# Patient Record
Sex: MALE | Race: BLACK OR AFRICAN AMERICAN | NOT HISPANIC OR LATINO | Employment: OTHER | URBAN - METROPOLITAN AREA
[De-identification: names, ages, dates, MRNs, and addresses within clinical notes are randomized per-mention and may not be internally consistent; named-entity substitution may affect disease eponyms.]

---

## 2017-04-22 ENCOUNTER — HOSPITAL ENCOUNTER (INPATIENT)
Facility: HOSPITAL | Age: 42
LOS: 6 days | Discharge: HOME HEALTH | DRG: 579 | End: 2017-04-28
Attending: EMERGENCY MEDICINE | Admitting: INTERNAL MEDICINE
Payer: MEDICARE

## 2017-04-22 DIAGNOSIS — D69.59 THROMBOCYTOPENIA DUE TO DEFECTIVE PLATELET PRODUCTION: ICD-10-CM

## 2017-04-22 DIAGNOSIS — I12.9 HYPERTENSIVE RENAL DISEASE: ICD-10-CM

## 2017-04-22 DIAGNOSIS — J18.9 PNEUMONIA OF RIGHT UPPER LOBE DUE TO INFECTIOUS ORGANISM: ICD-10-CM

## 2017-04-22 DIAGNOSIS — I50.23 ACUTE ON CHRONIC SYSTOLIC HEART FAILURE: ICD-10-CM

## 2017-04-22 DIAGNOSIS — N18.6 ESRD (END STAGE RENAL DISEASE): ICD-10-CM

## 2017-04-22 DIAGNOSIS — D63.8 ANEMIA OF CHRONIC DISEASE: ICD-10-CM

## 2017-04-22 DIAGNOSIS — R05.9 COUGH: ICD-10-CM

## 2017-04-22 DIAGNOSIS — D72.9 NEUTROPHILIC LEUKOCYTOSIS: ICD-10-CM

## 2017-04-22 DIAGNOSIS — E80.6 HYPERBILIRUBINEMIA: ICD-10-CM

## 2017-04-22 DIAGNOSIS — R18.8 ASCITES: ICD-10-CM

## 2017-04-22 DIAGNOSIS — K75.89 CHOLESTATIC HEPATITIS: ICD-10-CM

## 2017-04-22 DIAGNOSIS — R00.0 TACHYCARDIA: ICD-10-CM

## 2017-04-22 DIAGNOSIS — M62.81 GENERALIZED MUSCLE WEAKNESS: ICD-10-CM

## 2017-04-22 DIAGNOSIS — R00.0 SINUS TACHYCARDIA BY ELECTROCARDIOGRAPHY: ICD-10-CM

## 2017-04-22 DIAGNOSIS — R22.2 MASS OF RIGHT CHEST WALL: Primary | ICD-10-CM

## 2017-04-22 DIAGNOSIS — I42.9 CARDIOMYOPATHY: ICD-10-CM

## 2017-04-22 DIAGNOSIS — I10 HYPERTENSION: ICD-10-CM

## 2017-04-22 PROBLEM — R59.0 LYMPHADENOPATHY, MEDIASTINAL: Status: ACTIVE | Noted: 2017-04-22

## 2017-04-22 LAB
ALBUMIN SERPL BCP-MCNC: 3.1 G/DL
ALP SERPL-CCNC: 274 U/L
ALT SERPL W/O P-5'-P-CCNC: 52 U/L
ANION GAP SERPL CALC-SCNC: 16 MMOL/L
ANISOCYTOSIS BLD QL SMEAR: SLIGHT
AST SERPL-CCNC: 63 U/L
BASOPHILS # BLD AUTO: 0.01 K/UL
BASOPHILS NFR BLD: 0.1 %
BILIRUB SERPL-MCNC: 4.1 MG/DL
BUN SERPL-MCNC: 43 MG/DL
CALCIUM SERPL-MCNC: 9.9 MG/DL
CHLORIDE SERPL-SCNC: 92 MMOL/L
CO2 SERPL-SCNC: 29 MMOL/L
CREAT SERPL-MCNC: 9.5 MG/DL
DIFFERENTIAL METHOD: ABNORMAL
EOSINOPHIL # BLD AUTO: 0 K/UL
EOSINOPHIL NFR BLD: 0.2 %
ERYTHROCYTE [DISTWIDTH] IN BLOOD BY AUTOMATED COUNT: 19.7 %
EST. GFR  (AFRICAN AMERICAN): 7 ML/MIN/1.73 M^2
EST. GFR  (NON AFRICAN AMERICAN): 6 ML/MIN/1.73 M^2
GLUCOSE SERPL-MCNC: 75 MG/DL
HCT VFR BLD AUTO: 34.9 %
HGB BLD-MCNC: 11.3 G/DL
HYPOCHROMIA BLD QL SMEAR: ABNORMAL
LACTATE SERPL-SCNC: 1.5 MMOL/L
LYMPHOCYTES # BLD AUTO: 1.1 K/UL
LYMPHOCYTES NFR BLD: 8.6 %
MAGNESIUM SERPL-MCNC: 2.1 MG/DL
MCH RBC QN AUTO: 28.8 PG
MCHC RBC AUTO-ENTMCNC: 32.4 %
MCV RBC AUTO: 89 FL
MONOCYTES # BLD AUTO: 1.1 K/UL
MONOCYTES NFR BLD: 8.2 %
NEUTROPHILS # BLD AUTO: 10.8 K/UL
NEUTROPHILS NFR BLD: 83.1 %
OVALOCYTES BLD QL SMEAR: ABNORMAL
PHOSPHATE SERPL-MCNC: 6.3 MG/DL
PLATELET # BLD AUTO: 142 K/UL
PMV BLD AUTO: ABNORMAL FL
POCT GLUCOSE: 75 MG/DL (ref 70–110)
POIKILOCYTOSIS BLD QL SMEAR: SLIGHT
POLYCHROMASIA BLD QL SMEAR: ABNORMAL
POTASSIUM SERPL-SCNC: 4.9 MMOL/L
PROT SERPL-MCNC: 9.2 G/DL
RBC # BLD AUTO: 3.93 M/UL
SODIUM SERPL-SCNC: 137 MMOL/L
TARGETS BLD QL SMEAR: ABNORMAL
WBC # BLD AUTO: 13.03 K/UL

## 2017-04-22 PROCEDURE — 11000001 HC ACUTE MED/SURG PRIVATE ROOM

## 2017-04-22 PROCEDURE — 25000003 PHARM REV CODE 250: Performed by: INTERNAL MEDICINE

## 2017-04-22 PROCEDURE — 84100 ASSAY OF PHOSPHORUS: CPT

## 2017-04-22 PROCEDURE — 80100016 HC MAINTENANCE HEMODIALYSIS

## 2017-04-22 PROCEDURE — 83735 ASSAY OF MAGNESIUM: CPT

## 2017-04-22 PROCEDURE — 85025 COMPLETE CBC W/AUTO DIFF WBC: CPT

## 2017-04-22 PROCEDURE — 99285 EMERGENCY DEPT VISIT HI MDM: CPT

## 2017-04-22 PROCEDURE — 93005 ELECTROCARDIOGRAM TRACING: CPT

## 2017-04-22 PROCEDURE — 82962 GLUCOSE BLOOD TEST: CPT

## 2017-04-22 PROCEDURE — 87040 BLOOD CULTURE FOR BACTERIA: CPT

## 2017-04-22 PROCEDURE — 80053 COMPREHEN METABOLIC PANEL: CPT

## 2017-04-22 PROCEDURE — 5A1D60Z PERFORMANCE OF URINARY FILTRATION, MULTIPLE: ICD-10-PCS | Performed by: INTERNAL MEDICINE

## 2017-04-22 PROCEDURE — 63600175 PHARM REV CODE 636 W HCPCS: Performed by: INTERNAL MEDICINE

## 2017-04-22 PROCEDURE — 83605 ASSAY OF LACTIC ACID: CPT

## 2017-04-22 RX ORDER — SODIUM CHLORIDE 9 MG/ML
INJECTION, SOLUTION INTRAVENOUS ONCE
Status: DISCONTINUED | OUTPATIENT
Start: 2017-04-22 | End: 2017-04-28 | Stop reason: HOSPADM

## 2017-04-22 RX ORDER — CARVEDILOL 6.25 MG/1
25 TABLET ORAL 2 TIMES DAILY WITH MEALS
Status: DISCONTINUED | OUTPATIENT
Start: 2017-04-22 | End: 2017-04-28 | Stop reason: HOSPADM

## 2017-04-22 RX ORDER — HEPARIN SODIUM 5000 [USP'U]/ML
5000 INJECTION, SOLUTION INTRAVENOUS; SUBCUTANEOUS EVERY 8 HOURS
Status: DISCONTINUED | OUTPATIENT
Start: 2017-04-22 | End: 2017-04-28 | Stop reason: HOSPADM

## 2017-04-22 RX ORDER — SODIUM CHLORIDE 9 MG/ML
INJECTION, SOLUTION INTRAVENOUS
Status: DISCONTINUED | OUTPATIENT
Start: 2017-04-22 | End: 2017-04-28 | Stop reason: HOSPADM

## 2017-04-22 RX ORDER — RAMELTEON 8 MG/1
8 TABLET ORAL NIGHTLY PRN
Status: DISCONTINUED | OUTPATIENT
Start: 2017-04-22 | End: 2017-04-28 | Stop reason: HOSPADM

## 2017-04-22 RX ADMIN — HEPARIN SODIUM 5000 UNITS: 5000 INJECTION, SOLUTION INTRAVENOUS; SUBCUTANEOUS at 09:04

## 2017-04-22 RX ADMIN — CARVEDILOL 25 MG: 6.25 TABLET, FILM COATED ORAL at 09:04

## 2017-04-22 RX ADMIN — VANCOMYCIN HYDROCHLORIDE 1250 MG: 1 INJECTION, POWDER, LYOPHILIZED, FOR SOLUTION INTRAVENOUS at 05:04

## 2017-04-22 NOTE — ASSESSMENT & PLAN NOTE
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 Consult to IR placed for biopsy. Onc consult placed for further recs.

## 2017-04-22 NOTE — SUBJECTIVE & OBJECTIVE
Past Medical History:   Diagnosis Date    ESRD (end stage renal disease)     Hypertension     Renal disorder        Past Surgical History:   Procedure Laterality Date    DIALYSIS FISTULA CREATION         Review of patient's allergies indicates:   Allergen Reactions    Ciprofloxacin Hives    Iodine and iodide containing products Hives and Itching       No current facility-administered medications on file prior to encounter.      Current Outpatient Prescriptions on File Prior to Encounter   Medication Sig    aspirin 325 MG tablet Take 1 tablet (325 mg total) by mouth once daily.    B COMPLEX WITH VITAMIN C (VITAMIN B COMPLEX WITH C ORAL) Take by mouth.    carvedilol (COREG) 6.25 MG tablet Take 25 mg by mouth 2 (two) times daily with meals.     HYDRALAZINE HCL (HYDRALAZINE ORAL) Take by mouth.    lisinopril (PRINIVIL,ZESTRIL) 40 MG tablet Take 1 tablet (40 mg total) by mouth once daily.    [DISCONTINUED] cinacalcet (SENSIPAR) 30 MG Tab Take 30 mg by mouth daily with breakfast.    [DISCONTINUED] sevelamer carbonate (RENVELA) 800 mg Tab Take 800 mg by mouth 3 (three) times daily with meals.     Family History     Problem Relation (Age of Onset)    Kidney disease Mother        Social History Main Topics    Smoking status: Never Smoker    Smokeless tobacco: Not on file    Alcohol use No    Drug use: No    Sexual activity: Not on file     Review of Systems   Constitutional: Positive for activity change and fatigue. Negative for unexpected weight change.   HENT: Negative.    Eyes: Negative.    Respiratory: Negative.    Cardiovascular: Negative.    Gastrointestinal: Negative.    Endocrine: Negative.    Genitourinary: Negative.    Musculoskeletal: Negative.    Skin: Negative.    Allergic/Immunologic: Positive for immunocompromised state.   Neurological: Positive for weakness.   Hematological: Negative.    Psychiatric/Behavioral: Negative.      Objective:     Vital Signs (Most Recent):  Temp: 97.6 °F (36.4 °C)  (04/22/17 0958)  Pulse: 108 (04/22/17 1118)  Resp: 18 (04/22/17 0958)  BP: 133/86 (04/22/17 1118)  SpO2: 95 % (04/22/17 1118) Vital Signs (24h Range):  Temp:  [97.6 °F (36.4 °C)] 97.6 °F (36.4 °C)  Pulse:  [105-108] 108  Resp:  [18] 18  SpO2:  [95 %-98 %] 95 %  BP: (133-152)/(81-86) 133/86     Weight: 110.7 kg (244 lb)  Body mass index is 41.88 kg/(m^2).    Physical Exam   Constitutional: He is oriented to person, place, and time. He appears well-developed. No distress.   Non toxic appearance   HENT:   Head: Normocephalic and atraumatic.   Mouth/Throat: Oropharynx is clear and moist.   Eyes: Conjunctivae and EOM are normal. Pupils are equal, round, and reactive to light.   Injected sclerae   Neck: Normal range of motion. Neck supple. No thyromegaly present.   No palpable lymphadenopathy   Cardiovascular:   Sinus tachycardia   Pulmonary/Chest: Effort normal and breath sounds normal. No respiratory distress. He has no wheezes. He has no rales. He exhibits no tenderness.   Abdominal: Soft. Bowel sounds are normal. He exhibits no distension. There is no tenderness.   Musculoskeletal: Normal range of motion. He exhibits no edema, tenderness or deformity.   Did not feel a mass on palpation of chest or back   Neurological: He is alert and oriented to person, place, and time. He displays normal reflexes. No cranial nerve deficit. He exhibits normal muscle tone. Coordination normal.   Skin: Skin is warm and dry. He is not diaphoretic. No pallor.   Psychiatric: He has a normal mood and affect. His behavior is normal. Judgment and thought content normal.   Nursing note and vitals reviewed.       Significant Labs: All pertinent labs within the past 24 hours have been reviewed.    Significant Imaging: I have reviewed all pertinent imaging results/findings within the past 24 hours.  I have reviewed and interpreted all pertinent imaging results/findings within the past 24 hours.

## 2017-04-22 NOTE — ED NOTES
Spoke to Dr. Dick and she stated to hold off on piperacillin-tazobactam and not give it and to cancel blood culture #2.

## 2017-04-22 NOTE — ED NOTES
Joseph, RN floor nurse was notified of piperacillin-tazobactam not being administered and being cancelled as well as blood culture #2 is also being cancelled.

## 2017-04-22 NOTE — ASSESSMENT & PLAN NOTE
Patient has no physical exams suggestive of cholecystitis or cholangitis. Is afebrile, BP stable and has non toxic appearance. He does have mild leukocytosis. Hold off on antibiotics for now. I will start with RUQ US with doppler. Metastatic disease?

## 2017-04-22 NOTE — NURSING
Patient being transported to HD room at this time. Lunch arrived to room. Patient insists he is allowed to eat since he has not eaten since yesterday and he becomes ill when he does not eat prior to HD. HD nurse advised that meal be sent with the patient to HD room.

## 2017-04-22 NOTE — PLAN OF CARE
Problem: Fall Risk (Adult)  Intervention: Monitor/Assist with Self Care    04/22/17 1527   Activity   Activity Assistance Provided independent   Daily Care Interventions   Self-Care Promotion independence encouraged   Functional Level Current   Ambulation 0 - independent   Transferring 0 - independent   Toileting 0 - independent   Bathing 0 - independent   Dressing 0 - independent   Eating 0 - independent   Communication 0 - understands/communicates without difficulty         Goal: Identify Related Risk Factors and Signs and Symptoms  Related risk factors and signs and symptoms are identified upon initiation of Human Response Clinical Practice Guideline (CPG)   Outcome: Ongoing (interventions implemented as appropriate)    04/22/17 1527   Fall Risk   Signs and Symptoms (Fall Risk) presence of risk factors       Goal: Absence of Falls  Patient will demonstrate the desired outcomes by discharge/transition of care.   Outcome: Ongoing (interventions implemented as appropriate)    04/22/17 1527   Fall Risk (Adult)   Absence of Falls making progress toward outcome         Problem: Patient Care Overview  Goal: Plan of Care Review  Outcome: Ongoing (interventions implemented as appropriate)    04/22/17 1527   Coping/Psychosocial   Plan Of Care Reviewed With patient       Goal: Individualization & Mutuality  Outcome: Ongoing (interventions implemented as appropriate)    04/22/17 1521   Mutuality/Individual Preferences   What Anxieties, Fears, Concerns, or Questions Do You Have About Your Care? none   What Information Would Help Us Give You More Personalized Care? none       Goal: Discharge Needs Assessment  Outcome: Ongoing (interventions implemented as appropriate)    04/22/17 1527   Living Environment   Transportation Available family or friend will provide

## 2017-04-22 NOTE — H&P
"[facility] Hospital Medicine  History & Physical    Patient Name: Livan Arevalo  MRN: [id number]  Admission Date: 4/22/2017  Attending Physician: Judson Franco MD   Primary Care Provider: Stephon Barrios Jr, MD         Patient information was obtained from patient and ER records.     Subjective:     Principal Problem:<principal problem not specified>    Chief Complaint:   Chief Complaint   Patient presents with    Fatigue     States he does dialysis and is weak         HPI: 42 year old male with ESRD (on HD every MWF), tachyarrhythmia and hypertension who presented with generalized weakness of one day, but then reported at least one week in evolution. Stated inability to get out of bed. Reports his legs give away and falls. Was unable to drive himself to dialysis and missed yesterday's session. Was able to go to Wednesday's session where only one hour was completed as he was "feeling bad". Reported in ED having nausea, vomiting and diarrhea, but did not report this to me. Labs in the ED showed mild leukocytosis with neutrophilia, mild anemia and some thrombocytopenia that is not severe. No bands. Chemistry did not show hyperkalemia or severe uremia, but did show hyperbilirubinemia with cholestatic pattern. Is afebrile and not hypotensive. Does not appear toxic. Breathing comfortably at room air. CXR with a right upper lobe "consolidation". A CT of chest without contrast showed a "Large densely calcified mass arising from the anterior margins of the right 1st rib near the costovertebral junction measuring up to 8.4 cm.  Findings are concerning for neoplasm such as chondrosarcoma. findings are new from prior CT dated 9/2012. Multiple enlarged bilateral axillary lymph nodes and borderline enlarged mediastinal AP window node.  Metastatic adenopathy not excluded."        Past Medical History:   Diagnosis Date    ESRD (end stage renal disease)     Hypertension     Renal disorder        Past " Surgical History:   Procedure Laterality Date    DIALYSIS FISTULA CREATION         Review of patient's allergies indicates:   Allergen Reactions    Ciprofloxacin Hives    Iodine and iodide containing products Hives and Itching       No current facility-administered medications on file prior to encounter.      Current Outpatient Prescriptions on File Prior to Encounter   Medication Sig    aspirin 325 MG tablet Take 1 tablet (325 mg total) by mouth once daily.    B COMPLEX WITH VITAMIN C (VITAMIN B COMPLEX WITH C ORAL) Take by mouth.    carvedilol (COREG) 6.25 MG tablet Take 25 mg by mouth 2 (two) times daily with meals.     HYDRALAZINE HCL (HYDRALAZINE ORAL) Take by mouth.    lisinopril (PRINIVIL,ZESTRIL) 40 MG tablet Take 1 tablet (40 mg total) by mouth once daily.    [DISCONTINUED] cinacalcet (SENSIPAR) 30 MG Tab Take 30 mg by mouth daily with breakfast.    [DISCONTINUED] sevelamer carbonate (RENVELA) 800 mg Tab Take 800 mg by mouth 3 (three) times daily with meals.     Family History     Problem Relation (Age of Onset)    Kidney disease Mother        Social History Main Topics    Smoking status: Never Smoker    Smokeless tobacco: Not on file    Alcohol use No    Drug use: No    Sexual activity: Not on file     Review of Systems   Constitutional: Positive for activity change and fatigue. Negative for unexpected weight change.   HENT: Negative.    Eyes: Negative.    Respiratory: Negative.    Cardiovascular: Negative.    Gastrointestinal: Negative.    Endocrine: Negative.    Genitourinary: Negative.    Musculoskeletal: Negative.    Skin: Negative.    Allergic/Immunologic: Positive for immunocompromised state.   Neurological: Positive for weakness.   Hematological: Negative.    Psychiatric/Behavioral: Negative.      Objective:     Vital Signs (Most Recent):  Temp: 97.6 °F (36.4 °C) (04/22/17 0958)  Pulse: 108 (04/22/17 1118)  Resp: 18 (04/22/17 0958)  BP: 133/86 (04/22/17 1118)  SpO2: 95 % (04/22/17  1118) Vital Signs (24h Range):  Temp:  [97.6 °F (36.4 °C)] 97.6 °F (36.4 °C)  Pulse:  [105-108] 108  Resp:  [18] 18  SpO2:  [95 %-98 %] 95 %  BP: (133-152)/(81-86) 133/86     Weight: 110.7 kg (244 lb)  Body mass index is 41.88 kg/(m^2).    Physical Exam   Constitutional: He is oriented to person, place, and time. He appears well-developed. No distress.   Non toxic appearance   HENT:   Head: Normocephalic and atraumatic.   Mouth/Throat: Oropharynx is clear and moist.   Eyes: Conjunctivae and EOM are normal. Pupils are equal, round, and reactive to light.   Injected sclerae   Neck: Normal range of motion. Neck supple. No thyromegaly present.   No palpable lymphadenopathy   Cardiovascular:   Sinus tachycardia   Pulmonary/Chest: Effort normal and breath sounds normal. No respiratory distress. He has no wheezes. He has no rales. He exhibits no tenderness.   Abdominal: Soft. Bowel sounds are normal. He exhibits no distension. There is no tenderness.   Musculoskeletal: Normal range of motion. He exhibits no edema, tenderness or deformity.   Did not feel a mass on palpation of chest or back   Neurological: He is alert and oriented to person, place, and time. He displays normal reflexes. No cranial nerve deficit. He exhibits normal muscle tone. Coordination normal.   Skin: Skin is warm and dry. He is not diaphoretic. No pallor.   Psychiatric: He has a normal mood and affect. His behavior is normal. Judgment and thought content normal.   Nursing note and vitals reviewed.       Significant Labs: All pertinent labs within the past 24 hours have been reviewed.    Significant Imaging: I have reviewed all pertinent imaging results/findings within the past 24 hours.  I have reviewed and interpreted all pertinent imaging results/findings within the past 24 hours.    Assessment/Plan:     Cholestatic hepatitis  Patient has no physical exams suggestive of cholecystitis or cholangitis. Is afebrile, BP stable and has non toxic  appearance. He does have mild leukocytosis. Hold off on antibiotics for now. I will start with RUQ US with doppler. Metastatic disease?       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 Consult to IR placed for biopsy. Onc consult placed for further recs.       Lymphadenopathy, mediastinal  Metastatic disease?      Generalized muscle weakness  Unknown cause. Workup etiology as mentioned above      Sinus tachycardia by electrocardiography  I will start BB now. Cardiac monitoring      Hypertension  At goal      ESRD (end stage renal disease)  Per nephrology. 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 I will place on chemical DVT prophylaxis as he is at high risk for this      Anemia of chronic disease  Stable and consistent with baseline levels      VTE Risk Mitigation     None        Pamela Dick MD  Department of Hospital Medicine   [facility]

## 2017-04-22 NOTE — IP AVS SNAPSHOT
[place] [address] [place] [address]  Phone: [phone]           Patient Discharge Instructions   Our goal is to set you up for success. This packet includes information on your condition, medications, and your home care.  It will help you care for yourself to prevent having to return to the hospital.     Please ask your nurse if you have any questions.      There are many details to remember when preparing to leave the hospital. Here is what you will need to do:    1. Take your medicine. If you are prescribed medications, review your Medication List on the following pages. You may have new medications to  at the pharmacy and others that you'll need to stop taking. Review the instructions for how and when to take your medications. Talk with your doctor or nurses if you are unsure of what to do.     2. Go to your follow-up appointments. Specific follow-up information is listed in the following pages. Your may be contacted by a nurse or clinical provider about future appointments. Be sure we have all of the phone numbers to reach you. Please contact your provider's office if you are unable to make an appointment.     3. Watch for warning signs. Your doctor or nurse will give you detailed warning signs to watch for and when to call for assistance. These instructions may also include educational information about your condition. If you experience any of warning signs to your health, call your doctor.               ** Verify the list of medication(s) below is accurate and up to date. Carry this with you in case of emergency. If your medications have changed, please notify your healthcare provider.             Medication List      START taking these medications        Additional Info                      atorvastatin 40 MG tablet   Commonly known as:  LIPITOR   Quantity:  30 tablet   Refills:  4   Dose:  40 mg    Last time this was given:  40 mg on 4/27/2017  8:28 AM    Instructions:  Take 1 tablet (40 mg total) by mouth once daily.     Begin Date    AM    Noon    PM    Bedtime       oxycodone-acetaminophen 5-325 mg per tablet   Commonly known as:  PERCOCET   Quantity:  30 tablet   Refills:  0   Dose:  1 tablet    Instructions:  Take 1 tablet by mouth every 4 (four) hours as needed for Pain.     Begin Date    AM    Noon    PM    Bedtime         CONTINUE taking these medications        Additional Info                      aspirin 325 MG tablet   Refills:  0   Dose:  325 mg    Instructions:  Take 1 tablet (325 mg total) by mouth once daily.     Begin Date    AM    Noon    PM    Bedtime       carvedilol 6.25 MG tablet   Commonly known as:  COREG   Refills:  0   Dose:  25 mg    Last time this was given:  25 mg on 4/28/2017  8:55 AM   Instructions:  Take 25 mg by mouth 2 (two) times daily with meals.     Begin Date    AM    Noon    PM    Bedtime       HYDRALAZINE ORAL   Refills:  0    Instructions:  Take by mouth.     Begin Date    AM    Noon    PM    Bedtime       lisinopril 40 MG tablet   Commonly known as:  PRINIVIL,ZESTRIL   Quantity:  90 tablet   Refills:  3   Dose:  40 mg    Instructions:  Take 1 tablet (40 mg total) by mouth once daily.     Begin Date    AM    Noon    PM    Bedtime       VITAMIN B COMPLEX WITH C ORAL   Refills:  0    Instructions:  Take by mouth.     Begin Date    AM    Noon    PM    Bedtime            Where to Get Your Medications      You can get these medications from any pharmacy     Bring a paper prescription for each of these medications     atorvastatin 40 MG tablet    oxycodone-acetaminophen 5-325 mg per tablet                  Please bring to all follow up appointments:    1. A copy of your discharge instructions.  2. All medicines you are currently taking in their original bottles.  3. Identification and insurance card.    Please arrive 15 minutes ahead of scheduled appointment time.    Please call 24 hours in advance if you must reschedule your  "appointment and/or time.        Your Scheduled Appointments     May 11, 2017 10:30 AM CDT   Consult with Sparkle Erickson MD   [facility] ([facility])    [address] [facility] [address] [place] [address] [phone]              Follow-up Information     Follow up with Sparkle Erickson MD On 5/11/2017.    Specialties:  Hematology and Oncology, Hematology    Why:  Appointment scheduled for Thursday May 11 at 10:30am    Contact information:    [address]  [address]  [place] [address]  [phone]          Follow up with [facility].    Specialty:  Home Health Services    Why:  Home Health- number to your home health provider to contact with in questions or concerns that may have for home health.  Nurse will be out to the home on Sunday to admit     Contact information:    [address]  [place] [address]  [phone]          Discharge Instructions     Future Orders    Activity as tolerated     BATH/SHOWER CHAIR FOR HOME USE     Questions:    Height:  5' 4" (1.626 m)    Weight:  108.4 kg (238 lb 15.7 oz)    Does patient have medical equipment at home?:  walker, rolling    shower chair    Length of need (1-99 months):  99    Type:  With back    Diet general     Questions:    Total calories:      Fat restriction, if any:      Protein restriction, if any:      Na restriction, if any:  2gNa    Fluid restriction:      Additional restrictions:      WALKER FOR HOME USE     Questions:    Type of Walker:  Adult (5'4"-6'6")    With wheels?:  Yes    Height:  5' 4" (1.626 m)    Weight:  108.4 kg (238 lb 15.7 oz)    Length of need (1-99 months):  99    Platform attachment:      Accessories/Other:      Assistance needed:      Does patient have medical equipment at home?:  walker, rolling    shower chair    Please check all that apply:  Patient's condition impairs ambulation.        Primary Diagnosis     Your primary diagnosis was:  Mass Of Right Chest Wall      Admission " "Information     Date & Time Provider Department CSN    4/22/2017 10:02 AM Tonny Storm MD [facility] [id number]      Care Providers     Provider Role Specialty Primary office phone    Tonny Storm MD Attending Provider Hospitalist [phone]    Bita Fournier MD Consulting Physician  Nephrology [phone]    Saurabh Soto MD Consulting Physician  Radiology [phone]    Sparkle Erickson MD Consulting Physician  Hematology and Oncology [phone]    Refugio Skelton MD Consulting Physician  Cardiology [phone]    Andrei Doan MD Consulting Physician  Neurology [phone]    Salas Lopez MD Consulting Physician  Internal Medicine  [phone]      Important Medicare Message          Most Recent Value    Important Message from Medicare Regarding Discharge Appeal Rights  Given to patient/caregiver, Explained to patient/caregiver, Signed/date by patient/caregiver yes 04/28/2017 1103      Your Vitals Were     BP Pulse Temp Resp Height Weight    98/60 (BP Location: Right arm, Patient Position: Lying, BP Method: Automatic) 60 97.6 °F (36.4 °C) (Oral) 18 5' 4" (1.626 m) 108.4 kg (238 lb 15.7 oz)    SpO2 BMI             99% 41.02 kg/m2         Recent Lab Values        9/6/2015                           4:55 AM           A1C 6.1                       Pending Labs     Order Current Status    Freeze and Hold,  Collected (04/24/17 1500)    Cytology Specimen-FNA-Radiology Assisted with Path Adequacy-Core BX In process    Fungus culture In process    AFB Culture & Smear Preliminary result      Allergies as of 4/28/2017        Reactions    Ciprofloxacin Hives    Iodine And Iodide Containing Products Hives, Itching      [facility]     [facility] Nurse Care Line - 24/7 Assistance  Unless otherwise directed by your provider, please contact [facility], our nurse care line that is available for 24/7 assistance.     Registered nurses in the [facility] " provide clinical advisement, health education, appointment booking, and other advisory services.  Call for this free service at [phone].        Advance Directives     An advance directive is a document which, in the event you are no longer able to make decisions for yourself, tells your healthcare team what kind of treatment you do or do not want to receive, or who you would like to make those decisions for you.  If you do not currently have an advance directive, [facility] encourages you to create one.  For more information call:  [phone]), [phone] ([phone]),  or log on to [email or web address].        Language Assistance Services     ATTENTION: Language assistance services are available, free of charge. Please call [phone].      ATENCIÓN: Si habla español, tiene a youssef disposición servicios gratuitos de asistencia lingüística. Llame al [phone].     [facility] Ý: N?u b?n nói Ti?ng Vi?t, có các d?ch v? h? tr? ngôn ng? mi?n phí dành cho b?n. G?i s? [phone].        Stroke Education              Pneumonmia Discharge Instructions                Chronic Kindey Disease Education             [facility] Sign-Up     Activating your [facility] account is as easy as 1-2-3!     1) Visit [email or web address], select Sign Up Now, enter this activation code and your date of birth, then select Next.  [email or web address]  Expires: 6/6/2017  1:08 PM      2) Create a username and password to use when you visit [facility] in the future and select a security question in case you lose your password and select Next.    3) Enter your e-mail address and click Sign Up!    Additional Information  If you have questions, please e-mail [email or web address] or call [phone] to talk to our [facility] staff. Remember, [facility] is NOT to be used for urgent needs. For medical emergencies, dial 911.          [facility] complies with applicable Federal civil rights laws and does not  discriminate on the basis of race, color, national origin, age, disability, or sex.

## 2017-04-22 NOTE — PLAN OF CARE
Problem: Hemodialysis (Adult)  Goal: Signs and Symptoms of Listed Potential Problems Will be Absent, Minimized or Managed (Hemodialysis)  Signs and symptoms of listed potential problems will be absent, minimized or managed by discharge/transition of care (reference Hemodialysis (Adult) CPG).  Outcome: Ongoing (interventions implemented as appropriate)    04/22/17 1835   Hemodialysis   Problems Assessed (Hemodialysis) all   Problems Present (Hemodialysis) electrolyte imbalance;fluid imbalance   Hemodialysis as ordered

## 2017-04-22 NOTE — ED TRIAGE NOTES
Pt states that he started feeling sick since yesterday- diarrhea, nausea and vomiting (has not thrown up today), decreased appetite, and weakness. Pt denies abdominal pain, chest pain, sore throat, fever.Pt states that he goes to dialysis typically on Mon, Wed, Fri but only went Monday and went Thursday since he could not make it in Wed. Pt states that he was told he is hypoglycemic and his sugar was 75 this morning. Pt is currently lying in bed, in no acute distress, aunt is at bedside. Bedside commode in room due to continuing episodes of diarrhea.

## 2017-04-22 NOTE — NURSING
Patient arrived from ED. Patient presented AAO x4, denies pain, SOB, but does appear weak. O2 on via NC. Patient had 1 watery BM upon arrival. HD dept contact as patient arrived to unit to advise of the patient having to report to HD dept. Patient made aware of POC. Patient verbalized understanding. Also. US dept called and advised of US order. Patient to have US following completion of HD.

## 2017-04-22 NOTE — PROGRESS NOTES
"Livan Arevalo is a 42 y.o. male patient.    Active Hospital Problems    Diagnosis  POA    Pneumonia of right upper lobe due to infectious organism [J18.1]  Yes      Resolved Hospital Problems    Diagnosis Date Resolved POA   No resolved problems to display.     Temp: 97.6 °F (36.4 °C) (04/22/17 0958)  Pulse: 108 (04/22/17 1118)  Resp: 18 (04/22/17 0958)  BP: 133/86 (04/22/17 1118)  SpO2: 95 % (04/22/17 1118)  Weight: 110.7 kg (244 lb) (04/22/17 0958)  Height: 5' 4" (162.6 cm) (04/22/17 0958)    Subjective:  Symptoms:  Stable.      Objective:  General Appearance:  Ill-appearing, in no acute distress and not in pain.    Vital signs: (most recent): Blood pressure 133/86, pulse 108, temperature 97.6 °F (36.4 °C), resp. rate 18, height 5' 4" (1.626 m), weight 110.7 kg (244 lb), SpO2 95 %.    HEENT: Normal HEENT exam.    Lungs:  Breath sounds clear to auscultation.  No rales.    Heart: S1 normal and S2 normal.    Extremities: There is dependent edema.  (Aneurysm of avf, high pitch)  Neurological: Patient is alert.    Skin:  Warm and dry.    Abdomen: Abdomen is soft.  Bowel sounds are normal.   There is no abdominal tenderness.       Intake/Output - Last 3 Shifts       04/20 0700 - 04/21 0659 04/21 0700 - 04/22 0659 04/22 0700 - 04/23 0659           Stool Occurrence   1 x        Lab Results   Component Value Date    WBC 13.03 (H) 04/22/2017    HGB 11.3 (L) 04/22/2017    HCT 34.9 (L) 04/22/2017    MCV 89 04/22/2017     (L) 04/22/2017     BMP  Lab Results   Component Value Date     04/22/2017    K 4.9 04/22/2017    CL 92 (L) 04/22/2017    CO2 29 04/22/2017    BUN 43 (H) 04/22/2017    CREATININE 9.5 (H) 04/22/2017    CALCIUM 9.9 04/22/2017    ANIONGAP 16 04/22/2017    ESTGFRAFRICA 7 (A) 04/22/2017    EGFRNONAA 6 (A) 04/22/2017     Current Facility-Administered Medications   Medication    0.9%  NaCl infusion    0.9%  NaCl infusion    piperacillin-tazobactam 4.5 g in sodium chloride 0.9% 100 mL IVPB (ready to " mix system)    vancomycin 1 g in dextrose 5 % 250 mL IVPB (ready to mix system)     Current Outpatient Prescriptions   Medication Sig    aspirin 325 MG tablet Take 1 tablet (325 mg total) by mouth once daily.    B COMPLEX WITH VITAMIN C (VITAMIN B COMPLEX WITH C ORAL) Take by mouth.    carvedilol (COREG) 6.25 MG tablet Take 25 mg by mouth 2 (two) times daily with meals.     HYDRALAZINE HCL (HYDRALAZINE ORAL) Take by mouth.    lisinopril (PRINIVIL,ZESTRIL) 40 MG tablet Take 1 tablet (40 mg total) by mouth once daily.       Assessment & Plan  1.ESRD. HD today. Resume MWF.  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 No epo with hd. Add back vitamin.  3.PNA. CAP. Vanc with hd.  4.HTN w/ckd. Uf with hd.   Bita Fournier MD  4/22/2017

## 2017-04-22 NOTE — PROGRESS NOTES
Dr. Dick contacted to notify that IR orders must be stat on weekends and requires physician to physician communication. However, Dr. Dick advises that IR consult is not stat and can wait until Monday, 04/24/17. Dr. Dick was made aware that patient is undergoing dialysis and would have US following completion of HD.

## 2017-04-22 NOTE — ED PROVIDER NOTES
"Encounter Date: 4/22/2017    SCRIBE #1 NOTE: I, Gina Buck , am scribing for, and in the presence of,  Judson Franco MD . I have scribed the following portions of the note - Other sections scribed: HPI and ROS .       History     Chief Complaint   Patient presents with    Fatigue     States he does dialysis and is weak      Review of patient's allergies indicates:   Allergen Reactions    Ciprofloxacin Hives    Iodine and iodide containing products Hives and Itching     HPI Comments: CC: Generalized weakness and Fatigue.  History obtained from patient and relative.   Pt arrived via personal transportation.     HPI: This 42 y.o. Male, who has ESRD (dialyzed M/W/F) and HTN, presents to the ED for evaluation of generalized weakness and fatigue since yesterday. The patient is dialyzed on Mondays, Wednesdays, and Fridays and reports that he received dialysis for 1 hour Wednesday, but was unable to complete usual 3 hour 45 minute session due to "feeling bad." Thus, the patient returned Thursday and received remaining 2 hours and 45 minutes of dialysis. He did not go yesterday because he was having nausea, vomiting, diarrhea, decreased appetite, generalized weakness, and fatigue. He states nausea, vomiting, and diarrhea have resolved today without intervention. He states that CBG was 75 this morning and therefore, he is concerned with hypoglycemia. He has attempted no treatment prior to arrival in the ED. No alleviating factors. He denies fever, cough, congestion, or sore throat. He reports no further symptoms.     The history is provided by the patient and a relative. No  was used.     Past Medical History:   Diagnosis Date    ESRD (end stage renal disease)     Hypertension     Renal disorder      Past Surgical History:   Procedure Laterality Date    DIALYSIS FISTULA CREATION       Family History   Problem Relation Age of Onset    Kidney disease Mother      Social History   Substance Use " Topics    Smoking status: Never Smoker    Smokeless tobacco: None    Alcohol use No     Review of Systems   Constitutional: Positive for appetite change and fatigue.   HENT: Negative for congestion, rhinorrhea and sore throat.    Eyes: Negative for pain and redness.   Respiratory: Negative for cough and shortness of breath.    Cardiovascular: Negative for chest pain and leg swelling.   Gastrointestinal: Positive for diarrhea, nausea and vomiting. Negative for abdominal pain.   Musculoskeletal: Negative for myalgias.   Skin: Negative for rash.   Neurological: Positive for weakness (generalized ).   Psychiatric/Behavioral: Negative for confusion.       Physical Exam   Initial Vitals   BP Pulse Resp Temp SpO2   04/22/17 0958 04/22/17 0958 04/22/17 0958 04/22/17 0958 04/22/17 0958   152/81 105 18 97.6 °F (36.4 °C) 95 %     Physical Exam    Vitals reviewed.  Constitutional: He appears well-developed and well-nourished.   HENT:   Head: Normocephalic and atraumatic.   Eyes: EOM are normal. Pupils are equal, round, and reactive to light.   Neck: Normal range of motion. Neck supple.   Cardiovascular: Regular rhythm, normal heart sounds and intact distal pulses. Tachycardia present.    Pulmonary/Chest: No respiratory distress. He has wheezes in the right upper field and the right middle field. He has rhonchi.   Abdominal: Soft. Bowel sounds are normal.   Musculoskeletal: Normal range of motion.   Neurological: He is alert and oriented to person, place, and time.   Skin: Skin is warm and dry.   Psychiatric: He has a normal mood and affect.         ED Course   Procedures  Labs Reviewed   CBC W/ AUTO DIFFERENTIAL - Abnormal; Notable for the following:        Result Value    WBC 13.03 (*)     RBC 3.93 (*)     Hemoglobin 11.3 (*)     Hematocrit 34.9 (*)     RDW 19.7 (*)     Platelets 142 (*)     Gran # 10.8 (*)     Mono # 1.1 (*)     Gran% 83.1 (*)     Lymph% 8.6 (*)     All other components within normal limits   COMPREHENSIVE  METABOLIC PANEL - Abnormal; Notable for the following:     Chloride 92 (*)     BUN, Bld 43 (*)     Creatinine 9.5 (*)     Total Protein 9.2 (*)     Albumin 3.1 (*)     Total Bilirubin 4.1 (*)     Alkaline Phosphatase 274 (*)     AST 63 (*)     ALT 52 (*)     eGFR if  7 (*)     eGFR if non  6 (*)     All other components within normal limits   PHOSPHORUS - Abnormal; Notable for the following:     Phosphorus 6.3 (*)     All other components within normal limits   CULTURE, BLOOD   CULTURE, BLOOD   MAGNESIUM   LACTIC ACID, PLASMA   POCT GLUCOSE     EKG Readings: (Independently Interpreted)   Initial Reading: No STEMI. Rhythm: Sinus Tachycardia. Ectopy: No Ectopy. Conduction: Normal. ST Segments: Normal ST Segments. T Waves: Normal. Clinical Impression: Normal Sinus Rhythm       X-Rays:   Independently Interpreted Readings:   Chest X-Ray: Normal heart size. There is an infiltrate in the RUL.       Medical decision-making:    The patient received a medical screening exam. If performed, the EKG was independently evaluated by me and is pending final cardiology evaluation.  If performed, all radiographic studies were independently evaluated by me and are pending final radiology evaluation. If labs were ordered, they were reviewed. Vital signs are independently assessed by me.  If performed, the pulse oximetry was independently evaluated by me.  I decided to obtain the patient's past medical record.  If available, I reviewed the patient's past medical record, including most recent labs and radiology reports.    Patient presents to the emergency department for evaluation of fatigue.  Patient had some cough and shortness of breath yesterday.  He is a dialysis patient.  A chest x-ray reveals a right upper lobe pneumonia.  Patient has a leukocytosis and is tachycardic.  Patient requires admission for IV antibiotics.  I've discussed the case with nephrology who will dialyze the patient and  administer vancomycin during dialysis.  The Zosyn will be renally dosed.  Patient will be placed on supplemental oxygen therapy.    I discussed the patient's presentation and workup with the hospitalist who agrees with placing the patient in the hospital.  I have placed orders for the hospitalist.     The results and physical exam findings were reviewed with the patient. Pt agrees with assessment, disposition and treatment plan and has no further questions or complaints at this time.    ANA MARIA Franco M.D. 12:00 PM 4/22/2017              Scribe Attestation:   Scribe #1: I performed the above scribed service and the documentation accurately describes the services I performed. I attest to the accuracy of the note.    Attending Attestation:           Physician Attestation for Scribe:  Physician Attestation Statement for Scribe #1: I, Judson Franco MD , reviewed documentation, as scribed by Gina Buck  in my presence, and it is both accurate and complete.                 ED Course     Clinical Impression:   The primary encounter diagnosis was Pneumonia of right upper lobe due to infectious organism. A diagnosis of Cough was also pertinent to this visit.          Judson Franco MD  04/22/17 1200

## 2017-04-23 PROBLEM — J18.9 PNEUMONIA OF RIGHT UPPER LOBE DUE TO INFECTIOUS ORGANISM: Status: ACTIVE | Noted: 2017-04-23

## 2017-04-23 PROBLEM — D72.9 NEUTROPHILIC LEUKOCYTOSIS: Status: RESOLVED | Noted: 2017-04-22 | Resolved: 2017-04-23

## 2017-04-23 LAB
ALBUMIN SERPL BCP-MCNC: 2.6 G/DL
ALP SERPL-CCNC: 226 U/L
ALT SERPL W/O P-5'-P-CCNC: 39 U/L
AMMONIA PLAS-SCNC: 49 UMOL/L
ANION GAP SERPL CALC-SCNC: 10 MMOL/L
AST SERPL-CCNC: 49 U/L
BASOPHILS # BLD AUTO: 0.01 K/UL
BASOPHILS NFR BLD: 0.1 %
BILIRUB SERPL-MCNC: 2.9 MG/DL
BUN SERPL-MCNC: 27 MG/DL
CALCIUM SERPL-MCNC: 9.6 MG/DL
CHLORIDE SERPL-SCNC: 98 MMOL/L
CO2 SERPL-SCNC: 28 MMOL/L
CREAT SERPL-MCNC: 6.4 MG/DL
DIFFERENTIAL METHOD: ABNORMAL
EOSINOPHIL # BLD AUTO: 0.1 K/UL
EOSINOPHIL NFR BLD: 1.7 %
ERYTHROCYTE [DISTWIDTH] IN BLOOD BY AUTOMATED COUNT: 18.9 %
EST. GFR  (AFRICAN AMERICAN): 11 ML/MIN/1.73 M^2
EST. GFR  (NON AFRICAN AMERICAN): 10 ML/MIN/1.73 M^2
GLUCOSE SERPL-MCNC: 101 MG/DL
HCT VFR BLD AUTO: 31.4 %
HGB BLD-MCNC: 10.3 G/DL
INR PPP: 1.4
LYMPHOCYTES # BLD AUTO: 0.9 K/UL
LYMPHOCYTES NFR BLD: 11.2 %
MCH RBC QN AUTO: 28.1 PG
MCHC RBC AUTO-ENTMCNC: 32.8 %
MCV RBC AUTO: 86 FL
MONOCYTES # BLD AUTO: 0.7 K/UL
MONOCYTES NFR BLD: 8.8 %
NEUTROPHILS # BLD AUTO: 6.1 K/UL
NEUTROPHILS NFR BLD: 78.1 %
PLATELET # BLD AUTO: 138 K/UL
PMV BLD AUTO: 10.7 FL
POTASSIUM SERPL-SCNC: 3.9 MMOL/L
PROT SERPL-MCNC: 8 G/DL
PROTHROMBIN TIME: 14.1 SEC
RBC # BLD AUTO: 3.66 M/UL
SODIUM SERPL-SCNC: 136 MMOL/L
TSH SERPL DL<=0.005 MIU/L-ACNC: 1.22 UIU/ML
WBC # BLD AUTO: 7.84 K/UL

## 2017-04-23 PROCEDURE — 11000001 HC ACUTE MED/SURG PRIVATE ROOM

## 2017-04-23 PROCEDURE — G8978 MOBILITY CURRENT STATUS: HCPCS | Mod: CJ | Performed by: PHYSICAL THERAPIST

## 2017-04-23 PROCEDURE — 85610 PROTHROMBIN TIME: CPT

## 2017-04-23 PROCEDURE — 25000003 PHARM REV CODE 250: Performed by: INTERNAL MEDICINE

## 2017-04-23 PROCEDURE — 82140 ASSAY OF AMMONIA: CPT

## 2017-04-23 PROCEDURE — 99223 1ST HOSP IP/OBS HIGH 75: CPT | Mod: ,,, | Performed by: INTERNAL MEDICINE

## 2017-04-23 PROCEDURE — 36415 COLL VENOUS BLD VENIPUNCTURE: CPT

## 2017-04-23 PROCEDURE — 85025 COMPLETE CBC W/AUTO DIFF WBC: CPT

## 2017-04-23 PROCEDURE — G8979 MOBILITY GOAL STATUS: HCPCS | Mod: CI | Performed by: PHYSICAL THERAPIST

## 2017-04-23 PROCEDURE — 80053 COMPREHEN METABOLIC PANEL: CPT

## 2017-04-23 PROCEDURE — 63600175 PHARM REV CODE 636 W HCPCS: Performed by: INTERNAL MEDICINE

## 2017-04-23 PROCEDURE — 84443 ASSAY THYROID STIM HORMONE: CPT

## 2017-04-23 PROCEDURE — 97162 PT EVAL MOD COMPLEX 30 MIN: CPT | Performed by: PHYSICAL THERAPIST

## 2017-04-23 PROCEDURE — 93306 TTE W/DOPPLER COMPLETE: CPT

## 2017-04-23 PROCEDURE — 93306 TTE W/DOPPLER COMPLETE: CPT | Mod: 26,,, | Performed by: INTERNAL MEDICINE

## 2017-04-23 RX ORDER — DIPHENHYDRAMINE HCL 50 MG
50 CAPSULE ORAL ONCE
Status: COMPLETED | OUTPATIENT
Start: 2017-04-24 | End: 2017-04-24

## 2017-04-23 RX ORDER — LOPERAMIDE HYDROCHLORIDE 2 MG/1
2 CAPSULE ORAL 4 TIMES DAILY PRN
Status: DISCONTINUED | OUTPATIENT
Start: 2017-04-23 | End: 2017-04-28 | Stop reason: HOSPADM

## 2017-04-23 RX ADMIN — CARVEDILOL 25 MG: 6.25 TABLET, FILM COATED ORAL at 08:04

## 2017-04-23 RX ADMIN — HEPARIN SODIUM 5000 UNITS: 5000 INJECTION, SOLUTION INTRAVENOUS; SUBCUTANEOUS at 06:04

## 2017-04-23 RX ADMIN — LOPERAMIDE HYDROCHLORIDE 2 MG: 2 CAPSULE ORAL at 04:04

## 2017-04-23 RX ADMIN — LOPERAMIDE HYDROCHLORIDE 2 MG: 2 CAPSULE ORAL at 10:04

## 2017-04-23 RX ADMIN — HEPARIN SODIUM 5000 UNITS: 5000 INJECTION, SOLUTION INTRAVENOUS; SUBCUTANEOUS at 04:04

## 2017-04-23 RX ADMIN — CARVEDILOL 25 MG: 6.25 TABLET, FILM COATED ORAL at 04:04

## 2017-04-23 RX ADMIN — HEPARIN SODIUM 5000 UNITS: 5000 INJECTION, SOLUTION INTRAVENOUS; SUBCUTANEOUS at 09:04

## 2017-04-23 RX ADMIN — PREDNISONE 50 MG: 5 TABLET ORAL at 10:04

## 2017-04-23 RX ADMIN — RAMELTEON 8 MG: 8 TABLET, FILM COATED ORAL at 09:04

## 2017-04-23 RX ADMIN — Medication 1 CAPSULE: at 08:04

## 2017-04-23 NOTE — PLAN OF CARE
Problem: Patient Care Overview  Goal: Plan of Care Review  Outcome: Ongoing (interventions implemented as appropriate)  Patient had dialysis today, 2200ml fluid removed. Patient has been weak today and was instructed to call for help to ambulate to bathroom. Several episodes diarrhea today. Patient has not voided. US of abdomen done today. No complaints of pain or discomfort. Slept through the night.

## 2017-04-23 NOTE — PROGRESS NOTES
Patient back from dialysis. US notified and patient informed not to eat until after US. V/S wnl, O2 sats 97% on room air. Patient states he feels weak and needs to go to US in bed. Awaiting transport.

## 2017-04-23 NOTE — ASSESSMENT & PLAN NOTE
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 This is a new finding. Consult to IR placed for biopsy. Onc consult placed for further recs. Abd/pelvic CT with contrast ordered but needs pre-treatment with prednisone and benadryl as he has history of hives and pruritus with contrast. Likely to be done tomorrow in AM.

## 2017-04-23 NOTE — PLAN OF CARE
04/23/17 1636   Discharge Assessment   Assessment Type Discharge Planning Assessment   Assessment information obtained from? Patient   Prior to hospitilization cognitive status: Alert/Oriented   Prior to hospitalization functional status: Independent   Current cognitive status: Alert/Oriented   Current Functional Status: Independent   Arrived From home or self-care   Able to Return to Prior Arrangements yes   Is patient able to care for self after discharge? Unable to determine at this time (comments)   How many people do you have in your home that can help with your care after discharge? 0   Who are your caregiver(s) and their phone number(s)? No one   Patient's perception of discharge disposition admitted as an inpatient;home or selfcare   Patient currently being followed by outpatient case management? No   Patient currently receives home health services? No   Does the patient currently use HME? No   Patient currently receives private duty nursing? No   Patient currently receives any other outside agency services? No   Equipment Currently Used at Home walker, rolling;shower chair   Do you have any problems affording any of your prescribed medications? No   Is the patient taking medications as prescribed? yes   Do you have any financial concerns preventing you from receiving the healthcare you need? No   Does the patient have transportation to healthcare appointments? Yes   Transportation Available car   On Dialysis? Yes   If yes, what is the name of the dialysis unit? [facility] MWF 12:00    Does the patient receive outpatient dialysis? Yes   Does the patient receive services at the [facility]? No   Are there any open cases? No   Discharge Plan A Home   Patient/Family In Agreement With Plan yes

## 2017-04-23 NOTE — PT/OT/SLP EVAL
"Physical Therapy  Evaluation    Livan Arevalo   MRN: [id number]   Admitting Diagnosis: <principal problem not specified>    PT Received On: 17  PT Start Time: 1130     PT Stop Time: 1200    PT Total Time (min): 30 min       Billable Minutes:  Evaluation 30 min     Diagnosis: <principal problem not specified>    Past Medical History:   Diagnosis Date    ESRD (end stage renal disease)     Hypertension     Renal disorder       Past Surgical History:   Procedure Laterality Date    DIALYSIS FISTULA CREATION         Referring physician: MD Devi  Date referred to PT: 2017    General Precautions: Standard, fall  Orthopedic Precautions: Full weight bearing   Braces: N/A            Patient History:  Lives With: alone  Living Arrangements: house  Home Accessibility: stairs to enter home, stairs within home  Home Layout: Bedroom on 2nd floor, Bathroom on 2nd floor  Number of Stairs to Enter Home: 8  Number of Stairs Within Home: 5  Stair Railings at Home: outside, present on left side, inside, present on right side  Transportation Available: family or friend will provide  Equipment Currently Used at Home: none  DME owned (not currently used): none    Previous Level of Function:  Ambulation Skills: independent  Transfer Skills: independent  ADL Skills: independent    Subjective:  Communicated with Nurse Jang prior to session.    Chief Complaint: none stated  Patient goals: to return to PLOF    Pain Ratin/10     Objective:   Patient found with: peripheral IV     Cognitive Exam:  Oriented to: Person, Place and Situation    Follows Commands/attention: Follows one-step commands  Communication: clear/fluent  Safety awareness/insight to disability: impaired; Leg "giving out' during ambulation, and pt doesn't want to use an AD.     Physical Exam:  Postural examination/scapula alignment: Rounded shoulder and Head forward    Skin integrity: Visible skin intact  Edema: None noted     Lower Extremity Range of " "Motion:  Right Lower Extremity: WFL  Left Lower Extremity: WFL    Lower Extremity Strength:  Right Lower Extremity: Deficits: Quad = 4-/5  Left Lower Extremity: Deficits: Quad = 4/5     Gross motor coordination: WFL    Functional Mobility:  Bed Mobility:  Rolling/Turning to Left: Stand by assistance, With side rail  Rolling/Turning Right: Stand by assistance, With side rail  Supine to Sit: Contact Guard Assistance, With side rail  Sit to Supine: Contact Guard Assistance, With siderail    Transfers:  Sit <> Stand Assistance: Contact Guard Assistance (- HHA.)  Sit <> Stand Assistive Device: No Assistive Device    Gait:   Gait Distance: 100' with a standing rest break > 1 min and 1 episode of LOB almost falling with R LE "giving out"  Assistance 1: Minimum assistance  Gait Assistive Device: Hand held assist  Gait Pattern: 2-point gait  Gait Deviation(s): decreased toe-to-floor clearance, decreased weight-shifting ability, decreased step length, decreased grace    Balance:   Static Sit: FAIR: Maintains without assist, but unable to take any challenges   Dynamic Sit: FAIR: Cannot move trunk without losing balance  Static Stand: FAIR: Maintains without assist but unable to take challenges  Dynamic stand: FAIR: Needs CONTACT GUARD during gait    AM-PAC 6 CLICK MOBILITY  How much help from another person does this patient currently need?   1 = Unable, Total/Dependent Assistance  2 = A lot, Maximum/Moderate Assistance  3 = A little, Minimum/Contact Guard/Supervision  4 = None, Modified Morton/Independent    Turning over in bed (including adjusting bedclothes, sheets and blankets)?: 4  Sitting down on and standing up from a chair with arms (e.g., wheelchair, bedside commode, etc.): 4  Moving from lying on back to sitting on the side of the bed?: 4  Moving to and from a bed to a chair (including a wheelchair)?: 3  Need to walk in hospital room?: 3  Climbing 3-5 steps with a railing?: 2  Total Score: 20     AM-PAC Raw " Score CMS G-Code Modifier Level of Impairment Assistance   6 % Total / Unable   7 - 9 CM 80 - 100% Maximal Assist   10 - 14 CL 60 - 80% Moderate Assist   15 - 19 CK 40 - 60% Moderate Assist   20 - 22 CJ 20 - 40% Minimal Assist   23 CI 1-20% SBA / CGA   24 CH 0% Independent/ Mod I     Patient left sitting on EOB  with all lines intact and call button in reach.    Assessment:   Livan Arevalo is a 42 y.o. male with a medical diagnosis of <principal problem not specified> and presents with decrease functional independence with ADLs and ambulation distances.  Pt would benefit from skilled PT services to address established deficits in POC to return to PLOF and QOL within previous living environment.    Rehab identified problem list/impairments: Rehab identified problem list/impairments: weakness, impaired endurance, impaired functional mobilty, gait instability, decreased lower extremity function, decreased safety awareness    Rehab potential is good.    Activity tolerance: Good    Discharge recommendations: Discharge Facility/Level Of Care Needs: outpatient PT     Barriers to discharge: Barriers to Discharge: Decreased caregiver support, Inaccessible home environment    Equipment recommendations: Equipment Needed After Discharge: walker, rolling, bath bench     GOALS:   Physical Therapy Goals        Problem: Physical Therapy Goal    Goal Priority Disciplines Outcome Goal Variances Interventions   Physical Therapy Goal     PT/OT, PT      Description:  Goals to be met by: 2017    Patient will increase functional independence with mobility by performin. Sit to stand transfer with Taylor  2. Gait  x 350 feet with Modified Taylor using Rolling Walker.    Recommend: Outpatient Physical Therapy, Rolling Walker, and Transfer Tub Bench at time of discharge.                  PLAN:    Patient to be seen 5 x/week to address the above listed problems via gait training, therapeutic activities,  therapeutic exercises  Plan of Care expires: 05/06/17  Plan of Care reviewed with: patient    Functional Assessment Tool Used: AM PAC  Score: 20  Functional Limitation: Mobility: Walking and moving around  Mobility: Walking and Moving Around Current Status (): CJ  Mobility: Walking and Moving Around Goal Status (): HARMEET Jones, PT  04/23/2017

## 2017-04-23 NOTE — SUBJECTIVE & OBJECTIVE
Oncology Treatment Plan:   [No treatment plan]    Medications:  Continuous Infusions:   Scheduled Meds:   sodium chloride 0.9%   Intravenous Once    carvedilol  25 mg Oral BID WM    [START ON 4/24/2017] diphenhydrAMINE  50 mg Oral Once    heparin (porcine)  5,000 Units Subcutaneous Q8H    predniSONE  50 mg Oral Once    [START ON 4/24/2017] predniSONE  50 mg Oral Once    vitamin renal formula (B-complex-vitamin c-folic acid)  1 capsule Oral Daily     PRN Meds:sodium chloride 0.9%, loperamide, ramelteon     Review of patient's allergies indicates:   Allergen Reactions    Ciprofloxacin Hives    Iodine and iodide containing products Hives and Itching        Past Medical History:   Diagnosis Date    ESRD (end stage renal disease)     Hypertension     Renal disorder      Past Surgical History:   Procedure Laterality Date    DIALYSIS FISTULA CREATION       Family History     Problem Relation (Age of Onset)    Kidney disease Mother        Social History Main Topics    Smoking status: Never Smoker    Smokeless tobacco: Not on file    Alcohol use No    Drug use: No    Sexual activity: Not on file       Review of Systems   Constitutional: Positive for fatigue. Negative for appetite change, fever and unexpected weight change.   HENT: Negative for mouth sores and nosebleeds.    Eyes: Negative for visual disturbance.   Respiratory: Negative for cough and shortness of breath.    Cardiovascular: Negative for chest pain and leg swelling.   Gastrointestinal: Positive for diarrhea. Negative for abdominal pain, blood in stool, constipation, nausea and vomiting.   Genitourinary: Negative for frequency and hematuria.   Musculoskeletal: Negative for arthralgias and back pain.   Skin: Negative for rash.   Neurological: Positive for weakness. Negative for dizziness, light-headedness, numbness and headaches.   Hematological: Negative for adenopathy.   Psychiatric/Behavioral: The patient is not nervous/anxious.       Objective:     Vital Signs (Most Recent):  Temp: 97.7 °F (36.5 °C) (04/23/17 1730)  Pulse: 106 (04/23/17 1730)  Resp: 18 (04/23/17 1730)  BP: 130/70 (04/23/17 1730)  SpO2: 98 % (04/23/17 1730) Vital Signs (24h Range):  Temp:  [97.7 °F (36.5 °C)-98.7 °F (37.1 °C)] 97.7 °F (36.5 °C)  Pulse:  [104-110] 106  Resp:  [17-20] 18  SpO2:  [93 %-98 %] 98 %  BP: (109-138)/(57-81) 130/70     Weight: 108.4 kg (238 lb 15.7 oz)  Body mass index is 41.02 kg/(m^2).  Body surface area is 2.21 meters squared.      Intake/Output Summary (Last 24 hours) at 04/23/17 1837  Last data filed at 04/23/17 1800   Gross per 24 hour   Intake             1250 ml   Output             2850 ml   Net            -1600 ml       Physical Exam  Physical Exam   Constitutional: He is oriented to person, place, and time. He appears well-developed and well-nourished, appears older than stated age in NAD  HENT:   Head: Normocephalic.   Mouth/Throat: Oropharynx is clear and moist. No oropharyngeal exudate.   Eyes: No scleral icterus.   Neck: Normal range of motion. Neck supple. No thyromegaly present.   Cardiovascular: Normal rate, regular rhythm and normal heart sounds.    No murmur heard.  Pulmonary/Chest: Effort normal . CTA B.  He has no wheezes. He has no rales.   Abdominal: Soft. Bowel sounds are normal. He exhibits no distension. There is no tenderness. There is no rebound and no guarding.   Musculoskeletal: Normal range of motion. He exhibits no edema.   Neurological: He is alert and oriented to person, place, and time. No cranial nerve deficit.   Skin: No rash noted. No erythema.   Psychiatric: He has a normal mood and affect.       Significant Labs:   All pertinent labs within the past 24 hours have been reviewed  Lab Results   Component Value Date    WBC 7.84 04/23/2017    HGB 10.3 (L) 04/23/2017    HCT 31.4 (L) 04/23/2017    MCV 86 04/23/2017     (L) 04/23/2017         Diagnostic Results:  I have reviewed and interpreted all pertinent  imaging results/findings within the past 24 hours    CT chest w/out contrast 4/22/2017  Large densely calcified mass arising from the anterior margins of the right 1st rib near the costovertebral junction measuring up to 8.4 cm.  Findings are concerning for neoplasm such as chondrosarcoma. findings are new from prior CT dated 9/2012.      Multiple enlarged bilateral axillary lymph nodes and borderline enlarged mediastinal AP window node.  Metastatic adenopathy not excluded.

## 2017-04-23 NOTE — PLAN OF CARE
Problem: Physical Therapy Goal  Goal: Physical Therapy Goal  Goals to be met by: 2017    Patient will increase functional independence with mobility by performin. Sit to stand transfer with Turner  2. Gait x 350 feet with Modified Turner using Rolling Walker.    Recommend: Outpatient Physical Therapy, Rolling Walker, and Transfer Tub Bench at time of discharge.   Rik Jones,PT  2017

## 2017-04-23 NOTE — ASSESSMENT & PLAN NOTE
"Patient has no physical exams suggestive of cholecystitis or cholangitis. Is afebrile and has non toxic appearance. He does have mild leukocytosis. Ultrasound showed  "Cholelithiasis and trace volume ascites.  Diffuse gallbladder wall thickening without associated hyperemia or biliary ductal dilatation, probably related to ascites/hepatic dysfunction..Hepatic venous increased pulsatility, commonly cardiac related. Otherwise, limited liver Doppler ultrasound within normal limits...Right hepatic 2.1 cm echogenic mass suggestive of a hemangioma, but remains incompletely characterized." Metastatic disease? Bilirubin, AP and liver enzymes all improved after fluid removal via HD. Will get 2D Echo. Hold off on antibiotics for now.    "

## 2017-04-23 NOTE — PROGRESS NOTES
"[facility]  Progress Note    Patient Name: Livan Arevalo  MRN: [id number]  Patient Class: IP- Inpatient   Admission Date: 4/22/2017  Length of Stay: 1 days  Attending Physician: Pamela Quinonez MD  Primary Care Provider: Stephon Barrios Jr, MD        Subjective:     Principal Problem:<principal problem not specified>    HPI:  42 year old male with ESRD (on HD every MWF), tachyarrhythmia and hypertension who presented with generalized weakness of one day, but then reported at least one week in evolution. Stated inability to get out of bed. Reports his legs give away and falls. Was unable to drive himself to dialysis and missed yesterday's session. Was able to go to Wednesday's session where only one hour was completed as he was "feeling bad". Reported in ED having nausea, vomiting and diarrhea, but did not report this to me. Labs in the ED showed mild leukocytosis with neutrophilia, mild anemia and some thrombocytopenia that is not severe. No bands. Chemistry did not show hyperkalemia or severe uremia, but did show hyperbilirubinemia with cholestatic pattern. Is afebrile and not hypotensive. Does not appear toxic. Breathing comfortably at room air. CXR with a right upper lobe "consolidation". A CT of chest without contrast showed a "Large densely calcified mass arising from the anterior margins of the right 1st rib near the costovertebral junction measuring up to 8.4 cm.  Findings are concerning for neoplasm such as chondrosarcoma. findings are new from prior CT dated 9/2012. Multiple enlarged bilateral axillary lymph nodes and borderline enlarged mediastinal AP window node.  Metastatic adenopathy not excluded."        Hospital Course:       Interval History: no events overnight. Patient reports loose stools this AM. Had none yesterday    Review of Systems   Constitutional: Positive for activity change and fatigue.   Respiratory: Negative.    Cardiovascular: Negative.  "   Gastrointestinal:        Loose stools   Endocrine: Negative.    Allergic/Immunologic: Positive for immunocompromised state.   Neurological: Positive for weakness.   Hematological: Negative.    Psychiatric/Behavioral: Negative.      Objective:     Vital Signs (Most Recent):  Temp: 98 °F (36.7 °C) (04/23/17 0830)  Pulse: 104 (04/23/17 0830)  Resp: 17 (04/23/17 0830)  BP: 138/76 (04/23/17 0830)  SpO2: 97 % (04/23/17 0830) Vital Signs (24h Range):  Temp:  [97.6 °F (36.4 °C)-98.7 °F (37.1 °C)] 98 °F (36.7 °C)  Pulse:  [103-111] 104  Resp:  [17-20] 17  SpO2:  [93 %-100 %] 97 %  BP: ()/() 138/76     Weight: 108.4 kg (238 lb 15.7 oz)  Body mass index is 41.02 kg/(m^2).    Intake/Output Summary (Last 24 hours) at 04/23/17 0955  Last data filed at 04/22/17 2200   Gross per 24 hour   Intake              890 ml   Output             2850 ml   Net            -1960 ml      Physical Exam   Constitutional: He is oriented to person, place, and time. He appears well-developed. No distress.   Non toxic appearance   HENT:   Head: Normocephalic and atraumatic.   Mouth/Throat: Oropharynx is clear and moist.   Eyes: Conjunctivae and EOM are normal. Pupils are equal, round, and reactive to light.   Injected sclerae   Neck: Normal range of motion. Neck supple. No thyromegaly present.   No palpable lymphadenopathy   Cardiovascular:   Sinus tachycardia   Pulmonary/Chest: Effort normal and breath sounds normal. No respiratory distress. He has no wheezes. He has no rales. He exhibits no tenderness.   Abdominal: Soft. Bowel sounds are normal. He exhibits no distension. There is no tenderness.   Musculoskeletal: Normal range of motion. He exhibits no edema, tenderness or deformity.   Did not feel a mass on palpation of chest or back   Neurological: He is alert and oriented to person, place, and time. He displays normal reflexes. No cranial nerve deficit. He exhibits normal muscle tone. Coordination normal.   Skin: Skin is warm and  "dry. He is not diaphoretic. No pallor.   Psychiatric: He has a normal mood and affect. His behavior is normal. Judgment and thought content normal.   Nursing note and vitals reviewed.      Significant Labs: All pertinent labs within the past 24 hours have been reviewed.    Significant Imaging: I have reviewed all pertinent imaging results/findings within the past 24 hours.  I have reviewed and interpreted all pertinent imaging results/findings within the past 24 hours.    Assessment/Plan:      Cholestatic hepatitis  Patient has no physical exams suggestive of cholecystitis or cholangitis. Is afebrile and has non toxic appearance. He does have mild leukocytosis. Ultrasound showed  "Cholelithiasis and trace volume ascites.  Diffuse gallbladder wall thickening without associated hyperemia or biliary ductal dilatation, probably related to ascites/hepatic dysfunction..Hepatic venous increased pulsatility, commonly cardiac related. Otherwise, limited liver Doppler ultrasound within normal limits...Right hepatic 2.1 cm echogenic mass suggestive of a hemangioma, but remains incompletely characterized." Metastatic disease? Bilirubin, AP and liver enzymes all improved after fluid removal via HD. Will get 2D Echo. Hold off on antibiotics for now.      Mass of right chest wall  Concern for chondrosarcoma vs other type of malignancy. This is a new finding. Consult to IR placed for biopsy. Onc consult placed for further recs. Abd/pelvic CT with contrast ordered but needs pre-treatment with prednisone and benadryl as he has history of hives and pruritus with contrast. Likely to be done tomorrow in AM.       Neutrophilic leukocytosis, resolved as of 4/23/2017  resolved      Lymphadenopathy, mediastinal  Metastatic disease?      Generalized muscle weakness  Unknown cause. Workup etiology as mentioned above. PT to evaluate today      Sinus tachycardia by electrocardiography  On BB. Cardiac monitoring      Hypertension  At " goal      ESRD (end stage renal disease)  Per nephrology. Further recs very much appreciated. 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 Continue on chemical DVT prophylaxis as he is at high risk for this      Anemia of chronic disease  Stable and consistent with baseline levels      VTE Risk Mitigation         Ordered     heparin (porcine) injection 5,000 Units  Every 8 hours     Route:  Subcutaneous        04/22/17 1324     Medium Risk of VTE  Once      04/22/17 1324     Place sequential compression device  Until discontinued      04/22/17 1324     Place BEN hose  Until discontinued      04/22/17 1324        Pending Onc eval. Biopsy and Abd/pelv CT tomorrow. Routine HD    Pamela Dick MD  Department of Hospital Medicine   [facility]

## 2017-04-23 NOTE — SUBJECTIVE & OBJECTIVE
Interval History: no events overnight. Patient reports loose stools this AM. Had none yesterday    Review of Systems   Constitutional: Positive for activity change and fatigue.   Respiratory: Negative.    Cardiovascular: Negative.    Gastrointestinal:        Loose stools   Endocrine: Negative.    Allergic/Immunologic: Positive for immunocompromised state.   Neurological: Positive for weakness.   Hematological: Negative.    Psychiatric/Behavioral: Negative.      Objective:     Vital Signs (Most Recent):  Temp: 98 °F (36.7 °C) (04/23/17 0830)  Pulse: 104 (04/23/17 0830)  Resp: 17 (04/23/17 0830)  BP: 138/76 (04/23/17 0830)  SpO2: 97 % (04/23/17 0830) Vital Signs (24h Range):  Temp:  [97.6 °F (36.4 °C)-98.7 °F (37.1 °C)] 98 °F (36.7 °C)  Pulse:  [103-111] 104  Resp:  [17-20] 17  SpO2:  [93 %-100 %] 97 %  BP: ()/() 138/76     Weight: 108.4 kg (238 lb 15.7 oz)  Body mass index is 41.02 kg/(m^2).    Intake/Output Summary (Last 24 hours) at 04/23/17 0955  Last data filed at 04/22/17 2200   Gross per 24 hour   Intake              890 ml   Output             2850 ml   Net            -1960 ml      Physical Exam   Constitutional: He is oriented to person, place, and time. He appears well-developed. No distress.   Non toxic appearance   HENT:   Head: Normocephalic and atraumatic.   Mouth/Throat: Oropharynx is clear and moist.   Eyes: Conjunctivae and EOM are normal. Pupils are equal, round, and reactive to light.   Injected sclerae   Neck: Normal range of motion. Neck supple. No thyromegaly present.   No palpable lymphadenopathy   Cardiovascular:   Sinus tachycardia   Pulmonary/Chest: Effort normal and breath sounds normal. No respiratory distress. He has no wheezes. He has no rales. He exhibits no tenderness.   Abdominal: Soft. Bowel sounds are normal. He exhibits no distension. There is no tenderness.   Musculoskeletal: Normal range of motion. He exhibits no edema, tenderness or deformity.   Did not feel a mass on  palpation of chest or back   Neurological: He is alert and oriented to person, place, and time. He displays normal reflexes. No cranial nerve deficit. He exhibits normal muscle tone. Coordination normal.   Skin: Skin is warm and dry. He is not diaphoretic. No pallor.   Psychiatric: He has a normal mood and affect. His behavior is normal. Judgment and thought content normal.   Nursing note and vitals reviewed.      Significant Labs: All pertinent labs within the past 24 hours have been reviewed.    Significant Imaging: I have reviewed all pertinent imaging results/findings within the past 24 hours.  I have reviewed and interpreted all pertinent imaging results/findings within the past 24 hours.

## 2017-04-24 PROBLEM — I07.1 MODERATE TRICUSPID REGURGITATION BY PRIOR ECHOCARDIOGRAM: Status: ACTIVE | Noted: 2017-04-24

## 2017-04-24 PROBLEM — I34.0 MODERATE MITRAL REGURGITATION BY PRIOR ECHOCARDIOGRAM: Status: ACTIVE | Noted: 2017-04-24

## 2017-04-24 PROBLEM — R00.0 SINUS TACHYCARDIA BY ELECTROCARDIOGRAPHY: Status: RESOLVED | Noted: 2017-04-22 | Resolved: 2017-04-24

## 2017-04-24 PROBLEM — I50.23 ACUTE ON CHRONIC SYSTOLIC HEART FAILURE: Status: ACTIVE | Noted: 2017-04-24

## 2017-04-24 PROBLEM — I27.20 PULMONARY HYPERTENSION: Status: ACTIVE | Noted: 2017-04-24

## 2017-04-24 LAB
ALBUMIN SERPL BCP-MCNC: 2.7 G/DL
ALP SERPL-CCNC: 278 U/L
ALT SERPL W/O P-5'-P-CCNC: 34 U/L
ANA SER QL IF: NORMAL
ANION GAP SERPL CALC-SCNC: 12 MMOL/L
APTT BLDCRRT: 32 SEC
AST SERPL-CCNC: 35 U/L
BILIRUB SERPL-MCNC: 1.8 MG/DL
BUN SERPL-MCNC: 42 MG/DL
CALCIUM SERPL-MCNC: 9.1 MG/DL
CHLORIDE SERPL-SCNC: 96 MMOL/L
CK SERPL-CCNC: 262 U/L
CO2 SERPL-SCNC: 25 MMOL/L
CREAT SERPL-MCNC: 8.1 MG/DL
EST. GFR  (AFRICAN AMERICAN): 9 ML/MIN/1.73 M^2
EST. GFR  (NON AFRICAN AMERICAN): 7 ML/MIN/1.73 M^2
ESTIMATED PA SYSTOLIC PRESSURE: 48.41
FERRITIN SERPL-MCNC: 1429 NG/ML
GLUCOSE SERPL-MCNC: 219 MG/DL
HIV 1+2 AB+HIV1 P24 AG SERPL QL IA: NEGATIVE
IRON SERPL-MCNC: 121 UG/DL
LDH SERPL L TO P-CCNC: 138 U/L
LDH SERPL L TO P-CCNC: 147 U/L
MITRAL VALVE REGURGITATION: ABNORMAL
PHOSPHATE SERPL-MCNC: 6 MG/DL
POTASSIUM SERPL-SCNC: 4.6 MMOL/L
PROT SERPL-MCNC: 8.3 G/DL
RETICS/RBC NFR AUTO: 1.6 %
RETIRED EF AND QEF - SEE NOTES: 30 (ref 55–65)
SATURATED IRON: 83 %
SODIUM SERPL-SCNC: 133 MMOL/L
TOTAL IRON BINDING CAPACITY: 145 UG/DL
TRANSFERRIN SERPL-MCNC: 98 MG/DL
TRICUSPID VALVE REGURGITATION: ABNORMAL

## 2017-04-24 PROCEDURE — 80053 COMPREHEN METABOLIC PANEL: CPT

## 2017-04-24 PROCEDURE — 83615 LACTATE (LD) (LDH) ENZYME: CPT | Mod: 91

## 2017-04-24 PROCEDURE — 87102 FUNGUS ISOLATION CULTURE: CPT

## 2017-04-24 PROCEDURE — 83540 ASSAY OF IRON: CPT

## 2017-04-24 PROCEDURE — 63600175 PHARM REV CODE 636 W HCPCS: Performed by: INTERNAL MEDICINE

## 2017-04-24 PROCEDURE — 85730 THROMBOPLASTIN TIME PARTIAL: CPT

## 2017-04-24 PROCEDURE — 63600175 PHARM REV CODE 636 W HCPCS: Performed by: RADIOLOGY

## 2017-04-24 PROCEDURE — 82728 ASSAY OF FERRITIN: CPT

## 2017-04-24 PROCEDURE — 88307 TISSUE EXAM BY PATHOLOGIST: CPT | Mod: 26,,, | Performed by: PATHOLOGY

## 2017-04-24 PROCEDURE — 11000001 HC ACUTE MED/SURG PRIVATE ROOM

## 2017-04-24 PROCEDURE — 86038 ANTINUCLEAR ANTIBODIES: CPT

## 2017-04-24 PROCEDURE — 25000003 PHARM REV CODE 250: Performed by: INTERNAL MEDICINE

## 2017-04-24 PROCEDURE — 99000 SPECIMEN HANDLING OFFICE-LAB: CPT

## 2017-04-24 PROCEDURE — 36415 COLL VENOUS BLD VENIPUNCTURE: CPT

## 2017-04-24 PROCEDURE — 83615 LACTATE (LD) (LDH) ENZYME: CPT

## 2017-04-24 PROCEDURE — 84100 ASSAY OF PHOSPHORUS: CPT

## 2017-04-24 PROCEDURE — 99233 SBSQ HOSP IP/OBS HIGH 50: CPT | Mod: ,,, | Performed by: INTERNAL MEDICINE

## 2017-04-24 PROCEDURE — 0PB13ZX EXCISION OF 1 TO 2 RIBS, PERCUTANEOUS APPROACH, DIAGNOSTIC: ICD-10-PCS | Performed by: RADIOLOGY

## 2017-04-24 PROCEDURE — 88307 TISSUE EXAM BY PATHOLOGIST: CPT | Performed by: PATHOLOGY

## 2017-04-24 PROCEDURE — 07973ZX DRAINAGE OF THORAX LYMPHATIC, PERCUTANEOUS APPROACH, DIAGNOSTIC: ICD-10-PCS | Performed by: RADIOLOGY

## 2017-04-24 PROCEDURE — 80074 ACUTE HEPATITIS PANEL: CPT

## 2017-04-24 PROCEDURE — 86703 HIV-1/HIV-2 1 RESULT ANTBDY: CPT

## 2017-04-24 PROCEDURE — 82550 ASSAY OF CK (CPK): CPT

## 2017-04-24 PROCEDURE — 87116 MYCOBACTERIA CULTURE: CPT

## 2017-04-24 PROCEDURE — 85045 AUTOMATED RETICULOCYTE COUNT: CPT

## 2017-04-24 PROCEDURE — 80100016 HC MAINTENANCE HEMODIALYSIS

## 2017-04-24 PROCEDURE — 25500020 PHARM REV CODE 255: Performed by: INTERNAL MEDICINE

## 2017-04-24 RX ORDER — ASPIRIN 81 MG/1
81 TABLET ORAL DAILY
Status: DISCONTINUED | OUTPATIENT
Start: 2017-04-24 | End: 2017-04-28 | Stop reason: HOSPADM

## 2017-04-24 RX ORDER — ATORVASTATIN CALCIUM 40 MG/1
40 TABLET, FILM COATED ORAL DAILY
Status: DISCONTINUED | OUTPATIENT
Start: 2017-04-24 | End: 2017-04-28 | Stop reason: HOSPADM

## 2017-04-24 RX ORDER — FENTANYL CITRATE 50 UG/ML
INJECTION, SOLUTION INTRAMUSCULAR; INTRAVENOUS CODE/TRAUMA/SEDATION MEDICATION
Status: COMPLETED | OUTPATIENT
Start: 2017-04-24 | End: 2017-04-24

## 2017-04-24 RX ORDER — LORAZEPAM 2 MG/ML
1 INJECTION INTRAMUSCULAR ONCE
Status: COMPLETED | OUTPATIENT
Start: 2017-04-24 | End: 2017-04-24

## 2017-04-24 RX ORDER — MIDAZOLAM HYDROCHLORIDE 1 MG/ML
INJECTION, SOLUTION INTRAMUSCULAR; INTRAVENOUS CODE/TRAUMA/SEDATION MEDICATION
Status: COMPLETED | OUTPATIENT
Start: 2017-04-24 | End: 2017-04-24

## 2017-04-24 RX ADMIN — FENTANYL CITRATE 50 MCG: 50 INJECTION, SOLUTION INTRAMUSCULAR; INTRAVENOUS at 02:04

## 2017-04-24 RX ADMIN — MIDAZOLAM HYDROCHLORIDE 1 MG: 1 INJECTION INTRAMUSCULAR; INTRAVENOUS at 02:04

## 2017-04-24 RX ADMIN — HEPARIN SODIUM 5000 UNITS: 5000 INJECTION, SOLUTION INTRAVENOUS; SUBCUTANEOUS at 05:04

## 2017-04-24 RX ADMIN — DIPHENHYDRAMINE HYDROCHLORIDE 50 MG: 50 CAPSULE ORAL at 07:04

## 2017-04-24 RX ADMIN — HEPARIN SODIUM 5000 UNITS: 5000 INJECTION, SOLUTION INTRAVENOUS; SUBCUTANEOUS at 04:04

## 2017-04-24 RX ADMIN — IOHEXOL 100 ML: 350 INJECTION, SOLUTION INTRAVENOUS at 08:04

## 2017-04-24 RX ADMIN — IOHEXOL 15 ML: 300 INJECTION, SOLUTION INTRAVENOUS at 08:04

## 2017-04-24 RX ADMIN — RAMELTEON 8 MG: 8 TABLET, FILM COATED ORAL at 08:04

## 2017-04-24 RX ADMIN — LORAZEPAM 1 MG: 2 INJECTION, SOLUTION INTRAMUSCULAR; INTRAVENOUS at 06:04

## 2017-04-24 RX ADMIN — ASPIRIN 81 MG: 81 TABLET, COATED ORAL at 08:04

## 2017-04-24 RX ADMIN — PREDNISONE 50 MG: 5 TABLET ORAL at 07:04

## 2017-04-24 RX ADMIN — Medication 1 CAPSULE: at 04:04

## 2017-04-24 NOTE — ASSESSMENT & PLAN NOTE
Imaging studies reviewed. IR-guided bx planned in am. Findings concerning for neoplasm. It is also recommended he undergo bx of axillary LN at same time if area amenable to bx to evaluate for involvement.   Await histologic confirmation CT a/p planned in am. Further imaging studies may be warranted based on imaging and pathology findings

## 2017-04-24 NOTE — ASSESSMENT & PLAN NOTE
Etiology unclear.Cannot r/o neoplasm related. Pt may need to undergo further evaluation with MRI spine. No bony TTP or back pain.  Await CT a/p findings

## 2017-04-24 NOTE — CONSULTS
"[facility]  Hematology/Oncology  Consult Note    Patient Name: Livan Arevalo  MRN: [id number]  Admission Date: 4/22/2017  Hospital Length of Stay: 1 days  Code Status: Full Code   Attending Provider: Pamela Quinonez MD  Consulting Provider: Sparkle Erickson MD  Primary Care Physician: Stephon Barrios Jr, MD  Principal Problem:<principal problem not specified>    Consults  Subjective:     HPI:  42 year old male with ESRD on HD and HTN  presented  generalized weakness x 1 day. Pt also reports he has suffered 2-3 falls past week. He reports intermittent nausea w/out vomiting which is not a change since undergoing dialysis. He also notes loose stools x 3days,non-bloody. No bowel or bladder incontinence. No fevers. No back pain. No numbness or tingling in LE. No SOB/cough. No HA/vision changes.  Abnml CXR revealing RUL opacity. CT of chest w/out contrast abnormal and  shows a "Large densely calcified mass arising from the anterior margins of the right 1st rib near the costovertebral junction measuring up to 8.4 cm concerning for neoplasm such as chondrosarcoma. In addition, multiple enlarged bilateral axillary lymph nodes and borderline enlarged mediastinal AP window node noted . IR-guided bx planned in am. .     Oncology Treatment Plan:   [No treatment plan]    Medications:  Continuous Infusions:   Scheduled Meds:   sodium chloride 0.9%   Intravenous Once    carvedilol  25 mg Oral BID WM    [START ON 4/24/2017] diphenhydrAMINE  50 mg Oral Once    heparin (porcine)  5,000 Units Subcutaneous Q8H    predniSONE  50 mg Oral Once    [START ON 4/24/2017] predniSONE  50 mg Oral Once    vitamin renal formula (B-complex-vitamin c-folic acid)  1 capsule Oral Daily     PRN Meds:sodium chloride 0.9%, loperamide, ramelteon     Review of patient's allergies indicates:   Allergen Reactions    Ciprofloxacin Hives    Iodine and iodide containing products Hives and Itching        Past Medical History: "   Diagnosis Date    ESRD (end stage renal disease)     Hypertension     Renal disorder      Past Surgical History:   Procedure Laterality Date    DIALYSIS FISTULA CREATION       Family History     Problem Relation (Age of Onset)    Kidney disease Mother        Social History Main Topics    Smoking status: Never Smoker    Smokeless tobacco: Not on file    Alcohol use No    Drug use: No    Sexual activity: Not on file       Review of Systems   Constitutional: Positive for fatigue. Negative for appetite change, fever and unexpected weight change.   HENT: Negative for mouth sores and nosebleeds.    Eyes: Negative for visual disturbance.   Respiratory: Negative for cough and shortness of breath.    Cardiovascular: Negative for chest pain and leg swelling.   Gastrointestinal: Positive for diarrhea. Negative for abdominal pain, blood in stool, constipation, nausea and vomiting.   Genitourinary: Negative for frequency and hematuria.   Musculoskeletal: Negative for arthralgias and back pain.   Skin: Negative for rash.   Neurological: Positive for weakness. Negative for dizziness, light-headedness, numbness and headaches.   Hematological: Negative for adenopathy.   Psychiatric/Behavioral: The patient is not nervous/anxious.      Objective:     Vital Signs (Most Recent):  Temp: 97.7 °F (36.5 °C) (04/23/17 1730)  Pulse: 106 (04/23/17 1730)  Resp: 18 (04/23/17 1730)  BP: 130/70 (04/23/17 1730)  SpO2: 98 % (04/23/17 1730) Vital Signs (24h Range):  Temp:  [97.7 °F (36.5 °C)-98.7 °F (37.1 °C)] 97.7 °F (36.5 °C)  Pulse:  [104-110] 106  Resp:  [17-20] 18  SpO2:  [93 %-98 %] 98 %  BP: (109-138)/(57-81) 130/70     Weight: 108.4 kg (238 lb 15.7 oz)  Body mass index is 41.02 kg/(m^2).  Body surface area is 2.21 meters squared.      Intake/Output Summary (Last 24 hours) at 04/23/17 1837  Last data filed at 04/23/17 1800   Gross per 24 hour   Intake             1250 ml   Output             2850 ml   Net            -1600 ml        Physical Exam  Physical Exam   Constitutional: He is oriented to person, place, and time. He appears well-developed and well-nourished, appears older than stated age in NAD  HENT:   Head: Normocephalic.   Mouth/Throat: Oropharynx is clear and moist. No oropharyngeal exudate.   Eyes: No scleral icterus.   Neck: Normal range of motion. Neck supple. No thyromegaly present.   Cardiovascular: Normal rate, regular rhythm and normal heart sounds.    No murmur heard.  Pulmonary/Chest: Effort normal . CTA B.  He has no wheezes. He has no rales.   Abdominal: Soft. Bowel sounds are normal. He exhibits no distension. There is no tenderness. There is no rebound and no guarding.   Musculoskeletal: Normal range of motion. He exhibits no edema.   Neurological: He is alert and oriented to person, place, and time. No cranial nerve deficit.   Skin: No rash noted. No erythema.   Psychiatric: He has a normal mood and affect.       Significant Labs:   All pertinent labs within the past 24 hours have been reviewed  Lab Results   Component Value Date    WBC 7.84 04/23/2017    HGB 10.3 (L) 04/23/2017    HCT 31.4 (L) 04/23/2017    MCV 86 04/23/2017     (L) 04/23/2017         Diagnostic Results:  I have reviewed and interpreted all pertinent imaging results/findings within the past 24 hours    CT chest w/out contrast 4/22/2017  Large densely calcified mass arising from the anterior margins of the right 1st rib near the costovertebral junction measuring up to 8.4 cm.  Findings are concerning for neoplasm such as chondrosarcoma. findings are new from prior CT dated 9/2012.      Multiple enlarged bilateral axillary lymph nodes and borderline enlarged mediastinal AP window node.  Metastatic adenopathy not excluded.      Assessment/Plan:         Mass of right chest wall  Imaging studies reviewed. IR-guided bx planned in am. Findings concerning for neoplasm. It is also recommended he undergo bx of axillary LN at same time if area  amenable to bx to evaluate for involvement. Await histologic confirmation CT a/p planned in am. Further imaging studies may be warranted based on imaging and pathology findings    Generalized muscle weakness  Etiology unclear.Cannot r/o neoplasm related. Workup in progress. Pt may need to undergo further evaluation with MRI spine. No bony TTP or back pain.  Await CT a/p findings    Will follow.       Sparkle Erickson MD

## 2017-04-24 NOTE — ASSESSMENT & PLAN NOTE
"Patient has no physical exams suggestive of cholecystitis or cholangitis. Is afebrile and has non toxic appearance. He does have mild leukocytosis. Ultrasound showed  "Cholelithiasis and trace volume ascites.  Diffuse gallbladder wall thickening without associated hyperemia or biliary ductal dilatation, probably related to ascites/hepatic dysfunction..Hepatic venous increased pulsatility, commonly cardiac related. Otherwise, limited liver Doppler ultrasound within normal limits...Right hepatic 2.1 cm echogenic mass suggestive of a hemangioma, but remains incompletely characterized." Metastatic disease? Bilirubin, AP and liver enzymes all improved after fluid removal via HD. 2D Echo with EF of 30%, which is depressed from 3 years ago. Will get cardiology involved. Hold off on antibiotics for now.    "

## 2017-04-24 NOTE — PLAN OF CARE
Problem: Patient Care Overview  Goal: Plan of Care Review  Outcome: Ongoing (interventions implemented as appropriate)    04/24/17 6811   Coping/Psychosocial   Plan Of Care Reviewed With patient         Pt remains free of falls this shift. Vitals stable. Prednisone given this shift in preparation of CT with contrast in AM, benadryl to be given in AM pre procedure. Pt had one episode of diarrhea this shift, pt c/o being raw from repeat diarrhea.  Prn sleep med given pt sleeping without any complaints. No complaints of pain. Will continue to monitor.

## 2017-04-24 NOTE — CONSULTS
Radiology Post-Procedure Note    Pre Op Diagnosis: right rib lesion    Post Op Diagnosis:  Same    Procedure:right rib aspiration and biopsy    Procedure performed by: Saurabh Soto MD    Written Informed Consent Obtained: Yes    Specimen Removed: YES 2 cc of purulent fluid    Estimated Blood Loss: Minimal    Findings: Under CT guidance, an 11 gauge needle was advanced into the lesion and there was return of frankly purulent fluid.  Specimen sent to pathology.   Additional specimen sent to lab: Yes  Patient tolerated procedure well.    Saurabh Soto MD  Department of Radiology  Pager: [phone]

## 2017-04-24 NOTE — PROGRESS NOTES
Livan Arevalo is a 42 y.o. male patient.    Follow for ESRD, dialysis    Patient seen while on dialysis  Reportedly feeling OK  No new c/o    Scheduled Meds:   sodium chloride 0.9%   Intravenous Once    carvedilol  25 mg Oral BID WM    heparin (porcine)  5,000 Units Subcutaneous Q8H    vitamin renal formula (B-complex-vitamin c-folic acid)  1 capsule Oral Daily       Review of patient's allergies indicates:   Allergen Reactions    Ciprofloxacin Hives    Iodine and iodide containing products Hives and Itching         Vital Signs Range (Last 24H):  Temp:  [96 °F (35.6 °C)-97.7 °F (36.5 °C)]   Pulse:  [100-106]   Resp:  [18-19]   BP: (130-133)/(60-81)   SpO2:  [96 %-98 %]     I & O (Last 24H):  Intake/Output Summary (Last 24 hours) at 04/24/17 0935  Last data filed at 04/24/17 0600   Gross per 24 hour   Intake              240 ml   Output                0 ml   Net              240 ml           Physical Exam:  General appearance: well developed, well nourished, no distress  Lungs:  clear to auscultation bilaterally and normal respiratory effort  Heart: regular rate and rhythm  Abdomen: soft, non-tender non-distented; bowel sounds normal; no masses,  no organomegaly  Extremities: edema trace    Laboratory:  CBC:   Recent Labs  Lab 04/23/17  0414   WBC 7.84   RBC 3.66*   HGB 10.3*   HCT 31.4*   *   MCV 86   MCH 28.1   MCHC 32.8     CMP:   Recent Labs  Lab 04/24/17  0413   *   CALCIUM 9.1   ALBUMIN 2.7*   PROT 8.3   *   K 4.6   CO2 25   CL 96   BUN 42*   CREATININE 8.1*   ALKPHOS 278*   ALT 34   AST 35   BILITOT 1.8*       Imp/Plan    ESRD - on dialysis, tolerated well, stable  HTN  Chest wall mass - w/u in progress  Anemia of CKD    Continue present Rx  HD qMWF  We'll follow for dialysis            Wesley Gray  4/24/2017

## 2017-04-24 NOTE — ASSESSMENT & PLAN NOTE
Unknown cause but mass that may be cancerous and a severely depressed EF as seen on Echo may be contributing. PT to evaluate today

## 2017-04-24 NOTE — PROGRESS NOTES
"[facility]  Hematology/Oncolgy  Progress Note    Patient Name: Livan Arevalo  MRN: [id number]  Patient Class: IP- Inpatient   Admission Date: 4/22/2017  Length of Stay: 2 days  Attending Physician: Pamela Quinonez MD  Primary Care Provider: Stephon Barrios Jr, MD        Subjective:     Principal Problem:Rib mass      HPI:42 year old male with ESRD on HD and HTN presented generalized weakness x 1 day. Pt also reports he has suffered 2-3 falls past week. He reports intermittent nausea w/out vomiting which is not a change since undergoing dialysis. He also notes loose stools x 3days,non-bloody. No bowel or bladder incontinence. No fevers. No back pain. No numbness or tingling in LE. No SOB/cough. No HA/vision changes. Abnml CXR revealing RUL opacity. CT of chest w/out contrast abnormal and shows a "Large densely calcified mass arising from the anterior margins of the right 1st rib near the costovertebral junction measuring up to 8.4 cm concerning for neoplasm such as chondrosarcoma. In addition, multiple enlarged bilateral axillary lymph nodes and borderline enlarged mediastinal AP window node noted       Interval History: Pt s/p CT guided bx of rib lesion. No complaints today. Pt also underwent attempt of axillary LN bx. D/w IR and pus drained from LN and sent for GS and cx.    Review of Systems   Constitutional: Negative for fatigue and fever.   Respiratory: Negative for cough.    Cardiovascular: Negative for leg swelling.   Gastrointestinal: Negative for nausea and vomiting.   Neurological: Positive for weakness.     Objective:     Vital Signs (Most Recent):  Temp: 98.5 °F (36.9 °C) (04/24/17 0917)  Pulse: 103 (04/24/17 1500)  Resp: 18 (04/24/17 1500)  BP: 133/76 (04/24/17 1500)  SpO2: 99 % (04/24/17 1500) Vital Signs (24h Range):  Temp:  [96 °F (35.6 °C)-98.5 °F (36.9 °C)] 98.5 °F (36.9 °C)  Pulse:  [] 103  Resp:  [16-19] 18  SpO2:  [96 %-99 %] 99 %  BP: (122-141)/(60-95) 133/76     Weight: " 108.4 kg (238 lb 15.7 oz)  Body mass index is 41.02 kg/(m^2).    Intake/Output Summary (Last 24 hours) at 04/24/17 1536  Last data filed at 04/24/17 0600   Gross per 24 hour   Intake              120 ml   Output                0 ml   Net              120 ml      Physical Exam  Constitutional: He is oriented to person, place, and time. He appears well-developed and well-nourished, appears older than stated age in NAD  HENT:   Head: Normocephalic.   Mouth/Throat: Oropharynx is clear and moist. No oropharyngeal exudate.   Eyes: No scleral icterus.   Neck: Normal range of motion. Neck supple. No thyromegaly present.   Cardiovascular: Normal rate, regular rhythm and normal heart sounds.   No murmur heard.  Pulmonary/Chest: Effort normal . CTA B. No wheezes  Abdominal: Soft. BS nl. NT, ND.  Musculoskeletal: Normal range of motion. He exhibits no edema.   Neurological: He is alert and oriented to person, place, and time. No cranial nerve deficit.   Skin: No rash noted. No erythema.   Psychiatric: He has a normal mood and affect  Significant Labs:   Lab Results   Component Value Date    WBC 7.84 04/23/2017    HGB 10.3 (L) 04/23/2017    HCT 31.4 (L) 04/23/2017    MCV 86 04/23/2017     (L) 04/23/2017         Significant Imaging: I have reviewed and interpreted all pertinent imaging results/findings within the past 24 hours    Assessment/Plan:      Mass of right chest wall  Imaging studies reviewed.   S/p CT guided bx  Path pending    LAD  Axillary LN bx attempt pos for pus drainage- etiology unclear - cx pending    Generalized muscle weakness , etiology unclear  CK mildly elevated  CT a/p neg for osseous mets  Plan MRI brain    ESRD - HD dependent    Anemia of CKD -stable     D/w Dr. Kapil Erickson MD

## 2017-04-24 NOTE — PT/OT/SLP PROGRESS
Physical Therapy      Livan Arevalo  MRN: [id number]    Patient not seen today secondary to Dialysis. Will follow-up as able today.    Rebecca De Leon, PTA

## 2017-04-24 NOTE — PROGRESS NOTES
"[facility] Medicine  Progress Note    Patient Name: Livan Arevalo  MRN: [id number]  Patient Class: IP- Inpatient   Admission Date: 4/22/2017  Length of Stay: 2 days  Attending Physician: Pamela Quinonez MD  Primary Care Provider: Stephon Barrios Jr, MD        Subjective:     Principal Problem:Cholestatic hepatitis    HPI:  42 year old male with ESRD (on HD every MWF), tachyarrhythmia and hypertension who presented with generalized weakness of one day, but then reported at least one week in evolution. Stated inability to get out of bed. Reports his legs give away and falls. Was unable to drive himself to dialysis and missed yesterday's session. Was able to go to Wednesday's session where only one hour was completed as he was "feeling bad". Reported in ED having nausea, vomiting and diarrhea, but did not report this to me. Labs in the ED showed mild leukocytosis with neutrophilia, mild anemia and some thrombocytopenia that is not severe. No bands. Chemistry did not show hyperkalemia or severe uremia, but did show hyperbilirubinemia with cholestatic pattern. Is afebrile and not hypotensive. Does not appear toxic. Breathing comfortably at room air. CXR with a right upper lobe "consolidation". A CT of chest without contrast showed a "Large densely calcified mass arising from the anterior margins of the right 1st rib near the costovertebral junction measuring up to 8.4 cm.  Findings are concerning for neoplasm such as chondrosarcoma. findings are new from prior CT dated 9/2012. Multiple enlarged bilateral axillary lymph nodes and borderline enlarged mediastinal AP window node.  Metastatic adenopathy not excluded."        Hospital Course:  Mr Arevalo presents with generalized weakness of unknown cause, but it appears cancer may be a possible etiology. A CXR on presentation showed a "consolidation" in the right upper lobe. Since this is an unusual place for a "consolidation" a chest CT without " "contrast was obtained. This showed "Large densely calcified mass arising from the anterior margins of the right 1st rib near the costovertebral junction measuring up to 8.4 cm.  Findings are concerning for neoplasm such as chondrosarcoma. findings are new from prior CT dated 9/2012. Multiple enlarged bilateral axillary lymph nodes and borderline enlarged mediastinal AP window node.  Metastatic adenopathy not excluded." Antibitoics held and admitted for formal evaluation of weakness and mass. Labs did show cholestatic pattern with hepatitis, therefore an US was obtained. This showed cholelithiasis but no signs of infection or obstruction, but rather changes consistent with visceral congestion. All numbers improved with UF via HD. Of note, he did miss his dialysis on day prior nor did he complete the prior one as he was too weak to do so. An abdominal CT with contrast (pre-treatment given for Hx of urticaria from iodine exposure) showed an hypoattenuating lesion of the liver that is likely benign but that has been present since 2012. Also a few upper normal size para aortic lymph nodes, but otherwise nothing to suggest metastatic disease. Oncology consulted. MRI of brain obtained to further evaluate weakness and leg "giving out" when walking. A biopsy of mass done on 4/24 via IR. Pathology pending.         Interval History: frequency of loose stools much better with imodium. Stated leg "gave out" during PT but was able to ambulate     Review of Systems   Constitutional: Positive for activity change and fatigue.   Respiratory: Negative.    Cardiovascular: Negative.    Gastrointestinal:        Loose stools   Endocrine: Negative.    Allergic/Immunologic: Positive for immunocompromised state.   Neurological: Positive for weakness.   Hematological: Negative.    Psychiatric/Behavioral: Negative.      Objective:     Vital Signs (Most Recent):  Temp: 98.5 °F (36.9 °C) (04/24/17 1543)  Pulse: 80 (04/24/17 1624)  Resp: 17 " (04/24/17 1543)  BP: 127/86 (04/24/17 1543)  SpO2: 98 % (04/24/17 1624) Vital Signs (24h Range):  Temp:  [96 °F (35.6 °C)-98.5 °F (36.9 °C)] 98.5 °F (36.9 °C)  Pulse:  [] 80  Resp:  [16-20] 17  SpO2:  [95 %-99 %] 98 %  BP: (122-141)/(60-95) 127/86     Weight: 108.4 kg (238 lb 15.7 oz)  Body mass index is 41.02 kg/(m^2).    Intake/Output Summary (Last 24 hours) at 04/24/17 1734  Last data filed at 04/24/17 0600   Gross per 24 hour   Intake              120 ml   Output                0 ml   Net              120 ml      Physical Exam   Constitutional: He is oriented to person, place, and time. He appears well-developed. No distress.   Non toxic appearance   HENT:   Head: Normocephalic and atraumatic.   Mouth/Throat: Oropharynx is clear and moist.   Eyes: Conjunctivae and EOM are normal. Pupils are equal, round, and reactive to light.   Injected sclerae   Neck: Normal range of motion. Neck supple. No thyromegaly present.   No palpable lymphadenopathy   Cardiovascular: Normal rate and regular rhythm.    Pulmonary/Chest: Effort normal and breath sounds normal. No respiratory distress. He has no wheezes. He has no rales. He exhibits no tenderness.   Abdominal: Soft. Bowel sounds are normal. He exhibits no distension. There is no tenderness.   Musculoskeletal: Normal range of motion. He exhibits no edema, tenderness or deformity.   Did not feel a mass on palpation of chest or back   Neurological: He is alert and oriented to person, place, and time. He displays normal reflexes. No cranial nerve deficit. He exhibits normal muscle tone. Coordination normal.   Skin: Skin is warm and dry. He is not diaphoretic. No pallor.   Psychiatric: He has a normal mood and affect. His behavior is normal. Judgment and thought content normal.   Nursing note and vitals reviewed.      Significant Labs: All pertinent labs within the past 24 hours have been reviewed.    Significant Imaging: I have reviewed all pertinent imaging  "results/findings within the past 24 hours.  I have reviewed and interpreted all pertinent imaging results/findings within the past 24 hours.    Assessment/Plan:      * Cholestatic hepatitis  Patient has no physical exams suggestive of cholecystitis or cholangitis. Is afebrile and has non toxic appearance. He does have mild leukocytosis. Ultrasound showed  "Cholelithiasis and trace volume ascites.  Diffuse gallbladder wall thickening without associated hyperemia or biliary ductal dilatation, probably related to ascites/hepatic dysfunction..Hepatic venous increased pulsatility, commonly cardiac related. Otherwise, limited liver Doppler ultrasound within normal limits...Right hepatic 2.1 cm echogenic mass suggestive of a hemangioma, but remains incompletely characterized." Metastatic disease? Bilirubin, AP and liver enzymes all improved after fluid removal via HD. 2D Echo with EF of 30%, which is depressed from 3 years ago. Will get cardiology involved. Hold off on antibiotics for now.      Mass of right chest wall  Concern for chondrosarcoma vs other type of malignancy. This is a new finding. Consult to IR placed for biopsy. Done today. Pending results. Onc consult placed for further recs. Abd/pelvic CT without evident metastatic disease. Pending brain MRI for generalized weakness in case there is metastatic disease there.       Acute on chronic systolic heart failure  Has cardiovascular disease. Noted on CT with contrast. EF of 30 % is a new finding. I will consult cardiology. Afterload reduction, UF via HD, ASA, statin and BB      Lymphadenopathy, mediastinal  Metastatic disease? Pus removed when attempting biopsy of axillary lymph node. Fluid sent to lab for culture. Hold off on abx for now.      Generalized muscle weakness  Unknown cause but mass that may be cancerous and a severely depressed EF as seen on Echo may be contributing. PT to evaluate today      Sinus tachycardia by electrocardiography, resolved as of " 4/24/2017  On BB. Cardiac monitoring. resolved      Hypertension  At goal      ESRD (end stage renal disease)  Per nephrology. Further recs very much appreciated. 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 Continue on chemical DVT prophylaxis as he is at high risk for this      Anemia of chronic disease  Stable and consistent with baseline levels      VTE Risk Mitigation         Ordered     heparin (porcine) injection 5,000 Units  Every 8 hours     Route:  Subcutaneous        04/22/17 1324     Medium Risk of VTE  Once      04/22/17 1324     Place sequential compression device  Until discontinued      04/22/17 1324     Place BEN hose  Until discontinued      04/22/17 1324          Pamela Dick MD  Department of Hospital Medicine   [facility]

## 2017-04-24 NOTE — CONSULTS
Inpatient Radiology Pre-procedure Note    History of Present Illness:  Livan Arevalo is a 42 y.o. male who presents for CT guided biopsy.  Admission H&P reviewed.  Past Medical History:   Diagnosis Date    ESRD (end stage renal disease)     Hypertension     Renal disorder      Past Surgical History:   Procedure Laterality Date    DIALYSIS FISTULA CREATION         Review of Systems:   As documented in primary team H&P    Home Meds:   Prior to Admission medications    Medication Sig Start Date End Date Taking? Authorizing Provider   aspirin 325 MG tablet Take 1 tablet (325 mg total) by mouth once daily. 9/7/15 9/6/16  Niurka Otero MD   B COMPLEX WITH VITAMIN C (VITAMIN B COMPLEX WITH C ORAL) Take by mouth.    Historical Provider, MD   carvedilol (COREG) 6.25 MG tablet Take 25 mg by mouth 2 (two) times daily with meals.     Historical Provider, MD   HYDRALAZINE HCL (HYDRALAZINE ORAL) Take by mouth.    Historical Provider, MD   lisinopril (PRINIVIL,ZESTRIL) 40 MG tablet Take 1 tablet (40 mg total) by mouth once daily. 9/7/15 9/6/16  Niurka Otero MD     Scheduled Meds:    sodium chloride 0.9%   Intravenous Once    carvedilol  25 mg Oral BID WM    heparin (porcine)  5,000 Units Subcutaneous Q8H    vitamin renal formula (B-complex-vitamin c-folic acid)  1 capsule Oral Daily     Continuous Infusions:    PRN Meds:sodium chloride 0.9%, loperamide, ramelteon  Anticoagulants/Antiplatelets: Heparin    Allergies:   Review of patient's allergies indicates:   Allergen Reactions    Ciprofloxacin Hives    Iodine and iodide containing products Hives and Itching     Sedation Hx: have not been any systemic reactions    Labs:    Recent Labs  Lab 04/23/17  0414   INR 1.4*       Recent Labs  Lab 04/23/17  0414   WBC 7.84   HGB 10.3*   HCT 31.4*   MCV 86   *      Recent Labs  Lab 04/22/17  1050  04/24/17  0413   GLU 75  < > 219*     < > 133*   K 4.9  < > 4.6   CL 92*  < > 96   CO2 29  < > 25   BUN 43*  < >  42*   CREATININE 9.5*  < > 8.1*   CALCIUM 9.9  < > 9.1   MG 2.1  --   --    ALT 52*  < > 34   AST 63*  < > 35   ALBUMIN 3.1*  < > 2.7*   BILITOT 4.1*  < > 1.8*   < > = values in this interval not displayed.      Vitals:  Temp: 98.5 °F (36.9 °C) (04/24/17 0917)  Pulse: 105 (04/24/17 0917)  Resp: 18 (04/24/17 0917)  BP: (!) 141/95 (04/24/17 0917)  SpO2: 97 % (04/24/17 0917)     Physical Exam:  ASA: 3  Mallampati: 2    General: no acute distress  Mental Status: alert and oriented to person, place and time  HEENT: normocephalic, atraumatic  Chest: unlabored breathing  Heart: regular heart rate  Abdomen: nondistended  Extremity: moves all extremities    Plan: CT guided biopsy of rib lesion. Will consider lymph node biopsy if it can be performed safely based on the CT. Informed consent obtained.  Sedation Plan: Moderate.    Saurabh Soto MD  Department of Radiology  Pager: [phone]

## 2017-04-24 NOTE — ASSESSMENT & PLAN NOTE
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 This is a new finding. Consult to IR placed for biopsy. Done today. Pending results. Onc consult placed for further recs. Abd/pelvic CT without evident metastatic disease. Pending brain MRI for generalized weakness in case there is metastatic disease there.

## 2017-04-24 NOTE — PROGRESS NOTES
Pt presented to the dialysis Acute room , pt assessed, 4 hour hd tx initiated, attempting 3L UF. Continue with plan

## 2017-04-24 NOTE — NURSING
Spoke with CT Donna, will have day time CT staff call back to med- surg floor to give an accurate time for pt scheduled CT therefore pt can receive PO steroids and PO benadryl within hour of procedure for anaphylactic prevention. Will communicate info with on coming Nurse.

## 2017-04-24 NOTE — PROGRESS NOTES
Pt sitting up on side of bed eating dinner, ganesh well. No distress noted. Will continue to monitor.

## 2017-04-24 NOTE — ASSESSMENT & PLAN NOTE
Metastatic disease? Pus removed when attempting biopsy of axillary lymph node. Fluid sent to lab for culture. Hold off on abx for now.

## 2017-04-24 NOTE — PROGRESS NOTES
Pt returned to room via bed from IR. No change in pt status. VSS. Pt request something to eat @ this time. No distress noted. Will continue to monitor.

## 2017-04-24 NOTE — ASSESSMENT & PLAN NOTE
Has cardiovascular disease. Noted on CT with contrast. EF of 30 % is a new finding. I will consult cardiology. Afterload reduction, UF via HD, ASA, statin and BB

## 2017-04-24 NOTE — SUBJECTIVE & OBJECTIVE
"Interval History: frequency of loose stools much better with imodium. Stated leg "gave out" during PT but was able to ambulate     Review of Systems   Constitutional: Positive for activity change and fatigue.   Respiratory: Negative.    Cardiovascular: Negative.    Gastrointestinal:        Loose stools   Endocrine: Negative.    Allergic/Immunologic: Positive for immunocompromised state.   Neurological: Positive for weakness.   Hematological: Negative.    Psychiatric/Behavioral: Negative.      Objective:     Vital Signs (Most Recent):  Temp: 98.5 °F (36.9 °C) (04/24/17 1543)  Pulse: 80 (04/24/17 1624)  Resp: 17 (04/24/17 1543)  BP: 127/86 (04/24/17 1543)  SpO2: 98 % (04/24/17 1624) Vital Signs (24h Range):  Temp:  [96 °F (35.6 °C)-98.5 °F (36.9 °C)] 98.5 °F (36.9 °C)  Pulse:  [] 80  Resp:  [16-20] 17  SpO2:  [95 %-99 %] 98 %  BP: (122-141)/(60-95) 127/86     Weight: 108.4 kg (238 lb 15.7 oz)  Body mass index is 41.02 kg/(m^2).    Intake/Output Summary (Last 24 hours) at 04/24/17 1734  Last data filed at 04/24/17 0600   Gross per 24 hour   Intake              120 ml   Output                0 ml   Net              120 ml      Physical Exam   Constitutional: He is oriented to person, place, and time. He appears well-developed. No distress.   Non toxic appearance   HENT:   Head: Normocephalic and atraumatic.   Mouth/Throat: Oropharynx is clear and moist.   Eyes: Conjunctivae and EOM are normal. Pupils are equal, round, and reactive to light.   Injected sclerae   Neck: Normal range of motion. Neck supple. No thyromegaly present.   No palpable lymphadenopathy   Cardiovascular: Normal rate and regular rhythm.    Pulmonary/Chest: Effort normal and breath sounds normal. No respiratory distress. He has no wheezes. He has no rales. He exhibits no tenderness.   Abdominal: Soft. Bowel sounds are normal. He exhibits no distension. There is no tenderness.   Musculoskeletal: Normal range of motion. He exhibits no edema, " tenderness or deformity.   Did not feel a mass on palpation of chest or back   Neurological: He is alert and oriented to person, place, and time. He displays normal reflexes. No cranial nerve deficit. He exhibits normal muscle tone. Coordination normal.   Skin: Skin is warm and dry. He is not diaphoretic. No pallor.   Psychiatric: He has a normal mood and affect. His behavior is normal. Judgment and thought content normal.   Nursing note and vitals reviewed.      Significant Labs: All pertinent labs within the past 24 hours have been reviewed.    Significant Imaging: I have reviewed all pertinent imaging results/findings within the past 24 hours.  I have reviewed and interpreted all pertinent imaging results/findings within the past 24 hours.

## 2017-04-25 PROBLEM — E66.9 OBESITY (BMI 30-39.9): Status: ACTIVE | Noted: 2017-04-25

## 2017-04-25 LAB
ANION GAP SERPL CALC-SCNC: 15 MMOL/L
BUN SERPL-MCNC: 47 MG/DL
CALCIUM SERPL-MCNC: 8.9 MG/DL
CHLORIDE SERPL-SCNC: 93 MMOL/L
CO2 SERPL-SCNC: 24 MMOL/L
CREAT SERPL-MCNC: 7.4 MG/DL
EST. GFR  (AFRICAN AMERICAN): 10 ML/MIN/1.73 M^2
EST. GFR  (NON AFRICAN AMERICAN): 8 ML/MIN/1.73 M^2
GLUCOSE SERPL-MCNC: 173 MG/DL
HAV IGM SERPL QL IA: NEGATIVE
HBV CORE IGM SERPL QL IA: NEGATIVE
HBV SURFACE AG SERPL QL IA: NEGATIVE
HCV AB SERPL QL IA: NEGATIVE
POTASSIUM SERPL-SCNC: 4.8 MMOL/L
SODIUM SERPL-SCNC: 132 MMOL/L

## 2017-04-25 PROCEDURE — G8980 MOBILITY D/C STATUS: HCPCS | Mod: CJ

## 2017-04-25 PROCEDURE — 25000003 PHARM REV CODE 250: Performed by: INTERNAL MEDICINE

## 2017-04-25 PROCEDURE — 36415 COLL VENOUS BLD VENIPUNCTURE: CPT

## 2017-04-25 PROCEDURE — 80048 BASIC METABOLIC PNL TOTAL CA: CPT

## 2017-04-25 PROCEDURE — 99222 1ST HOSP IP/OBS MODERATE 55: CPT | Mod: ,,, | Performed by: PSYCHIATRY & NEUROLOGY

## 2017-04-25 PROCEDURE — 97116 GAIT TRAINING THERAPY: CPT

## 2017-04-25 PROCEDURE — 99223 1ST HOSP IP/OBS HIGH 75: CPT | Mod: ,,, | Performed by: INTERNAL MEDICINE

## 2017-04-25 PROCEDURE — G8978 MOBILITY CURRENT STATUS: HCPCS | Mod: CJ

## 2017-04-25 PROCEDURE — 97110 THERAPEUTIC EXERCISES: CPT

## 2017-04-25 PROCEDURE — G8979 MOBILITY GOAL STATUS: HCPCS | Mod: CI

## 2017-04-25 PROCEDURE — 11000001 HC ACUTE MED/SURG PRIVATE ROOM

## 2017-04-25 RX ADMIN — RAMELTEON 8 MG: 8 TABLET, FILM COATED ORAL at 09:04

## 2017-04-25 RX ADMIN — CARVEDILOL 25 MG: 6.25 TABLET, FILM COATED ORAL at 06:04

## 2017-04-25 RX ADMIN — HEPARIN SODIUM 5000 UNITS: 5000 INJECTION, SOLUTION INTRAVENOUS; SUBCUTANEOUS at 02:04

## 2017-04-25 RX ADMIN — CARVEDILOL 25 MG: 6.25 TABLET, FILM COATED ORAL at 09:04

## 2017-04-25 RX ADMIN — Medication 1 CAPSULE: at 09:04

## 2017-04-25 RX ADMIN — ASPIRIN 81 MG: 81 TABLET, COATED ORAL at 09:04

## 2017-04-25 RX ADMIN — ATORVASTATIN CALCIUM 40 MG: 40 TABLET, FILM COATED ORAL at 09:04

## 2017-04-25 NOTE — SUBJECTIVE & OBJECTIVE
Interval History: No new issues.      Review of Systems   Constitutional: Negative for activity change, appetite change, chills, fatigue and fever.   HENT: Positive for congestion.    Respiratory: Negative for chest tightness and shortness of breath.    Cardiovascular: Negative for chest pain.   Gastrointestinal: Negative for abdominal pain.     Objective:     Vital Signs (Most Recent):  Temp: 97.6 °F (36.4 °C) (04/25/17 0800)  Pulse: 93 (04/25/17 0800)  Resp: 20 (04/25/17 0800)  BP: (!) 159/83 (04/25/17 0800)  SpO2: 98 % (04/25/17 0800) Vital Signs (24h Range):  Temp:  [97.1 °F (36.2 °C)-98.5 °F (36.9 °C)] 97.6 °F (36.4 °C)  Pulse:  [] 93  Resp:  [16-20] 20  SpO2:  [95 %-99 %] 98 %  BP: (110-160)/() 159/83     Weight: 108.4 kg (238 lb 15.7 oz)  Body mass index is 41.02 kg/(m^2).    Intake/Output Summary (Last 24 hours) at 04/25/17 1004  Last data filed at 04/25/17 0600   Gross per 24 hour   Intake              500 ml   Output             2404 ml   Net            -1904 ml      Physical Exam   Constitutional: He is oriented to person, place, and time. He appears well-developed and well-nourished.   Cardiovascular: Normal rate.    Pulmonary/Chest: Breath sounds normal. No respiratory distress. He has no wheezes.   Abdominal: Soft.   Neurological: He is alert and oriented to person, place, and time.   Vitals reviewed.      Significant Labs:   BMP:   Recent Labs  Lab 04/24/17  0413   *   *   K 4.6   CL 96   CO2 25   BUN 42*   CREATININE 8.1*   CALCIUM 9.1     CBC: No results for input(s): WBC, HGB, HCT, PLT in the last 48 hours.    Significant Imaging:

## 2017-04-25 NOTE — CONSULTS
"[facility]  Cardiology  Consult Note    Patient Name: Livan Arevalo  MRN: [id number]  Admission Date: 4/22/2017  Hospital Length of Stay: 3 days  Code Status: Full Code   Attending Provider: Tonny Storm MD   Consulting Provider: Von Alvarado MD  Primary Care Physician: Stephon Barrios Jr, MD  Principal Problem:Cholestatic hepatitis    Patient information was obtained from patient and ER records.     Consults  Subjective:     Chief Complaint:  Abnormal echo     HPI:     HPI: 42 year old male with ESRD (on HD every MWF), tachyarrhythmia and hypertension who presented with generalized weakness of one day, but then reported at least one week in evolution. Stated inability to get out of bed. Reports his legs give away and falls. Was unable to drive himself to dialysis and missed yesterday's session. Was able to go to Wednesday's session where only one hour was completed as he was "feeling bad". Reported in ED having nausea, vomiting and diarrhea, but did not report this to me. Labs in the ED showed mild leukocytosis with neutrophilia, mild anemia and some thrombocytopenia that is not severe. No bands. Chemistry did not show hyperkalemia or severe uremia, but did show hyperbilirubinemia with cholestatic pattern. Is afebrile and not hypotensive. Does not appear toxic. Breathing comfortably at room air. CXR with a right upper lobe "consolidation". A CT of chest without contrast showed a "Large densely calcified mass arising from the anterior margins of the right 1st rib near the costovertebral junction measuring up to 8.4 cm.  Findings are concerning for neoplasm such as chondrosarcoma. findings are new from prior CT dated 9/2012. Multiple enlarged bilateral axillary lymph nodes and borderline enlarged mediastinal AP window node.  Metastatic adenopathy not excluded."    Denies CP or significant SOB  Denies prior cardiac Hx  Hx PAD with LE intervention    Past Medical History:   Diagnosis Date    " ESRD (end stage renal disease)     Hypertension     Renal disorder        Past Surgical History:   Procedure Laterality Date    DIALYSIS FISTULA CREATION         Review of patient's allergies indicates:   Allergen Reactions    Ciprofloxacin Hives    Iodine and iodide containing products Hives and Itching       No current facility-administered medications on file prior to encounter.      Current Outpatient Prescriptions on File Prior to Encounter   Medication Sig    aspirin 325 MG tablet Take 1 tablet (325 mg total) by mouth once daily.    B COMPLEX WITH VITAMIN C (VITAMIN B COMPLEX WITH C ORAL) Take by mouth.    carvedilol (COREG) 6.25 MG tablet Take 25 mg by mouth 2 (two) times daily with meals.     HYDRALAZINE HCL (HYDRALAZINE ORAL) Take by mouth.    lisinopril (PRINIVIL,ZESTRIL) 40 MG tablet Take 1 tablet (40 mg total) by mouth once daily.     Family History     Problem Relation (Age of Onset)    Kidney disease Mother        Social History Main Topics    Smoking status: Never Smoker    Smokeless tobacco: Not on file    Alcohol use No    Drug use: No    Sexual activity: Not on file     Review of Systems   Constitution: Negative for decreased appetite.   HENT: Negative for ear discharge.    Eyes: Negative for blurred vision.   Endocrine: Negative for polyphagia.   Skin: Negative for nail changes.   Neurological: Negative for aphonia.   Psychiatric/Behavioral: Negative for hallucinations.     Objective:     Vital Signs (Most Recent):  Temp: 97.6 °F (36.4 °C) (04/25/17 0800)  Pulse: 93 (04/25/17 0800)  Resp: 20 (04/25/17 0800)  BP: (!) 159/83 (04/25/17 0800)  SpO2: 98 % (04/25/17 0800) Vital Signs (24h Range):  Temp:  [97.1 °F (36.2 °C)-98.5 °F (36.9 °C)] 97.6 °F (36.4 °C)  Pulse:  [] 93  Resp:  [16-20] 20  SpO2:  [95 %-99 %] 98 %  BP: (110-160)/() 159/83     Weight: 108.4 kg (238 lb 15.7 oz)  Body mass index is 41.02 kg/(m^2).    SpO2: 98 %  O2 Device (Oxygen Therapy): room  air      Intake/Output Summary (Last 24 hours) at 04/25/17 0856  Last data filed at 04/25/17 0600   Gross per 24 hour   Intake              500 ml   Output             2404 ml   Net            -1904 ml       Lines/Drains/Airways     Drain                 Hemodialysis AV Fistula Left upper arm -- days          Peripheral Intravenous Line                 Peripheral IV - Single Lumen 04/22/17 1050 Right Antecubital 2 days                Physical Exam   Constitutional: He is oriented to person, place, and time. He appears well-developed and well-nourished.   HENT:   Head: Normocephalic and atraumatic.   Eyes: Conjunctivae are normal. Pupils are equal, round, and reactive to light.   Neck: Normal range of motion. Neck supple.   Cardiovascular: Normal rate, normal heart sounds and intact distal pulses.    Pulmonary/Chest: Effort normal and breath sounds normal.   Abdominal: Soft. Bowel sounds are normal.   Musculoskeletal: Normal range of motion.   Neurological: He is alert and oriented to person, place, and time.   Skin: Skin is warm and dry.       Significant Labs: All pertinent lab results from the last 24 hours have been reviewed.    Significant Imaging: X-Ray: CXR: X-Ray Chest PA and Lateral (CXR): No results found for this visit on 04/22/17.  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] 04/22/2017 Yes    Anemia of chronic disease [D63.8] 04/22/2017 Yes     Lymphadenopathy, mediastinal [R59.0] 04/22/2017 Yes    Hypertensive renal disease [I12.9] 09/05/2015 Yes    ESRD (end stage renal disease) [N18.6] 03/18/2014 Yes    Hypertension [I10] 03/18/2014 Yes      Problems Resolved During this Admission:    Diagnosis Date Noted Date Resolved POA    Sinus tachycardia by electrocardiography [R00.0] 04/22/2017 04/24/2017 Yes    Neutrophilic leukocytosis [D72.9] 04/22/2017 04/23/2017 Yes       VTE Risk Mitigation         Ordered     heparin (porcine) injection 5,000 Units  Every 8 hours     Route:  Subcutaneous        04/22/17 1324     Medium Risk of VTE  Once      04/22/17 1324     Place sequential compression device  Until discontinued      04/22/17 1324     Place BEN hose  Until discontinued      04/22/17 1324        Echo 4/23/17    1 - Moderately depressed left ventricular systolic function (EF 30-35%).     2 - Concentric hypertrophy.     3 - Mild left ventricular enlargement.     4 - Biatrial enlargement.     5 - Right ventricular enlargement with mildly to moderately depressed systolic function.     6 - Pulmonary hypertension. The estimated PA systolic pressure is 48 mmHg.     7 - Moderate mitral regurgitation.     8 - Moderate tricuspid regurgitation.     9 - Trivial pulmonic regurgitation.     Cardiomyopathy - EF depressed from last echo 3/2014. Needs an ischemic evaluation when stable. Afterload reduction as tolerated, volume control with HD  Chest wall mass - w/u in progress  ESRD  PAD  Cholestatic hepatitis  HTN    Thank you for your consult. I will follow-up with patient. Please contact us if you have any additional questions.    Von Alvarado MD  Cardiology   [facility] [place]

## 2017-04-25 NOTE — PLAN OF CARE
Problem: Patient Care Overview  Goal: Plan of Care Review  Outcome: Ongoing (interventions implemented as appropriate)  Patient resting comfortably, AAOX3, Remains free of falls, respirations even and unlabored on RA. Denies complaints of pain, Tolerating po diet well. Ate 100% of breakfast and lunch. Plan of care reviewed to include renal diet, medications, and consults scheduled. Patient up with PT today, no distress noted. Will continue to monitor and follow plan of care    04/25/17 8288   Coping/Psychosocial   Plan Of Care Reviewed With patient

## 2017-04-25 NOTE — PLAN OF CARE
Problem: Physical Therapy Goal  Goal: Physical Therapy Goal  Goals to be met by: 2017    Patient will increase functional independence with mobility by performin. Sit to stand transfer with [place]  2. Gait x 350 feet with Modified [place] using Rolling Walker.    Recommend: Outpatient Physical Therapy, Rolling Walker, and Transfer Tub Bench at time of discharge.    Outcome: Ongoing (interventions implemented as appropriate)  Continue with POC.

## 2017-04-25 NOTE — ASSESSMENT & PLAN NOTE
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 This is a new finding. Consult to IR placed for biopsy. Done 4/24.. Pending results. Onc consult placed for further recs. Abd/pelvic CT without evident metastatic disease. brain MRI - possible acute infarcts.  Neuro consulted on 4/25.

## 2017-04-25 NOTE — PT/OT/SLP PROGRESS
"Physical Therapy  Treatment    Livan Arevalo   MRN: [id number]   Admitting Diagnosis: Mass of right chest wall    PT Received On: 17  PT Start Time: 1152     PT Stop Time: 1215    PT Total Time (min): 23 min       Billable Minutes:  Gait [id number] and Therapeutic Exercise 11    Treatment Type: Treatment  PT/PTA: PTA     PTA Visit Number: 1       General Precautions: Standard, fall  Orthopedic Precautions: N/A   Braces: N/A         Subjective:  Communicated with nurse [place] prior to session.  Pt agreeable to therapy. " my knees buckled sometimes "     Pain Ratin/10              Pain Rating Post-Intervention: 0/10    Objective:   Patient found with: telemetry    Functional Mobility:  Bed Mobility:    pt found seated EOB.     Transfers: x 2 trials from bed. VC's for safety technique and walker management.   Sit <> Stand Assistance: Contact Guard Assistance  Sit <> Stand Assistive Device: Rolling Walker    Gait:   Gait Distance: ~ 250 ft  . V/T cues for safety technique and wanlker management. Noted with L lateral lean during gait training, pt required min A for corner turning.   Assistance 1: Contact Guard Assistance, Minimum assistance  Gait Assistive Device: Rolling walker  Gait Pattern: reciprocal  Gait Deviation(s): decreased grace, decreased step length, decreased toe-to-floor clearance, decreased weight-shifting ability, decreased swing-to-stance ratio, lateral lean      Balance:   Static Sit: GOOD+: Takes MAXIMAL challenges from all directions.    Dynamic Sit: GOOD: Maintains balance through MODERATE excursions of active trunk movement  Static Stand: FAIR: Maintains without assist but unable to take challenges  Dynamic stand: FAIR: Needs CONTACT GUARD during gait     Therapeutic Activities and Exercises:  Pt performed seated BLE x 20 reps : LAQ, HS, hip flexion , pillow squeezes and heel/toes raises. VC's for proper and sequence. Pt tolerated well.  Educated pt on safety technique using RW. Pt " verbalize understand.     AM-PAC 6 CLICK MOBILITY  How much help from another person does this patient currently need?   1 = Unable, Total/Dependent Assistance  2 = A lot, Maximum/Moderate Assistance  3 = A little, Minimum/Contact Guard/Supervision  4 = None, Modified [place]/Independent    Turning over in bed (including adjusting bedclothes, sheets and blankets)?: 4  Sitting down on and standing up from a chair with arms (e.g., wheelchair, bedside commode, etc.): 3  Moving from lying on back to sitting on the side of the bed?: 4  Moving to and from a bed to a chair (including a wheelchair)?: 3  Need to walk in hospital room?: 3  Climbing 3-5 steps with a railing?: 3  Total Score: 20    AM-PAC Raw Score CMS G-Code Modifier Level of Impairment Assistance   6 % Total / Unable   7 - 9 CM 80 - 100% Maximal Assist   10 - 14 CL 60 - 80% Moderate Assist   15 - 19 CK 40 - 60% Moderate Assist   20 - 22 CJ 20 - 40% Minimal Assist   23 CI 1-20% SBA / CGA   24 CH 0% Independent/ Mod I     Patient left seated EOB ( per pt request) with all lines intact, call button in reach and nurse notified.    Assessment:  Livan Arevalo is a 42 y.o. male with a medical diagnosis of Mass of right chest wall and presents with weakness, decreased functional mobility. Pt increased distance with gait training today. Pt will benefit from further skilled therapy in order to get back to PLOF.    Rehab identified problem list/impairments: Rehab identified problem list/impairments: weakness, impaired endurance, gait instability, decreased lower extremity function, decreased coordination, decreased safety awareness    Rehab potential is good.    Activity tolerance: Good    Discharge recommendations: Discharge Facility/Level Of Care Needs: outpatient PT     Barriers to discharge:   Decreased caregiver support, Inaccessible home environment    Equipment recommendations:   walker, rolling, bath bench     GOALS:   Physical Therapy Goals         Problem: Physical Therapy Goal    Goal Priority Disciplines Outcome Goal Variances Interventions   Physical Therapy Goal     PT/OT, PT      Description:  Goals to be met by: 2017    Patient will increase functional independence with mobility by performin. Sit to stand transfer with [place]  2. Gait  x 350 feet with Modified [place] using Rolling Walker.    Recommend: Outpatient Physical Therapy, Rolling Walker, and Transfer Tub Bench at time of discharge.                  PLAN:    Patient to be seen 5 x/week  to address the above listed problems via gait training, therapeutic activities, therapeutic exercises  Plan of Care expires: 17  Plan of Care reviewed with: patient         Rebecca De Leon, PTA  2017

## 2017-04-25 NOTE — CONSULTS
"[facility]  Neurology  Consult Note    Patient Name: Livan Arevalo  MRN: [id number]  Admission Date: 4/22/2017  Hospital Length of Stay: 3 days  Code Status: Full Code   Attending Provider: Tonny Storm MD   Consulting Provider: Andrei Doan MD  Primary Care Physician: Stephon Barrios Jr, MD  Principal Problem:Mass of right chest wall    Consults  Subjective:     Chief Complaint/Reason for consult: Abnormal MRI    HPI: 41 y/o male with medical Hx as listed below who came to ED for generalized weakness. On chest imaging pt was found to have what appears to be an osseous mass in first rib on the left. Pt has been complaining of weakness of LE, "legs giving out" which has caused falls in the past. Pt had MRI of brain showing at least three areas of possible acute infarction. Mr. Arevalo denies visual or speech disturbances, vertigo, UE weakness or constant LE weakness. No aggravating or alleviating factors. Denies previous Hx of stroke. He does state that he was recently put on Coreg for irregular heart beat.    Past Medical History:   Diagnosis Date    ESRD (end stage renal disease)     Hypertension     Renal disorder        Past Surgical History:   Procedure Laterality Date    DIALYSIS FISTULA CREATION         Review of patient's allergies indicates:   Allergen Reactions    Ciprofloxacin Hives    Iodine and iodide containing products Hives and Itching       Current Neurological Medications:     No current facility-administered medications on file prior to encounter.      Current Outpatient Prescriptions on File Prior to Encounter   Medication Sig    aspirin 325 MG tablet Take 1 tablet (325 mg total) by mouth once daily.    B COMPLEX WITH VITAMIN C (VITAMIN B COMPLEX WITH C ORAL) Take by mouth.    carvedilol (COREG) 6.25 MG tablet Take 25 mg by mouth 2 (two) times daily with meals.     HYDRALAZINE HCL (HYDRALAZINE ORAL) Take by mouth.    lisinopril (PRINIVIL,ZESTRIL) 40 MG tablet " Take 1 tablet (40 mg total) by mouth once daily.      Family History     Problem Relation (Age of Onset)    Kidney disease Mother        Social History Main Topics    Smoking status: Never Smoker    Smokeless tobacco: Not on file    Alcohol use No    Drug use: No    Sexual activity: Not on file     Review of Systems   Constitutional: Negative for chills and fever.   HENT: Negative for trouble swallowing.   Eyes: Negative for photophobia.   Respiratory: Negative for shortness of breath.   Cardiovascular: Negative for chest pain and palpitations.   Gastrointestinal: Negative for abdominal pain.   Genitourinary: Negative for dysuria.   Musculoskeletal: Negative for arthralgias and back pain.   Neurological: Negative for dizziness, tremors, seizures, weakness, light-headedness, numbness and headaches.     Objective:     Vital Signs (Most Recent):  Temp: 96.4 °F (35.8 °C) (04/25/17 1600)  Pulse: 90 (04/25/17 1600)  Resp: 20 (04/25/17 1600)  BP: 132/82 (04/25/17 1600)  SpO2: (!) 92 % (04/25/17 1600) Vital Signs (24h Range):  Temp:  [96.4 °F (35.8 °C)-98 °F (36.7 °C)] 96.4 °F (35.8 °C)  Pulse:  [] 90  Resp:  [18-20] 20  SpO2:  [92 %-99 %] 92 %  BP: (118-160)/() 132/82     Weight: 108.4 kg (238 lb 15.7 oz)  Body mass index is 41.02 kg/(m^2).    Physical Exam  General: well developed, well nourished, appears stated age, no distress  Head: normocephalic  Eyes: conjunctivae/corneas clear.  Nose: no discharge, no epistaxis  Neck: no adenopathy and no carotid bruit  Heart: regular rate and rhythm.  Abdomen: non-distended and bowel sounds normal.  Extremities: no cyanosis or edema  Skin: no rash  Neurologic: Mental status: alert, oriented to person, place, time; follows commands with no difficulties  Cranial nerves: II: pupils equal, round, reactive to light and accommodation, III,IV,VI: extraocular muscles intact, no ptosis, V: facial light touch sensation normal bilaterally, VII: no facial weakness, IX: soft  palate elevation normal bilaterally, XI: trapezius strength normal bilaterally, XII: tongue protrudes midline   Strength: 5/5 on left; 5/5 on right  DTR's: 2+ in UE/LE's  No dysmetria       Significant Labs:   CBC: No results for input(s): WBC, HGB, HCT, PLT in the last 48 hours.  CMP:   Recent Labs  Lab 04/24/17  0413 04/25/17  1018   * 173*   * 132*   K 4.6 4.8   CL 96 93*   CO2 25 24   BUN 42* 47*   CREATININE 8.1* 7.4*   CALCIUM 9.1 8.9   PROT 8.3  --    ALBUMIN 2.7*  --    BILITOT 1.8*  --    ALKPHOS 278*  --    AST 35  --    ALT 34  --    ANIONGAP 12 15   EGFRNONAA 7* 8*       Significant Imaging:  MRI brain  Very limited due to motion artifact.  There is a sub-centimeters area of abnormal diffusion and ADC signal within the left cerebellar hemisphere concerning for an area of acute infarction.  There is a punctate area of signal abnormality within the posterior left frontal lobe also concerning for an area of acute infarction.  There is punctate area of signal abnormality noted on diffusion and ADC in the left frontal lobe more so anteriorly also concerning for an area of acute infarction.  There is a remote infarction in the right corona radiata with prior hemorrhagic transformation and hemosiderin deposition.  Tiny lacunar infarction within the left thalamus.  There are numerous punctate areas of abnormal T2 and flair signal seen within the periventricular white matter of the bilateral cerebral hemispheres most consistent with changes of small vessel chronic ischemic disease.  No definite mass is identified although note that IV contrast increase the sensitivity to detect metastases and could be done with better sedation.  There is no subdural fluid collection.  The sinuses, orbits, sella, parasellar region and craniocervical junction appear within normal limits.  Abnormal decreased T1 signal of the osseous structure of the upper cervical spine may indicate an underlying bone marrow replacement  process or changes of chronic anemia.   Impression       Severely limited study due to motion.  Repeat exam with better sedation could be done with the addition of IV contrast which increased sensitivity to detect metastases.  3 small punctate areas of abnormal diffusion signal concerning for small areas of acute infarction without definite hemorrhagic transformation.  Significant periventricular white matter disease, much greater than expected for this patient's age.  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        Electronically signed by: MELANIE MCGINNIS MD  Date: 04/25/17  Time: 08:17          Assessment and Plan:     43 y/o male consulted for abnormal brain MRI    1. Brain MRI abnormalities: there are several areas as mentioned above concerning for acute infarctions. Pattern could be seen in embolic phenomena, watershed strokes. Mass in chest wall of unknown type but malignancy could predispose pt to hypercoagulability as well.   -Will obtain carotid US.   -Continue antiplatelet and statin for now.      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] 04/22/2017 Yes    Anemia of chronic disease [D63.8] 04/22/2017 Yes    Cholestatic hepatitis [K75.89] 04/22/2017 Yes    Lymphadenopathy, mediastinal [R59.0] 04/22/2017 Yes    Hypertensive renal disease [I12.9] 09/05/2015 Yes    ESRD (end stage renal disease) [N18.6] 03/18/2014 Yes    Hypertension [I10]  03/18/2014 Yes      Problems Resolved During this Admission:    Diagnosis Date Noted Date Resolved POA    Sinus tachycardia by electrocardiography [R00.0] 04/22/2017 04/24/2017 Yes    Neutrophilic leukocytosis [D72.9] 04/22/2017 04/23/2017 Yes       VTE Risk Mitigation         Ordered     heparin (porcine) injection 5,000 Units  Every 8 hours     Route:  Subcutaneous        04/22/17 1324     Medium Risk of VTE  Once      04/22/17 1324     Place sequential compression device  Until discontinued      04/22/17 1324     Place BEN hose  Until discontinued      04/22/17 1324          Thank you for your consult. I will follow-up with patient. Please contact us if you have any additional questions.    Andrei Doan MD  Neurology  [facility]

## 2017-04-25 NOTE — ASSESSMENT & PLAN NOTE
Unknown cause but mass that may be cancerous and a severely depressed EF as seen on Echo may be contributing. PT recommends H/H with rolling walker and shower chair.

## 2017-04-25 NOTE — PROGRESS NOTES
"Progress Note  [facility] Medicine Service:   Admit Date: 4/22/2017  Encounter Date: 04/25/2017  ANGELIQUE:     Follow-up Visit For: Mass of right chest wall    HPI:  42 year old male with ESRD (on HD every MWF), tachyarrhythmia and hypertension who presented with generalized weakness of one day, but then reported at least one week in evolution. Stated inability to get out of bed. Reports his legs give away and falls. Was unable to drive himself to dialysis and missed yesterday's session. Was able to go to Wednesday's session where only one hour was completed as he was "feeling bad". Reported in ED having nausea, vomiting and diarrhea, but did not report this to me. Labs in the ED showed mild leukocytosis with neutrophilia, mild anemia and some thrombocytopenia that is not severe. No bands. Chemistry did not show hyperkalemia or severe uremia, but did show hyperbilirubinemia with cholestatic pattern. Is afebrile and not hypotensive. Does not appear toxic. Breathing comfortably at room air. CXR with a right upper lobe "consolidation". A CT of chest without contrast showed a "Large densely calcified mass arising from the anterior margins of the right 1st rib near the costovertebral junction measuring up to 8.4 cm.  Findings are concerning for neoplasm such as chondrosarcoma. findings are new from prior CT dated 9/2012. Multiple enlarged bilateral axillary lymph nodes and borderline enlarged mediastinal AP window node.  Metastatic adenopathy not excluded."          Interval History: No new issues.      Review of Systems   Constitutional: Negative for activity change, appetite change, chills, fatigue and fever.   HENT: Positive for congestion.    Respiratory: Negative for chest tightness and shortness of breath.    Cardiovascular: Negative for chest pain.   Gastrointestinal: Negative for abdominal pain.     Objective:     Vital Signs (Most Recent):  Temp: 97.6 °F (36.4 °C) (04/25/17 0800)  Pulse: 93 " (04/25/17 0800)  Resp: 20 (04/25/17 0800)  BP: (!) 159/83 (04/25/17 0800)  SpO2: 98 % (04/25/17 0800) Vital Signs (24h Range):  Temp:  [97.1 °F (36.2 °C)-98.5 °F (36.9 °C)] 97.6 °F (36.4 °C)  Pulse:  [] 93  Resp:  [16-20] 20  SpO2:  [95 %-99 %] 98 %  BP: (110-160)/() 159/83     Weight: 108.4 kg (238 lb 15.7 oz)  Body mass index is 41.02 kg/(m^2).    Intake/Output Summary (Last 24 hours) at 04/25/17 1004  Last data filed at 04/25/17 0600   Gross per 24 hour   Intake              500 ml   Output             2404 ml   Net            -1904 ml      Physical Exam   Constitutional: He is oriented to person, place, and time. He appears well-developed and well-nourished.   Cardiovascular: Normal rate.    Pulmonary/Chest: Breath sounds normal. No respiratory distress. He has no wheezes.   Abdominal: Soft.   Neurological: He is alert and oriented to person, place, and time.   Vitals reviewed.      Significant Labs:   BMP:   Recent Labs  Lab 04/24/17  0413   *   *   K 4.6   CL 96   CO2 25   BUN 42*   CREATININE 8.1*   CALCIUM 9.1     CBC: No results for input(s): WBC, HGB, HCT, PLT in the last 48 hours.    Significant Imaging:       ASSESSMENT/PLAN:     * Mass of right chest wall  Concern for chondrosarcoma vs other type of malignancy. This is a new finding. Consult to IR placed for biopsy. Done 4/24.. Pending results. Onc consult placed for further recs. Abd/pelvic CT without evident metastatic disease. brain MRI - possible acute infarcts.  Neuro consulted on 4/25.         Hypertension  At goal  Benign essential.     ESRD (end stage renal disease)  Per nephrology. Further recs very much appreciated. HD every MWF      Hypertensive renal disease        Generalized muscle weakness  Unknown cause but mass that may be cancerous and a severely depressed EF as seen on Echo may be contributing. PT recommends H/H with rolling walker and shower chair.         Anemia of chronic disease  Stable and consistent with  "baseline levels      Cholestatic hepatitis  Patient has no physical exams suggestive of cholecystitis or cholangitis. Is afebrile and has non toxic appearance. He does have mild leukocytosis. Ultrasound showed  "Cholelithiasis and trace volume ascites.  Diffuse gallbladder wall thickening without associated hyperemia or biliary ductal dilatation, probably related to ascites/hepatic dysfunction..Hepatic venous increased pulsatility, commonly cardiac related. Otherwise, limited liver Doppler ultrasound within normal limits...Right hepatic 2.1 cm echogenic mass suggestive of a hemangioma, but remains incompletely characterized." Metastatic disease? Bilirubin, AP and liver enzymes all improved after fluid removal via HD. 2D Echo with EF of 30%, which is depressed from 3 years ago. Will get cardiology involved. Hold off on antibiotics for now.      Acute on chronic systolic heart failure  Has cardiovascular disease. Noted on CT with contrast. EF of 30 % is a new finding. . Afterload reduction, UF via HD, ASA, statin and BB  Cards notes ischemic work up at some point.       Pulmonary hypertension  stable      Continue PT. Follow up on oncology recs. [facility] consulted for abnormal MRI of brain. Ischemic work up from cards once stable. Home soon. Dialysis tomorrow.   Rolling walker/shower chair on discharge       Tonny Mckoy MD  Department of Hospital Medicine    "

## 2017-04-25 NOTE — PLAN OF CARE
Problem: Patient Care Overview  Goal: Plan of Care Review  Outcome: Ongoing (interventions implemented as appropriate)    04/25/17 6654   Coping/Psychosocial   Plan Of Care Reviewed With patient         Pt remains free of falls and injury this shift. Vitals stable. Pt walked around unit twice.  No compliants of pain.IV c/d/i saline locked. No distress noted. Will continue to monitor.

## 2017-04-26 LAB
ANION GAP SERPL CALC-SCNC: 16 MMOL/L
BUN SERPL-MCNC: 67 MG/DL
CALCIUM SERPL-MCNC: 8.5 MG/DL
CHLORIDE SERPL-SCNC: 97 MMOL/L
CO2 SERPL-SCNC: 21 MMOL/L
CREAT SERPL-MCNC: 8.6 MG/DL
DIASTOLIC DYSFUNCTION: NO
EST. GFR  (AFRICAN AMERICAN): 8 ML/MIN/1.73 M^2
EST. GFR  (NON AFRICAN AMERICAN): 7 ML/MIN/1.73 M^2
GLUCOSE SERPL-MCNC: 162 MG/DL
POTASSIUM SERPL-SCNC: 3.9 MMOL/L
SODIUM SERPL-SCNC: 134 MMOL/L

## 2017-04-26 PROCEDURE — 11000001 HC ACUTE MED/SURG PRIVATE ROOM

## 2017-04-26 PROCEDURE — 36415 COLL VENOUS BLD VENIPUNCTURE: CPT

## 2017-04-26 PROCEDURE — 86706 HEP B SURFACE ANTIBODY: CPT

## 2017-04-26 PROCEDURE — 25000003 PHARM REV CODE 250: Performed by: INTERNAL MEDICINE

## 2017-04-26 PROCEDURE — 80048 BASIC METABOLIC PNL TOTAL CA: CPT

## 2017-04-26 PROCEDURE — 99232 SBSQ HOSP IP/OBS MODERATE 35: CPT | Mod: 25,,, | Performed by: INTERNAL MEDICINE

## 2017-04-26 PROCEDURE — 93017 CV STRESS TEST TRACING ONLY: CPT

## 2017-04-26 PROCEDURE — 93018 CV STRESS TEST I&R ONLY: CPT | Mod: ,,, | Performed by: INTERNAL MEDICINE

## 2017-04-26 PROCEDURE — 80100016 HC MAINTENANCE HEMODIALYSIS

## 2017-04-26 PROCEDURE — 63600175 PHARM REV CODE 636 W HCPCS: Performed by: INTERNAL MEDICINE

## 2017-04-26 PROCEDURE — 78452 HT MUSCLE IMAGE SPECT MULT: CPT | Mod: 26,,, | Performed by: INTERNAL MEDICINE

## 2017-04-26 PROCEDURE — 63600175 PHARM REV CODE 636 W HCPCS

## 2017-04-26 PROCEDURE — 93016 CV STRESS TEST SUPVJ ONLY: CPT | Mod: ,,, | Performed by: INTERNAL MEDICINE

## 2017-04-26 RX ORDER — REGADENOSON 0.08 MG/ML
INJECTION, SOLUTION INTRAVENOUS
Status: DISPENSED
Start: 2017-04-26 | End: 2017-04-27

## 2017-04-26 RX ORDER — ONDANSETRON 2 MG/ML
4 INJECTION INTRAMUSCULAR; INTRAVENOUS EVERY 6 HOURS PRN
Status: DISCONTINUED | OUTPATIENT
Start: 2017-04-26 | End: 2017-04-28 | Stop reason: HOSPADM

## 2017-04-26 RX ORDER — ONDANSETRON 2 MG/ML
INJECTION INTRAMUSCULAR; INTRAVENOUS
Status: DISPENSED
Start: 2017-04-26 | End: 2017-04-27

## 2017-04-26 RX ORDER — ONDANSETRON 2 MG/ML
INJECTION INTRAMUSCULAR; INTRAVENOUS
Status: COMPLETED
Start: 2017-04-26 | End: 2017-04-26

## 2017-04-26 RX ADMIN — ATORVASTATIN CALCIUM 40 MG: 40 TABLET, FILM COATED ORAL at 08:04

## 2017-04-26 RX ADMIN — ONDANSETRON 4 MG: 2 INJECTION INTRAMUSCULAR; INTRAVENOUS at 12:04

## 2017-04-26 RX ADMIN — ONDANSETRON 4 MG: 2 INJECTION, SOLUTION INTRAMUSCULAR; INTRAVENOUS at 12:04

## 2017-04-26 RX ADMIN — LOPERAMIDE HYDROCHLORIDE 2 MG: 2 CAPSULE ORAL at 09:04

## 2017-04-26 RX ADMIN — Medication 1 CAPSULE: at 08:04

## 2017-04-26 RX ADMIN — ONDANSETRON 4 MG: 2 INJECTION INTRAMUSCULAR; INTRAVENOUS at 08:04

## 2017-04-26 RX ADMIN — ASPIRIN 81 MG: 81 TABLET, COATED ORAL at 08:04

## 2017-04-26 RX ADMIN — HEPARIN SODIUM 5000 UNITS: 5000 INJECTION, SOLUTION INTRAVENOUS; SUBCUTANEOUS at 09:04

## 2017-04-26 NOTE — ASSESSMENT & PLAN NOTE
Has cardiovascular disease. Noted on CT with contrast. EF of 30 % is a new finding. . Afterload reduction, UF via HD, ASA, statin and BB  Cards notes ischemic work up at some point.

## 2017-04-26 NOTE — PROGRESS NOTES
"Livan Arevalo is a 42 y.o. male patient.    Active Hospital Problems    Diagnosis  POA    *Mass of right chest wall [R22.2]  Yes    Obesity (BMI 30-39.9) [E66.9]  Yes     Body mass index is 41.02 kg/(m^2).  Weight loss as out patient. Morbid.        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]  Yes    Anemia of chronic disease [D63.8]  Yes    Cholestatic hepatitis [K75.89]  Yes    Lymphadenopathy, mediastinal [R59.0]  Yes    Hypertensive renal disease [I12.9]  Yes    ESRD (end stage renal disease) [N18.6]  Yes    Hypertension [I10]  Yes      Resolved Hospital Problems    Diagnosis Date Resolved POA    Sinus tachycardia by electrocardiography [R00.0] 04/24/2017 Yes    Neutrophilic leukocytosis [D72.9] 04/23/2017 Yes     Temp: 97.4 °F (36.3 °C) (04/26/17 0400)  Pulse: 98 (04/26/17 0400)  Resp: 20 (04/26/17 0400)  BP: 120/89 (04/26/17 0400)  SpO2: 96 % (04/26/17 0400)  Weight: 108.4 kg (238 lb 15.7 oz) (04/22/17 1350)  Height: 5' 4" (162.6 cm) (04/22/17 1350)    Subjective:  Symptoms:  Stable.    Diet:  NPO.      Objective:  Vital signs: (most recent): Blood pressure 120/89, pulse 98, temperature 97.4 °F (36.3 °C), temperature source Oral, resp. rate 20, height 5' 4" (1.626 m), weight 108.4 kg (238 lb 15.7 oz), SpO2 96 %.    Lungs:  Normal respiratory rate and normal effort.    Heart: Normal rate.  Regular rhythm.      Assessment & Plan     Echo 4/23/17    1 - Moderately depressed left ventricular systolic function (EF 30-35%).     2 - Concentric hypertrophy.     3 - Mild left ventricular enlargement.     4 - Biatrial enlargement.     5 - Right ventricular enlargement with mildly to moderately depressed " systolic function.     6 - Pulmonary hypertension. The estimated PA systolic pressure is 48 mmHg.     7 - Moderate mitral regurgitation.     8 - Moderate tricuspid regurgitation.     9 - Trivial pulmonic regurgitation.      Cardiomyopathy - EF depressed from last echo 3/2014. Stress test today. Afterload reduction as tolerated, volume control with HD  Chest wall mass - w/u in progress  ESRD  PAD  Cholestatic hepatitis  HTN       Von Alvarado MD  4/26/2017

## 2017-04-26 NOTE — PROGRESS NOTES
Livan Arevalo is a 42 y.o. male patient.    Follow for ESRD, dialysis    No new c/o, comfortable    Scheduled Meds:   sodium chloride 0.9%   Intravenous Once    aspirin  81 mg Oral Daily    atorvastatin  40 mg Oral Daily    carvedilol  25 mg Oral BID WM    heparin (porcine)  5,000 Units Subcutaneous Q8H    regadenoson        vitamin renal formula (B-complex-vitamin c-folic acid)  1 capsule Oral Daily       Review of patient's allergies indicates:   Allergen Reactions    Ciprofloxacin Hives    Iodine and iodide containing products Hives and Itching         Vital Signs Range (Last 24H):  Temp:  [96.4 °F (35.8 °C)-98.5 °F (36.9 °C)]   Pulse:  []   Resp:  [17-20]   BP: (102-145)/(54-89)   SpO2:  [92 %-98 %]     I & O (Last 24H):  Intake/Output Summary (Last 24 hours) at 04/26/17 1324  Last data filed at 04/26/17 0600   Gross per 24 hour   Intake              240 ml   Output                0 ml   Net              240 ml           Physical Exam:  General appearance: well developed, well nourished, no distress  Lungs:  clear to auscultation bilaterally and normal respiratory effort  Heart: regular rate and rhythm  Abdomen: soft, non-tender non-distented; bowel sounds normal; no masses,  no organomegaly  Extremities: no cyanosis or edema, or clubbing    Laboratory:  CBC:   Recent Labs  Lab 04/23/17  0414   WBC 7.84   RBC 3.66*   HGB 10.3*   HCT 31.4*   *   MCV 86   MCH 28.1   MCHC 32.8     CMP:   Recent Labs  Lab 04/24/17  0413  04/26/17  0445   *  < > 162*   CALCIUM 9.1  < > 8.5*   ALBUMIN 2.7*  --   --    PROT 8.3  --   --    *  < > 134*   K 4.6  < > 3.9   CO2 25  < > 21*   CL 96  < > 97   BUN 42*  < > 67*   CREATININE 8.1*  < > 8.6*   ALKPHOS 278*  --   --    ALT 34  --   --    AST 35  --   --    BILITOT 1.8*  --   --    < > = values in this interval not displayed.    Imp/Plan    ESRD - HD today  Chest wall mass  HTN  Anemia of CKD    Continue present Rx  HD qMWF  Continue follow  for dialysis    Wesley Gray  4/26/2017

## 2017-04-26 NOTE — PT/OT/SLP PROGRESS
Physical Therapy      Livan Arevalo  MRN: [id number]    Patient not seen today secondary to Unavailable (Comment) x 2 attempts ( 10:40 AM and 14:50 PM)  Other (Comment) pt is off the unit . Will follow-up as able today .    Rebecca De Leon, PTA

## 2017-04-26 NOTE — PROGRESS NOTES
MRI findings reviewed. Neuro following. Pathology chest wall mass still pending. If findings c/w sarcoma, plan surgical evaluation.

## 2017-04-26 NOTE — PROGRESS NOTES
"[facility] Medicine  Progress Note    Patient Name: Livan Arevalo  MRN: [id number]  Patient Class: IP- Inpatient   Admission Date: 4/22/2017  Length of Stay: 4 days  Attending Physician: Tonny Storm MD  Primary Care Provider: Stephon Barrios Jr, MD        Subjective:     Principal Problem:Mass of right chest wall    HPI:  42 year old male with ESRD (on HD every MWF), tachyarrhythmia and hypertension who presented with generalized weakness of one day, but then reported at least one week in evolution. Stated inability to get out of bed. Reports his legs give away and falls. Was unable to drive himself to dialysis and missed yesterday's session. Was able to go to Wednesday's session where only one hour was completed as he was "feeling bad". Reported in ED having nausea, vomiting and diarrhea, but did not report this to me. Labs in the ED showed mild leukocytosis with neutrophilia, mild anemia and some thrombocytopenia that is not severe. No bands. Chemistry did not show hyperkalemia or severe uremia, but did show hyperbilirubinemia with cholestatic pattern. Is afebrile and not hypotensive. Does not appear toxic. Breathing comfortably at room air. CXR with a right upper lobe "consolidation". A CT of chest without contrast showed a "Large densely calcified mass arising from the anterior margins of the right 1st rib near the costovertebral junction measuring up to 8.4 cm.  Findings are concerning for neoplasm such as chondrosarcoma. findings are new from prior CT dated 9/2012. Multiple enlarged bilateral axillary lymph nodes and borderline enlarged mediastinal AP window node.  Metastatic adenopathy not excluded."        Hospital Course:  Mr Arevalo presents with generalized weakness of unknown cause, but it appears cancer may be a possible etiology. A CXR on presentation showed a "consolidation" in the right upper lobe. Since this is an unusual place for a "consolidation" a chest CT " "without contrast was obtained. This showed "Large densely calcified mass arising from the anterior margins of the right 1st rib near the costovertebral junction measuring up to 8.4 cm.  Findings are concerning for neoplasm such as chondrosarcoma. findings are new from prior CT dated 9/2012. Multiple enlarged bilateral axillary lymph nodes and borderline enlarged mediastinal AP window node.  Metastatic adenopathy not excluded." Antibitoics held and admitted for formal evaluation of weakness and mass. Labs did show cholestatic pattern with hepatitis, therefore an US was obtained. This showed cholelithiasis but no signs of infection or obstruction, but rather changes consistent with visceral congestion. All numbers improved with UF via HD. Of note, he did miss his dialysis on day prior nor did he complete the prior one as he was too weak to do so. Oncology consulted. MRI of brain obtained to further evaluate weakness and leg "giving out" when walking. A biopsy of mass done on 4/24 via IR. Pathology pending. MRI showed  acute infarcts and Neuro was consulted on 4/25 and began stroke work up. PT/OT evaluated the patient and recommended home with H/H as well as rolling walker and shower chair. Cards was consulted as well for decreased EF and plan was for ischemic work up once stable.         Interval History: No new issues. Resting comfortably    Review of Systems   Constitutional: Negative for activity change, appetite change, chills, fatigue and fever.   HENT: Positive for congestion.    Respiratory: Negative for chest tightness and shortness of breath.    Cardiovascular: Negative for chest pain.   Gastrointestinal: Negative for abdominal pain.     Objective:     Vital Signs (Most Recent):  Temp: 97.4 °F (36.3 °C) (04/26/17 0400)  Pulse: 98 (04/26/17 0400)  Resp: 20 (04/26/17 0400)  BP: 120/89 (04/26/17 0400)  SpO2: 96 % (04/26/17 0400) Vital Signs (24h Range):  Temp:  [96.4 °F (35.8 °C)-98.5 °F (36.9 °C)] 97.4 °F (36.3 " °C)  Pulse:  [] 98  Resp:  [20] 20  SpO2:  [92 %-99 %] 96 %  BP: (118-159)/(58-89) 120/89     Weight: 108.4 kg (238 lb 15.7 oz)  Body mass index is 41.02 kg/(m^2).    Intake/Output Summary (Last 24 hours) at 04/26/17 0647  Last data filed at 04/26/17 0600   Gross per 24 hour   Intake              240 ml   Output                0 ml   Net              240 ml      Physical Exam   Constitutional: He is oriented to person, place, and time. He appears well-developed and well-nourished.   Cardiovascular: Normal rate.    Pulmonary/Chest: Breath sounds normal. No respiratory distress. He has no wheezes.   Abdominal: Soft.   Neurological: He is alert and oriented to person, place, and time.   Vitals reviewed.      Significant Labs:   BMP:   Recent Labs  Lab 04/26/17  0445   *   *   K 3.9   CL 97   CO2 21*   BUN 67*   CREATININE 8.6*   CALCIUM 8.5*     CBC: No results for input(s): WBC, HGB, HCT, PLT in the last 48 hours.    Significant Imaging:    Assessment/Plan:      * Mass of right chest wall  Concern for chondrosarcoma vs other type of malignancy. This is a new finding. Consult to IR placed for biopsy. Done 4/24.. Pending results. Onc consult placed for further recs. Abd/pelvic CT without evident metastatic disease. brain MRI -acute infarcts.  Neuro consulted on 4/25. Pathology from Bx pending         Hypertension  At goal  Benign essential.     ESRD (end stage renal disease)  Per nephrology. Further recs very much appreciated. HD every MWF      Hypertensive renal disease        Generalized muscle weakness  Unknown cause but mass that may be cancerous and a severely depressed EF as seen on Echo may be contributing. PT recommends H/H with rolling walker and shower chair.         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 Continue on chemical DVT prophylaxis as he is at high risk for this      Anemia of chronic disease  Stable and consistent with baseline levels      Cholestatic  "hepatitis  Patient has no physical exams suggestive of cholecystitis or cholangitis. Is afebrile and has non toxic appearance. He does have mild leukocytosis. Ultrasound showed  "Cholelithiasis and trace volume ascites.  Diffuse gallbladder wall thickening without associated hyperemia or biliary ductal dilatation, probably related to ascites/hepatic dysfunction..Hepatic venous increased pulsatility, commonly cardiac related. Otherwise, limited liver Doppler ultrasound within normal limits...Right hepatic 2.1 cm echogenic mass suggestive of a hemangioma, but remains incompletely characterized." Metastatic disease? Bilirubin, AP and liver enzymes all improved after fluid removal via HD. 2D Echo with EF of 30%, which is depressed from 3 years ago. Will get cardiology involved. Hold off on antibiotics for now.      Lymphadenopathy, mediastinal  Metastatic disease? Pus removed when attempting biopsy of axillary lymph node. Fluid sent to lab for culture. Hold off on abx for now.      Acute on chronic systolic heart failure  Has cardiovascular disease. Noted on CT with contrast. EF of 30 % is a new finding. . Afterload reduction, UF via HD, ASA, statin and BB  Cards notes ischemic work up at some point.       Moderate mitral regurgitation by prior echocardiogram        Moderate tricuspid regurgitation by prior echocardiogram        Pulmonary hypertension  stable      Obesity (BMI 30-39.9)  Body mass index is 41.02 kg/(m^2).  Morbid.       VTE Risk Mitigation         Ordered     heparin (porcine) injection 5,000 Units  Every 8 hours     Route:  Subcutaneous        04/22/17 1324     Medium Risk of VTE  Once      04/22/17 1324     Place sequential compression device  Until discontinued      04/22/17 1324     Place BEN hose  Until discontinued      04/22/17 1324        Continue PT/OT. Neuro consulted and working up for stroke. Cards following. Ischemic work up at some point.  Oncology following. Path pending on bx.  Home in a " couple of days. Dialysis today.     Tonny Mckoy MD  Department of Hospital Medicine   [facility]

## 2017-04-26 NOTE — PLAN OF CARE
Problem: Patient Care Overview  Goal: Plan of Care Review  Outcome: Ongoing (interventions implemented as appropriate)    04/26/17 3684   Coping/Psychosocial   Plan Of Care Reviewed With patient      Pt remains free of falls and injury this shift. Vitals stable. No compliants of pain.IV c/d/i saline locked. No distress noted. Will continue to monitor.

## 2017-04-26 NOTE — PLAN OF CARE
Problem: Kidney Disease, Chronic/End Stage Renal Disease (Adult)  Goal: Signs and Symptoms of Listed Potential Problems Will be Absent, Minimized or Managed (Kidney Disease, Chronic/End Stage Renal Disease)  Signs and symptoms of listed potential problems will be absent, minimized or managed by discharge/transition of care (reference Kidney Disease, Chronic/End Stage Renal Disease (Adult) CPG).   Outcome: Ongoing (interventions implemented as appropriate)    04/26/17 8697   Kidney Disease, Chronic/End Stage Renal Disease   Problems Assessed (Chronic Kidney Disease/ESRD) all   Problems Present (Chronic Kidney Disease/ESRD) electrolyte imbalance;fluid imbalance   Hemodialysis as ordered

## 2017-04-26 NOTE — SUBJECTIVE & OBJECTIVE
Interval History: No new issues. Resting comfortably    Review of Systems   Constitutional: Negative for activity change, appetite change, chills, fatigue and fever.   HENT: Positive for congestion.    Respiratory: Negative for chest tightness and shortness of breath.    Cardiovascular: Negative for chest pain.   Gastrointestinal: Negative for abdominal pain.     Objective:     Vital Signs (Most Recent):  Temp: 97.4 °F (36.3 °C) (04/26/17 0400)  Pulse: 98 (04/26/17 0400)  Resp: 20 (04/26/17 0400)  BP: 120/89 (04/26/17 0400)  SpO2: 96 % (04/26/17 0400) Vital Signs (24h Range):  Temp:  [96.4 °F (35.8 °C)-98.5 °F (36.9 °C)] 97.4 °F (36.3 °C)  Pulse:  [] 98  Resp:  [20] 20  SpO2:  [92 %-99 %] 96 %  BP: (118-159)/(58-89) 120/89     Weight: 108.4 kg (238 lb 15.7 oz)  Body mass index is 41.02 kg/(m^2).    Intake/Output Summary (Last 24 hours) at 04/26/17 0647  Last data filed at 04/26/17 0600   Gross per 24 hour   Intake              240 ml   Output                0 ml   Net              240 ml      Physical Exam   Constitutional: He is oriented to person, place, and time. He appears well-developed and well-nourished.   Cardiovascular: Normal rate.    Pulmonary/Chest: Breath sounds normal. No respiratory distress. He has no wheezes.   Abdominal: Soft.   Neurological: He is alert and oriented to person, place, and time.   Vitals reviewed.      Significant Labs:   BMP:   Recent Labs  Lab 04/26/17  0445   *   *   K 3.9   CL 97   CO2 21*   BUN 67*   CREATININE 8.6*   CALCIUM 8.5*     CBC: No results for input(s): WBC, HGB, HCT, PLT in the last 48 hours.    Significant Imaging:

## 2017-04-26 NOTE — ASSESSMENT & PLAN NOTE
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 This is a new finding. Consult to IR placed for biopsy. Done 4/24.. Pending results. Onc consult placed for further recs. Abd/pelvic CT without evident metastatic disease. brain MRI -acute infarcts.  Neuro consulted on 4/25. Pathology from Bx pending

## 2017-04-26 NOTE — PLAN OF CARE
Problem: Patient Care Overview  Goal: Plan of Care Review  Outcome: Ongoing (interventions implemented as appropriate)  Patient with c/o nausea this a.m., new orders written per Dr. Mckoy for zofran iv q 6 hrs. Denies complaints of pain. Went today for carotid US. And stress test. Results pending. In dialysis, denies further nausea. Will continue to monitor    04/26/17 4105   Coping/Psychosocial   Plan Of Care Reviewed With patient

## 2017-04-27 LAB
ANION GAP SERPL CALC-SCNC: 13 MMOL/L
BACTERIA BLD CULT: NORMAL
BUN SERPL-MCNC: 43 MG/DL
CALCIUM SERPL-MCNC: 9.2 MG/DL
CHLORIDE SERPL-SCNC: 99 MMOL/L
CO2 SERPL-SCNC: 25 MMOL/L
CREAT SERPL-MCNC: 6.7 MG/DL
EST. GFR  (AFRICAN AMERICAN): 11 ML/MIN/1.73 M^2
EST. GFR  (NON AFRICAN AMERICAN): 9 ML/MIN/1.73 M^2
GLUCOSE SERPL-MCNC: 111 MG/DL
POTASSIUM SERPL-SCNC: 4.1 MMOL/L
SODIUM SERPL-SCNC: 137 MMOL/L

## 2017-04-27 PROCEDURE — 11000001 HC ACUTE MED/SURG PRIVATE ROOM

## 2017-04-27 PROCEDURE — 36415 COLL VENOUS BLD VENIPUNCTURE: CPT

## 2017-04-27 PROCEDURE — 99232 SBSQ HOSP IP/OBS MODERATE 35: CPT | Mod: ,,, | Performed by: INTERNAL MEDICINE

## 2017-04-27 PROCEDURE — 80048 BASIC METABOLIC PNL TOTAL CA: CPT

## 2017-04-27 PROCEDURE — 99232 SBSQ HOSP IP/OBS MODERATE 35: CPT | Mod: ,,, | Performed by: PSYCHIATRY & NEUROLOGY

## 2017-04-27 PROCEDURE — 25000003 PHARM REV CODE 250: Performed by: INTERNAL MEDICINE

## 2017-04-27 RX ADMIN — HEPARIN SODIUM 5000 UNITS: 5000 INJECTION, SOLUTION INTRAVENOUS; SUBCUTANEOUS at 05:04

## 2017-04-27 RX ADMIN — ASPIRIN 81 MG: 81 TABLET, COATED ORAL at 08:04

## 2017-04-27 RX ADMIN — CARVEDILOL 25 MG: 6.25 TABLET, FILM COATED ORAL at 08:04

## 2017-04-27 RX ADMIN — HEPARIN SODIUM 5000 UNITS: 5000 INJECTION, SOLUTION INTRAVENOUS; SUBCUTANEOUS at 09:04

## 2017-04-27 RX ADMIN — CARVEDILOL 25 MG: 6.25 TABLET, FILM COATED ORAL at 05:04

## 2017-04-27 RX ADMIN — ATORVASTATIN CALCIUM 40 MG: 40 TABLET, FILM COATED ORAL at 08:04

## 2017-04-27 RX ADMIN — Medication 1 CAPSULE: at 08:04

## 2017-04-27 NOTE — PT/OT/SLP PROGRESS
Physical Therapy      Livan Arevalo  MRN: [id number]    Patient not seen today secondary to Patient fatigue and nausea , nurse Genna notified Other (Comment) x 3 attempts ( 10:10 AM, 11: 40 AM , 14 : 20 PM) . Will follow-up tomorrow.    Rebecca De Leon, PTA

## 2017-04-27 NOTE — PROGRESS NOTES
"[facility]  Neurology  Progress Note    Patient Name: Livan Arevalo  MRN: [id number]  Admission Date: 4/22/2017  Hospital Length of Stay: 5 days  Code Status: Full Code   Attending Provider: Tonny Storm MD  Primary Care Physician: Stephon Barrios Jr, MD   Principal Problem:Mass of right chest wall    Subjective:     Interval History: 41 y/o male with medical Hx as listed below who came to ED for generalized weakness. On chest imaging pt was found to have what appears to be an osseous mass in first rib on the left. Pt has been complaining of weakness of LE, "legs giving out" which has caused falls in the past. Pt had MRI of brain showing at least three areas of possible acute infarction. Mr. Arevalo denies visual or speech disturbances, vertigo, UE weakness or constant LE weakness. No aggravating or alleviating factors. Denies previous Hx of stroke. He does state that he was recently put on Coreg for irregular heart beat.    -4/27/17: Mr. Arevalo with no new complaints today.     Current Neurological Medications:     Current Facility-Administered Medications   Medication Dose Route Frequency Provider Last Rate Last Dose    0.9%  NaCl infusion   Intravenous PRN Bita Fournier MD        0.9%  NaCl infusion   Intravenous Once Bita Fournier MD        aspirin EC tablet 81 mg  81 mg Oral Daily Pamela Quinonez MD   81 mg at 04/27/17 0829    atorvastatin tablet 40 mg  40 mg Oral Daily Pamela Quinonez MD   40 mg at 04/27/17 0828    carvedilol tablet 25 mg  25 mg Oral BID WM Pamela Quinonez MD   25 mg at 04/27/17 0828    heparin (porcine) injection 5,000 Units  5,000 Units Subcutaneous Q8H Pamela Quinonez MD   5,000 Units at 04/27/17 0546    loperamide capsule 2 mg  2 mg Oral QID PRN Pamela Quinonez MD   2 mg at 04/26/17 2156    ondansetron injection 4 mg  4 mg Intravenous Q6H PRN Tonny Storm MD   4 mg at 04/26/17 1205    ramelteon tablet 8 mg  8 mg Oral Nightly PRN Shae Blevins, " MD   8 mg at 04/25/17 1497    vitamin renal formula (B-complex-vitamin c-folic acid) 1 mg per capsule 1 capsule  1 capsule Oral Daily Bita Fournier MD   1 capsule at 04/27/17 0833       Review of Systems   Constitutional: Negative for chills and fever.   HENT: Negative for trouble swallowing.   Eyes: Negative for photophobia.   Respiratory: Negative for shortness of breath.   Cardiovascular: Negative for chest pain and palpitations.   Gastrointestinal: Negative for abdominal pain.   Genitourinary: Negative for dysuria.   Musculoskeletal: Negative for arthralgias and back pain.   Neurological: Negative for dizziness, tremors, seizures, weakness, light-headedness, numbness and headaches.     Objective:     Vital Signs (Most Recent):  Temp: 97.5 °F (36.4 °C) (04/27/17 1200)  Pulse: 101 (04/27/17 1200)  Resp: 19 (04/27/17 1200)  BP: 114/78 (04/27/17 1200)  SpO2: 97 % (04/26/17 0917) Vital Signs (24h Range):  Temp:  [97.5 °F (36.4 °C)-98 °F (36.7 °C)] 97.5 °F (36.4 °C)  Pulse:  [] 101  Resp:  [16-19] 19  BP: ()/(43-84) 114/78     Weight: 108.4 kg (238 lb 15.7 oz)  Body mass index is 41.02 kg/(m^2).    Physical Exam  General: well developed, well nourished, appears stated age, no distress  Head: normocephalic  Eyes: conjunctivae/corneas clear.  Nose: no discharge, no epistaxis  Neck: no adenopathy and no carotid bruit  Heart: regular rate and rhythm.  Abdomen: non-distended and bowel sounds normal.  Extremities: no cyanosis or edema  Skin: no rash  Neurologic: Mental status: alert, oriented to person, place, time; follows commands with no difficulties  Cranial nerves: II: pupils equal, round, reactive to light and accommodation, III,IV,VI: extraocular muscles intact, no ptosis, V: facial light touch sensation normal bilaterally, VII: no facial weakness, IX: soft palate elevation normal bilaterally, XI: trapezius strength normal bilaterally, XII: tongue protrudes midline   Strength: 5/5 on left; 5/5 on  right  DTR's: 2+ in UE/LE's  No dysmetria       Significant Labs:   CBC: No results for input(s): WBC, HGB, HCT, PLT in the last 48 hours.  CMP:   Recent Labs  Lab 04/26/17  0445 04/27/17  0526   * 111*   * 137   K 3.9 4.1   CL 97 99   CO2 21* 25   BUN 67* 43*   CREATININE 8.6* 6.7*   CALCIUM 8.5* 9.2   ANIONGAP 16 13   EGFRNONAA 7* 9*         Assessment and Plan:     43 y/o male consulted for abnormal brain MRI     1. Brain MRI abnormalities: there are several areas as mentioned above concerning for acute infarctions. Pattern could be seen in embolic phenomena, watershed strokes. Mass in chest wall of unknown type but malignancy could predispose pt to hypercoagulability as well. No arrhythmias on EKG. No focal findings on exam.  -Carotid US. No significant stenosis.  -Continue antiplatelet and statin for now.       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] 04/22/2017 Yes    Anemia of chronic disease [D63.8] 04/22/2017 Yes    Cholestatic hepatitis [K75.89] 04/22/2017 Yes    Lymphadenopathy, mediastinal [R59.0] 04/22/2017 Yes    Hypertensive renal disease [I12.9] 09/05/2015 Yes    ESRD (end stage renal disease) [N18.6] 03/18/2014 Yes    Hypertension [I10] 03/18/2014 Yes      Problems Resolved During this Admission:    Diagnosis Date Noted Date Resolved POA    Sinus tachycardia by electrocardiography [R00.0] 04/22/2017 04/24/2017 Yes    Neutrophilic leukocytosis [D72.9]  04/22/2017 04/23/2017 Yes       VTE Risk Mitigation         Ordered     heparin (porcine) injection 5,000 Units  Every 8 hours     Route:  Subcutaneous        04/22/17 1324     Medium Risk of VTE  Once      04/22/17 1324     Place sequential compression device  Until discontinued      04/22/17 1324     Place BEN hose  Until discontinued      04/22/17 1324          Andrei Doan MD  Neurology  [facility]

## 2017-04-27 NOTE — PLAN OF CARE
Problem: Patient Care Overview  Goal: Plan of Care Review  Outcome: Ongoing (interventions implemented as appropriate)  Pt remains free of falls and injuries. Pt received dialysis today (4/26), tolerated well but with low BP, could not get much off. Pt denies any pain. Still waiting for pathology result of chest wall. C/o diarrhea, imodium given. No other complaints, will cont to monitor.

## 2017-04-27 NOTE — ASSESSMENT & PLAN NOTE
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 This is a new finding. Consult to IR placed for biopsy. Done 4/24.. Pending results. Onc consult placed for further recs. Abd/pelvic CT without evident metastatic disease. brain MRI -acute infarcts.  Neuro consulted on 4/25. Pathology from Bx pending. Oncology following.

## 2017-04-27 NOTE — PLAN OF CARE
Problem: Fall Risk (Adult)  Goal: Absence of Falls  Patient will demonstrate the desired outcomes by discharge/transition of care.   Outcome: Ongoing (interventions implemented as appropriate)  Patient remains free of falls, instructed to use call light for ambulation. Refused PT on today. Will continue to monitor    04/27/17 1516   Fall Risk (Adult)   Absence of Falls making progress toward outcome

## 2017-04-27 NOTE — PROGRESS NOTES
"[facility] Medicine  Progress Note    Patient Name: Livan Arevalo  MRN: [id number]  Patient Class: IP- Inpatient   Admission Date: 4/22/2017  Length of Stay: 5 days  Attending Physician: Tonny Storm MD  Primary Care Provider: Stephon Barrios Jr, MD        Subjective:     Principal Problem:Mass of right chest wall    HPI:  42 year old male with ESRD (on HD every MWF), tachyarrhythmia and hypertension who presented with generalized weakness of one day, but then reported at least one week in evolution. Stated inability to get out of bed. Reports his legs give away and falls. Was unable to drive himself to dialysis and missed yesterday's session. Was able to go to Wednesday's session where only one hour was completed as he was "feeling bad". Reported in ED having nausea, vomiting and diarrhea, but did not report this to me. Labs in the ED showed mild leukocytosis with neutrophilia, mild anemia and some thrombocytopenia that is not severe. No bands. Chemistry did not show hyperkalemia or severe uremia, but did show hyperbilirubinemia with cholestatic pattern. Is afebrile and not hypotensive. Does not appear toxic. Breathing comfortably at room air. CXR with a right upper lobe "consolidation". A CT of chest without contrast showed a "Large densely calcified mass arising from the anterior margins of the right 1st rib near the costovertebral junction measuring up to 8.4 cm.  Findings are concerning for neoplasm such as chondrosarcoma. findings are new from prior CT dated 9/2012. Multiple enlarged bilateral axillary lymph nodes and borderline enlarged mediastinal AP window node.  Metastatic adenopathy not excluded."        Hospital Course:  Mr Arevalo presents with generalized weakness of unknown cause, but it appears cancer may be a possible etiology. A CXR on presentation showed a "consolidation" in the right upper lobe. Since this is an unusual place for a "consolidation" a chest CT " "without contrast was obtained. This showed "Large densely calcified mass arising from the anterior margins of the right 1st rib near the costovertebral junction measuring up to 8.4 cm.  Findings are concerning for neoplasm such as chondrosarcoma. findings are new from prior CT dated 9/2012. Multiple enlarged bilateral axillary lymph nodes and borderline enlarged mediastinal AP window node.  Metastatic adenopathy not excluded." Antibitoics held and admitted for formal evaluation of weakness and mass. Labs did show cholestatic pattern with hepatitis, therefore an US was obtained. This showed cholelithiasis but no signs of infection or obstruction, but rather changes consistent with visceral congestion. All numbers improved with UF via HD. Of note, he did miss his dialysis on day prior nor did he complete the prior one as he was too weak to do so. Oncology consulted. MRI of brain obtained to further evaluate weakness and leg "giving out" when walking. A biopsy of mass done on 4/24 via IR. Pathology pending. MRI showed  acute infarcts and Neuro was consulted on 4/25 and began stroke work up. PT/OT evaluated the patient and recommended home with H/H as well as rolling walker and shower chair. Cards was consulted as well for decreased EF and the patient went for a stress test on 4/26 that showed a fixed deficit without any areas of ischemia.  Patient had carotids of his neck that were normal.          Interval History: No new issues. Resting comfortably     Review of Systems   Constitutional: Negative for activity change, appetite change, chills, fatigue and fever.   HENT: Positive for congestion.    Respiratory: Negative for chest tightness and shortness of breath.    Cardiovascular: Negative for chest pain.   Gastrointestinal: Negative for abdominal pain.     Objective:     Vital Signs (Most Recent):  Temp: 97.9 °F (36.6 °C) (04/27/17 0800)  Pulse: 104 (04/27/17 0800)  Resp: 18 (04/27/17 0800)  BP: 117/73 (04/27/17 " 0800)  SpO2: 97 % (04/26/17 0917) Vital Signs (24h Range):  Temp:  [97.6 °F (36.4 °C)-98 °F (36.7 °C)] 97.9 °F (36.6 °C)  Pulse:  [] 104  Resp:  [16-18] 18  BP: ()/(43-84) 117/73     Weight: 108.4 kg (238 lb 15.7 oz)  Body mass index is 41.02 kg/(m^2).    Intake/Output Summary (Last 24 hours) at 04/27/17 1004  Last data filed at 04/26/17 1920   Gross per 24 hour   Intake              740 ml   Output             3000 ml   Net            -2260 ml      Physical Exam   Constitutional: He is oriented to person, place, and time. He appears well-developed and well-nourished.   Cardiovascular: Normal rate.    Pulmonary/Chest: Breath sounds normal. No respiratory distress. He has no wheezes.   Abdominal: Soft.   Neurological: He is alert and oriented to person, place, and time.   Vitals reviewed.      Significant Labs:   BMP:   Recent Labs  Lab 04/27/17  0526   *      K 4.1   CL 99   CO2 25   BUN 43*   CREATININE 6.7*   CALCIUM 9.2     CBC: No results for input(s): WBC, HGB, HCT, PLT in the last 48 hours.    Significant Imaging:     Assessment/Plan:      * Mass of right chest wall  Concern for chondrosarcoma vs other type of malignancy. This is a new finding. Consult to IR placed for biopsy. Done 4/24.. Pending results. Onc consult placed for further recs. Abd/pelvic CT without evident metastatic disease. brain MRI -acute infarcts.  Neuro consulted on 4/25. Pathology from Bx pending. Oncology following.         Hypertension  At goal  Benign essential.     ESRD (end stage renal disease)  Per nephrology. Further recs very much appreciated. HD every MWF      Hypertensive renal disease        Generalized muscle weakness  Unknown cause but mass that may be cancerous and a severely depressed EF as seen on Echo may be contributing. PT recommends H/H with rolling walker and shower chair.         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 Continue on chemical DVT prophylaxis as he is  "at high risk for this      Anemia of chronic disease  Stable and consistent with baseline levels      Cholestatic hepatitis  Patient has no physical exams suggestive of cholecystitis or cholangitis. Is afebrile and has non toxic appearance. He does have mild leukocytosis. Ultrasound showed  "Cholelithiasis and trace volume ascites.  Diffuse gallbladder wall thickening without associated hyperemia or biliary ductal dilatation, probably related to ascites/hepatic dysfunction..Hepatic venous increased pulsatility, commonly cardiac related. Otherwise, limited liver Doppler ultrasound within normal limits...Right hepatic 2.1 cm echogenic mass suggestive of a hemangioma, but remains incompletely characterized." Metastatic disease? Bilirubin, AP and liver enzymes all improved after fluid removal via HD. 2D Echo with EF of 30%, which is depressed from 3 years ago. Will get cardiology involved. Hold off on antibiotics for now.      Lymphadenopathy, mediastinal  Metastatic disease? Pus removed when attempting biopsy of axillary lymph node. Fluid sent to lab for culture. Hold off on abx for now.      Acute on chronic systolic heart failure  Has cardiovascular disease. Noted on CT with contrast. EF of 30 % is a new finding. . Afterload reduction, UF via HD, ASA, statin and BB  S/p stress test on 4/26 with fixed deficit and no ischemia       Moderate mitral regurgitation by prior echocardiogram        Moderate tricuspid regurgitation by prior echocardiogram        Pulmonary hypertension  stable      Obesity (BMI 30-39.9)  Body mass index is 41.02 kg/(m^2).  Morbid.       VTE Risk Mitigation         Ordered     heparin (porcine) injection 5,000 Units  Every 8 hours     Route:  Subcutaneous        04/22/17 1324     Medium Risk of VTE  Once      04/22/17 1324     Place sequential compression device  Until discontinued      04/22/17 1324     Place BEN hose  Until discontinued      04/22/17 1324        No new issues.  Path pending.  " If not back by tomorrow- may d/c to home after dialysis and follow up as out patient.     Tonny Mckoy MD  Department of Hospital Medicine   [facility]

## 2017-04-27 NOTE — PROGRESS NOTES
"[facility]  Hematology/Oncolgy  Progress Note    Patient Name: Livan Arevalo  MRN: [id number]  Patient Class: IP- Inpatient   Admission Date: 4/22/2017  Length of Stay: 5 days  Attending Physician: Tonny Storm MD  Primary Care Provider: Stephon Barrios Jr, MD        Subjective:     Principal Problem:Rib mass      HPI:42 year old male with ESRD on HD and HTN presented generalized weakness x 1 day. Pt also reports he has suffered 2-3 falls past week. He reports intermittent nausea w/out vomiting which is not a change since undergoing dialysis. He also notes loose stools x 3days,non-bloody. No bowel or bladder incontinence. No fevers. No back pain. No numbness or tingling in LE. No SOB/cough. No HA/vision changes. Abnml CXR revealing RUL opacity. CT of chest w/out contrast abnormal and shows a "Large densely calcified mass arising from the anterior margins of the right 1st rib near the costovertebral junction measuring up to 8.4 cm concerning for neoplasm such as chondrosarcoma. In addition, multiple enlarged bilateral axillary lymph nodes and borderline enlarged mediastinal AP window node noted       Interval History: Pt s/p CT guided bx of rib lesion.  Pt also underwent attempt of axillary LN bx. Purulent fluid drained from LN. Pathology of rib lesion still pending.   No new complaints today.     Review of Systems   Constitutional: Negative for fatigue and fever.   Respiratory: Negative for cough.    Cardiovascular: Negative for leg swelling.   Gastrointestinal: Negative for nausea and vomiting.   Neurological: Positive for weakness.     Objective:     Vital Signs (Most Recent):  Temp: 97.5 °F (36.4 °C) (04/27/17 1200)  Pulse: 101 (04/27/17 1200)  Resp: 19 (04/27/17 1200)  BP: 114/78 (04/27/17 1200)  SpO2: 98 % (04/27/17 0800) Vital Signs (24h Range):  Temp:  [97.5 °F (36.4 °C)-98 °F (36.7 °C)] 97.5 °F (36.4 °C)  Pulse:  [] 101  Resp:  [17-19] 19  SpO2:  [98 %] 98 %  BP: ()/(50-84) " 114/78     Weight: 108.4 kg (238 lb 15.7 oz)  Body mass index is 41.02 kg/(m^2).    Intake/Output Summary (Last 24 hours) at 04/27/17 1653  Last data filed at 04/27/17 1500   Gross per 24 hour   Intake              980 ml   Output             3000 ml   Net            -2020 ml      Physical Exam  Constitutional: He is oriented to person, place, and time. He appears well-developed and well-nourished, appears older than stated age in NAD  HENT:   Head: Normocephalic.   Mouth/Throat: Oropharynx is clear and moist. No oropharyngeal exudate.   Eyes: No scleral icterus.   Neck: Normal range of motion. Neck supple. No thyromegaly present.   Cardiovascular: Normal rate, regular rhythm and normal heart sounds. No murmur heard.  Pulmonary/Chest: Effort normal . CTA B. No wheezes  Abdominal: Soft. BS nl. NT, ND.  Musculoskeletal: Normal range of motion. He exhibits no edema.   Neurological: He is alert and oriented to person, place, and time.  Skin: No rash noted. No erythema.   Psychiatric: He has a normal mood and affect  Significant Labs:   Lab Results   Component Value Date    WBC 7.84 04/23/2017    HGB 10.3 (L) 04/23/2017    HCT 31.4 (L) 04/23/2017    MCV 86 04/23/2017     (L) 04/23/2017         Significant Imaging: I have reviewed and interpreted all pertinent imaging results/findings within the past 24 hours    Assessment/Plan:      Mass of right chest wall  S/p CT guided bx  Concern for malignancy, particularly chondrosacroma  If bx proven sarcoma, pt will need to undergo surgical evaluation.     Generalized muscle weakness , etiology unclear  CK mildly elevated  CT a/p neg for osseous mets    Abnormal Brain MRI  Followed by Neuro    ESRD - HD dependent    Anemia of CKD -stable       Dr. Lopez will f/u in am    Sparkle Erickson MD

## 2017-04-27 NOTE — ASSESSMENT & PLAN NOTE
Has cardiovascular disease. Noted on CT with contrast. EF of 30 % is a new finding. . Afterload reduction, UF via HD, ASA, statin and BB  S/p stress test on 4/26 with fixed deficit and no ischemia

## 2017-04-27 NOTE — SUBJECTIVE & OBJECTIVE
Interval History: No new issues. Resting comfortably     Review of Systems   Constitutional: Negative for activity change, appetite change, chills, fatigue and fever.   HENT: Positive for congestion.    Respiratory: Negative for chest tightness and shortness of breath.    Cardiovascular: Negative for chest pain.   Gastrointestinal: Negative for abdominal pain.     Objective:     Vital Signs (Most Recent):  Temp: 97.9 °F (36.6 °C) (04/27/17 0800)  Pulse: 104 (04/27/17 0800)  Resp: 18 (04/27/17 0800)  BP: 117/73 (04/27/17 0800)  SpO2: 97 % (04/26/17 0917) Vital Signs (24h Range):  Temp:  [97.6 °F (36.4 °C)-98 °F (36.7 °C)] 97.9 °F (36.6 °C)  Pulse:  [] 104  Resp:  [16-18] 18  BP: ()/(43-84) 117/73     Weight: 108.4 kg (238 lb 15.7 oz)  Body mass index is 41.02 kg/(m^2).    Intake/Output Summary (Last 24 hours) at 04/27/17 1004  Last data filed at 04/26/17 1920   Gross per 24 hour   Intake              740 ml   Output             3000 ml   Net            -2260 ml      Physical Exam   Constitutional: He is oriented to person, place, and time. He appears well-developed and well-nourished.   Cardiovascular: Normal rate.    Pulmonary/Chest: Breath sounds normal. No respiratory distress. He has no wheezes.   Abdominal: Soft.   Neurological: He is alert and oriented to person, place, and time.   Vitals reviewed.      Significant Labs:   BMP:   Recent Labs  Lab 04/27/17  0526   *      K 4.1   CL 99   CO2 25   BUN 43*   CREATININE 6.7*   CALCIUM 9.2     CBC: No results for input(s): WBC, HGB, HCT, PLT in the last 48 hours.    Significant Imaging:

## 2017-04-27 NOTE — PROGRESS NOTES
"Livan Arevalo is a 42 y.o. male patient.    Active Hospital Problems    Diagnosis  POA    *Mass of right chest wall [R22.2]  Yes    Obesity (BMI 30-39.9) [E66.9]  Yes     Body mass index is 41.02 kg/(m^2).  Weight loss as out patient. Morbid.        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]  Yes    Anemia of chronic disease [D63.8]  Yes    Cholestatic hepatitis [K75.89]  Yes    Lymphadenopathy, mediastinal [R59.0]  Yes    Hypertensive renal disease [I12.9]  Yes    ESRD (end stage renal disease) [N18.6]  Yes    Hypertension [I10]  Yes      Resolved Hospital Problems    Diagnosis Date Resolved POA    Sinus tachycardia by electrocardiography [R00.0] 04/24/2017 Yes    Neutrophilic leukocytosis [D72.9] 04/23/2017 Yes     Temp: 97.9 °F (36.6 °C) (04/27/17 0800)  Pulse: 104 (04/27/17 0800)  Resp: 18 (04/27/17 0800)  BP: 117/73 (04/27/17 0800)  SpO2: 97 % (04/26/17 0917)  Weight: 108.4 kg (238 lb 15.7 oz) (04/22/17 1350)  Height: 5' 4" (162.6 cm) (04/22/17 1350)    Subjective:  Symptoms:  Stable.    Diet:  NPO.      Objective:  Vital signs: (most recent): Blood pressure 117/73, pulse 104, temperature 97.9 °F (36.6 °C), temperature source Oral, resp. rate 18, height 5' 4" (1.626 m), weight 108.4 kg (238 lb 15.7 oz), SpO2 97 %.    Lungs:  Normal respiratory rate and normal effort.    Heart: Normal rate.  Regular rhythm.      Assessment & Plan     Echo 4/23/17    1 - Moderately depressed left ventricular systolic function (EF 30-35%).     2 - Concentric hypertrophy.     3 - Mild left ventricular enlargement.     4 - Biatrial enlargement.     5 - Right ventricular enlargement with mildly to moderately depressed " systolic function.     6 - Pulmonary hypertension. The estimated PA systolic pressure is 48 mmHg.     7 - Moderate mitral regurgitation.     8 - Moderate tricuspid regurgitation.     9 - Trivial pulmonic regurgitation.     Stress test 4/26/17  Impression: ABNORMAL MYOCARDIAL PERFUSION  1. The perfusion scan is free of evidence for myocardial ischemia.   2. There is mild intensity fixed defect in the apical wall of the left ventricle, consistent with myocardial injury.   3. There is a mild intensity fixed defect in the inferior wall of the left ventricle, secondary to diaphragm attenuation.   4. Resting wall motion is physiologic.   5. There is resting LV dysfunction with a reduced ejection fraction of 37 %.      Cardiomyopathy - EF depressed from last echo 3/2014. Stress test with fixed defects and no ischemia - continue with medical Rx. Afterload reduction as tolerated, volume control with HD  Chest wall mass - w/u in progress  ESRD  PAD  Cholestatic hepatitis  HTN       Von Alvarado MD  4/27/2017

## 2017-04-28 VITALS
TEMPERATURE: 98 F | SYSTOLIC BLOOD PRESSURE: 98 MMHG | OXYGEN SATURATION: 99 % | HEIGHT: 64 IN | BODY MASS INDEX: 40.8 KG/M2 | RESPIRATION RATE: 18 BRPM | WEIGHT: 239 LBS | DIASTOLIC BLOOD PRESSURE: 60 MMHG | HEART RATE: 60 BPM

## 2017-04-28 PROBLEM — I63.9 ACUTE ISCHEMIC STROKE: Status: ACTIVE | Noted: 2017-04-28

## 2017-04-28 PROBLEM — I49.9 IRREGULAR HEART RATE: Status: ACTIVE | Noted: 2017-04-28

## 2017-04-28 PROBLEM — I50.23 ACUTE ON CHRONIC SYSTOLIC HEART FAILURE: Status: RESOLVED | Noted: 2017-04-24 | Resolved: 2017-04-28

## 2017-04-28 PROBLEM — K75.89 CHOLESTATIC HEPATITIS: Status: RESOLVED | Noted: 2017-04-22 | Resolved: 2017-04-28

## 2017-04-28 PROBLEM — I49.9 IRREGULAR HEART RATE: Status: RESOLVED | Noted: 2017-04-28 | Resolved: 2017-04-28

## 2017-04-28 LAB
ANION GAP SERPL CALC-SCNC: 14 MMOL/L
BUN SERPL-MCNC: 56 MG/DL
CALCIUM SERPL-MCNC: 9 MG/DL
CHLORIDE SERPL-SCNC: 96 MMOL/L
CO2 SERPL-SCNC: 24 MMOL/L
CREAT SERPL-MCNC: 8.2 MG/DL
EST. GFR  (AFRICAN AMERICAN): 9 ML/MIN/1.73 M^2
EST. GFR  (NON AFRICAN AMERICAN): 7 ML/MIN/1.73 M^2
GLUCOSE SERPL-MCNC: 120 MG/DL
HEP. B SURF AB, QUAL: POSITIVE
HEP. B SURF AB, QUANT.: 34 MIU/ML
POTASSIUM SERPL-SCNC: 4.3 MMOL/L
SODIUM SERPL-SCNC: 134 MMOL/L

## 2017-04-28 PROCEDURE — 63600175 PHARM REV CODE 636 W HCPCS: Performed by: INTERNAL MEDICINE

## 2017-04-28 PROCEDURE — 93005 ELECTROCARDIOGRAM TRACING: CPT

## 2017-04-28 PROCEDURE — 36415 COLL VENOUS BLD VENIPUNCTURE: CPT

## 2017-04-28 PROCEDURE — 25000003 PHARM REV CODE 250: Performed by: INTERNAL MEDICINE

## 2017-04-28 PROCEDURE — 99233 SBSQ HOSP IP/OBS HIGH 50: CPT | Mod: ,,, | Performed by: INTERNAL MEDICINE

## 2017-04-28 PROCEDURE — 80100016 HC MAINTENANCE HEMODIALYSIS

## 2017-04-28 PROCEDURE — 80048 BASIC METABOLIC PNL TOTAL CA: CPT

## 2017-04-28 RX ORDER — OXYCODONE AND ACETAMINOPHEN 5; 325 MG/1; MG/1
1 TABLET ORAL EVERY 4 HOURS PRN
Qty: 30 TABLET | Refills: 0 | Status: SHIPPED | OUTPATIENT
Start: 2017-04-28 | End: 2018-01-11 | Stop reason: ALTCHOICE

## 2017-04-28 RX ORDER — ATORVASTATIN CALCIUM 40 MG/1
40 TABLET, FILM COATED ORAL DAILY
Qty: 30 TABLET | Refills: 4 | Status: SHIPPED | OUTPATIENT
Start: 2017-04-28 | End: 2017-05-11

## 2017-04-28 RX ORDER — PROMETHAZINE HYDROCHLORIDE 25 MG/1
25 TABLET ORAL EVERY 4 HOURS PRN
Qty: 30 TABLET | Refills: 3 | Status: SHIPPED | OUTPATIENT
Start: 2017-04-28 | End: 2018-01-11 | Stop reason: ALTCHOICE

## 2017-04-28 RX ADMIN — CARVEDILOL 25 MG: 6.25 TABLET, FILM COATED ORAL at 08:04

## 2017-04-28 RX ADMIN — HEPARIN SODIUM 5000 UNITS: 5000 INJECTION, SOLUTION INTRAVENOUS; SUBCUTANEOUS at 05:04

## 2017-04-28 RX ADMIN — ASPIRIN 81 MG: 81 TABLET, COATED ORAL at 08:04

## 2017-04-28 RX ADMIN — ONDANSETRON 4 MG: 2 INJECTION INTRAMUSCULAR; INTRAVENOUS at 04:04

## 2017-04-28 NOTE — PT/OT/SLP PROGRESS
Physical Therapy      Livan Arevalo  MRN: [id number]    Patient not seen today secondary to Unavailable (Comment), Dialysis. Will follow-up as able today .    Rebecca De Leon, PTA

## 2017-04-28 NOTE — PT/OT/SLP DISCHARGE
Physical Therapy Discharge Summary    Livan Arevalo  MRN: [id number]   Mass of right chest wall   Patient Discharged from acute Physical Therapy on 17.  Please refer to prior PT noted date on 17 for functional status.     Assessment:   Goals partially met. Patient appropriate for care in another setting.  GOALS:   Physical Therapy Goals     Not on file      Multidisciplinary Problems (Resolved)        Problem: Physical Therapy Goal    Goal Priority Disciplines Outcome Goal Variances Interventions   Physical Therapy Goal   (Resolved)     PT/OT, PT Outcome(s) achieved     Description:  Goals to be met by: 2017    Patient will increase functional independence with mobility by performin. Sit to stand transfer with Spencer  2. Gait  x 350 feet with Modified Spencer using Rolling Walker.    Recommend: Outpatient Physical Therapy, Rolling Walker, and Transfer Tub Bench at time of discharge.                Reasons for Discontinuation of Therapy Services  Transfer to alternate level of care.      Plan:  Patient Discharged to: Outpatient Therapy Services.

## 2017-04-28 NOTE — PLAN OF CARE
04/28/17 1103   Medicare Message   Important Message from Medicare regarding Discharge Appeal Rights Given to patient/caregiver;Explained to patient/caregiver;Signed/date by patient/caregiver   Date IMM was signed 04/28/17   Time IMM was signed ([id number])

## 2017-04-28 NOTE — PROGRESS NOTES
"[facility]  Hematology/Oncolgy  Progress Note    Patient Name: Livan Arevalo  MRN: [id number]  Patient Class: IP- Inpatient   Admission Date: 4/22/2017  Length of Stay: 6 days  Attending Physician: Tonny Storm MD  Primary Care Provider: Stephon Barrios Jr, MD        Subjective:     Principal Problem:Rib mass      HPI:42 year old male with ESRD on HD and HTN presented generalized weakness x 1 day. Pt also reports he has suffered 2-3 falls past week. He reports intermittent nausea w/out vomiting which is not a change since undergoing dialysis. He also notes loose stools x 3days,non-bloody. No bowel or bladder incontinence. No fevers. No back pain. No numbness or tingling in LE. No SOB/cough. No HA/vision changes. Abnml CXR revealing RUL opacity. CT of chest w/out contrast abnormal and shows a "Large densely calcified mass arising from the anterior margins of the right 1st rib near the costovertebral junction measuring up to 8.4 cm concerning for neoplasm such as chondrosarcoma. In addition, multiple enlarged bilateral axillary lymph nodes and borderline enlarged mediastinal AP window node noted       Interval History:     04/28/2017: ready to go home. Pt s/p CT guided bx of rib lesion.  Pt also underwent attempt of axillary LN bx. Purulent fluid drained from LN. Pathology of rib lesion still pending.     Review of Systems   Constitutional: Negative for fatigue and fever.   Respiratory: Negative for cough.    Cardiovascular: Negative for leg swelling.   Gastrointestinal: Negative for nausea and vomiting.   Neurological: Positive for weakness.     Objective:     Vital Signs (Most Recent):  Temp: 97.5 °F (36.4 °C) (04/28/17 0434)  Pulse: 102 (04/28/17 0434)  Resp: 18 (04/28/17 0434)  BP: (!) 105/55 (04/28/17 0434)  SpO2: 95 % (04/28/17 0434) Vital Signs (24h Range):  Temp:  [97.5 °F (36.4 °C)-98.3 °F (36.8 °C)] 97.5 °F (36.4 °C)  Pulse:  [] 102  Resp:  [18-19] 18  SpO2:  [95 %-98 %] 95 %  BP: " (105-117)/(55-79) 105/55     Weight: 108.4 kg (238 lb 15.7 oz)  Body mass index is 41.02 kg/(m^2).    Intake/Output Summary (Last 24 hours) at 04/28/17 0724  Last data filed at 04/27/17 1800   Gross per 24 hour   Intake              360 ml   Output                0 ml   Net              360 ml      Physical Exam     Constitutional: NAD  Head: Normocephalic.   Mouth/Throat: Oropharynx is clear and moist. No oropharyngeal exudate.   Eyes: No scleral icterus.   Neck: Normal range of motion. Neck supple. No thyromegaly present.   Cardiovascular: Normal rate, regular rhythm and normal heart sounds. No murmur heard.  Pulmonary/Chest: right chest wall tenderness   Abdominal: obese, + BS x 4 quadrants   Musculoskeletal: Normal range of motion. He exhibits no edema.   Neurological: A&O x 3.  Skin: skin rashes to lower extremities. Extensive tattoos    Psychiatric: He has a normal mood and affect      Significant Labs:   Lab Results   Component Value Date    WBC 7.84 04/23/2017    HGB 10.3 (L) 04/23/2017    HCT 31.4 (L) 04/23/2017    MCV 86 04/23/2017     (L) 04/23/2017         Significant Imaging: I have reviewed and interpreted all pertinent imaging results/findings within the past 24 hours    Assessment/Plan:      Mass of right chest wall  S/p CT guided bx  Concern for malignancy, particularly chondrosacroma  If bx proven sarcoma, pt will need to undergo surgical evaluation.   - path pending  - f/u with Dr. Erickson outpatient     Generalized muscle weakness: improving     Abnormal Brain MRI  Followed by Neuro    ESRD - HD dependent    Anemia of CKD -stable     Covering for Dr. Georgina Lopez M.D  Internal Medicine & Geriatric Medicine  Hematology & Oncology  Palliative Medicine    [address], [address]  [place] [address]  [phone] (Office)  [phone] (Fax)

## 2017-04-28 NOTE — ASSESSMENT & PLAN NOTE
Workup ongoing.  Path pending.  Pt is at acceptable cardiac risk for surgery/anesthesia given stress test results.

## 2017-04-28 NOTE — PROGRESS NOTES
Order received for DME. Facesheet, Orders and H&P sent to [facility] @ [phone].  Awaiting fax confirmation and return call to confirm equipment will be provided.    11:32a  Called [facility]. Did not receive the fax. Re-fax referral.    12:56pm  Spoke to pt while in Dialysis. Stated that he has never had wallker or bath/shower chair.  Call to Patricia with [facility] for receipt of fax and status of DMEs. Did not receive fax but will send referral information to [facility] to be processed. Pt has Medicare and Medicaid.    1:34pm  Walker delivered by [employer]. Shower chair not covered by insurance and pt does not want to make cash pay. Informed patient that chair can be purchased at [employer].

## 2017-04-28 NOTE — DISCHARGE SUMMARY
"[facility] Hospital Medicine  Discharge Summary      Patient Name: Livan Arevalo  MRN: [id number]  Admission Date: 4/22/2017  Hospital Length of Stay: 6 days  Discharge Date and Time:  04/28/2017 10:45 AM  Attending Physician: Tonny Storm MD   Discharging Provider: Tonny Storm MD  Primary Care Provider: Stephon Barrios Jr, MD      HPI:   42 year old male with ESRD (on HD every MWF), tachyarrhythmia and hypertension who presented with generalized weakness of one day, but then reported at least one week in evolution. Stated inability to get out of bed. Reports his legs give away and falls. Was unable to drive himself to dialysis and missed yesterday's session. Was able to go to Wednesday's session where only one hour was completed as he was "feeling bad". Reported in ED having nausea, vomiting and diarrhea, but did not report this to me. Labs in the ED showed mild leukocytosis with neutrophilia, mild anemia and some thrombocytopenia that is not severe. No bands. Chemistry did not show hyperkalemia or severe uremia, but did show hyperbilirubinemia with cholestatic pattern. Is afebrile and not hypotensive. Does not appear toxic. Breathing comfortably at room air. CXR with a right upper lobe "consolidation". A CT of chest without contrast showed a "Large densely calcified mass arising from the anterior margins of the right 1st rib near the costovertebral junction measuring up to 8.4 cm.  Findings are concerning for neoplasm such as chondrosarcoma. findings are new from prior CT dated 9/2012. Multiple enlarged bilateral axillary lymph nodes and borderline enlarged mediastinal AP window node.  Metastatic adenopathy not excluded."        * No surgery found *      Indwelling Lines/Drains at time of discharge:   Lines/Drains/Airways     Drain                 Hemodialysis AV Fistula Left upper arm -- days              Hospital Course:   Mr Arevalo presents with generalized weakness of unknown " "cause, but it appears cancer may be a possible etiology. A CXR on presentation showed a "consolidation" in the right upper lobe. Since this is an unusual place for a "consolidation" a chest CT without contrast was obtained. This showed "Large densely calcified mass arising from the anterior margins of the right 1st rib near the costovertebral junction measuring up to 8.4 cm.  Findings are concerning for neoplasm such as chondrosarcoma. findings are new from prior CT dated 9/2012. Multiple enlarged bilateral axillary lymph nodes and borderline enlarged mediastinal AP window node.  Metastatic adenopathy not excluded." Antibitoics held and admitted for formal evaluation of weakness and mass. Labs did show cholestatic pattern with hepatitis, therefore an US was obtained. This showed cholelithiasis but no signs of infection or obstruction, but rather changes consistent with visceral congestion. All numbers improved with UF via HD. Of note, he did miss his dialysis on day prior nor did he complete the prior one as he was too weak to do so. Oncology consulted. MRI of brain obtained to further evaluate weakness and leg "giving out" when walking. A biopsy of mass done on 4/24 via IR. Pathology pending. MRI showed  acute infarcts and Neuro was consulted on 4/25 and began stroke work up. PT/OT evaluated the patient and recommended home with H/H as well as rolling walker and shower chair. Cards was consulted as well for decreased EF and the patient went for a stress test on 4/26 that showed a fixed deficit without any areas of ischemia.  Patient had carotids of his neck that were normal.  Neuro just recommended continuation of anti-platelet therapy.  His path was not back on 4/28 and the patient was requesting discharge- reasonable. The rest of the hospital course was unremarkable. The patient can follow up with oncology in one week for path results.  Activity as tolerated. Diet- low NA diet.  Will be discharged on statin and " ASA. Already on B-blocker and Ace for CHF with EF of 30%.  He will follow up with Dr. Marks, Dr. Ellsworth, Dr. Lerner and PCP in one week.   H/H will be arranged. Equipment will be arranged.            Consults:   Consults         Status Ordering Provider     Inpatient consult to Cardiology  Once     Provider:  Refugio Skelton MD    Acknowledged QUYEN DURANT     Inpatient consult to Hematology/Oncology - [facility]  Once     Provider:  Sparkle Erickson MD    Acknowledged QUYEN DURANT     Inpatient consult to Interventional Radiology  Once     Provider:  Saurabh Soto MD    Completed QUYEN DURANT     Inpatient consult to Interventional Radiology  Once     Provider:  Saurabh Soto MD    Completed SPARKLE ERICKSON     Inpatient consult to Nephrology  Once     Provider:  Bita Fournier MD    Acknowledged VIKASH ZAMUDIO     Inpatient consult to Neurology  Once     Provider:  Andrei Doan MD    Completed PEDRITO LITTLEJOHN          Significant Diagnostic Studies: Labs:    Pending Diagnostic Studies:     Procedure Component Value Units Date/Time    Cytology Specimen-FNA-Radiology Assisted with Path Adequacy-Core BX [[id number]] Collected:  04/24/17 1500    Order Status:  Sent Lab Status:  In process Updated:  04/24/17 1514    Specimen:  Bone (radiology assisted with on site path adequacy)     Freeze and Hold,  [[id number]] Collected:  04/24/17 1500    Order Status:  Sent Lab Status:  No result     Specimen:  Body Fluid from Drainage     IR Biopsy Bone Superficial [[id number]] Resulted:  04/24/17 1503    Order Status:  Sent Lab Status:  In process Updated:  04/24/17 1509    NM Myocardial Perfusion Spect Multi Pharmacologic [[id number]] Resulted:  04/26/17 1137    Order Status:  Sent Lab Status:  In process Updated:  04/26/17 1520        Final Active Diagnoses:    Diagnosis Date Noted POA    PRINCIPAL PROBLEM:  Mass of right chest wall [R22.2] 04/22/2017 Yes    Acute ischemic stroke [I63.9]  04/28/2017 No    Obesity (BMI 30-39.9) [E66.9] 04/25/2017 Yes    Moderate mitral regurgitation by prior echocardiogram [I34.0] 04/24/2017 Yes    Moderate tricuspid regurgitation by prior echocardiogram [I07.1] 04/24/2017 Yes    Pulmonary hypertension [I27.2] 04/24/2017 Yes    Generalized muscle weakness [M62.81] 04/22/2017 Yes    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] 04/22/2017 Yes    Anemia of chronic disease [D63.8] 04/22/2017 Yes    Lymphadenopathy, mediastinal [R59.0] 04/22/2017 Yes    Hypertensive renal disease [I12.9] 09/05/2015 Yes    ESRD (end stage renal disease) [N18.6] 03/18/2014 Yes    Hypertension [I10] 03/18/2014 Yes      Problems Resolved During this Admission:    Diagnosis Date Noted Date Resolved POA    Irregular heart rate [I49.9] 04/28/2017 04/28/2017 Yes    Pneumonia of right upper lobe due to infectious organism [J18.1]  04/28/2017 Unknown    Acute on chronic systolic heart failure [I50.23] 04/24/2017 04/28/2017 Yes    Sinus tachycardia by electrocardiography [R00.0] 04/22/2017 04/24/2017 Yes    Neutrophilic leukocytosis [D72.9] 04/22/2017 04/23/2017 Yes    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  Service: Hospital Medicine      Discharged Condition: good    [place] [facility] [place] Up:  Follow-up Information     Follow up with Sparkle Erickson MD In 1 week.    Specialties:  Hematology and Oncology, Hematology    Contact information:    [address]  [address]  [place] [id number] [phone]          Follow up with Oscar Ellsworth MD In 1 week.    Specialties:  Cardiology, INTERVENTIONAL CARDIOLOGY    Contact information:    [address]  [address]  [place] [id number] [phone]          Follow up with Stephon Barrios Jr, MD In 1 week.    Specialty:  Family Medicine    Contact information:     "[address]  [facility] [address]  [phone]          Follow up with Guicho Lerner MD.    Specialty:  Neurology    Contact information:    [address]   [address] [place] [address] [phone]          Patient Instructions:     WALKER FOR HOME USE   Order Specific Question Answer Comments   Type of Walker: Adult (5'4"-6'6")    With wheels? Yes    Height: 5' 4" (1.626 m)    Weight: 108.4 kg (238 lb 15.7 oz)    Length of need (1-99 months): 99    Does patient have medical equipment at home? walker, rolling    Does patient have medical equipment at home? shower chair    Please check all that apply: Patient's condition impairs ambulation.      BATH/SHOWER CHAIR FOR HOME USE   Order Specific Question Answer Comments   Height: 5' 4" (1.626 m)    Weight: 108.4 kg (238 lb 15.7 oz)    Does patient have medical equipment at home? walker, rolling    Does patient have medical equipment at home? shower chair    Length of need (1-99 months): 99    Type: With back      Diet general   Order Specific Question Answer Comments   Na restriction, if any: 2gNa      Activity as tolerated       Medications:  Reconciled Home Medications:   Current Discharge Medication List      START taking these medications    Details   atorvastatin (LIPITOR) 40 MG tablet Take 1 tablet (40 mg total) by mouth once daily.  Qty: 30 tablet, Refills: 4      oxycodone-acetaminophen (PERCOCET) 5-325 mg per tablet Take 1 tablet by mouth every 4 (four) hours as needed for Pain.  Qty: 30 tablet, Refills: 0         CONTINUE these medications which have NOT CHANGED    Details   aspirin 325 MG tablet Take 1 tablet (325 mg total) by mouth once daily.  Refills: 0      B COMPLEX WITH VITAMIN C (VITAMIN B COMPLEX WITH C ORAL) Take by mouth.      carvedilol (COREG) 6.25 MG tablet Take 25 mg by mouth 2 (two) times daily with meals.       HYDRALAZINE HCL (HYDRALAZINE ORAL) Take by mouth.      lisinopril (PRINIVIL,ZESTRIL) 40 MG tablet Take 1 tablet (40 mg " total) by mouth once daily.  Qty: 90 tablet, Refills: 3           Time spent on the discharge of patient: > 30 minutes    Tonny Mckoy MD  Department of Hospital Medicine  [facility]

## 2017-04-28 NOTE — PROGRESS NOTES
"[facility]  Progress Note    Patient Name: Livan Arevalo  MRN: [id number]  Patient Class: IP- Inpatient   Admission Date: 4/22/2017  Length of Stay: 6 days  Attending Physician: Tonny Storm MD  Primary Care Provider: Stephon Barrios Jr, MD        Subjective:     Principal Problem:Mass of right chest wall    HPI:  42 year old male with ESRD (on HD every MWF), tachyarrhythmia and hypertension who presented with generalized weakness of one day, but then reported at least one week in evolution. Stated inability to get out of bed. Reports his legs give away and falls. Was unable to drive himself to dialysis and missed yesterday's session. Was able to go to Wednesday's session where only one hour was completed as he was "feeling bad". Reported in ED having nausea, vomiting and diarrhea, but did not report this to me. Labs in the ED showed mild leukocytosis with neutrophilia, mild anemia and some thrombocytopenia that is not severe. No bands. Chemistry did not show hyperkalemia or severe uremia, but did show hyperbilirubinemia with cholestatic pattern. Is afebrile and not hypotensive. Does not appear toxic. Breathing comfortably at room air. CXR with a right upper lobe "consolidation". A CT of chest without contrast showed a "Large densely calcified mass arising from the anterior margins of the right 1st rib near the costovertebral junction measuring up to 8.4 cm.  Findings are concerning for neoplasm such as chondrosarcoma. findings are new from prior CT dated 9/2012. Multiple enlarged bilateral axillary lymph nodes and borderline enlarged mediastinal AP window node.  Metastatic adenopathy not excluded."        Hospital Course:  Mr Arevalo presents with generalized weakness of unknown cause, but it appears cancer may be a possible etiology. A CXR on presentation showed a "consolidation" in the right upper lobe. Since this is an unusual place for a "consolidation" a chest CT " "without contrast was obtained. This showed "Large densely calcified mass arising from the anterior margins of the right 1st rib near the costovertebral junction measuring up to 8.4 cm.  Findings are concerning for neoplasm such as chondrosarcoma. findings are new from prior CT dated 9/2012. Multiple enlarged bilateral axillary lymph nodes and borderline enlarged mediastinal AP window node.  Metastatic adenopathy not excluded." Antibitoics held and admitted for formal evaluation of weakness and mass. Labs did show cholestatic pattern with hepatitis, therefore an US was obtained. This showed cholelithiasis but no signs of infection or obstruction, but rather changes consistent with visceral congestion. All numbers improved with UF via HD. Of note, he did miss his dialysis on day prior nor did he complete the prior one as he was too weak to do so. Oncology consulted. MRI of brain obtained to further evaluate weakness and leg "giving out" when walking. A biopsy of mass done on 4/24 via IR. Pathology pending. MRI showed  acute infarcts and Neuro was consulted on 4/25 and began stroke work up. PT/OT evaluated the patient and recommended home with H/H as well as rolling walker and shower chair. Cards was consulted as well for decreased EF and the patient went for a stress test on 4/26 that showed a fixed deficit without any areas of ischemia.  Patient had carotids of his neck that were normal.  Neuro just recommended continuation of anti-platelet therapy.  His path was not back on 4/28 and the patient was requesting discharge- reasonable. The rest of the hospital course was unremarkable. The patient can follow up with oncology in one week for path results.          Interval History: No new issues. Would like to go home     Review of Systems   Constitutional: Negative for activity change, appetite change, chills, fatigue and fever.   HENT: Positive for congestion.    Respiratory: Negative for chest tightness and shortness " of breath.    Cardiovascular: Negative for chest pain.   Gastrointestinal: Negative for abdominal pain.     Objective:     Vital Signs (Most Recent):  Temp: 97.7 °F (36.5 °C) (04/28/17 0800)  Pulse: 101 (04/28/17 0800)  Resp: 18 (04/28/17 0800)  BP: 112/75 (04/28/17 0800)  SpO2: 99 % (04/28/17 0800) Vital Signs (24h Range):  Temp:  [97.5 °F (36.4 °C)-98.3 °F (36.8 °C)] 97.7 °F (36.5 °C)  Pulse:  [] 101  Resp:  [18-19] 18  SpO2:  [95 %-99 %] 99 %  BP: (105-117)/(55-79) 112/75     Weight: 108.4 kg (238 lb 15.7 oz)  Body mass index is 41.02 kg/(m^2).    Intake/Output Summary (Last 24 hours) at 04/28/17 1035  Last data filed at 04/27/17 1800   Gross per 24 hour   Intake              360 ml   Output                0 ml   Net              360 ml      Physical Exam   Constitutional: He is oriented to person, place, and time. He appears well-developed and well-nourished.   Cardiovascular: Normal rate.    Pulmonary/Chest: Breath sounds normal. No respiratory distress. He has no wheezes.   Abdominal: Soft.   Neurological: He is alert and oriented to person, place, and time.   Vitals reviewed.      Significant Labs:   CBC: No results for input(s): WBC, HGB, HCT, PLT in the last 48 hours.  CMP:   Recent Labs  Lab 04/27/17  0526 04/28/17  0330    134*   K 4.1 4.3   CL 99 96   CO2 25 24   * 120*   BUN 43* 56*   CREATININE 6.7* 8.2*   CALCIUM 9.2 9.0   ANIONGAP 13 14   EGFRNONAA 9* 7*       Significant Imaging:     Assessment/Plan:      * Mass of right chest wall  Concern for chondrosarcoma vs other type of malignancy. This is a new finding. Consult to IR placed for biopsy. Done 4/24.. Pending results. Onc consult placed for further recs. Abd/pelvic CT without evident metastatic disease. brain MRI -acute infarcts.  Neuro consulted on 4/25. Pathology from Bx pending. Oncology following. Can follow up as out patient.         Hypertension  At goal  Benign essential.     ESRD (end stage renal disease)  Per  "nephrology. Further recs very much appreciated. HD every MWF      Hypertensive renal disease        Generalized muscle weakness  Unknown cause but mass that may be cancerous and a severely depressed EF as seen on Echo may be contributing. PT recommends H/H with rolling walker and shower chair.         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 Continue on chemical DVT prophylaxis as he is at high risk for this      Anemia of chronic disease  Stable and consistent with baseline levels      Cholestatic hepatitis  Patient has no physical exams suggestive of cholecystitis or cholangitis. Is afebrile and has non toxic appearance. He does have mild leukocytosis. Ultrasound showed  "Cholelithiasis and trace volume ascites.  Diffuse gallbladder wall thickening without associated hyperemia or biliary ductal dilatation, probably related to ascites/hepatic dysfunction..Hepatic venous increased pulsatility, commonly cardiac related. Otherwise, limited liver Doppler ultrasound within normal limits...Right hepatic 2.1 cm echogenic mass suggestive of a hemangioma, but remains incompletely characterized." Metastatic disease? Bilirubin, AP and liver enzymes all improved after fluid removal via HD. 2D Echo with EF of 30%, which is depressed from 3 years ago. Will get cardiology involved. Hold off on antibiotics for now.      Lymphadenopathy, mediastinal  Metastatic disease? Pus removed when attempting biopsy of axillary lymph node. Fluid sent to lab for culture. Hold off on abx for now.      Acute on chronic systolic heart failure  Has cardiovascular disease. Noted on CT with contrast. EF of 30 % is a new finding. . Afterload reduction, UF via HD, ASA, statin and BB  S/p stress test on 4/26 with fixed deficit and no ischemia       Moderate mitral regurgitation by prior echocardiogram        Moderate tricuspid regurgitation by prior echocardiogram        Pulmonary hypertension  stable      Obesity (BMI " 30-39.9)  Body mass index is 41.02 kg/(m^2).  Morbid.       Irregular heart rate        Acute ischemic stroke  May be from hypercoagulable state from chest mass. Neuro has evaluated. Carotids negative. Continue anti-platelet therapy. Follow up out patient       Pneumonia of right upper lobe due to infectious organism, resolved as of 4/28/2017        VTE Risk Mitigation         Ordered     heparin (porcine) injection 5,000 Units  Every 8 hours     Route:  Subcutaneous        04/22/17 1324     Medium Risk of VTE  Once      04/22/17 1324     Place sequential compression device  Until discontinued      04/22/17 1324     Place BEN hose  Until discontinued      04/22/17 1324      will d/c to home       Tonny Mckoy MD  Department of Hospital Medicine   [facility]

## 2017-04-28 NOTE — PLAN OF CARE
Problem: Patient Care Overview  Goal: Plan of Care Review  Outcome: Ongoing (interventions implemented as appropriate)  Pt remains free of falls and injuries. Pt more fatigued tonight, sleeping throughout shift with no complaints, denies any pain. Still waiting for pathology result of chest wall mass. No other complaints, will cont to monitor.

## 2017-04-28 NOTE — ASSESSMENT & PLAN NOTE
May be from hypercoagulable state from chest mass. Neuro has evaluated. Carotids negative. Continue anti-platelet therapy. Follow up out patient

## 2017-04-28 NOTE — NURSING
Cardiology tech reports EKG shows acute MI. Dr. Ellsworth notified via phone and given EKG report in person, states it is NO-STEMI. VS stable /80, , O2 sats 98%. Patient remains on cardiac monitoring. EKG report in patient's chart. Will continue to monitor patient.

## 2017-04-28 NOTE — PROGRESS NOTES
"[facility]  Cardiology  Progress Note    Patient Name: Livan Arevalo  MRN: [id number]  Admission Date: 4/22/2017  Hospital Length of Stay: 6 days  Code Status: Full Code   Attending Physician: Tonny Storm MD   Primary Care Physician: Stephon Barrios Jr, MD  Expected Discharge Date:   Principal Problem:Mass of right chest wall    Subjective:     Hospital Course:   No CP/SOB.  Pt states that he was diagnosed with an "irregular heartbeat" and prescribed coreg by his nephrologist.  He is unaware of any prior AF or hx of CVA.  Carotid US noted "irregular HR".  EKG 4/22 with ?EAT.  Case d/w RN.        ROS  Objective:     Vital Signs (Most Recent):  Temp: 97.5 °F (36.4 °C) (04/28/17 0434)  Pulse: 102 (04/28/17 0434)  Resp: 18 (04/28/17 0434)  BP: (!) 105/55 (04/28/17 0434)  SpO2: 95 % (04/28/17 0434) Vital Signs (24h Range):  Temp:  [97.5 °F (36.4 °C)-98.3 °F (36.8 °C)] 97.5 °F (36.4 °C)  Pulse:  [] 102  Resp:  [18-19] 18  SpO2:  [95 %-98 %] 95 %  BP: (105-117)/(55-79) 105/55     Weight: 108.4 kg (238 lb 15.7 oz)  Body mass index is 41.02 kg/(m^2).     SpO2: 95 %  O2 Device (Oxygen Therapy): room air      Intake/Output Summary (Last 24 hours) at 04/28/17 0705  Last data filed at 04/27/17 1800   Gross per 24 hour   Intake              360 ml   Output                0 ml   Net              360 ml       Lines/Drains/Airways     Drain                 Hemodialysis AV Fistula Left upper arm -- days          Peripheral Intravenous Line                 Peripheral IV - Single Lumen 04/22/17 1050 Right Antecubital 5 days                Physical Exam    Current Medications:   sodium chloride 0.9%   Intravenous Once    aspirin  81 mg Oral Daily    atorvastatin  40 mg Oral Daily    carvedilol  25 mg Oral BID WM    heparin (porcine)  5,000 Units Subcutaneous Q8H    vitamin renal formula (B-complex-vitamin c-folic acid)  1 capsule Oral Daily        sodium chloride 0.9%, loperamide, ondansetron, " ramelteon    Laboratory:  CBC:    Recent Labs  Lab 09/06/15  0455 04/22/17  1050 04/23/17  0414   WHITE BLOOD CELL COUNT 7.61 13.03 H 7.84   HEMOGLOBIN 10.6 L 11.3 L 10.3 L   HEMATOCRIT 33.4 L 34.9 L 31.4 L   PLATELETS 221 142 L 138 L       CHEMISTRIES:    Recent Labs  Lab 08/15/15  1955  04/22/17  1050  04/26/17  0445 04/27/17  0526 04/28/17  0330   GLUCOSE 149 H  < > 75  < > 162 H 111 H 120 H   SODIUM 137  < > 137  < > 134 L 137 134 L   POTASSIUM 4.9  < > 4.9  < > 3.9 4.1 4.3   BUN BLD 37 H  < > 43 H  < > 67 H 43 H 56 H   CREATININE 9.5 H  < > 9.5 H  < > 8.6 H 6.7 H 8.2 H   EGFR IF  7 A  < > 7 A  < > 8 A 11 A 9 A   EGFR IF NON- 6 A  < > 6 A  < > 7 A 9 A 7 A   CALCIUM 10.0  < > 9.9  < > 8.5 L 9.2 9.0   MAGNESIUM 2.1  --  2.1  --   --   --   --    < > = values in this interval not displayed.    CARDIAC BIOMARKERS:    Recent Labs  Lab 04/24/17  0413    H       COAGS:    Recent Labs  Lab 09/05/15  1640 04/23/17  0414   INR 1.2 1.4 H       LIPIDS/LFTS:    Recent Labs  Lab 09/07/15  0446 04/22/17  1050 04/23/17  0414 04/24/17  0413   CHOLESTEROL 108 L  --   --   --    TRIGLYCERIDES 60  --   --   --    HDL 27 L  --   --   --    LDL CHOLESTEROL 69.0  --   --   --    NON-HDL CHOLESTEROL 81  --   --   --    AST  --  63 H 49 H 35   ALT  --  52 H 39 34     MRI Brain 4/24/17  Severely limited study due to motion.  Repeat exam with better sedation could be done with the addition of IV contrast which increased sensitivity to detect metastases.  3 small punctate areas of abnormal diffusion signal concerning for small areas of acute infarction without definite hemorrhagic transformation.  Significant periventricular white matter disease, much greater than expected for this patient's age.  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      Diagnostic Results:  EKG (tracings pers rev)  4/22/17 1026 ?SR vs ectopic atrial rhythm, PRWP    Carotid US 4/26/17  No  "significant proximal ICA stenosis (<50%).  Irregular heart rate noted.    Echo: 4/23/17    1 - Moderately depressed left ventricular systolic function (EF 30-35%).     2 - Concentric hypertrophy.     3 - Mild left ventricular enlargement.     4 - Biatrial enlargement.     5 - Right ventricular enlargement with mildly to moderately depressed systolic function.     6 - Pulmonary hypertension. The estimated PA systolic pressure is 48 mmHg.     7 - Moderate mitral regurgitation.     8 - Moderate tricuspid regurgitation.     9 - Trivial pulmonic regurgitation.    Stress Test: L MPI 4/26/17 (Images pers rev)  Nuclear Quantitative Functional Analysis:   LVEF: 37 %  Impression: ABNORMAL MYOCARDIAL PERFUSION  1. The perfusion scan is free of evidence for myocardial ischemia.   2. There is mild intensity fixed defect in the apical wall of the left ventricle, consistent with myocardial injury.   3. There is a mild intensity fixed defect in the inferior wall of the left ventricle, secondary to diaphragm attenuation.   4. Resting wall motion is physiologic.   5. There is resting LV dysfunction with a reduced ejection fraction of 37 %.   6. The ventricular volumes are normal at rest and stress.   7. The extracardiac distribution of radioactivity is normal.       Assessment and Plan:     * Mass of right chest wall  Workup ongoing.  Path pending.  Pt is at acceptable cardiac risk for surgery/anesthesia given stress test results.    Irregular heart rate  Pt reports hx of "Irreg HR"  No prior hx of AF  Abnl EKG (?EAT) noted on EKG  MRI suggestive of mult small CVA  Carotid US neg  Check EKG and put pt on tele to monitor for atrial arrhythmia.  If AF/AFL, pt will need OAC    Acute on chronic systolic heart failure  Pt appears euvolemic.  MPI without ischemia.  Cont med rx: ASA/statin/BBl  Consider addition of ACEi if BP will tolerate  Cardiac "clearance" as above    Hypertension  Controlled    ESRD (end stage renal disease)  Per " renal, on HD      VTE Risk Mitigation         Ordered     heparin (porcine) injection 5,000 Units  Every 8 hours     Route:  Subcutaneous        04/22/17 1324     Medium Risk of VTE  Once      04/22/17 1324     Place sequential compression device  Until discontinued      04/22/17 1324     Place BEN hose  Until discontinued      04/22/17 1324          Oscar Ellsworth MD  Cardiology  [facility]

## 2017-04-28 NOTE — PROGRESS NOTES
Home health referral sent to [facility] via [facility].  [place] to follow for response in [facility]. Jonathan informed of home health needed      1158- Jonathan notified [place] that the pt has been accepted by [facility] and that the pt is on the board to be seen on Sunday. [place] accepted [facility] as home health provider in [facility]

## 2017-04-28 NOTE — SUBJECTIVE & OBJECTIVE
ROS  Objective:     Vital Signs (Most Recent):  Temp: 97.5 °F (36.4 °C) (04/28/17 0434)  Pulse: 102 (04/28/17 0434)  Resp: 18 (04/28/17 0434)  BP: (!) 105/55 (04/28/17 0434)  SpO2: 95 % (04/28/17 0434) Vital Signs (24h Range):  Temp:  [97.5 °F (36.4 °C)-98.3 °F (36.8 °C)] 97.5 °F (36.4 °C)  Pulse:  [] 102  Resp:  [18-19] 18  SpO2:  [95 %-98 %] 95 %  BP: (105-117)/(55-79) 105/55     Weight: 108.4 kg (238 lb 15.7 oz)  Body mass index is 41.02 kg/(m^2).     SpO2: 95 %  O2 Device (Oxygen Therapy): room air      Intake/Output Summary (Last 24 hours) at 04/28/17 0705  Last data filed at 04/27/17 1800   Gross per 24 hour   Intake              360 ml   Output                0 ml   Net              360 ml       Lines/Drains/Airways     Drain                 Hemodialysis AV Fistula Left upper arm -- days          Peripheral Intravenous Line                 Peripheral IV - Single Lumen 04/22/17 1050 Right Antecubital 5 days                Physical Exam    Current Medications:   sodium chloride 0.9%   Intravenous Once    aspirin  81 mg Oral Daily    atorvastatin  40 mg Oral Daily    carvedilol  25 mg Oral BID WM    heparin (porcine)  5,000 Units Subcutaneous Q8H    vitamin renal formula (B-complex-vitamin c-folic acid)  1 capsule Oral Daily        sodium chloride 0.9%, loperamide, ondansetron, ramelteon    Laboratory:  CBC:    Recent Labs  Lab 09/06/15  0455 04/22/17  1050 04/23/17  0414   WHITE BLOOD CELL COUNT 7.61 13.03 H 7.84   HEMOGLOBIN 10.6 L 11.3 L 10.3 L   HEMATOCRIT 33.4 L 34.9 L 31.4 L   PLATELETS 221 142 L 138 L       CHEMISTRIES:    Recent Labs  Lab 08/15/15  1955  04/22/17  1050  04/26/17  0445 04/27/17  0526 04/28/17  0330   GLUCOSE 149 H  < > 75  < > 162 H 111 H 120 H   SODIUM 137  < > 137  < > 134 L 137 134 L   POTASSIUM 4.9  < > 4.9  < > 3.9 4.1 4.3   BUN BLD 37 H  < > 43 H  < > 67 H 43 H 56 H   CREATININE 9.5 H  < > 9.5 H  < > 8.6 H 6.7 H 8.2 H   EGFR IF  7 A  < > 7 A  < > 8 A 11  A 9 A   EGFR IF NON- 6 A  < > 6 A  < > 7 A 9 A 7 A   CALCIUM 10.0  < > 9.9  < > 8.5 L 9.2 9.0   MAGNESIUM 2.1  --  2.1  --   --   --   --    < > = values in this interval not displayed.    CARDIAC BIOMARKERS:    Recent Labs  Lab 04/24/17  0413    H       COAGS:    Recent Labs  Lab 09/05/15  1640 04/23/17  0414   INR 1.2 1.4 H       LIPIDS/LFTS:    Recent Labs  Lab 09/07/15  0446 04/22/17  1050 04/23/17  0414 04/24/17  0413   CHOLESTEROL 108 L  --   --   --    TRIGLYCERIDES 60  --   --   --    HDL 27 L  --   --   --    LDL CHOLESTEROL 69.0  --   --   --    NON-HDL CHOLESTEROL 81  --   --   --    AST  --  63 H 49 H 35   ALT  --  52 H 39 34     MRI Brain 4/24/17  Severely limited study due to motion.  Repeat exam with better sedation could be done with the addition of IV contrast which increased sensitivity to detect metastases.  3 small punctate areas of abnormal diffusion signal concerning for small areas of acute infarction without definite hemorrhagic transformation.  Significant periventricular white matter disease, much greater than expected for this patient's age.  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      Diagnostic Results:  EKG (tracings pers rev)  4/22/17 1026 ?SR vs ectopic atrial rhythm, PRWP    Carotid US 4/26/17  No significant proximal ICA stenosis (<50%).  Irregular heart rate noted.    Echo: 4/23/17    1 - Moderately depressed left ventricular systolic function (EF 30-35%).     2 - Concentric hypertrophy.     3 - Mild left ventricular enlargement.     4 - Biatrial enlargement.     5 - Right ventricular enlargement with mildly to moderately depressed systolic function.     6 - Pulmonary hypertension. The estimated PA systolic pressure is 48 mmHg.     7 - Moderate mitral regurgitation.     8 - Moderate tricuspid regurgitation.     9 - Trivial pulmonic regurgitation.    Stress Test: L MPI 4/26/17 (Images pers rev)  Nuclear Quantitative Functional  Analysis:   LVEF: 37 %  Impression: ABNORMAL MYOCARDIAL PERFUSION  1. The perfusion scan is free of evidence for myocardial ischemia.   2. There is mild intensity fixed defect in the apical wall of the left ventricle, consistent with myocardial injury.   3. There is a mild intensity fixed defect in the inferior wall of the left ventricle, secondary to diaphragm attenuation.   4. Resting wall motion is physiologic.   5. There is resting LV dysfunction with a reduced ejection fraction of 37 %.   6. The ventricular volumes are normal at rest and stress.   7. The extracardiac distribution of radioactivity is normal.

## 2017-04-28 NOTE — PLAN OF CARE
[facility]    HOME HEALTH ORDERS  FACE TO FACE ENCOUNTER    Patient Name: Livan Arevalo  YOB: 1975    PCP: Stephon Barrios Jr, MD   PCP Address: [address] [place] [address]  PCP Phone Number: [phone]  PCP Fax: [phone]    Encounter Date: 04/28/2017    Admit to Home Health    Diagnoses:  Active Hospital Problems    Diagnosis  POA    *Mass of right chest wall [R22.2]  Yes     Priority: 1 - High    Acute ischemic stroke [I63.9]  No    Obesity (BMI 30-39.9) [E66.9]  Yes     Body mass index is 41.02 kg/(m^2).  Weight loss as out patient. Morbid.        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]  Yes    Anemia of chronic disease [D63.8]  Yes    Lymphadenopathy, mediastinal [R59.0]  Yes    Hypertensive renal disease [I12.9]  Yes    ESRD (end stage renal disease) [N18.6]  Yes    Hypertension [I10]  Yes      Resolved Hospital Problems    Diagnosis Date Resolved POA    Irregular heart rate [I49.9] 04/28/2017 Yes    Pneumonia of right upper lobe due to infectious organism [J18.1] 04/28/2017 Unknown    Acute on chronic systolic heart failure [I50.23] 04/28/2017 Yes    Sinus tachycardia by electrocardiography [R00.0] 04/24/2017 Yes    Neutrophilic leukocytosis [D72.9] 04/23/2017 Yes    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  Follow-up Information     Follow up with Sparkle Erickson MD In 1 week.    Specialties:  Hematology and Oncology, Hematology    Contact information:    [address] [place] [address] [phone]          Follow up with Oscar Ellsworth MD In 1 week.    Specialties:  Cardiology, INTERVENTIONAL CARDIOLOGY    Contact information:     [address]  [facility] [address]  [phone]          Follow up with Stephon Barrios Jr, MD In 1 week.    Specialty:  Family Medicine    Contact information:    [address]  [facility] [id number] [phone]          Follow up with Guicho Lerner MD.    Specialty:  Neurology    Contact information:    [address]  [facility] [id number] [phone]              I have seen and examined this patient face to face today. My clinical findings that support the need for the home health skilled services and home bound status are the following:  Weakness/numbness causing balance and gait disturbance due to Weakness/Debility making it taxing to leave home.    Allergies:  Review of patient's allergies indicates:   Allergen Reactions    Ciprofloxacin Hives    Iodine and iodide containing products Hives and Itching       Diet: 2 gram sodium diet    Activities: activity as tolerated    Nursing:   SN to complete comprehensive assessment including routine vital signs. Instruct on disease process and s/s of complications to report to MD. Review/verify medication list sent home with the patient at time of discharge  and instruct patient/caregiver as needed. Frequency may be adjusted depending on start of care date.    Notify MD if SBP > 160 or < 90; DBP > 90 or < 50; HR > 120 or < 50; Temp > 101; Other:       CONSULTS:    Physical Therapy to evaluate and treat. Evaluate for home safety and equipment needs; Establish/upgrade home exercise program. Perform / instruct on therapeutic exercises, gait training, transfer training, and Range of Motion.  Occupational Therapy to evaluate and treat. Evaluate home environment for safety and equipment needs. Perform/Instruct on transfers, ADL training, ROM, and therapeutic exercises.  Aide to provide assistance with personal care, ADLs, and vital signs.      Medications: Review discharge medications with patient and family and provide education.       Current Discharge Medication List      START taking these medications    Details   atorvastatin (LIPITOR) 40 MG tablet Take 1 tablet (40 mg total) by mouth once daily.  Qty: 30 tablet, Refills: 4      oxycodone-acetaminophen (PERCOCET) 5-325 mg per tablet Take 1 tablet by mouth every 4 (four) hours as needed for Pain.  Qty: 30 tablet, Refills: 0         CONTINUE these medications which have NOT CHANGED    Details   aspirin 325 MG tablet Take 1 tablet (325 mg total) by mouth once daily.  Refills: 0      B COMPLEX WITH VITAMIN C (VITAMIN B COMPLEX WITH C ORAL) Take by mouth.      carvedilol (COREG) 6.25 MG tablet Take 25 mg by mouth 2 (two) times daily with meals.       HYDRALAZINE HCL (HYDRALAZINE ORAL) Take by mouth.      lisinopril (PRINIVIL,ZESTRIL) 40 MG tablet Take 1 tablet (40 mg total) by mouth once daily.  Qty: 90 tablet, Refills: 3             I certify that this patient is confined to his home and needs intermittent skilled nursing care, physical therapy and occupational therapy.

## 2017-04-28 NOTE — ASSESSMENT & PLAN NOTE
"Pt reports hx of "Irreg HR"  No prior hx of AF  Abnl EKG (?EAT) noted on EKG  MRI suggestive of mult small CVA  Carotid US neg  Check EKG and put pt on tele to monitor for atrial arrhythmia.  If AF/AFL, pt will need OAC  "

## 2017-04-28 NOTE — ASSESSMENT & PLAN NOTE
"Pt appears euvolemic.  MPI without ischemia.  Cont med rx: ASA/statin/BBl  Consider addition of ACEi if BP will tolerate  Cardiac "clearance" as above  "

## 2017-04-28 NOTE — PROGRESS NOTES
WRITTEN DISCHARGE INSTRUCTIONS    Follow-up Information     Follow up with Sparkle Erickson MD On 5/11/2017.    Specialties:  Hematology and Oncology, Hematology    Why:  Appointment scheduled for Thursday May 11 at 10:30am    Contact information:    [address]  [address]  [place] [address]  [phone]          Follow up with [facility].    Specialty:  Home Health Services    Why:  Home Health- number to your home health provider to contact with in questions or concerns that may have for home health.  Nurse will be out to the home on Sunday to admit     Contact information:    [address]  [place] [address]  [phone]          Follow up with [facility].    Specialty:  DME Provider    Why:  SHAVONNE - Gayle Levy    Contact information:    [address] [facility] [address] [phone]            Thank you for choosing [facility] for your care.  Within 48-72 hours after leaving the hospital you will receive a call from [facility] Nurses following up to see how you are doing.  The team will ask you a few questions and the call will last approximately 20 minutes.    Please answer any calls you may receive from [facility].  We want to continue to support you as you manage your healthcare needs.  [facility] is happy to have the opportunity to serve you.    Sincerely,  [facility],  DULCE Ruvalcaba, [facility]  [phone]

## 2017-04-28 NOTE — ASSESSMENT & PLAN NOTE
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 This is a new finding. Consult to IR placed for biopsy. Done 4/24.. Pending results. Onc consult placed for further recs. Abd/pelvic CT without evident metastatic disease. brain MRI -acute infarcts.  Neuro consulted on 4/25. Pathology from Bx pending. Oncology following. Can follow up as out patient.

## 2017-04-28 NOTE — SUBJECTIVE & OBJECTIVE
Interval History: No new issues. Would like to go home     Review of Systems   Constitutional: Negative for activity change, appetite change, chills, fatigue and fever.   HENT: Positive for congestion.    Respiratory: Negative for chest tightness and shortness of breath.    Cardiovascular: Negative for chest pain.   Gastrointestinal: Negative for abdominal pain.     Objective:     Vital Signs (Most Recent):  Temp: 97.7 °F (36.5 °C) (04/28/17 0800)  Pulse: 101 (04/28/17 0800)  Resp: 18 (04/28/17 0800)  BP: 112/75 (04/28/17 0800)  SpO2: 99 % (04/28/17 0800) Vital Signs (24h Range):  Temp:  [97.5 °F (36.4 °C)-98.3 °F (36.8 °C)] 97.7 °F (36.5 °C)  Pulse:  [] 101  Resp:  [18-19] 18  SpO2:  [95 %-99 %] 99 %  BP: (105-117)/(55-79) 112/75     Weight: 108.4 kg (238 lb 15.7 oz)  Body mass index is 41.02 kg/(m^2).    Intake/Output Summary (Last 24 hours) at 04/28/17 1035  Last data filed at 04/27/17 1800   Gross per 24 hour   Intake              360 ml   Output                0 ml   Net              360 ml      Physical Exam   Constitutional: He is oriented to person, place, and time. He appears well-developed and well-nourished.   Cardiovascular: Normal rate.    Pulmonary/Chest: Breath sounds normal. No respiratory distress. He has no wheezes.   Abdominal: Soft.   Neurological: He is alert and oriented to person, place, and time.   Vitals reviewed.      Significant Labs:   CBC: No results for input(s): WBC, HGB, HCT, PLT in the last 48 hours.  CMP:   Recent Labs  Lab 04/27/17  0526 04/28/17  0330    134*   K 4.1 4.3   CL 99 96   CO2 25 24   * 120*   BUN 43* 56*   CREATININE 6.7* 8.2*   CALCIUM 9.2 9.0   ANIONGAP 13 14   EGFRNONAA 9* 7*       Significant Imaging:

## 2017-04-28 NOTE — PLAN OF CARE
04/28/17 1458   Final Note   Assessment Type Final Discharge Note   Discharge Disposition Home   Discharge planning education complete? Yes   Hospital Follow Up  Appt(s) scheduled? Yes   Discharge plans and expectations educations in teach back method with documentation complete? Yes   [facility]fered [facility]? n/a   Discharge/Hospital Encounter Summary to (non-[facility]) PCP n/a   Referral to Outpatient Case Management complete? n/a   Referral to / orders for Home Health Complete? n/a   30 day supply of medicines given at discharge, if documented non-compliance / non-adherence? n/a   Any social issues identified prior to discharge? No   Did you assess the readiness or willingness of the family or caregiver to support self management of care? Yes   Right Care Referral Info   Post Acute Recommendation Home-care

## 2017-04-28 NOTE — NURSING
Discharge instructions, follow up appointments, and prescriptions given and explained to patient. Verbalizes understanding of all. IV removed patient tolerated well. Patient escorted to car via wheel chair, family by side. No acute distress noted.

## 2017-05-02 ENCOUNTER — PATIENT OUTREACH (OUTPATIENT)
Dept: ADMINISTRATIVE | Facility: CLINIC | Age: 42
End: 2017-05-02
Payer: MEDICARE

## 2017-05-02 NOTE — PROGRESS NOTES
C3 nurse contacted Dr Tran's office after several attempts and  stating patient can come in today as a walk-in appt.    C3 nurse called patient back to advise of above; Pt verbalized understanding.

## 2017-05-02 NOTE — PATIENT INSTRUCTIONS
Weakness [Uncertain Cause]  Based on your exam today, the exact cause of your weakness is not certain. However, your weakness does not seem to be a sign of a serious illness at this time. Sometimes the signs of a serious illness take more time to appear. Therefore, please watch for the warning signs listed below.  Home Care:  1) Rest at home today. Do not over-exert yourself.  2) Take your medicine as prescribed.  3) For the next few days, drink extra fluids (unless your doctor wants you to restrict fluids for other reasons). Do not skip meals.  Follow Up  with your doctor or as advised if you are not starting to feel better within TWO days.  Get Prompt Medical Attention  if any of the following occur:  Worsening of your symptoms  Chest, arm, neck, jaw or upper back pain  Dizziness or fainting  Trouble breathing  Unable to eat or drink normal amounts  Nausea, frequent vomiting, frequent diarrhea  Abdominal pain  Numbness or weakness of the face, one arm or one leg  Slurred speech, confusion, trouble speaking, walking or seeing  Blood in vomit or stool (black or red color)  Fever of 100.4º F (38º C) or higher, or as directed by your healthcare provider  © 5776-6893 [facility], [address] [place] [address], [place] [address]. All rights reserved. This information is not intended as a substitute for professional medical care. Always follow your healthcare professional's instructions.

## 2017-05-11 ENCOUNTER — INITIAL CONSULT (OUTPATIENT)
Dept: HEMATOLOGY/ONCOLOGY | Facility: CLINIC | Age: 42
DRG: 871 | End: 2017-05-11
Payer: MEDICARE

## 2017-05-11 VITALS
TEMPERATURE: 99 F | SYSTOLIC BLOOD PRESSURE: 128 MMHG | OXYGEN SATURATION: 99 % | HEIGHT: 64 IN | DIASTOLIC BLOOD PRESSURE: 68 MMHG | HEART RATE: 110 BPM

## 2017-05-11 DIAGNOSIS — R22.2 CHEST SWELLING: ICD-10-CM

## 2017-05-11 DIAGNOSIS — R22.2 MASS OF RIGHT CHEST WALL: Primary | ICD-10-CM

## 2017-05-11 DIAGNOSIS — R93.89 ABNORMAL CT SCAN, CHEST: ICD-10-CM

## 2017-05-11 PROCEDURE — 99999 PR PBB SHADOW E&M-EST. PATIENT-LVL III: CPT | Mod: PBBFAC,,, | Performed by: INTERNAL MEDICINE

## 2017-05-11 PROCEDURE — 99215 OFFICE O/P EST HI 40 MIN: CPT | Mod: S$PBB,,, | Performed by: INTERNAL MEDICINE

## 2017-05-11 RX ORDER — CARVEDILOL 3.12 MG/1
TABLET ORAL
Refills: 3 | COMMUNITY
Start: 2017-03-31 | End: 2018-01-11

## 2017-05-11 NOTE — LETTER
May 14, 2017      Tonny Storm MD  [address] [place] [address]           [facility]  [address] [facility] [address] [place] [address]  Phone: [phone]          Patient: Livan Arevalo   MR Number: [id number]   YOB: 1975   Date of Visit: 5/11/2017       Dear Dr. Tonny Storm:    Thank you for referring Livan Arevalo to me for evaluation. Attached you will find relevant portions of my assessment and plan of care.    If you have questions, please do not hesitate to call me. I look forward to following Livan Arevalo along with you.    Sincerely,        Enclosure  CC:  No Recipients    If you would like to receive this communication electronically, please contact [email or web address] or [phone] to request more information on [facility] access.    For providers and/or their staff who would like to refer a patient to [facility], please contact us through our one-stop-shop provider referral line, Kathryn Marks, at [phone].    If you feel you have received this communication in error or would no longer like to receive these types of communications, please e-mail [email or web address]

## 2017-05-11 NOTE — PROGRESS NOTES
"Subjective:       Patient ID: Livan Arevalo is a 42 y.o. male.    Chief Complaint: Consult (hospital f/u)    HPI   42 year old male with ESRD on HD and HTN seen today for hospital f/u for rt chest wall lesion. He presented with generalized weakness x 1 day and  intermittent nausea w/out vomiting which is not a change since undergoing dialysis. He also notes loose stools x 3days,non-bloody. No bowel or bladder incontinence. No fevers. No back pain. No numbness or tingling in LE. No SOB/cough. No HA/vision changes. Abnml CXR revealing RUL opacity. CT of chest w/out contrast abnormal and shows a "Large densely calcified mass arising from the anterior margins of the right 1st rib near the costovertebral junction measuring up to 8.4 cm concerning for neoplasm such as chondrosarcoma. In addition, multiple enlarged bilateral axillary lymph nodes and borderline enlarged mediastinal AP window node noted MRI of brain obtained to further evaluate weakness and leg "giving out" when walking. A biopsy of mass done on 4/24 via IR. Pathology pending at time of hospital d/c. MRI showed acute infarcts and Neuro followed pt during hospitalization. Cards was consulted as well for decreased EF and the patient went for a stress test on 4/26 that showed a fixed deficit without any areas of ischemia.         Today, pt doing well.  Pt here for f/u for results of rib bx.  Pt s/p CT guided bx of rib lesion. Pt also underwent attempt of axillary LN bx. Purulent fluid drained from LN.   He reports rt chest wall swelling  No fevers or pain .    RIGHT RIB LESION (BIOPSY):  -No neoplasm identified  -Extensive dystrophic calcifications and degenerative material        Review of Systems   Constitutional: Negative for appetite change, fatigue, fever and unexpected weight change.   HENT: Negative for mouth sores and nosebleeds.    Eyes: Negative for visual disturbance.   Respiratory: Negative for cough and shortness of breath.    Cardiovascular: " "Negative for chest pain and leg swelling.   Gastrointestinal: Negative for abdominal pain, blood in stool, constipation, diarrhea, nausea and vomiting.   Genitourinary: Negative for frequency and hematuria.   Musculoskeletal: Negative for arthralgias and back pain.   Skin: Negative for rash.   Neurological: Negative for dizziness, seizures, weakness, light-headedness, numbness and headaches.   Hematological: Negative for adenopathy.   Psychiatric/Behavioral: The patient is not nervous/anxious.        Objective:        Vitals:    05/11/17 1106 05/11/17 1115   BP: 128/68    BP Location: Right arm    Patient Position: Sitting    BP Method: Manual    Pulse: (!) 111 110   Temp: 98.6 °F (37 °C)    TempSrc: Oral    SpO2: 99% 99%   Height: 5' 4" (1.626 m)        Physical Exam   Constitutional: He is oriented to person, place, and time. He appears well-developed and well-nourished.   HENT:   Head: Normocephalic.   Mouth/Throat: Oropharynx is clear and moist. No oropharyngeal exudate.   Eyes: Conjunctivae are normal. No scleral icterus.   Neck: Normal range of motion. Neck supple. No thyromegaly present.   Cardiovascular: Normal rate, regular rhythm and normal heart sounds.    No murmur heard.  Pulmonary/Chest: Effort normal and breath sounds normal. He has no wheezes. He has no rales.   Abdominal: Soft. Bowel sounds are normal. He exhibits no distension and no mass. There is no hepatosplenomegaly. There is no tenderness. There is no rebound and no guarding.   Musculoskeletal: Normal range of motion. He exhibits no edema.   Lymphadenopathy:     He has no cervical adenopathy.     He has axillary adenopathy.        Right: No supraclavicular adenopathy present.        Left: No supraclavicular adenopathy present.   Neurological: He is alert and oriented to person, place, and time. No cranial nerve deficit.   Skin: No rash noted. No erythema.   Psychiatric: He has a normal mood and affect.         Lab Results   Component Value " Date    WBC 11.68 05/14/2017    HGB 10.7 (L) 05/14/2017    HCT 32.3 (L) 05/14/2017    MCV 86 05/14/2017     (L) 05/14/2017       CT a/p w/contrast 4/24/2017  Cardiomegaly.  Coronary artery calcification.  Mosaic attenuation of the lung bases suggest underlying inflammatory small airway disease.  Small amount of ascites.  Significant atherosclerotic plaque of the aorta and its branches.  Hypoattenuating lesion of the liver is likely benign, it is perceptible on the prior study of 1/24/12 but more conspicuous on today's contrast study    CT chest w/out contrast 4/22/2017  Large densely calcified mass arising from the anterior margins of the right 1st rib near the costovertebral junction measuring up to 8.4 cm.  Findings are concerning for neoplasm such as chondrosarcoma. findings are new from prior CT dated 9/2012.      Multiple enlarged bilateral axillary lymph nodes and borderline enlarged mediastinal AP window node.  Metastatic adenopathy not excluded.    RIGHT RIB LESION (BIOPSY):  -No neoplasm identified  -Extensive dystrophic calcifications and degenerative material  Assessment:       1. Mass of right chest wall    2. Abnormal CT scan, chest    3. Chest swelling        Plan:   Detailed d/w patient reviewing radiographic and pathology findings.Pathology findings reveals no neoplasm however; concern for neoplasm remains high. Plan to discuss an consider Amb referral to CT surgery for evaluation. If histologic confirmation is confirmed , pt likely not candidate for resection due to sig comorbidities. Plan to defer to CT surgery.   Also, plan US chest for further eval chest wall swelling.    Addendum: Discussed with Dr. Guerra. It is determined that pt should be considered for repeat bx. Plan Amb referral to IR for evaluation.     Greater than 45 minutes spent during this visit of which greater than 50% devoted to counseling and coordination of care regarding diagnosis and management plan.

## 2017-05-14 ENCOUNTER — HOSPITAL ENCOUNTER (INPATIENT)
Facility: HOSPITAL | Age: 42
LOS: 3 days | Discharge: HOME | DRG: 871 | End: 2017-05-17
Attending: EMERGENCY MEDICINE | Admitting: HOSPITALIST
Payer: MEDICARE

## 2017-05-14 DIAGNOSIS — A41.89 SEPSIS DUE TO OTHER ETIOLOGY: Primary | ICD-10-CM

## 2017-05-14 DIAGNOSIS — A41.9 SEPSIS: ICD-10-CM

## 2017-05-14 DIAGNOSIS — N18.6 ESRD (END STAGE RENAL DISEASE): ICD-10-CM

## 2017-05-14 DIAGNOSIS — E66.9 OBESITY (BMI 30-39.9): ICD-10-CM

## 2017-05-14 LAB
ALBUMIN SERPL BCP-MCNC: 2.7 G/DL
ALLENS TEST: ABNORMAL
ALP SERPL-CCNC: 343 U/L
ALT SERPL W/O P-5'-P-CCNC: 38 U/L
ANION GAP SERPL CALC-SCNC: 17 MMOL/L
ANISOCYTOSIS BLD QL SMEAR: SLIGHT
APTT BLDCRRT: 29.4 SEC
AST SERPL-CCNC: 61 U/L
BASOPHILS # BLD AUTO: 0.02 K/UL
BASOPHILS NFR BLD: 0.2 %
BILIRUB SERPL-MCNC: 5.3 MG/DL
BUN SERPL-MCNC: 38 MG/DL
CALCIUM SERPL-MCNC: 9.1 MG/DL
CHLORIDE SERPL-SCNC: 93 MMOL/L
CO2 SERPL-SCNC: 26 MMOL/L
CREAT SERPL-MCNC: 8.8 MG/DL
DELSYS: ABNORMAL
DIFFERENTIAL METHOD: ABNORMAL
EOSINOPHIL # BLD AUTO: 0 K/UL
EOSINOPHIL NFR BLD: 0.1 %
ERYTHROCYTE [DISTWIDTH] IN BLOOD BY AUTOMATED COUNT: 19 %
EST. GFR  (AFRICAN AMERICAN): 7.8 ML/MIN/1.73 M^2
EST. GFR  (NON AFRICAN AMERICAN): 6.8 ML/MIN/1.73 M^2
FLOW: 0
FLUAV AG SPEC QL IA: NEGATIVE
FLUBV AG SPEC QL IA: NEGATIVE
GLUCOSE SERPL-MCNC: 83 MG/DL
HCO3 UR-SCNC: 30.5 MMOL/L (ref 24–28)
HCT VFR BLD AUTO: 32.3 %
HGB BLD-MCNC: 10.7 G/DL
HYPOCHROMIA BLD QL SMEAR: ABNORMAL
INR PPP: 1.5
LACTATE SERPL-SCNC: 0.5 MMOL/L
LACTATE SERPL-SCNC: 2.3 MMOL/L
LYMPHOCYTES # BLD AUTO: 1.4 K/UL
LYMPHOCYTES NFR BLD: 11.6 %
MAGNESIUM SERPL-MCNC: 1.8 MG/DL
MCH RBC QN AUTO: 28.4 PG
MCHC RBC AUTO-ENTMCNC: 33.1 %
MCV RBC AUTO: 86 FL
MODE: ABNORMAL
MONOCYTES # BLD AUTO: 0.8 K/UL
MONOCYTES NFR BLD: 7.1 %
NEUTROPHILS # BLD AUTO: 9.4 K/UL
NEUTROPHILS NFR BLD: 81 %
OVALOCYTES BLD QL SMEAR: ABNORMAL
PCO2 BLDA: 38.1 MMHG (ref 35–45)
PH SMN: 7.51 [PH] (ref 7.35–7.45)
PLATELET # BLD AUTO: 101 K/UL
PLATELET BLD QL SMEAR: ABNORMAL
PMV BLD AUTO: ABNORMAL FL
PO2 BLDA: 64 MMHG (ref 80–100)
POC BE: 8 MMOL/L
POC SATURATED O2: 94 % (ref 95–100)
POC TCO2: 32 MMOL/L (ref 23–27)
POIKILOCYTOSIS BLD QL SMEAR: SLIGHT
POLYCHROMASIA BLD QL SMEAR: ABNORMAL
POTASSIUM SERPL-SCNC: 4.9 MMOL/L
PROCALCITONIN SERPL IA-MCNC: 109.22 NG/ML
PROT SERPL-MCNC: 8.1 G/DL
PROTHROMBIN TIME: 15.2 SEC
RBC # BLD AUTO: 3.77 M/UL
SAMPLE: ABNORMAL
SITE: ABNORMAL
SODIUM SERPL-SCNC: 136 MMOL/L
SPECIMEN SOURCE: NORMAL
TARGETS BLD QL SMEAR: ABNORMAL
WBC # BLD AUTO: 11.68 K/UL

## 2017-05-14 PROCEDURE — 87400 INFLUENZA A/B EACH AG IA: CPT

## 2017-05-14 PROCEDURE — 85025 COMPLETE CBC W/AUTO DIFF WBC: CPT

## 2017-05-14 PROCEDURE — 87449 NOS EACH ORGANISM AG IA: CPT

## 2017-05-14 PROCEDURE — 96367 TX/PROPH/DG ADDL SEQ IV INF: CPT

## 2017-05-14 PROCEDURE — 80053 COMPREHEN METABOLIC PANEL: CPT

## 2017-05-14 PROCEDURE — 85610 PROTHROMBIN TIME: CPT

## 2017-05-14 PROCEDURE — 96366 THER/PROPH/DIAG IV INF ADDON: CPT

## 2017-05-14 PROCEDURE — 96361 HYDRATE IV INFUSION ADD-ON: CPT

## 2017-05-14 PROCEDURE — 93010 ELECTROCARDIOGRAM REPORT: CPT | Mod: ,,, | Performed by: INTERNAL MEDICINE

## 2017-05-14 PROCEDURE — 99285 EMERGENCY DEPT VISIT HI MDM: CPT | Mod: 25

## 2017-05-14 PROCEDURE — 36600 WITHDRAWAL OF ARTERIAL BLOOD: CPT

## 2017-05-14 PROCEDURE — 99285 EMERGENCY DEPT VISIT HI MDM: CPT | Mod: ,,, | Performed by: EMERGENCY MEDICINE

## 2017-05-14 PROCEDURE — 85730 THROMBOPLASTIN TIME PARTIAL: CPT

## 2017-05-14 PROCEDURE — 96365 THER/PROPH/DIAG IV INF INIT: CPT

## 2017-05-14 PROCEDURE — 63600175 PHARM REV CODE 636 W HCPCS: Performed by: INTERNAL MEDICINE

## 2017-05-14 PROCEDURE — 87040 BLOOD CULTURE FOR BACTERIA: CPT

## 2017-05-14 PROCEDURE — 20600001 HC STEP DOWN PRIVATE ROOM

## 2017-05-14 PROCEDURE — 84145 PROCALCITONIN (PCT): CPT

## 2017-05-14 PROCEDURE — 83735 ASSAY OF MAGNESIUM: CPT

## 2017-05-14 PROCEDURE — 82803 BLOOD GASES ANY COMBINATION: CPT

## 2017-05-14 PROCEDURE — 83605 ASSAY OF LACTIC ACID: CPT

## 2017-05-14 PROCEDURE — 25000003 PHARM REV CODE 250: Performed by: INTERNAL MEDICINE

## 2017-05-14 PROCEDURE — 93005 ELECTROCARDIOGRAM TRACING: CPT

## 2017-05-14 RX ORDER — CEFEPIME HYDROCHLORIDE 2 G/50ML
2 INJECTION, SOLUTION INTRAVENOUS
Status: COMPLETED | OUTPATIENT
Start: 2017-05-14 | End: 2017-05-14

## 2017-05-14 RX ORDER — METRONIDAZOLE 500 MG/100ML
500 INJECTION, SOLUTION INTRAVENOUS
Status: DISCONTINUED | OUTPATIENT
Start: 2017-05-14 | End: 2017-05-14

## 2017-05-14 RX ORDER — ONDANSETRON 4 MG/1
4 TABLET, ORALLY DISINTEGRATING ORAL
Status: COMPLETED | OUTPATIENT
Start: 2017-05-14 | End: 2017-05-14

## 2017-05-14 RX ADMIN — CEFEPIME HYDROCHLORIDE 2 G: 2 INJECTION, SOLUTION INTRAVENOUS at 04:05

## 2017-05-14 RX ADMIN — SODIUM CHLORIDE 3267 ML: 0.9 INJECTION, SOLUTION INTRAVENOUS at 03:05

## 2017-05-14 RX ADMIN — ONDANSETRON 4 MG: 4 TABLET, ORALLY DISINTEGRATING ORAL at 03:05

## 2017-05-14 RX ADMIN — VANCOMYCIN HYDROCHLORIDE 2000 MG: 100 INJECTION, POWDER, LYOPHILIZED, FOR SOLUTION INTRAVENOUS at 05:05

## 2017-05-14 NOTE — CONSULTS
[facility]  Critical Care Medicine  Consult Note    Patient Name: Livan Arevalo  MRN: [id number]  Admission Date: 5/14/2017  Hospital Length of Stay: 0 days  Code Status: Prior  Attending Physician: Timur Arechiga MD   Primary Care Provider: Stephon Barrios Jr, MD   Principal Problem: <principal problem not specified>    Consults  Subjective:     HPI:  Mr. Arevalo is a 43 yo AAM w/ h/o ESRD (MWF) 2/2 to hypertensive nephropathy who [place] [facility] [place] a 2 day history of weakness, chills and vomiting.   Patient has his HD on Friday, where he states they pulled too much off.  He remembers finishing dialysis and going to his car and turning the heat up because he was having significant chills.   That evening had malaise and exhaustion.  Patient went to bed that night, slept the entire night, but when he woke up his malaise was worse and he began having intermittent rigors.   Yesterday afternoon he reports nausea and had one episode of vomiting this morning 2/2 to po intolerance. He states that his abdomen burns diffusely when after PO intake.    He denies CP, SOB and dyspnea, productive cough,  Over the past day he has also felt light headed w/o any vertigo, palpitations.      Hospital/ICU Course:       Past Medical History:   Diagnosis Date    ESRD (end stage renal disease)     Hypertension     Renal disorder        Past Surgical History:   Procedure Laterality Date    DIALYSIS FISTULA CREATION         Review of patient's allergies indicates:   Allergen Reactions    Ciprofloxacin Hives    Iodine and iodide containing products Hives and Itching       Family History     Problem Relation (Age of Onset)    Kidney disease Mother        Social History Main Topics    Smoking status: Never Smoker    Smokeless tobacco: Not on file    Alcohol use No    Drug use: No    Sexual activity: Not on file      Review of Systems   Constitutional: Positive for chills and fatigue. Negative for diaphoresis and  fever.   HENT: Negative for ear pain, hearing loss, mouth sores and sore throat.   Eyes: Negative for photophobia, pain and visual disturbance.   Respiratory: Negative for chest tightness and shortness of breath.   Cardiovascular: Negative for chest pain and palpitations.   Gastrointestinal: Negative for abdominal distention, abdominal pain, anal bleeding, blood in stool, constipation, diarrhea, nausea, rectal pain and vomiting.   Genitourinary:   Patient is anuric    Musculoskeletal: Negative for arthralgias and myalgias.   Skin: Negative for rash and wound.   Neurological: Positive for dizziness, tremors and light-headedness. Negative for syncope  Objective:     Vital Signs (Most Recent):  Temp: 98.7 °F (37.1 °C) (05/14/17 1301)  Pulse: 108 (05/14/17 1611)  Resp: (!) 24 (05/14/17 1611)  BP: (S) (!) 99/58 (05/14/17 1428)  SpO2: 95 % (05/14/17 1301) Vital Signs (24h Range):  Temp:  [98.7 °F (37.1 °C)] 98.7 °F (37.1 °C)  Pulse:  [108-111] 108  Resp:  [16-24] 24  SpO2:  [95 %] 95 %  BP: ()/(54-63) 99/58   Weight: 108.9 kg (240 lb)  Body mass index is 41.2 kg/(m^2).    No intake or output data in the 24 hours ending 05/14/17 1653    Physical Exam  Constitutional: He appears well-nourished. He appears lethargic. He is not diaphoretic. He is Obese . He appears ill. No distress.   HENT:   Head: Normocephalic and atraumatic.   Eyes: EOM are normal. Pupils are equal, round, and reactive to light.   Neck: Normal range of motion. Neck supple.   Cardiovascular: Regular rhythm and intact distal pulses. Tachycardia present. Exam reveals no gallop and no friction rub.   No murmur heard.  Pulmonary/Chest: Breath sounds normal. No respiratory distress. He has no wheezes. He has no rhonchi. He has no rales.   Abdominal: Soft. Bowel sounds are normal. He exhibits no distension. There is no tenderness.   Musculoskeletal: He exhibits no edema or tenderness.   Neurological: He is oriented to person, place, and time. He appears  lethargic. No cranial nerve deficit.   Skin: Skin is warm and dry.   Vents:     Lines/Drains/Airways     Drain                 Hemodialysis AV Fistula Left upper arm -- days          Peripheral Intravenous Line                 Peripheral IV - Single Lumen 05/14/17 1431 Right Hand less than 1 day              Significant Labs:    CBC/Anemia Profile:    Recent Labs  Lab 05/14/17  1423   WBC 11.68   HGB 10.7*   HCT 32.3*   *   MCV 86   RDW 19.0*        Chemistries:    Recent Labs  Lab 05/14/17  1423      K 4.9   CL 93*   CO2 26   BUN 38*   CREATININE 8.8*   CALCIUM 9.1   ALBUMIN 2.7*   PROT 8.1   BILITOT 5.3*   ALKPHOS 343*   ALT 38   AST 61*   MG 1.8       Lactic Acid:   Recent Labs  Lab 05/14/17  1542   LACTATE 2.3*       Significant Imaging: I have reviewed all pertinent imaging results/findings within the past 24 hours.     No acute abnormality.    Unchanged cardiomegaly and right apical calcified mass.    Assessment/Plan:     ID  Sepsis  -WBC wnl, afebrile with a BP of 99/58 while in the room  -Lactate 2.3 and tachycardic  -patient appears to be fluid down and requires fluid resuscitation (pt also confirmed  That dialysis pulled too much fluid off him on Friday's session)  -has gotten Vanc and Cefepime in the ED for empirical coverage  -CXR -show no acute changes, or infective process  -BCx are pending  -recommend fluid resuscitation, follow up on Blood cultures, admit to hospital medicine for further  evaluation (currently stable for the floor)  -Please notify ICU with any status changes         Critical Care Medicine Daily Checklist:    A: Awake: RASS Goal/Actual Goal:    Actual:     B: Spontaneous Breathing Trial Performed?     C: SAT & SBT Coordinated?  NA                      D: Delirium: CAM-ICU     E: Early Mobility Performed? Yes   F: Feeding Goal:    Status:     Current Diet Order   Procedures    Diet NPO     Except for Medication      AS: Analgesia/Sedation None   T: Thromboembolic  Prophylaxis Defer to primary team   H: HOB > 300 Yes   U: Stress Ulcer Prophylaxis (if needed) NA   G: Glucose Control Na   B: Bowel Function     I: Indwelling Catheter (Lines & Loera) Necessity none   D: De-escalation of Antimicrobials/Pharmacotherapies Currently vanc and cefepime    Plan for the day/ETD Admit to hospital medicine    Code Status:  Family/Goals of Care: Prior       Critical Care Time: 60 minutes     Critical care was time spent personally by me on the following activities: development of treatment plan with patient or surrogate and bedside caregivers, discussions with consultants, evaluation of patient's response to treatment, examination of patient, ordering and performing treatments and interventions, ordering and review of laboratory studies, ordering and review of radiographic studies, pulse oximetry, re-evaluation of patient's condition. This critical care time did not overlap with that of any other provider or involve time for any procedures.    Thank you for your consult. I will sign off. Please contact us if you have any additional questions.     Case discussed with Dr. Machuca, Critical Care Fellow     Hallie Matta MD  Critical Care Medicine  [facility]

## 2017-05-14 NOTE — SUBJECTIVE & OBJECTIVE
Past Medical History:   Diagnosis Date    ESRD (end stage renal disease)     Hypertension     Renal disorder        Past Surgical History:   Procedure Laterality Date    DIALYSIS FISTULA CREATION         Review of patient's allergies indicates:   Allergen Reactions    Ciprofloxacin Hives    Iodine and iodide containing products Hives and Itching       Family History     Problem Relation (Age of Onset)    Kidney disease Mother        Social History Main Topics    Smoking status: Never Smoker    Smokeless tobacco: Not on file    Alcohol use No    Drug use: No    Sexual activity: Not on file      Review of Systems   Constitutional: Positive for chills and fatigue. Negative for diaphoresis and fever.   HENT: Negative for ear pain, hearing loss, mouth sores and sore throat.   Eyes: Negative for photophobia, pain and visual disturbance.   Respiratory: Negative for chest tightness and shortness of breath.   Cardiovascular: Negative for chest pain and palpitations.   Gastrointestinal: Negative for abdominal distention, abdominal pain, anal bleeding, blood in stool, constipation, diarrhea, nausea, rectal pain and vomiting.   Genitourinary:   Patient is anuric    Musculoskeletal: Negative for arthralgias and myalgias.   Skin: Negative for rash and wound.   Neurological: Positive for dizziness, tremors and light-headedness. Negative for syncope  Objective:     Vital Signs (Most Recent):  Temp: 98.7 °F (37.1 °C) (05/14/17 1301)  Pulse: 108 (05/14/17 1611)  Resp: (!) 24 (05/14/17 1611)  BP: (S) (!) 99/58 (05/14/17 1428)  SpO2: 95 % (05/14/17 1301) Vital Signs (24h Range):  Temp:  [98.7 °F (37.1 °C)] 98.7 °F (37.1 °C)  Pulse:  [108-111] 108  Resp:  [16-24] 24  SpO2:  [95 %] 95 %  BP: ()/(54-63) 99/58   Weight: 108.9 kg (240 lb)  Body mass index is 41.2 kg/(m^2).    No intake or output data in the 24 hours ending 05/14/17 1653    Physical Exam  Constitutional: He appears well-nourished. He appears lethargic. He is  not diaphoretic. He is Obese . He appears ill. No distress.   HENT:   Head: Normocephalic and atraumatic.   Eyes: EOM are normal. Pupils are equal, round, and reactive to light.   Neck: Normal range of motion. Neck supple.   Cardiovascular: Regular rhythm and intact distal pulses. Tachycardia present. Exam reveals no gallop and no friction rub.   No murmur heard.  Pulmonary/Chest: Breath sounds normal. No respiratory distress. He has no wheezes. He has no rhonchi. He has no rales.   Abdominal: Soft. Bowel sounds are normal. He exhibits no distension. There is no tenderness.   Musculoskeletal: He exhibits no edema or tenderness.   Neurological: He is oriented to person, place, and time. He appears lethargic. No cranial nerve deficit.   Skin: Skin is warm and dry.   Vents:     Lines/Drains/Airways     Drain                 Hemodialysis AV Fistula Left upper arm -- days          Peripheral Intravenous Line                 Peripheral IV - Single Lumen 05/14/17 1431 Right Hand less than 1 day              Significant Labs:    CBC/Anemia Profile:    Recent Labs  Lab 05/14/17  1423   WBC 11.68   HGB 10.7*   HCT 32.3*   *   MCV 86   RDW 19.0*        Chemistries:    Recent Labs  Lab 05/14/17  1423      K 4.9   CL 93*   CO2 26   BUN 38*   CREATININE 8.8*   CALCIUM 9.1   ALBUMIN 2.7*   PROT 8.1   BILITOT 5.3*   ALKPHOS 343*   ALT 38   AST 61*   MG 1.8       Lactic Acid:   Recent Labs  Lab 05/14/17  1542   LACTATE 2.3*       Significant Imaging: I have reviewed all pertinent imaging results/findings within the past 24 hours.     No acute abnormality.    Unchanged cardiomegaly and right apical calcified mass.

## 2017-05-14 NOTE — IP AVS SNAPSHOT
[facility]  [address]  [facility] [address]  Phone: [phone]           Patient Discharge Instructions   Our goal is to set you up for success. This packet includes information on your condition, medications, and your home care.  It will help you care for yourself to prevent having to return to the hospital.     Please ask your nurse if you have any questions.      There are many details to remember when preparing to leave the hospital. Here is what you will need to do:    1. Take your medicine. If you are prescribed medications, review your Medication List on the following pages. You may have new medications to  at the pharmacy and others that you'll need to stop taking. Review the instructions for how and when to take your medications. Talk with your doctor or nurses if you are unsure of what to do.     2. Go to your follow-up appointments. Specific follow-up information is listed in the following pages. Your may be contacted by a nurse or clinical provider about future appointments. Be sure we have all of the phone numbers to reach you. Please contact your provider's office if you are unable to make an appointment.     3. Watch for warning signs. Your doctor or nurse will give you detailed warning signs to watch for and when to call for assistance. These instructions may also include educational information about your condition. If you experience any of warning signs to your health, call your doctor.           [facility]  Unless otherwise directed by your provider, please   contact [facility], our nurse care line   that is available for 24/7 assistance.     [phone] (toll-free)     Registered nurses in the [facility]   provide: appointment scheduling, clinical advisement, health education, and other advisory services.                  ** Verify the list of medication(s) below is accurate and up to date. Carry this with you in case of  emergency. If your medications have changed, please notify your healthcare provider.             Medication List      START taking these medications        Additional Info                      calcium carbonate 200 mg calcium (500 mg) chewable tablet   Commonly known as:  TUMS   Refills:  0   Dose:  500 mg    Last time this was given:  500 mg on 5/17/2017  9:24 AM   Instructions:  Take 1 tablet (500 mg total) by mouth 3 (three) times daily with meals.     Begin Date    AM    Noon    PM    Bedtime         CONTINUE taking these medications        Additional Info                      carvedilol 3.125 MG tablet   Commonly known as:  COREG   Refills:  3    Last time this was given:  3.125 mg on 5/17/2017 12:01 PM   Instructions:  TK 1 T PO BID     Begin Date    AM    Noon    PM    Bedtime       oxycodone-acetaminophen 5-325 mg per tablet   Commonly known as:  PERCOCET   Quantity:  30 tablet   Refills:  0   Dose:  1 tablet    Instructions:  Take 1 tablet by mouth every 4 (four) hours as needed for Pain.     Begin Date    AM    Noon    PM    Bedtime       promethazine 25 MG tablet   Commonly known as:  PHENERGAN   Quantity:  30 tablet   Refills:  3   Dose:  25 mg    Instructions:  Take 1 tablet (25 mg total) by mouth every 4 (four) hours as needed for Nausea.     Begin Date    AM    Noon    PM    Bedtime            Where to Get Your Medications      You can get these medications from any pharmacy     You don't need a prescription for these medications     calcium carbonate 200 mg calcium (500 mg) chewable tablet                  Please bring to all follow up appointments:    1. A copy of your discharge instructions.  2. All medicines you are currently taking in their original bottles.  3. Identification and insurance card.    Please arrive 15 minutes ahead of scheduled appointment time.    Please call 24 hours in advance if you must reschedule your appointment and/or time.        Your Scheduled Appointments     May 25,  "2017  1:15 PM CDT   Physical with Tl Sandoval MD   [facility] - [facility] ([facility])    [address]  [facility] [address] [phone]              Follow-up Information     Follow up with Jenn Sandoval MD In 1 week.    Specialty:  Internal Medicine    Contact information:    [address] [facility] [id number] [phone]          Discharge Instructions     Future Orders    Call MD for:  difficulty breathing or increased cough     Call MD for:  increased confusion or weakness     Call MD for:  persistent dizziness, light-headedness, or visual disturbances     Call MD for:  persistent nausea and vomiting or diarrhea     Call MD for:  redness, tenderness, or signs of infection (pain, swelling, redness, odor or green/yellow discharge around incision site)     Call MD for:  severe persistent headache     Call MD for:  severe uncontrolled pain     Call MD for:  temperature >100.4         Primary Diagnosis     Your primary diagnosis was:  Infection In Bloodstream      Admission Information     Date & Time Provider Department CSN    5/14/2017  1:42 PM Victorina Belle MD [facility] [id number]      Care Providers     Provider Role Specialty Primary office phone    Victorina Belle MD Attending Provider Hospitalist [phone]    Victorina Belle MD Team Attending  Hospitalist [phone]    Shaheen Castano MD Consulting Physician  Nephrology [phone]      Your Vitals Were     BP Pulse Temp Resp Height Weight    142/86 (BP Location: Right arm, Patient Position: Standing, BP Method: Automatic) 106 97.4 °F (36.3 °C) (Oral) 18 5' 4" (1.626 m) 108.9 kg (240 lb)    SpO2 BMI             100% 41.2 kg/m2         Recent Lab Values        9/6/2015                           4:55 AM           A1C 6.1                       Pending Labs     Order Current Status    Blood culture x two cultures. Draw prior to antibiotics. Preliminary result    " Blood culture x two cultures. Draw prior to antibiotics. Preliminary result      Allergies as of 5/17/2017        Reactions    Ciprofloxacin Hives    Iodine And Iodide Containing Products Hives, Itching      Advance Directives     An advance directive is a document which, in the event you are no longer able to make decisions for yourself, tells your healthcare team what kind of treatment you do or do not want to receive, or who you would like to make those decisions for you.  If you do not currently have an advance directive, [facility] encourages you to create one.  For more information call:  [phone]), [phone] ([phone]),  or log on to [email or web address].        Language Assistance Services     ATTENTION: Language assistance services are available, free of charge. Please call [phone].      ATENCIÓN: Si habla español, tiene a youssef disposición servicios gratuitos de asistencia lingüística. Llame al [phone].     CHÚ Ý: N?u b?n nói Ti?ng Vi?t, có các d?ch v? h? tr? ngôn ng? mi?n phí dành cho b?n. G?i s? [phone].        Heart Failure Education       Heart Failure: Being Active  You have a condition called heart failure. Being active doesnt mean that you have to wear yourself out. Even a little movement each day helps to strengthen your heart. If you cant get out to exercise, you can do simple stretching and strengthening exercises at home. These are good ways to keep you well-conditioned and prevent you and your heart from becoming excessively weak.    Ideas to get you started  · Add a little movement to things you do now. Walk to mail letters. Park your car at the far end of the parking lot and walk to the store. Walk up a flight of stairs instead of taking the elevator.  · Choose activities you enjoy. You might walk, swim, or ride an exercise bike. Things like gardening and washing the car count, too. Other possibilities include: washing dishes, walking the dog,  walking around the mall, and doing aerobic activities with friends.  · Join a group exercise program at a [facility], a senior center, or a community center. Or look into a hospital cardiac rehabilitation program. Ask your doctor if you qualify.  Tips to keep you going  · Get up and get dressed each day. Go to a coffee shop and read a newspaper or go somewhere that you'll be in the presence of other active people. Youll feel more like being active.  · Make a plan. Choose one or more activities that you enjoy and that you can easily do. Then plan to do at least one each day. You might write your plan on a calendar.  · Go with a friend or a group if you like company. This can help you feel supported and stay motivated, too.  · Plan social events that you enjoy. This will keep you mentally engaged as well as physically motivated to do things you find pleasure in.  For your safety  · Talk with your healthcare provider before starting an exercise program.  · Exercise indoors when its too hot or too cold outside, or when the air quality is poor. Try walking at a shopping mall.  · Wear socks and sturdy shoes to maintain your balance and prevent falls.  · Start slowly. Do a few minutes several times a day at first. Increase your time and speed little by little.  · Stop and rest whenever you feel tired or get short of breath.  · Dont push yourself on days when you dont feel well.  Date Last Reviewed: 3/20/2016  © 4092-6800 [employer]. [address] [place] [address], [place] [address]. All rights reserved. This information is not intended as a substitute for professional medical care. Always follow your healthcare professional's instructions.              Heart Failure: Evaluating Your Heart  You have a condition called heart failure. To evaluate your condition, your doctor will examine you, ask questions, and do some tests. Along with looking for signs of heart failure, the doctor looks for any other health  problems that may have led to heart failure. The results of your evaluation will help your doctor form a treatment plan.  Health history and physical exam  Your visit will start with a health history. Tell the doctor about any symptoms youve noticed and about all medicines you take. Then youll have a physical exam. This includes listening to your heartbeat and breathing. Youll also be checked for swelling (edema) in your legs and neck. When you have fluid buildup or fluid in the lungs, it may be called congestive heart failure.  Diagnosing heart failure     During an echocardiogram, sound waves bounce off the heart. These are converted into a picture on the screen.   The following may be done to help your doctor form a diagnosis:  · X-rays show the size and shape of your heart. These pictures can also show fluid in your lungs.  · An electrocardiogram (ECG or EKG) shows the pattern of your heartbeat. Small pads (electrodes) are placed on your chest, arms, and legs. Wires connect the pads to the ECG machine, which records your hearts electrical signals. This can give the doctor information about heart function.  · An echocardiogram uses ultrasound waves to show the structure and movement of your heart muscle. This shows how well the heart pumps. It also shows the thickness of the heart walls, and if the heart is enlarged. It is one of the most useful, non-invasive tests as it provides information about the heart's general function. This helps your doctor make treatment decisions.  · Lab tests evaluate small amounts of blood or urine for signs of problems. A BNP lab test can help diagnose and evaluate heart failure. BNP stands for B-type natriuretic peptide. The ventricles secrete more BNP when heart failure worsens. Lab tests can also provide information about metabolic dysfunction or heart dysfunction.  Your treatment plan  Based on the results of your evaluation and tests, your doctor will develop a treatment  plan. This plan is designed to relieve some of your heart failure symptoms and help make you more comfortable. Your treatment plan may include:  · Medicine to help your heart work better and improve your quality of life  · Changes in what you eat and drink to help prevent fluid from backing up in your body  · Daily monitoring of your weight and heart failure symptoms to see how well your treatment plan is working  · Exercise to help you stay healthy  · Help with quitting smoking  · Emotional and psychological support to help adjust to the changes  · Referrals to other specialists to make sure you are being treated comprehensively  Date Last Reviewed: 3/21/2016  © 1175-9314 [employer]. [address] [place] [address], [place] [address]. All rights reserved. This information is not intended as a substitute for professional medical care. Always follow your healthcare professional's instructions.              Heart Failure: Making Changes to Your Diet  You have a condition called heart failure. When you have heart failure, excess fluid is more likely to build up in your body because your heart isn't working well. This makes the heart work harder to pump blood. Fluid buildup causes symptoms such as shortness of breath and swelling (edema). This is often referred to as congestive heart failure or CHF. Controlling the amount of salt (sodium) you eat may help stop fluid from building up. Your doctor may also tell you to reduce the amount of fluid you drink.  Reading food labels    Your healthcare provider will tell you how much sodium you can eat each day. Read food labels to keep track. Keep in mind that certain foods are high in salt. These include canned, frozen, and processed foods. Check the amount of sodium in each serving. Watch out for high-sodium ingredients. These include MSG (monosodium glutamate), baking soda, and sodium phosphate.   Eating less salt  Give yourself time to get used to eating less salt. It  may take a little while. Here are some tips to help:  · Take the saltshaker off the table. Replace it with salt-free herb mixes and spices.  · Eat fresh or plain frozen vegetables. These have much less salt than canned vegetables.  · Choose low-sodium snacks like sodium-free pretzels, crackers, or air-popped popcorn.  · Dont add salt to your food when youre cooking. Instead, season your foods with pepper, lemon, garlic, or onion.  · When you eat out, ask that your food be cooked without added salt.  · Avoid eating fried foods as these often have a great deal of salt.  If youre told to limit fluids  You may need to limit how much fluid you have to help prevent swelling. This includes anything that is liquid at room temperature, such as ice cream and soup. If your doctor tells you to limit fluid, try these tips:  · Measure drinks in a measuring cup before you drink them. This will help you meet daily goals.  · Chill drinks to make them more refreshing.  · Suck on frozen lemon wedges to quench thirst.  · Only drink when youre thirsty.  · Chew sugarless gum or suck on hard candy to keep your mouth moist.  · Weigh yourself daily to know if your body's fluid content is rising.  My sodium goal  Your healthcare provider may give you a sodium goal to meet each day. This includes sodium found in food as well as salt that you add. My goal is to eat no more than ___________ mg of sodium per day.     When to call your doctor  Call your doctor right away if you have any symptoms of worsening heart failure. These can include:  · Sudden weight gain  · Increased swelling of your legs or ankles  · Trouble breathing when youre resting or at night  · Increase in the number of pillows you have to sleep on  · Chest pain, pressure, discomfort, or pain in the jaw, neck, or back   Date Last Reviewed: 3/21/2016  © 0459-7777 [employer]. [address] [place] [address], [place] [address]. All rights reserved. This information is not  intended as a substitute for professional medical care. Always follow your healthcare professional's instructions.              Heart Failure: Medicines to Help Your Heart    You have a condition called heart failure (also known as congestive heart failure, or CHF). Your doctor will likely prescribe medicines for heart failure and any underlying health problems you have. Most heart failure patients take one or more types of medicinen. Your healthcare provider will work to find the combination of medicines that works best for you.  Heart failure medicines  Here are the most common heart failure medicines:  · ACE inhibitors lower blood pressure and decrease strain on the heart. This makes it easier for the heart to pump. Angiotensin receptor blockers have similar effects. These are prescribed for some patients instead of ACE inhibitors.  · Beta-blockers relieve stress on the heart. They also improve symptoms. They may also improve the heart's pumping action over time.  · Diuretics (also called water pills) help rid your body of excess water. This can help rid your body of swelling (edema). Having less fluid to pump means your heart doesnt have to work as hard. Some diuretics make your body lose a mineral called potassium. Your doctor will tell you if you need to take supplements or eat more foods high in potassium.  · Digoxin helps your heart pump with more strength. This helps your heart pump more blood with each beat. So, more oxygen-rich blood travels to the rest of the body.  · Aldosterone antagonists help alter hormones and decrease strain on the heart.  · Hydralazine and nitrates are two separate medicines used together to treat heart failure. They may come in one combination pill. They lower blood pressure and decrease how hard the heart has to pump.  Medicines for related conditions  Controlling other heart problems helps keep heart failure under control, too. Depending on other heart problems you have,  medicines may be prescribed to:  · Lower blood pressure (antihypertensives).  · Lower cholesterol levels (statins).  · Prevent blood clots (anticoagulants or aspirin).  · Keep the heartbeat steady (antiarrhythmics).  Date Last Reviewed: 3/5/2016  © 8612-7363 [employer]. [address] [place] [address]. All rights reserved. This information is not intended as a substitute for professional medical care. Always follow your healthcare professional's instructions.              Heart Failure: Procedures That May Help    The heart is a muscle that pumps oxygen-rich blood to all parts of the body. When you have heart failure, the heart is not able to pump as well as it should. Blood and fluid may back up into the lungs (congestive heart failure), and some parts of the body dont get enough oxygen-rich blood to work normally. These problems lead to the symptoms of heart failure.     Certain procedures may help the heart pump better in some cases of heart failure. Some procedures are done to treat health problems that may have caused the heart failure such as coronary artery disease or heart rhythm problems. For more serious heart failure, other options are available.  Treating artery and valve problems  If you have coronary artery disease or valve disease, procedures may be done to improve blood flow. This helps the heart pump better, which can improve heart failure symptoms. First, your doctor may do a cardiac catheterization to help detect clogged blood vessels or valve damage. During this procedure, a  thin tube (catheter) in inserted into a blood vessel and guided to the heart. There a dye is injected and a special type of X-ray (angiogram) is taken of the blood vessels. Procedures to open a blocked artery or fix damaged valves can also be done using catheterization.  · Angioplasty uses a balloon-tipped instrument at the end of the catheter. The balloon is inflated to widen the narrowed artery.  In many cases, a stent is expanded to further support the narrowed artery. A stent is a metal mesh tube.  · Valve surgery repairs or replacement of faulty valves can also be done during catheterization so blood can flow properly through the chambers of the heart.  Bypass surgery is another option to help treat blocked arteries. It uses a healthy blood vessel from elsewhere in the body. The healthy blood vessel is attached above and below the blocked area so that blood can flow around the blocked artery.  Treating heart rhythm problems  A device may be placed in the chest to help a weak heart maintain a healthy, heartbeat so the heart can pump more effectively:  · Pacemaker. A pacemaker is an implanted device that regulates your heartbeat electronically. It monitors your heart's rhythm and generates a painless electric impulse that helps the heart beat in a regular rhythm. A pacemaker is programmed to meet your specific heart rhythm needs.  · Biventricular pacing/cardiac resynchronization therapy. A type of pacemaker that paces both pumping chambers of the heart at the same time to coordinate contractions and to improve the heart's function. Some people with heart failure are candidates for this therapy.  · Implantable cardioverter defibrillator. A device similar to a pacemaker that senses when the heart is beating too fast and delivers an electrical shock to convert the fast rhythm to a normal rhythm. This can be a life saving device.  In severe cases  In more serious cases of heart failure when other treatments no longer work, other options may include:  · Ventricular assist devices (VADs). These are mechanical devices used to take over the pumping function for one or both of the heart's ventricles, or pumping chambers. A VAD may be necessary when heart failure progresses to the point that medicines and other treatments no longer help. In some cases, a VAD may be used as a bridge to transplant.  · Heart transplant.  This is replacing the diseased heart with a healthy one from a donor. This is an option for a few people who are very sick. A heart transplant is very serious and not an option for all patients. Your doctor can tell you more.  Date Last Reviewed: 3/20/2016  © 1744-0578 [employer]. [address] [place] [address], [place] [address]. All rights reserved. This information is not intended as a substitute for professional medical care. Always follow your healthcare professional's instructions.              Heart Failure: Tracking Your Weight  You have a condition called heart failure. When you have heart failure, a sudden weight gain or a steady rise in weight is a warning sign that your body is retaining too much water and salt. This could mean your heart failure is getting worse. If left untreated, it can cause problems for your lungs and result in shortness of breath. Weighing yourself each day is the best way to know if youre retaining water. If your weight goes up quickly, call your doctor. You will be given instructions on how to get rid of the excess water. You will likely need medicines and to avoid salt. This will help your heart work better.  Call your doctor if you gain more than 2 pounds in 1 day, more than 5 pounds in 1 week, or whatever weight gain you were told to report by your doctor. This is often a sign of worsening heart failure and needs to be evaluated and treated. Your doctor will tell you what to do next.   Tips for weighing yourself    · Weigh yourself at the same time each morning, wearing the same clothes. Weigh yourself after urinating and before eating.  · Use the same scale each day. Make sure the numbers are easy to read. Put the scale on a flat, hard surface -- not on a rug or carpet.  · Do not stop weighing yourself. If you forget one day, weigh again the next morning.  How to use your weight chart  · Keep your weight chart near the scale. Write your weight on the chart as soon as  you get off the scale.  · Fill in the month and the start date on the chart. Then write down your weight each day. Your chart will look like this:    · If you miss a day, leave the space blank. Weigh yourself the next day and write your weight in the next space.  · Take your weight chart with you when you go to see your doctor.  Date Last Reviewed: 3/20/2016  © 3309-7169 [employer]. [address] [place] [address], [place] [address]. All rights reserved. This information is not intended as a substitute for professional medical care. Always follow your healthcare professional's instructions.              Heart Failure: Warning Signs of a Flare-Up  You have a condition called heart failure. Once you have heart failure, flare-ups can happen. Below are signs that can mean your heart failure is getting worse. If you notice any of these warning signs, call your healthcare provider.  Swelling    · Your feet, ankles, or lower legs get puffier.  · You notice skin changes on your lower legs.  · Your shoes feel too tight.  · Your clothes are tighter in the waist.  · You have trouble getting rings on or off your fingers.  Shortness of breath  · You have to breathe harder even when youre doing your normal activities or when youre resting.  · You are short of breath walking up stairs or even short distances.  · You wake up at night short of breath or coughing.  · You need to use more pillows or sit up to sleep.  · You wake up tired or restless.  Other warning signs  · You feel weaker, dizzy, or more tired.  · You have chest pain or changes in your heartbeat.  · You have a cough that wont go away.  · You cant remember things or dont feel like eating.  Tracking your weight  Gaining weight is often the first warning sign that heart failure is getting worse. Gaining even a few pounds can be a sign that your body is retaining excess water and salt. Weighing yourself each day in the morning after you urinate and before you  eat, is the best way to know if you're retaining water. Get a scale that is easy to read and make sure you wear the same clothes and use the same scale every time you weigh. Your healthcare provider will show you how to track your weight. Call your doctor if you gain more than 2 pounds in 1 day, 5 pounds in 1 week, or whatever weight gain you were told to report by your doctor. This is often a sign of worsening heart failure and needs to be evaluated and treated before it compromises your breathing. Your doctor will tell you what to do next.    Date Last Reviewed: 3/15/2016  © 8030-8498 [employer]. [address] [place] [address], [place] [address]. All rights reserved. This information is not intended as a substitute for professional medical care. Always follow your healthcare professional's instructions.              Pneumonmia Discharge Instructions                Chronic Kindey Disease Education             [facility] Sign-Up     Activating your [facility] account is as easy as 1-2-3!     1) Visit [email or web address], select Sign Up Now, enter this activation code and your date of birth, then select Next.  [email or web address]  Expires: 6/6/2017  1:08 PM      2) Create a username and password to use when you visit [facility] in the future and select a security question in case you lose your password and select Next.    3) Enter your e-mail address and click Sign Up!    Additional Information  If you have questions, please e-mail [email or web address] or call [phone] to talk to our [facility] staff. Remember, [facility] is NOT to be used for urgent needs. For medical emergencies, dial 911.          [facility] complies with applicable Federal civil rights laws and does not discriminate on the basis of race, color, national origin, age, disability, or sex.

## 2017-05-14 NOTE — ED TRIAGE NOTES
"Patient reports he was hospitalized last week - says he wasn't given enough abx.     Patient reports yesterday he was at [facility] in the heat and felt freezing so her got in his car and turned on the heat. Did not feel feverish last night but states his L arm started feeling numb. Last normal yesterday per patient.  Pt is hemodialysis patient MWF, last dialysis Friday. Is afebrile at this time and took no antipyretics      + Generalized weakness and fatigue   + "numbness" to L arm (said while he rubs his fingers together), no weakness in this extremity, sharp and dull sensation intact and equal bilaterally, denies confusion, slurred words, or unilateral weakness  +Rhinorhea  + Cough with productive sputum  +N/V/D since yesterday  "

## 2017-05-14 NOTE — ASSESSMENT & PLAN NOTE
-WBC wnl, afebrile with a BP of 99/58 while in the room  -Lactate 2.3 and tachycardic  -patient appears to be fluid down and requires fluid resuscitation (pt also confirmed  That dialysis pulled too much fluid off him on Friday's session)  -has gotten Vanc and Cefepime in the ED for empirical coverage  -CXR -show no abdnormality  -BCx are pending  -recommend fluid resuscitation, follow up on Blood cultures, admit to hospital medicine for further  evaluation (currently stable for the floor)  -Please notify ICU with any status changes

## 2017-05-14 NOTE — ED PROVIDER NOTES
Encounter Date: 5/14/2017    SCRIBE #1 NOTE: I, Marce Abdi, am scribing for, and in the presence of,  Dr. Arechiga. I have scribed the following portions of the note - the Resident attestation and the EKG reading. Other sections scribed: imaging.       History     Chief Complaint   Patient presents with    Fatigue     Patient reports chills yesterday, without known fever.  States he is a dialysis patient with dialysis last on Friday.  Aching all over.     Review of patient's allergies indicates:   Allergen Reactions    Ciprofloxacin Hives    Iodine and iodide containing products Hives and Itching     HPI Comments: Mr. Arevalo is a 41 yo AAM w/ h/o ESRD (MWF) 2/2 to hypertensive nephropathy p/w 2d h/o weakness.  Patient reports being in his usual state of health until HD this past Friday.  While getting HD he began having chills w/ associated tremor that spontaneously resolved.  He went back home after HD and that evening had malaise and exhaustion.  Patient went to bed that night, slept the entire night, but when he woke up his malaise was worse and he began having intermittent rigors.  He denies and dyspnea, productive cough, or fevers.  Yesterday afternoon he reports nausea and had one episode of vomiting this morning 2/2 to po intolerance.  Over the past day he has also felt light headed w/o any vertigo, palpitations.    Past Medical History:   Diagnosis Date    ESRD (end stage renal disease)     Hypertension     Renal disorder      Past Surgical History:   Procedure Laterality Date    DIALYSIS FISTULA CREATION       Family History   Problem Relation Age of Onset    Kidney disease Mother      Social History   Substance Use Topics    Smoking status: Never Smoker    Smokeless tobacco: None    Alcohol use No     Review of Systems   Constitutional: Positive for chills and fatigue. Negative for diaphoresis and fever.   HENT: Negative for ear pain, hearing loss, mouth sores and sore throat.    Eyes: Negative  for photophobia, pain and visual disturbance.   Respiratory: Negative for chest tightness and shortness of breath.    Cardiovascular: Negative for chest pain and palpitations.   Gastrointestinal: Negative for abdominal distention, abdominal pain, anal bleeding, blood in stool, constipation, diarrhea, nausea, rectal pain and vomiting.   Genitourinary:        Patient is anuric    Musculoskeletal: Negative for arthralgias and myalgias.   Skin: Negative for rash and wound.   Neurological: Positive for dizziness, tremors and light-headedness. Negative for syncope.       Physical Exam   Initial Vitals   BP Pulse Resp Temp SpO2   05/14/17 1301 05/14/17 1301 05/14/17 1301 05/14/17 1301 05/14/17 1301   90/54 111 16 98.7 °F (37.1 °C) 95 %       Physical Exam    Constitutional: He appears well-nourished. He appears lethargic. He is not diaphoretic. He is Obese . He appears ill. No distress.   HENT:   Head: Normocephalic and atraumatic.   Eyes: EOM are normal. Pupils are equal, round, and reactive to light.   Neck: Normal range of motion. Neck supple.   Cardiovascular: Regular rhythm and intact distal pulses. Tachycardia present.  Exam reveals no gallop and no friction rub.    No murmur heard.  Pulmonary/Chest: Breath sounds normal. No respiratory distress. He has no wheezes. He has no rhonchi. He has no rales.   Abdominal: Soft. Bowel sounds are normal. He exhibits no distension. There is no tenderness.   Musculoskeletal: He exhibits no edema or tenderness.   Neurological: He is oriented to person, place, and time. He appears lethargic. No cranial nerve deficit.   Skin: Skin is warm and dry.         ED Course   Procedures  Labs Reviewed   CBC W/ AUTO DIFFERENTIAL - Abnormal; Notable for the following:        Result Value    RBC 3.77 (*)     Hemoglobin 10.7 (*)     Hematocrit 32.3 (*)     RDW 19.0 (*)     Platelets 101 (*)     Gran # 9.4 (*)     Gran% 81.0 (*)     Lymph% 11.6 (*)     Platelet Estimate Decreased (*)     All  other components within normal limits   COMPREHENSIVE METABOLIC PANEL - Abnormal; Notable for the following:     Chloride 93 (*)     BUN, Bld 38 (*)     Creatinine 8.8 (*)     Albumin 2.7 (*)     Total Bilirubin 5.3 (*)     Alkaline Phosphatase 343 (*)     AST 61 (*)     Anion Gap 17 (*)     eGFR if  7.8 (*)     eGFR if non  6.8 (*)     All other components within normal limits   LACTIC ACID, PLASMA - Abnormal; Notable for the following:     Lactate (Lactic Acid) 2.3 (*)     All other components within normal limits   PROTIME-INR - Abnormal; Notable for the following:     Prothrombin Time 15.2 (*)     INR 1.5 (*)     All other components within normal limits   PROCALCITONIN - Abnormal; Notable for the following:     Procalcitonin 109.22 (*)     All other components within normal limits   ISTAT PROCEDURE - Abnormal; Notable for the following:     POC PH 7.512 (*)     POC PO2 64 (*)     POC HCO3 30.5 (*)     POC SATURATED O2 94 (*)     POC TCO2 32 (*)     All other components within normal limits   MAGNESIUM   INFLUENZA A AND B ANTIGEN   APTT   LACTIC ACID, PLASMA     EKG Readings: (Independently Interpreted)   Tachycardia, right axis deviation, poor R wave progression, QT prolongation, incomplete right  bundle branch block, inferior T wave inversion.        X-Rays:   Independently Interpreted Readings:   Chest X-Ray: Mild cardiomegaly, right apical mass noted, no effusions, no infiltrates. Unchanged from prior.      Medical Decision Making:   History:   Old Medical Records: I decided to obtain old medical records.  Old Records Summarized: records from previous admission(s).  Initial Assessment:   43 yo AAM w/ h/o ESRD p/w malaise  Differential Diagnosis:   DDx includes metabolic encephalopathy 2/2 to electrolyte disturbance vs sepsis vs Hypokalemia vs hypercalcemia  Independently Interpreted Test(s):   I have ordered and independently interpreted X-rays - see prior notes.  I have  ordered and independently interpreted EKG Reading(s) - see prior notes  Clinical Tests:   Lab Tests: Ordered and Reviewed  Radiological Study: Ordered and Reviewed  Medical Tests: Ordered and Reviewed  ED Management:  2:27 PM  Labs & CXR pending    3:13 PM  500 cc NS bolus & lactic acid ordered     4:00 PM  CBC shows leukocytosis; ABG ordered    4:32 PM  Lactic acid = 2.3 & SOFA score = 8, ICU consulted for further assistance    4:52 PM  Per ICU, complete fluid resuscitation; per their request, consult order DC'd            Scribe Attestation:   Scribe #1: I performed the above scribed service and the documentation accurately describes the services I performed. I attest to the accuracy of the note.    Attending Attestation:   Physician Attestation Statement for Resident:  As the supervising MD   Physician Attestation Statement: I have personally seen and examined this patient.   I agree with the above history. -: Emergent evaluation of a 42 y.o. Male. My initial differential diagnoses include, but are not limited to: sepsis, bacteremia, pneumonia, dehydration. Because teddy the patient's comorbidities, tachycardia, and mild hypotension, I will provide broad spectrum antibiotics and fluid resuscitation.    As the supervising MD I agree with the above PE.    As the supervising MD I agree with the above treatment, course, plan, and disposition.          Physician Attestation for Scribe:  Physician Attestation Statement for Scribe #1: I, Dr. Arechiga, reviewed documentation, as scribed by Marce Abdi in my presence, and it is both accurate and complete.                 ED Course     Clinical Impression:   The primary encounter diagnosis was Sepsis due to other etiology. Diagnoses of ESRD (end stage renal disease), Obesity (BMI 30-39.9), and Sepsis were also pertinent to this visit.          Timur Arechiga MD  05/18/17 [id number]

## 2017-05-15 PROBLEM — I50.22 CHRONIC SYSTOLIC HEART FAILURE: Status: ACTIVE | Noted: 2017-04-24

## 2017-05-15 PROBLEM — D63.8 ANEMIA OF CHRONIC DISEASE: Chronic | Status: ACTIVE | Noted: 2017-04-22

## 2017-05-15 PROBLEM — N18.9 CHRONIC KIDNEY DISEASE-MINERAL AND BONE DISORDER: Status: ACTIVE | Noted: 2017-05-15

## 2017-05-15 PROBLEM — E66.9 OBESITY (BMI 30-39.9): Chronic | Status: ACTIVE | Noted: 2017-04-25

## 2017-05-15 PROBLEM — E83.9 CHRONIC KIDNEY DISEASE-MINERAL AND BONE DISORDER: Status: ACTIVE | Noted: 2017-05-15

## 2017-05-15 PROBLEM — Z86.73 HISTORY OF STROKE: Chronic | Status: ACTIVE | Noted: 2017-04-28

## 2017-05-15 PROBLEM — N18.9 ANEMIA OF CHRONIC RENAL FAILURE: Chronic | Status: ACTIVE | Noted: 2017-04-22

## 2017-05-15 PROBLEM — R22.2 MASS OF RIGHT CHEST WALL: Chronic | Status: ACTIVE | Noted: 2017-04-22

## 2017-05-15 PROBLEM — M62.81 GENERALIZED MUSCLE WEAKNESS: Status: RESOLVED | Noted: 2017-04-22 | Resolved: 2017-05-15

## 2017-05-15 PROBLEM — I50.22 CHRONIC SYSTOLIC HEART FAILURE: Chronic | Status: ACTIVE | Noted: 2017-04-24

## 2017-05-15 PROBLEM — D63.1 ANEMIA OF CHRONIC RENAL FAILURE: Chronic | Status: ACTIVE | Noted: 2017-04-22

## 2017-05-15 PROBLEM — M89.9 CHRONIC KIDNEY DISEASE-MINERAL AND BONE DISORDER: Status: ACTIVE | Noted: 2017-05-15

## 2017-05-15 LAB
ALBUMIN SERPL BCP-MCNC: 2.7 G/DL
ANION GAP SERPL CALC-SCNC: 14 MMOL/L
ANISOCYTOSIS BLD QL SMEAR: SLIGHT
BASOPHILS # BLD AUTO: 0.01 K/UL
BASOPHILS NFR BLD: 0.1 %
BUN SERPL-MCNC: 47 MG/DL
BURR CELLS BLD QL SMEAR: ABNORMAL
C DIFF GDH STL QL: NEGATIVE
C DIFF TOX A+B STL QL IA: NEGATIVE
CALCIUM SERPL-MCNC: 9.3 MG/DL
CHLORIDE SERPL-SCNC: 93 MMOL/L
CO2 SERPL-SCNC: 28 MMOL/L
CREAT SERPL-MCNC: 9.4 MG/DL
DIFFERENTIAL METHOD: ABNORMAL
EOSINOPHIL # BLD AUTO: 0.1 K/UL
EOSINOPHIL NFR BLD: 0.9 %
ERYTHROCYTE [DISTWIDTH] IN BLOOD BY AUTOMATED COUNT: 18.9 %
EST. GFR  (AFRICAN AMERICAN): 7.2 ML/MIN/1.73 M^2
EST. GFR  (NON AFRICAN AMERICAN): 6.2 ML/MIN/1.73 M^2
GLUCOSE SERPL-MCNC: 77 MG/DL
HCT VFR BLD AUTO: 32.5 %
HGB BLD-MCNC: 10.5 G/DL
HYPOCHROMIA BLD QL SMEAR: ABNORMAL
LYMPHOCYTES # BLD AUTO: 1.1 K/UL
LYMPHOCYTES NFR BLD: 12.5 %
MAGNESIUM SERPL-MCNC: 2 MG/DL
MCH RBC QN AUTO: 28.1 PG
MCHC RBC AUTO-ENTMCNC: 32.3 %
MCV RBC AUTO: 87 FL
MONOCYTES # BLD AUTO: 0.7 K/UL
MONOCYTES NFR BLD: 7.8 %
NEUTROPHILS # BLD AUTO: 6.8 K/UL
NEUTROPHILS NFR BLD: 78.7 %
PHOSPHATE SERPL-MCNC: 6.4 MG/DL
PLATELET # BLD AUTO: 99 K/UL
PLATELET BLD QL SMEAR: ABNORMAL
PMV BLD AUTO: ABNORMAL FL
POIKILOCYTOSIS BLD QL SMEAR: SLIGHT
POLYCHROMASIA BLD QL SMEAR: ABNORMAL
POTASSIUM SERPL-SCNC: 4.5 MMOL/L
PROCALCITONIN SERPL IA-MCNC: 109.08 NG/ML
RBC # BLD AUTO: 3.74 M/UL
SODIUM SERPL-SCNC: 135 MMOL/L
VANCOMYCIN SERPL-MCNC: 20.7 UG/ML
WBC # BLD AUTO: 8.61 K/UL

## 2017-05-15 PROCEDURE — 25000003 PHARM REV CODE 250: Performed by: STUDENT IN AN ORGANIZED HEALTH CARE EDUCATION/TRAINING PROGRAM

## 2017-05-15 PROCEDURE — 25000003 PHARM REV CODE 250: Performed by: INTERNAL MEDICINE

## 2017-05-15 PROCEDURE — 80202 ASSAY OF VANCOMYCIN: CPT

## 2017-05-15 PROCEDURE — 96372 THER/PROPH/DIAG INJ SC/IM: CPT

## 2017-05-15 PROCEDURE — 84145 PROCALCITONIN (PCT): CPT

## 2017-05-15 PROCEDURE — 36415 COLL VENOUS BLD VENIPUNCTURE: CPT

## 2017-05-15 PROCEDURE — 80069 RENAL FUNCTION PANEL: CPT

## 2017-05-15 PROCEDURE — 80100016 HC MAINTENANCE HEMODIALYSIS

## 2017-05-15 PROCEDURE — 63600175 PHARM REV CODE 636 W HCPCS: Performed by: INTERNAL MEDICINE

## 2017-05-15 PROCEDURE — 27000207 HC ISOLATION

## 2017-05-15 PROCEDURE — 99223 1ST HOSP IP/OBS HIGH 75: CPT | Mod: ,,, | Performed by: INTERNAL MEDICINE

## 2017-05-15 PROCEDURE — 99223 1ST HOSP IP/OBS HIGH 75: CPT | Mod: ,,, | Performed by: HOSPITALIST

## 2017-05-15 PROCEDURE — 20600001 HC STEP DOWN PRIVATE ROOM

## 2017-05-15 PROCEDURE — 63600175 PHARM REV CODE 636 W HCPCS: Performed by: STUDENT IN AN ORGANIZED HEALTH CARE EDUCATION/TRAINING PROGRAM

## 2017-05-15 PROCEDURE — 83735 ASSAY OF MAGNESIUM: CPT

## 2017-05-15 PROCEDURE — 85025 COMPLETE CBC W/AUTO DIFF WBC: CPT

## 2017-05-15 RX ORDER — HEPARIN SODIUM 5000 [USP'U]/ML
5000 INJECTION, SOLUTION INTRAVENOUS; SUBCUTANEOUS EVERY 8 HOURS
Status: DISCONTINUED | OUTPATIENT
Start: 2017-05-15 | End: 2017-05-17 | Stop reason: HOSPADM

## 2017-05-15 RX ORDER — SODIUM CHLORIDE 9 MG/ML
INJECTION, SOLUTION INTRAVENOUS ONCE
Status: COMPLETED | OUTPATIENT
Start: 2017-05-15 | End: 2017-05-15

## 2017-05-15 RX ORDER — SEVELAMER CARBONATE 800 MG/1
800 TABLET, FILM COATED ORAL
Status: DISCONTINUED | OUTPATIENT
Start: 2017-05-15 | End: 2017-05-16

## 2017-05-15 RX ORDER — BENZONATATE 100 MG/1
100 CAPSULE ORAL 3 TIMES DAILY PRN
Status: DISCONTINUED | OUTPATIENT
Start: 2017-05-15 | End: 2017-05-17 | Stop reason: HOSPADM

## 2017-05-15 RX ORDER — ONDANSETRON 2 MG/ML
4 INJECTION INTRAMUSCULAR; INTRAVENOUS EVERY 8 HOURS PRN
Status: DISCONTINUED | OUTPATIENT
Start: 2017-05-15 | End: 2017-05-17 | Stop reason: HOSPADM

## 2017-05-15 RX ORDER — ACETAMINOPHEN 325 MG/1
650 TABLET ORAL EVERY 6 HOURS PRN
Status: DISCONTINUED | OUTPATIENT
Start: 2017-05-15 | End: 2017-05-17 | Stop reason: HOSPADM

## 2017-05-15 RX ORDER — SODIUM CHLORIDE 9 MG/ML
INJECTION, SOLUTION INTRAVENOUS
Status: DISCONTINUED | OUTPATIENT
Start: 2017-05-15 | End: 2017-05-17

## 2017-05-15 RX ORDER — ONDANSETRON 4 MG/1
4 TABLET, ORALLY DISINTEGRATING ORAL EVERY 8 HOURS PRN
Status: DISCONTINUED | OUTPATIENT
Start: 2017-05-15 | End: 2017-05-17 | Stop reason: HOSPADM

## 2017-05-15 RX ORDER — LOPERAMIDE HYDROCHLORIDE 2 MG/1
2 CAPSULE ORAL 4 TIMES DAILY PRN
Status: DISCONTINUED | OUTPATIENT
Start: 2017-05-15 | End: 2017-05-17 | Stop reason: HOSPADM

## 2017-05-15 RX ADMIN — CEFTRIAXONE 1 G: 1 INJECTION, SOLUTION INTRAVENOUS at 01:05

## 2017-05-15 RX ADMIN — VANCOMYCIN HYDROCHLORIDE 1500 MG: 1 INJECTION, POWDER, LYOPHILIZED, FOR SOLUTION INTRAVENOUS at 04:05

## 2017-05-15 RX ADMIN — HEPARIN SODIUM 5000 UNITS: 5000 INJECTION, SOLUTION INTRAVENOUS; SUBCUTANEOUS at 01:05

## 2017-05-15 RX ADMIN — LOPERAMIDE HYDROCHLORIDE 2 MG: 2 CAPSULE ORAL at 08:05

## 2017-05-15 RX ADMIN — SODIUM CHLORIDE 350 ML: 0.9 INJECTION, SOLUTION INTRAVENOUS at 01:05

## 2017-05-15 RX ADMIN — HEPARIN SODIUM 5000 UNITS: 5000 INJECTION, SOLUTION INTRAVENOUS; SUBCUTANEOUS at 06:05

## 2017-05-15 RX ADMIN — HEPARIN SODIUM 5000 UNITS: 5000 INJECTION, SOLUTION INTRAVENOUS; SUBCUTANEOUS at 10:05

## 2017-05-15 RX ADMIN — ERYTHROPOIETIN 5000 UNITS: 10000 INJECTION, SOLUTION INTRAVENOUS; SUBCUTANEOUS at 01:05

## 2017-05-15 NOTE — H&P
[facility] Hospital Medicine  History & Physical    Patient Name: Livan Arevalo  MRN: [id number]  Admission Date: 5/14/2017  Attending Physician: Victorina Belle MD   Primary Care Provider: Stephon Barrios Jr, MD    [facility] Medicine Team: [facility] 2 D Claude Jones MD     Patient information was obtained from patient, past medical records and ER records.     Subjective:     Principal Problem:Sepsis    Chief Complaint:   Chief Complaint   Patient presents with    Fatigue     Patient reports chills yesterday, without known fever.  States he is a dialysis patient with dialysis last on Friday.  Aching all over.        HPI: Mr. Arevalo is a 42/M with PMH HTN, ESRD on HD (Mon/Wed/Fri), HFrEF (2D Echo 04/23/17 EF 30%, mod MR, mod TR) who presented with two days of weakness, chills with rigors, and cough. He states symptoms began on 05/12 as he underwent dialysis, beginning with chills with rigors. He had significant malaise that evening. He states that symptoms worsened on 05/13, with severe chills and rigors while at a [place] outing in the park. He reports some nonproductive coughing with post-tussive emesis. He reports having several episodes of loose stool as well beginning on 05/13. Given persistence of symptoms, he presented to the ED 05/14 for further evaluation.    He attests to lightheadedness, chills, rigors, fatigue, cough and post-tussive emesis, as well as diarrhea. Denies history of C difficile, CP, palpitations, SOB; chart review reveals serratia on blood culture from 2012, with subsequent blood cultures negative.      Past Medical History:   Diagnosis Date    ESRD (end stage renal disease)     Hypertension     Renal disorder        Past Surgical History:   Procedure Laterality Date    DIALYSIS FISTULA CREATION         Review of patient's allergies indicates:   Allergen Reactions    Ciprofloxacin Hives    Iodine and iodide containing products Hives and Itching       No current  facility-administered medications on file prior to encounter.      Current Outpatient Prescriptions on File Prior to Encounter   Medication Sig    aspirin 325 MG tablet Take 1 tablet (325 mg total) by mouth once daily.    carvedilol (COREG) 3.125 MG tablet TK 1 T PO BID    lisinopril (PRINIVIL,ZESTRIL) 40 MG tablet Take 1 tablet (40 mg total) by mouth once daily.    oxycodone-acetaminophen (PERCOCET) 5-325 mg per tablet Take 1 tablet by mouth every 4 (four) hours as needed for Pain.    promethazine (PHENERGAN) 25 MG tablet Take 1 tablet (25 mg total) by mouth every 4 (four) hours as needed for Nausea.     Family History     Problem Relation (Age of Onset)    Kidney disease Mother        Social History Main Topics    Smoking status: Never Smoker    Smokeless tobacco: Not on file    Alcohol use No    Drug use: No    Sexual activity: Not on file     Review of Systems   Constitutional: Positive for chills and fever.   HENT: Negative for sore throat and trouble swallowing.    Eyes: Negative for pain and visual disturbance.   Respiratory: Positive for cough. Negative for shortness of breath.    Cardiovascular: Negative for chest pain and palpitations.   Gastrointestinal: Positive for diarrhea and vomiting (post-tussive). Negative for abdominal pain and nausea.     Objective:     Vital Signs (Most Recent):  Temp: 97.6 °F (36.4 °C) (05/14/17 2318)  Pulse: 107 (05/14/17 2318)  Resp: 20 (05/14/17 2318)  BP: 110/63 (05/14/17 2318)  SpO2: 100 % (05/14/17 2318) Vital Signs (24h Range):  Temp:  [97.6 °F (36.4 °C)-98.7 °F (37.1 °C)] 97.6 °F (36.4 °C)  Pulse:  [105-111] 107  Resp:  [16-24] 20  SpO2:  [91 %-100 %] 100 %  BP: ()/(50-76) 110/63     Weight: 108.9 kg (240 lb)  Body mass index is 41.2 kg/(m^2).    Physical Exam   Constitutional: He is oriented to person, place, and time. He appears well-developed and well-nourished. No distress.   HENT:   Head: Normocephalic and atraumatic.   Nose: Nose normal.    Mouth/Throat: Oropharynx is clear and moist.   Eyes: Conjunctivae are normal. Pupils are equal, round, and reactive to light.   Neck: Normal range of motion. Neck supple.   Cardiovascular: Normal rate, regular rhythm, normal heart sounds and intact distal pulses.    Pulmonary/Chest: Effort normal and breath sounds normal.   Abdominal: Soft. Bowel sounds are normal. He exhibits no distension. There is no tenderness.   Musculoskeletal: Normal range of motion. He exhibits edema (trace BLE).   Palpable thrill LUE fistula; no erythema, induration or tenderness   Neurological: He is alert and oriented to person, place, and time.   Skin: Skin is warm and dry. No rash noted.   Vitals reviewed.    Significant Labs:   Recent Results (from the past 24 hour(s))   CBC auto differential    Collection Time: 05/14/17  2:23 PM   Result Value Ref Range    WBC 11.68 3.90 - 12.70 K/uL    RBC 3.77 (L) 4.60 - 6.20 M/uL    Hemoglobin 10.7 (L) 14.0 - 18.0 g/dL    Hematocrit 32.3 (L) 40.0 - 54.0 %    MCV 86 82 - 98 fL    MCH 28.4 27.0 - 31.0 pg    MCHC 33.1 32.0 - 36.0 %    RDW 19.0 (H) 11.5 - 14.5 %    Platelets 101 (L) 150 - 350 K/uL    MPV SEE COMMENT 9.2 - 12.9 fL    Gran # 9.4 (H) 1.8 - 7.7 K/uL    Lymph # 1.4 1.0 - 4.8 K/uL    Mono # 0.8 0.3 - 1.0 K/uL    Eos # 0.0 0.0 - 0.5 K/uL    Baso # 0.02 0.00 - 0.20 K/uL    Gran% 81.0 (H) 38.0 - 73.0 %    Lymph% 11.6 (L) 18.0 - 48.0 %    Mono% 7.1 4.0 - 15.0 %    Eosinophil% 0.1 0.0 - 8.0 %    Basophil% 0.2 0.0 - 1.9 %    Platelet Estimate Decreased (A)     Aniso Slight     Poik Slight     Poly Occasional     Hypo Occasional     Ovalocytes Occasional     Target Cells Occasional     Differential Method Automated    Comprehensive metabolic panel    Collection Time: 05/14/17  2:23 PM   Result Value Ref Range    Sodium 136 136 - 145 mmol/L    Potassium 4.9 3.5 - 5.1 mmol/L    Chloride 93 (L) 95 - 110 mmol/L    CO2 26 23 - 29 mmol/L    Glucose 83 70 - 110 mg/dL    BUN, Bld 38 (H) 6 - 20 mg/dL     Creatinine 8.8 (H) 0.5 - 1.4 mg/dL    Calcium 9.1 8.7 - 10.5 mg/dL    Total Protein 8.1 6.0 - 8.4 g/dL    Albumin 2.7 (L) 3.5 - 5.2 g/dL    Total Bilirubin 5.3 (H) 0.1 - 1.0 mg/dL    Alkaline Phosphatase 343 (H) 55 - 135 U/L    AST 61 (H) 10 - 40 U/L    ALT 38 10 - 44 U/L    Anion Gap 17 (H) 8 - 16 mmol/L    eGFR if African American 7.8 (A) >60 mL/min/1.73 m^2    eGFR if non  6.8 (A) >60 mL/min/1.73 m^2   Magnesium    Collection Time: 05/14/17  2:23 PM   Result Value Ref Range    Magnesium 1.8 1.6 - 2.6 mg/dL   Lactic acid, plasma    Collection Time: 05/14/17  3:42 PM   Result Value Ref Range    Lactate (Lactic Acid) 2.3 (H) 0.5 - 2.2 mmol/L   APTT    Collection Time: 05/14/17  3:42 PM   Result Value Ref Range    aPTT 29.4 21.0 - 32.0 sec   Protime-INR    Collection Time: 05/14/17  3:42 PM   Result Value Ref Range    Prothrombin Time 15.2 (H) 9.0 - 12.5 sec    INR 1.5 (H) 0.8 - 1.2   Influenza antigen Nasal Swab    Collection Time: 05/14/17  3:43 PM   Result Value Ref Range    Influenza A Ag, EIA Negative Negative    Influenza B Ag, EIA Negative Negative    Flu A & B Source Nasal Swab    Procalcitonin    Collection Time: 05/14/17  3:43 PM   Result Value Ref Range    Procalcitonin 109.22 (H) <0.25 ng/mL   ISTAT PROCEDURE    Collection Time: 05/14/17  4:11 PM   Result Value Ref Range    POC PH 7.512 (H) 7.35 - 7.45    POC PCO2 38.1 35 - 45 mmHg    POC PO2 64 (L) 80 - 100 mmHg    POC HCO3 30.5 (H) 24 - 28 mmol/L    POC BE 8 -2 to 2 mmol/L    POC SATURATED O2 94 (L) 95 - 100 %    POC TCO2 32 (H) 23 - 27 mmol/L    Sample ARTERIAL     Site RR     Allens Test Pass     DelSys Room Air     Mode SPONT     Flow 0    Lactic acid, plasma #2    Collection Time: 05/14/17  6:33 PM   Result Value Ref Range    Lactate (Lactic Acid) 0.5 0.5 - 2.2 mmol/L     Significant Imaging:   CXR 05/14/17:  No acute abnormality. Unchanged cardiomegaly and right apical calcified mass.    Assessment/Plan:     * Sepsis  - Sepsis with  concern for blood source given frequent access for HD; lower suspicion for GI, respiratory infections.  - SIRS 2/4 in ED (tachycardia, tachypnea) with SOFA 8 prior to fluid resuscitation. LA 2.3, improved to 0.5 after fluids.  - Received vancomycin, cefepime in ED; will continue empiric coverage with ceftriaxone 1g IV q24hr, intermittently dosed vancomycin given ESRD on HD. Vancomycin level in AM to further assess.  - Blood cultures pending. CXR overall unremarkable for infectious process.    Essential hypertension  - Holding antihypertensives in the setting of sepsis. Will resume as appropriate.    ESRD (end stage renal disease)  - ESRD on HD Mon/Wed/Fri; will consult nephrology for routine HD.    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    Anemia of chronic renal failure  - Anemia, thrombocytopenia associated with chronic disease (ESRD).  - Will continue to monitor.    Chronic systolic heart failure  - Recent 2D Echo with EF 30%; pharmacologic stress test with no induced ischemia.  - Given received fluid resuscitation in ED will monitor for signs of volume overload and likely undergo HD tomorrow.  - Will continue to monitor.    VTE Risk Mitigation         Ordered     heparin (porcine) injection 5,000 Units  Every 8 hours     Route:  Subcutaneous        05/15/17 0232     Place BEN hose  Until discontinued      05/14/17 2144     Place sequential compression device  Until discontinued      05/14/17 2144     Medium Risk of VTE  Once      05/14/17 2144        Staff attestation to follow.    MARBIN Flood MD   PGY-2   [phone]

## 2017-05-15 NOTE — ASSESSMENT & PLAN NOTE
- Already on renal diet   - Corrected calcium 10.3   - Start Sevelamer 800 mg PO with meals   - Will order PTH and vitamin D levels

## 2017-05-15 NOTE — ASSESSMENT & PLAN NOTE
- Target Hgb 10- 11.5   - Currently at goal (10.5)    - Will start epoetin 5,000 units on HD days

## 2017-05-15 NOTE — CONSULTS
[facility]  Nephrology  Consult Note    Patient Name: Livan Arevalo  MRN: [id number]  Admission Date: 5/14/2017  Hospital Length of Stay: 1 days  Attending Provider: Victorina Belle MD   Primary Care Physician: Stephon Barrios Jr, MD  Principal Problem:Sepsis    Inpatient consult to Nephrology  Consult performed by: RADHA HORNE  Consult ordered by: MARBIN ESCOBAR  Assessment/Recommendations: I have reviewed and concur with the fellow's history, physical, assessment, and plan. I have personally interviewed and examined the patient at bedside.        Subjective:     HPI: A 42/M with PMH HTN, ESRD on HD (Mon/Wed/Fri), HFrEF (2D Echo 04/23/17 EF 30%, mod MR, mod TR) who presented with two days of weakness, chills with rigors, and cough. He states symptoms began on 05/12 as he underwent dialysis, beginning with chills with rigors. Patient was started on IV fluids and antibiotics on admission with slight improvement of symptoms. We are consulted for backup HD. Patient goes to [facility] on MWF under the care of Dr. Hernandez. His dry weight is 107 kg. Treatment duration 3 hours 45 minutes.     Past Medical History:   Diagnosis Date    ESRD (end stage renal disease)     Hypertension     Renal disorder        Past Surgical History:   Procedure Laterality Date    DIALYSIS FISTULA CREATION         Review of patient's allergies indicates:   Allergen Reactions    Ciprofloxacin Hives    Iodine and iodide containing products Hives and Itching     Current Facility-Administered Medications   Medication Frequency    0.9%  NaCl infusion PRN    0.9%  NaCl infusion Once    acetaminophen tablet 650 mg Q6H PRN    benzonatate capsule 100 mg TID PRN    cefTRIAXone (ROCEPHIN) 1 g in dextrose 5 % 50 mL IVPB Q24H    heparin (porcine) injection 5,000 Units Q8H    ondansetron disintegrating tablet 4 mg Q8H PRN    ondansetron injection 4 mg Q8H PRN     Family History     Problem Relation (Age of  Onset)    Kidney disease Mother        Social History Main Topics    Smoking status: Never Smoker    Smokeless tobacco: Not on file    Alcohol use No    Drug use: No    Sexual activity: Not on file     Review of Systems   Constitutional: Positive for chills and fever.   HENT: Negative for sore throat and trouble swallowing.    Eyes: Negative for pain and visual disturbance.   Respiratory: Positive for cough. Negative for shortness of breath.    Cardiovascular: Positive for leg swelling. Negative for chest pain and palpitations.   Gastrointestinal: Positive for diarrhea and vomiting. Negative for abdominal pain and nausea.   Genitourinary:        Patient anuric    Musculoskeletal: Positive for arthralgias. Negative for joint swelling and neck stiffness.   Skin: Negative for color change and rash.   Neurological: Positive for weakness and headaches. Negative for dizziness and seizures.     Objective:     Vital Signs (Most Recent):  Temp: 98.1 °F (36.7 °C) (05/15/17 0801)  Pulse: 108 (05/15/17 0801)  Resp: 18 (05/15/17 0801)  BP: 116/69 (05/15/17 0801)  SpO2: 95 % (05/15/17 0801)  O2 Device (Oxygen Therapy): room air (05/15/17 0801) Vital Signs (24h Range):  Temp:  [97.6 °F (36.4 °C)-98.7 °F (37.1 °C)] 98.1 °F (36.7 °C)  Pulse:  [105-111] 108  Resp:  [16-24] 18  SpO2:  [91 %-100 %] 95 %  BP: ()/(50-76) 116/69     Weight: 108.9 kg (240 lb) (05/14/17 2318)  Body mass index is 41.2 kg/(m^2).  Body surface area is 2.22 meters squared.    I/O last 3 completed shifts:  In: 240 [P.O.:240]  Out: -     Physical Exam   Constitutional: He is oriented to person, place, and time. He appears well-developed and well-nourished. No distress.   HENT:   Head: Normocephalic and atraumatic.   Nose: Nose normal.   Mouth/Throat: Oropharynx is clear and moist.   Eyes: Conjunctivae are normal. Pupils are equal, round, and reactive to light.   Neck: Normal range of motion. Neck supple.   Cardiovascular: Normal rate, regular rhythm,  normal heart sounds and intact distal pulses.    Pulmonary/Chest: Effort normal and breath sounds normal.   Abdominal: Soft. Bowel sounds are normal. He exhibits no distension. There is no tenderness.   Musculoskeletal: Normal range of motion. He exhibits edema (trace BLE).   Palpable thrill LUE fistula; no erythema, induration or tenderness   Neurological: He is alert and oriented to person, place, and time.   Skin: Skin is warm and dry. No rash noted.   Vitals reviewed.         Significant Labs:  CBC:   Recent Labs  Lab 05/15/17  0602   WBC 8.61   RBC 3.74*   HGB 10.5*   HCT 32.5*   PLT 99*   MCV 87   MCH 28.1   MCHC 32.3     CMP:   Recent Labs  Lab 05/14/17  1423 05/15/17  0602   GLU 83 77   CALCIUM 9.1 9.3   ALBUMIN 2.7* 2.7*   PROT 8.1  --     135*   K 4.9 4.5   CO2 26 28   CL 93* 93*   BUN 38* 47*   CREATININE 8.8* 9.4*   ALKPHOS 343*  --    ALT 38  --    AST 61*  --    BILITOT 5.3*  --        Significant Imaging:  Labs: Reviewed  X-Ray: Reviewed    Assessment/Plan:     ESRD (end stage renal disease)  - Will provide dialysis for metabolic clearance and volume   - Seen and examined today during hemodialysis; tolerating treatment well without issues. Denied headaches, chest pain, abdominal pain, or muscle cramps   - Target ultrafiltration up to 1L as tolerated by patient   - Dialysate adjusted to current labs                 Anemia of chronic renal failure  - Target Hgb 10- 11.5   - Currently at goal (10.5)    - Will start epoetin 5,000 units on HD days         Chronic kidney disease-mineral and bone disorder  - Already on renal diet   - Corrected calcium 10.3   - Start Sevelamer 800 mg PO with meals   - Will order PTH and vitamin D levels          Fredo Osman MD  Nephrology  [facility]

## 2017-05-15 NOTE — ASSESSMENT & PLAN NOTE
- Sepsis with concern for blood source given frequent access for HD; lower suspicion for GI, respiratory infections.  - SIRS 2/4 in ED (tachycardia, tachypnea) with SOFA 8 prior to fluid resuscitation. LA 2.3, improved to 0.5 after fluids.  - Received vancomycin, cefepime in ED; will continue empiric coverage with ceftriaxone 1g IV q24hr, intermittently dosed vancomycin given ESRD on HD. Vancomycin level in AM to further assess.  - Blood cultures pending. CXR overall unremarkable for infectious process.

## 2017-05-15 NOTE — PLAN OF CARE
Problem: Patient Care Overview  Goal: Plan of Care Review  Outcome: Ongoing (interventions implemented as appropriate)  3.45 hr hd completed, net fluid [id number], target goal achieved, pt tolerated well. Report given to Manoj ACHARYA.

## 2017-05-15 NOTE — SUBJECTIVE & OBJECTIVE
Past Medical History:   Diagnosis Date    ESRD (end stage renal disease)     Hypertension     Renal disorder        Past Surgical History:   Procedure Laterality Date    DIALYSIS FISTULA CREATION         Review of patient's allergies indicates:   Allergen Reactions    Ciprofloxacin Hives    Iodine and iodide containing products Hives and Itching       No current facility-administered medications on file prior to encounter.      Current Outpatient Prescriptions on File Prior to Encounter   Medication Sig    aspirin 325 MG tablet Take 1 tablet (325 mg total) by mouth once daily.    carvedilol (COREG) 3.125 MG tablet TK 1 T PO BID    lisinopril (PRINIVIL,ZESTRIL) 40 MG tablet Take 1 tablet (40 mg total) by mouth once daily.    oxycodone-acetaminophen (PERCOCET) 5-325 mg per tablet Take 1 tablet by mouth every 4 (four) hours as needed for Pain.    promethazine (PHENERGAN) 25 MG tablet Take 1 tablet (25 mg total) by mouth every 4 (four) hours as needed for Nausea.     Family History     Problem Relation (Age of Onset)    Kidney disease Mother        Social History Main Topics    Smoking status: Never Smoker    Smokeless tobacco: Not on file    Alcohol use No    Drug use: No    Sexual activity: Not on file     Review of Systems   Constitutional: Positive for chills and fever.   HENT: Negative for sore throat and trouble swallowing.    Eyes: Negative for pain and visual disturbance.   Respiratory: Positive for cough. Negative for shortness of breath.    Cardiovascular: Negative for chest pain and palpitations.   Gastrointestinal: Positive for diarrhea and vomiting (post-tussive). Negative for abdominal pain and nausea.     Objective:     Vital Signs (Most Recent):  Temp: 97.6 °F (36.4 °C) (05/14/17 2318)  Pulse: 107 (05/14/17 2318)  Resp: 20 (05/14/17 2318)  BP: 110/63 (05/14/17 2318)  SpO2: 100 % (05/14/17 2318) Vital Signs (24h Range):  Temp:  [97.6 °F (36.4 °C)-98.7 °F (37.1 °C)] 97.6 °F (36.4  °C)  Pulse:  [105-111] 107  Resp:  [16-24] 20  SpO2:  [91 %-100 %] 100 %  BP: ()/(50-76) 110/63     Weight: 108.9 kg (240 lb)  Body mass index is 41.2 kg/(m^2).    Physical Exam   Constitutional: He is oriented to person, place, and time. He appears well-developed and well-nourished. No distress.   HENT:   Head: Normocephalic and atraumatic.   Nose: Nose normal.   Mouth/Throat: Oropharynx is clear and moist.   Eyes: Conjunctivae are normal. Pupils are equal, round, and reactive to light.   Neck: Normal range of motion. Neck supple.   Cardiovascular: Normal rate, regular rhythm, normal heart sounds and intact distal pulses.    Pulmonary/Chest: Effort normal and breath sounds normal.   Abdominal: Soft. Bowel sounds are normal. He exhibits no distension. There is no tenderness.   Musculoskeletal: Normal range of motion. He exhibits edema (trace BLE).   Palpable thrill LUE fistula; no erythema, induration or tenderness   Neurological: He is alert and oriented to person, place, and time.   Skin: Skin is warm and dry. No rash noted.   Vitals reviewed.    Significant Labs:   Recent Results (from the past 24 hour(s))   CBC auto differential    Collection Time: 05/14/17  2:23 PM   Result Value Ref Range    WBC 11.68 3.90 - 12.70 K/uL    RBC 3.77 (L) 4.60 - 6.20 M/uL    Hemoglobin 10.7 (L) 14.0 - 18.0 g/dL    Hematocrit 32.3 (L) 40.0 - 54.0 %    MCV 86 82 - 98 fL    MCH 28.4 27.0 - 31.0 pg    MCHC 33.1 32.0 - 36.0 %    RDW 19.0 (H) 11.5 - 14.5 %    Platelets 101 (L) 150 - 350 K/uL    MPV SEE COMMENT 9.2 - 12.9 fL    Gran # 9.4 (H) 1.8 - 7.7 K/uL    Lymph # 1.4 1.0 - 4.8 K/uL    Mono # 0.8 0.3 - 1.0 K/uL    Eos # 0.0 0.0 - 0.5 K/uL    Baso # 0.02 0.00 - 0.20 K/uL    Gran% 81.0 (H) 38.0 - 73.0 %    Lymph% 11.6 (L) 18.0 - 48.0 %    Mono% 7.1 4.0 - 15.0 %    Eosinophil% 0.1 0.0 - 8.0 %    Basophil% 0.2 0.0 - 1.9 %    Platelet Estimate Decreased (A)     Aniso Slight     Poik Slight     Poly Occasional     Hypo Occasional      Ovalocytes Occasional     Target Cells Occasional     Differential Method Automated    Comprehensive metabolic panel    Collection Time: 05/14/17  2:23 PM   Result Value Ref Range    Sodium 136 136 - 145 mmol/L    Potassium 4.9 3.5 - 5.1 mmol/L    Chloride 93 (L) 95 - 110 mmol/L    CO2 26 23 - 29 mmol/L    Glucose 83 70 - 110 mg/dL    BUN, Bld 38 (H) 6 - 20 mg/dL    Creatinine 8.8 (H) 0.5 - 1.4 mg/dL    Calcium 9.1 8.7 - 10.5 mg/dL    Total Protein 8.1 6.0 - 8.4 g/dL    Albumin 2.7 (L) 3.5 - 5.2 g/dL    Total Bilirubin 5.3 (H) 0.1 - 1.0 mg/dL    Alkaline Phosphatase 343 (H) 55 - 135 U/L    AST 61 (H) 10 - 40 U/L    ALT 38 10 - 44 U/L    Anion Gap 17 (H) 8 - 16 mmol/L    eGFR if African American 7.8 (A) >60 mL/min/1.73 m^2    eGFR if non  6.8 (A) >60 mL/min/1.73 m^2   Magnesium    Collection Time: 05/14/17  2:23 PM   Result Value Ref Range    Magnesium 1.8 1.6 - 2.6 mg/dL   Lactic acid, plasma    Collection Time: 05/14/17  3:42 PM   Result Value Ref Range    Lactate (Lactic Acid) 2.3 (H) 0.5 - 2.2 mmol/L   APTT    Collection Time: 05/14/17  3:42 PM   Result Value Ref Range    aPTT 29.4 21.0 - 32.0 sec   Protime-INR    Collection Time: 05/14/17  3:42 PM   Result Value Ref Range    Prothrombin Time 15.2 (H) 9.0 - 12.5 sec    INR 1.5 (H) 0.8 - 1.2   Influenza antigen Nasal Swab    Collection Time: 05/14/17  3:43 PM   Result Value Ref Range    Influenza A Ag, EIA Negative Negative    Influenza B Ag, EIA Negative Negative    Flu A & B Source Nasal Swab    Procalcitonin    Collection Time: 05/14/17  3:43 PM   Result Value Ref Range    Procalcitonin 109.22 (H) <0.25 ng/mL   ISTAT PROCEDURE    Collection Time: 05/14/17  4:11 PM   Result Value Ref Range    POC PH 7.512 (H) 7.35 - 7.45    POC PCO2 38.1 35 - 45 mmHg    POC PO2 64 (L) 80 - 100 mmHg    POC HCO3 30.5 (H) 24 - 28 mmol/L    POC BE 8 -2 to 2 mmol/L    POC SATURATED O2 94 (L) 95 - 100 %    POC TCO2 32 (H) 23 - 27 mmol/L    Sample ARTERIAL     Site RR      Allens Test Pass     DelSys Room Air     Mode SPONT     Flow 0    Lactic acid, plasma #2    Collection Time: 05/14/17  6:33 PM   Result Value Ref Range    Lactate (Lactic Acid) 0.5 0.5 - 2.2 mmol/L     Significant Imaging:   CXR 05/14/17:  No acute abnormality. Unchanged cardiomegaly and right apical calcified mass.

## 2017-05-15 NOTE — PLAN OF CARE
Problem: Patient Care Overview  Goal: Plan of Care Review  Outcome: Ongoing (interventions implemented as appropriate)  Side rails up x2; call bell in place; bed in lowest, locked position; skid proof socks on; no evidence of skin breakdown; care plan explained to patient; pt remains free of injury. Pt tolerated po, stated he has had several semi formed BM and is requesting Imodium MD aware. VSS and afebrile, dialysis completed without incident. U/S of abdomen pending. Pt completed rocephin and vancomycin infusion.

## 2017-05-15 NOTE — ASSESSMENT & PLAN NOTE
- Will provide dialysis for metabolic clearance and volume   - Seen and examined today during hemodialysis; tolerating treatment well without issues. Denied headaches, chest pain, abdominal pain, or muscle cramps   - Target ultrafiltration up to 1L as tolerated by patient   - Dialysate adjusted to current labs

## 2017-05-15 NOTE — PROGRESS NOTES
3 HR maintenance HD treatment initiated via Lt Upper Arm AVF, poor venous flow and some clotting at the tip of needle noted, another site selected, good flows obtained.

## 2017-05-15 NOTE — ASSESSMENT & PLAN NOTE
- Recent 2D Echo with EF 30%; pharmacologic stress test with no induced ischemia.  - Given received fluid resuscitation in ED will monitor for signs of volume overload and likely undergo HD tomorrow.  - Will continue to monitor.

## 2017-05-15 NOTE — PLAN OF CARE
"Jenn Sandoval MD   [address] [facility] [address]       [id number] - [place], [place] - [address] AT [facility]  [address]  [facility] [address]  Phone: [phone] Fax: [phone]    [facility] [address] [facility] [address] [facility]  [address] [facility] [address]  Phone: [phone] Fax: [phone]      Payor: MEDICARE / Plan: MEDICARE PART A & B / Product Type: Government /      The patient / family member was given the Patient Information and Education packet, the "My Health Packet".         05/15/17 1450   Discharge Assessment   Assessment Type Discharge Planning Assessment   Confirmed/corrected address and phone number on facesheet? Yes   Assessment information obtained from? Patient   Expected Length of Stay (days) 3   Communicated expected length of stay with patient/caregiver yes   Prior to hospitilization cognitive status: Alert/Oriented   Prior to hospitalization functional status: Assistive Equipment   Current cognitive status: Alert/Oriented   Current Functional Status: Assistive Equipment   Arrived From home or self-care   Lives With alone   Able to Return to Prior Arrangements yes   Is patient able to care for self after discharge? Yes   Who are your caregiver(s) and their phone number(s)? Claude Blanchard (S.O.) [phone]   Patient's perception of discharge disposition home or selfcare   Readmission Within The Last 30 Days current reason for admission unrelated to previous admission   Patient currently being followed by outpatient case management? No   Patient currently receives home health services? No   Does the patient currently use HME? Yes   Patient currently receives private duty nursing? No   Patient currently receives any other outside agency services? No   Equipment Currently Used at Home walker, rolling;shower chair   Do you have any problems affording any of your " prescribed medications? No   Is the patient taking medications as prescribed? yes   Do you have any financial concerns preventing you from receiving the healthcare you need? No   Does the patient have transportation to healthcare appointments? Yes   Transportation Available car   On Dialysis? Yes   If yes, what is the name of the dialysis unit? [facility]   Does the patient receive outpatient dialysis? Yes   Does the patient receive services at the Coumadin Clinic? No   Discharge Plan A Home   Discharge Plan B Home Health   Patient/Family In Agreement With Plan yes

## 2017-05-15 NOTE — PLAN OF CARE
05/15/17 1453   Readmission Questionnaire   At the time of your discharge, did someone talk to you about what your health problems were? Yes   At the time of discharge, did someone talk to you about what to watch out for regarding worsening of your health problem? Yes   At the time of discharge, did someone talk to you about what to do if you experienced worsening of your health problem? Yes   At the time of discharge, did someone talk to you about which medication to take when you left the hospital and which ones to stop taking? Yes   At the time of discharge, did someone talk to you about when and where to follow up with a doctor after you left the hospital? Yes   What do you believe caused you to be sick enough to be re-admitted? Fever   Do you have problems taking your medications as prescribed? No   Do you have any problems affording any of  your prescribed medications? No   Do you have problems obtaining/receiving your medications? No   Does the patient have transportation to healthcare appointments? Yes   Lives With alone   Living Arrangements house   Does the patient have family/friends to help with healtcare needs after discharge? yes

## 2017-05-15 NOTE — PLAN OF CARE
Problem: Patient Care Overview  Goal: Plan of Care Review  Outcome: Ongoing (interventions implemented as appropriate)  Afebrile. Free from falls or injury. No complaints of pain. C-diff sample taken.  Bed locked in lowest position, non skid socks on, call light within reach. Pt instructed to call if any assistance is needed. Vitals stable. Will cont to angle pt.

## 2017-05-15 NOTE — PROGRESS NOTES
Pt arrived with transport and was placed in room 806 without any complications. Pt is AAOx 4. Oriented to room, nurse and call light. Assessment performed. No skin breakdown noted. Vitals stable. IV of the right hand removed because pt stated it was hurting.  All questions answered at this time. Will cont to angle pt.

## 2017-05-15 NOTE — SUBJECTIVE & OBJECTIVE
Past Medical History:   Diagnosis Date    ESRD (end stage renal disease)     Hypertension     Renal disorder        Past Surgical History:   Procedure Laterality Date    DIALYSIS FISTULA CREATION         Review of patient's allergies indicates:   Allergen Reactions    Ciprofloxacin Hives    Iodine and iodide containing products Hives and Itching     Current Facility-Administered Medications   Medication Frequency    0.9%  NaCl infusion PRN    0.9%  NaCl infusion Once    acetaminophen tablet 650 mg Q6H PRN    benzonatate capsule 100 mg TID PRN    cefTRIAXone (ROCEPHIN) 1 g in dextrose 5 % 50 mL IVPB Q24H    heparin (porcine) injection 5,000 Units Q8H    ondansetron disintegrating tablet 4 mg Q8H PRN    ondansetron injection 4 mg Q8H PRN     Family History     Problem Relation (Age of Onset)    Kidney disease Mother        Social History Main Topics    Smoking status: Never Smoker    Smokeless tobacco: Not on file    Alcohol use No    Drug use: No    Sexual activity: Not on file     Review of Systems   Constitutional: Positive for chills and fever.   HENT: Negative for sore throat and trouble swallowing.    Eyes: Negative for pain and visual disturbance.   Respiratory: Positive for cough. Negative for shortness of breath.    Cardiovascular: Positive for leg swelling. Negative for chest pain and palpitations.   Gastrointestinal: Positive for diarrhea and vomiting. Negative for abdominal pain and nausea.   Genitourinary:        Patient anuric    Musculoskeletal: Positive for arthralgias. Negative for joint swelling and neck stiffness.   Skin: Negative for color change and rash.   Neurological: Positive for weakness and headaches. Negative for dizziness and seizures.     Objective:     Vital Signs (Most Recent):  Temp: 98.1 °F (36.7 °C) (05/15/17 0801)  Pulse: 108 (05/15/17 0801)  Resp: 18 (05/15/17 0801)  BP: 116/69 (05/15/17 0801)  SpO2: 95 % (05/15/17 0801)  O2 Device (Oxygen Therapy): room air  (05/15/17 0801) Vital Signs (24h Range):  Temp:  [97.6 °F (36.4 °C)-98.7 °F (37.1 °C)] 98.1 °F (36.7 °C)  Pulse:  [105-111] 108  Resp:  [16-24] 18  SpO2:  [91 %-100 %] 95 %  BP: ()/(50-76) 116/69     Weight: 108.9 kg (240 lb) (05/14/17 2318)  Body mass index is 41.2 kg/(m^2).  Body surface area is 2.22 meters squared.    I/O last 3 completed shifts:  In: 240 [P.O.:240]  Out: -     Physical Exam   Constitutional: He is oriented to person, place, and time. He appears well-developed and well-nourished. No distress.   HENT:   Head: Normocephalic and atraumatic.   Nose: Nose normal.   Mouth/Throat: Oropharynx is clear and moist.   Eyes: Conjunctivae are normal. Pupils are equal, round, and reactive to light.   Neck: Normal range of motion. Neck supple.   Cardiovascular: Normal rate, regular rhythm, normal heart sounds and intact distal pulses.    Pulmonary/Chest: Effort normal and breath sounds normal.   Abdominal: Soft. Bowel sounds are normal. He exhibits no distension. There is no tenderness.   Musculoskeletal: Normal range of motion. He exhibits edema (trace BLE).   Palpable thrill LUE fistula; no erythema, induration or tenderness   Neurological: He is alert and oriented to person, place, and time.   Skin: Skin is warm and dry. No rash noted.   Vitals reviewed.         Significant Labs:  CBC:   Recent Labs  Lab 05/15/17  0602   WBC 8.61   RBC 3.74*   HGB 10.5*   HCT 32.5*   PLT 99*   MCV 87   MCH 28.1   MCHC 32.3     CMP:   Recent Labs  Lab 05/14/17  1423 05/15/17  0602   GLU 83 77   CALCIUM 9.1 9.3   ALBUMIN 2.7* 2.7*   PROT 8.1  --     135*   K 4.9 4.5   CO2 26 28   CL 93* 93*   BUN 38* 47*   CREATININE 8.8* 9.4*   ALKPHOS 343*  --    ALT 38  --    AST 61*  --    BILITOT 5.3*  --        Significant Imaging:  Labs: Reviewed  X-Ray: Reviewed

## 2017-05-16 LAB
ALBUMIN SERPL BCP-MCNC: 2.7 G/DL
ALP SERPL-CCNC: 293 U/L
ALT SERPL W/O P-5'-P-CCNC: 24 U/L
ANION GAP SERPL CALC-SCNC: 10 MMOL/L
AST SERPL-CCNC: 28 U/L
BASOPHILS # BLD AUTO: 0.01 K/UL
BASOPHILS NFR BLD: 0.2 %
BILIRUB SERPL-MCNC: 2.5 MG/DL
BUN SERPL-MCNC: 28 MG/DL
CALCIUM SERPL-MCNC: 9.3 MG/DL
CHLORIDE SERPL-SCNC: 100 MMOL/L
CO2 SERPL-SCNC: 23 MMOL/L
CREAT SERPL-MCNC: 7.2 MG/DL
DIFFERENTIAL METHOD: ABNORMAL
EOSINOPHIL # BLD AUTO: 0.2 K/UL
EOSINOPHIL NFR BLD: 2.8 %
ERYTHROCYTE [DISTWIDTH] IN BLOOD BY AUTOMATED COUNT: 18.9 %
EST. GFR  (AFRICAN AMERICAN): 10 ML/MIN/1.73 M^2
EST. GFR  (NON AFRICAN AMERICAN): 8.6 ML/MIN/1.73 M^2
GLUCOSE SERPL-MCNC: 113 MG/DL
HCT VFR BLD AUTO: 30.5 %
HGB BLD-MCNC: 9.6 G/DL
LYMPHOCYTES # BLD AUTO: 1.3 K/UL
LYMPHOCYTES NFR BLD: 20.5 %
MAGNESIUM SERPL-MCNC: 2.2 MG/DL
MCH RBC QN AUTO: 27.7 PG
MCHC RBC AUTO-ENTMCNC: 31.5 %
MCV RBC AUTO: 88 FL
MONOCYTES # BLD AUTO: 0.6 K/UL
MONOCYTES NFR BLD: 10 %
NEUTROPHILS # BLD AUTO: 4.2 K/UL
NEUTROPHILS NFR BLD: 66.5 %
PLATELET # BLD AUTO: 128 K/UL
PMV BLD AUTO: ABNORMAL FL
POTASSIUM SERPL-SCNC: 4 MMOL/L
PROT SERPL-MCNC: 8 G/DL
RBC # BLD AUTO: 3.47 M/UL
SODIUM SERPL-SCNC: 133 MMOL/L
VANCOMYCIN SERPL-MCNC: 33.9 UG/ML
WBC # BLD AUTO: 6.38 K/UL

## 2017-05-16 PROCEDURE — 25000003 PHARM REV CODE 250: Performed by: STUDENT IN AN ORGANIZED HEALTH CARE EDUCATION/TRAINING PROGRAM

## 2017-05-16 PROCEDURE — 25000003 PHARM REV CODE 250: Performed by: INTERNAL MEDICINE

## 2017-05-16 PROCEDURE — 36415 COLL VENOUS BLD VENIPUNCTURE: CPT

## 2017-05-16 PROCEDURE — 99232 SBSQ HOSP IP/OBS MODERATE 35: CPT | Mod: ,,, | Performed by: HOSPITALIST

## 2017-05-16 PROCEDURE — 83735 ASSAY OF MAGNESIUM: CPT

## 2017-05-16 PROCEDURE — 63600175 PHARM REV CODE 636 W HCPCS: Performed by: INTERNAL MEDICINE

## 2017-05-16 PROCEDURE — 80202 ASSAY OF VANCOMYCIN: CPT

## 2017-05-16 PROCEDURE — 80053 COMPREHEN METABOLIC PANEL: CPT

## 2017-05-16 PROCEDURE — 20600001 HC STEP DOWN PRIVATE ROOM

## 2017-05-16 PROCEDURE — 85025 COMPLETE CBC W/AUTO DIFF WBC: CPT

## 2017-05-16 RX ORDER — SODIUM CHLORIDE 9 MG/ML
INJECTION, SOLUTION INTRAVENOUS
Status: DISCONTINUED | OUTPATIENT
Start: 2017-05-17 | End: 2017-05-17 | Stop reason: HOSPADM

## 2017-05-16 RX ORDER — SODIUM CHLORIDE 9 MG/ML
INJECTION, SOLUTION INTRAVENOUS ONCE
Status: COMPLETED | OUTPATIENT
Start: 2017-05-17 | End: 2017-05-17

## 2017-05-16 RX ORDER — CALCIUM CARBONATE 200(500)MG
500 TABLET,CHEWABLE ORAL
Status: DISCONTINUED | OUTPATIENT
Start: 2017-05-16 | End: 2017-05-17 | Stop reason: HOSPADM

## 2017-05-16 RX ADMIN — LOPERAMIDE HYDROCHLORIDE 2 MG: 2 CAPSULE ORAL at 04:05

## 2017-05-16 RX ADMIN — HEPARIN SODIUM 5000 UNITS: 5000 INJECTION, SOLUTION INTRAVENOUS; SUBCUTANEOUS at 05:05

## 2017-05-16 RX ADMIN — CEFTRIAXONE 1 G: 1 INJECTION, SOLUTION INTRAVENOUS at 09:05

## 2017-05-16 RX ADMIN — CALCIUM CARBONATE (ANTACID) CHEW TAB 500 MG 500 MG: 500 CHEW TAB at 01:05

## 2017-05-16 RX ADMIN — HEPARIN SODIUM 5000 UNITS: 5000 INJECTION, SOLUTION INTRAVENOUS; SUBCUTANEOUS at 02:05

## 2017-05-16 RX ADMIN — CALCIUM CARBONATE (ANTACID) CHEW TAB 500 MG 500 MG: 500 CHEW TAB at 05:05

## 2017-05-16 RX ADMIN — LOPERAMIDE HYDROCHLORIDE 2 MG: 2 CAPSULE ORAL at 05:05

## 2017-05-16 NOTE — ASSESSMENT & PLAN NOTE
Biopsy 04/24/17 demonstrated no identified neoplasm, extensive dystrophic calcifications and degenerative material; planning for repeat outpatient IR biopsy given high suspicion of neoplastic process

## 2017-05-16 NOTE — ASSESSMENT & PLAN NOTE
- Recent 2D Echo with EF 30%; pharmacologic stress test with no induced ischemia.  - Given received fluid resuscitation in ED will monitor for signs of volume overload. S/p HD yesterday.  - Will continue to monitor.

## 2017-05-16 NOTE — PROGRESS NOTES
[facility] Hospital Medicine  Progress Note    Patient Name: Livan Arevalo  MRN: [id number]  Patient Class: IP- Inpatient   Admission Date: 5/14/2017  Length of Stay: 2 days  Attending Physician: Victroina Belle MD  Primary Care Provider: Jenn Sandoval MD    [facility] Medicine Team: [facility] 2 Becca Rogers MD    Subjective:     Principal Problem:Sepsis    HPI:  Mr. Arevalo is a 42/M with PMH HTN, ESRD on HD (Mon/Wed/Fri), HFrEF (2D Echo 04/23/17 EF 30%, mod MR, mod TR) who presented with two days of weakness, chills with rigors, and cough. He states symptoms began on 05/12 as he underwent dialysis, beginning with chills with rigors. He had significant malaise that evening. He states that symptoms worsened on 05/13, with severe chills and rigors while at a [place] outing in the park. He reports some nonproductive coughing with post-tussive emesis. He reports having several episodes of loose stool as well beginning on 05/13. Given persistence of symptoms, he presented to the ED 05/14 for further evaluation.    He attests to lightheadedness, chills, rigors, fatigue, cough and post-tussive emesis, as well as diarrhea. Denies history of C difficile, CP, palpitations, SOB; chart review reveals serratia on blood culture from 2012, with subsequent blood cultures negative.      Hospital Course:  5/15 Patient with episodes of loose stool. 5/14 cdiff negative. Immodium provided.  5/16 Diarrhea resolved. Patient remains afebrile. Antibiotics discontinued. Abd US negative for cholecystitis although cholelithiasis noted. Bcxs remain negative.      Interval History: Patient states he feels much better this AM and denies fever, chills, nausea, vomiting, abdominal pain. He says he has not had diarrhea since being given the Immodium. He asks that the Renvela be switched to Tums as patient is used to taking Tums TIDWM due to his renal disease.    Review of Systems   Constitutional: Negative for chills and  fever.   HENT: Negative for sore throat and trouble swallowing.    Eyes: Negative for pain and visual disturbance.   Respiratory: Negative for cough and shortness of breath.    Cardiovascular: Negative for chest pain and palpitations.   Gastrointestinal: Negative for abdominal distention, abdominal pain, diarrhea, nausea and vomiting.     Objective:     Vital Signs (Most Recent):  Temp: 98.2 °F (36.8 °C) (05/16/17 0750)  Pulse: 108 (05/16/17 0750)  Resp: 18 (05/16/17 0750)  BP: (!) 147/95 (05/16/17 0750)  SpO2: 96 % (05/16/17 0750) Vital Signs (24h Range):  Temp:  [97.4 °F (36.3 °C)-98.6 °F (37 °C)] 98.2 °F (36.8 °C)  Pulse:  [102-108] 108  Resp:  [17-18] 18  SpO2:  [95 %-99 %] 96 %  BP: (114-147)/(73-95) 147/95     Weight: 108.9 kg (240 lb)  Body mass index is 41.2 kg/(m^2).    Intake/Output Summary (Last 24 hours) at 05/16/17 1147  Last data filed at 05/15/17 1658   Gross per 24 hour   Intake             1080 ml   Output             1600 ml   Net             -520 ml      Physical Exam   Constitutional: He is oriented to person, place, and time. He appears well-developed and well-nourished. No distress.   HENT:   Head: Normocephalic and atraumatic.   Nose: Nose normal.   Mouth/Throat: Oropharynx is clear and moist.   Eyes: Conjunctivae are normal. Pupils are equal, round, and reactive to light.   Neck: Normal range of motion. Neck supple.   Cardiovascular: Regular rhythm, normal heart sounds and intact distal pulses.    tachycardic   Pulmonary/Chest: Effort normal and breath sounds normal. He has no rales.   Abdominal: Soft. Bowel sounds are normal. He exhibits no distension. There is no tenderness.   Musculoskeletal: Normal range of motion. He exhibits edema (trace BLE).   Palpable thrill LUE fistula; no erythema, induration or tenderness   Neurological: He is alert and oriented to person, place, and time.   Skin: Skin is warm and dry. No rash noted.   Vitals reviewed.       Significant Labs:   CBC:   Recent  Labs  Lab 05/14/17  1423 05/15/17  0602 05/16/17  0625   WBC 11.68 8.61 6.38   HGB 10.7* 10.5* 9.6*   HCT 32.3* 32.5* 30.5*   * 99* 128*     CMP:   Recent Labs  Lab 05/14/17  1423 05/15/17  0602 05/16/17  0625    135* 133*   K 4.9 4.5 4.0   CL 93* 93* 100   CO2 26 28 23   GLU 83 77 113*   BUN 38* 47* 28*   CREATININE 8.8* 9.4* 7.2*   CALCIUM 9.1 9.3 9.3   PROT 8.1  --  8.0   ALBUMIN 2.7* 2.7* 2.7*   BILITOT 5.3*  --  2.5*   ALKPHOS 343*  --  293*   AST 61*  --  28   ALT 38  --  24   ANIONGAP 17* 14 10   EGFRNONAA 6.8* 6.2* 8.6*     Random vanc 33.9    Significant Imaging: U/S: I have reviewed all pertinent results/findings within the past 24 hours and my personal findings are:  negative for cholecystitis, although cholelithiasis and wall thickening noted    Assessment/Plan:      * Sepsis  - Sepsis with concern for blood source given frequent access for HD; lower suspicion for GI, respiratory infections. bcxs remain no growth to date.  - CXR overall unremarkable for infectious process.  - SIRS 2/4 in ED (tachycardia, tachypnea) with SOFA 8 prior to fluid resuscitation. LA 2.3, improved to 0.5 after fluids. Remains tachycardic although tachypnea improved.  - Received vancomycin, cefepime in ED; switched to ceftriaxone 1g IV q24hr, intermittently dosed vancomycin given ESRD on HD. Vancomycin level elevated. Given no overt signs of infection, no fever, no leukocytosis, or complaints, will discontinue antibiotics and monitor closely. Symptoms likely viral.    Essential hypertension  - Holding antihypertensives in the setting of sepsis. Blood pressure appears to improve. Will consider restarting tomorrow.     ESRD (end stage renal disease)  - ESRD on HD Mon/Wed/Fri; nephrology following. Appreciate assistance.   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    Anemia of chronic renal failure  - Anemia, thrombocytopenia associated with chronic disease (ESRD).  - Will continue to monitor.    Chronic systolic heart failure  - Recent 2D Echo with EF 30%; pharmacologic stress test with no induced ischemia.  - Given received fluid resuscitation in ED will monitor for signs of volume overload. S/p HD yesterday.  - Will continue to monitor.    Obesity (BMI 30-39.9)        VTE Risk Mitigation         Ordered     heparin (porcine) injection 5,000 Units  Every 8 hours     Route:  Subcutaneous        05/15/17 0232     Place BEN hose  Until discontinued      05/14/17 2144     Place sequential compression device  Until discontinued      05/14/17 2144     Medium Risk of VTE  Once      05/14/17 2144          Becca Rogers MD  Department of Hospital Medicine   [facility]

## 2017-05-16 NOTE — ASSESSMENT & PLAN NOTE
- Sepsis with concern for blood source given frequent access for HD; lower suspicion for GI, respiratory infections. bcxs remain no growth to date.  - CXR overall unremarkable for infectious process.  - SIRS 2/4 in ED (tachycardia, tachypnea) with SOFA 8 prior to fluid resuscitation. LA 2.3, improved to 0.5 after fluids. Remains tachycardic although tachypnea improved.  - Received vancomycin, cefepime in ED; switched to ceftriaxone 1g IV q24hr, intermittently dosed vancomycin given ESRD on HD. Vancomycin level elevated. Given no overt signs of infection, no fever, no leukocytosis, or complaints, will discontinue antibiotics and monitor closely. Symptoms likely viral.

## 2017-05-16 NOTE — PLAN OF CARE
Problem: Patient Care Overview  Goal: Plan of Care Review  Outcome: Ongoing (interventions implemented as appropriate)  Side rails up x2; call bell in place; bed in lowest, locked position; skid proof socks on; no evidence of skin breakdown; care plan explained to patient; pt remains free of injury. Pt tolerated po, stated he has had several loose BM, Imodium given. VSS and afebrile. Possible d/c hme tomorrow. Pt rocephin discontinued.

## 2017-05-16 NOTE — ASSESSMENT & PLAN NOTE
- Holding antihypertensives in the setting of sepsis. Blood pressure appears to improve. Will consider restarting tomorrow.

## 2017-05-16 NOTE — PROGRESS NOTES
Dr Stone notified procalcitonin level was canceled due to insufficient blood collected, and new order needed.

## 2017-05-16 NOTE — PLAN OF CARE
Problem: Patient Care Overview  Goal: Plan of Care Review  Outcome: Ongoing (interventions implemented as appropriate)  Pt remained free of falls and injury. Bed in low locked position side rails up x2 with call light and belongings in reach. Non skid socks in place. Imodium prn given. US of abdomen done. No acute events thus far. Will continue to monitor.

## 2017-05-16 NOTE — SUBJECTIVE & OBJECTIVE
Interval History: Patient states he feels much better this AM and denies fever, chills, nausea, vomiting, abdominal pain. He says he has not had diarrhea since being given the Immodium. He asks that the Renvela be switched to Tums as patient is used to taking Tums TIDWM due to his renal disease.    Review of Systems   Constitutional: Negative for chills and fever.   HENT: Negative for sore throat and trouble swallowing.    Eyes: Negative for pain and visual disturbance.   Respiratory: Negative for cough and shortness of breath.    Cardiovascular: Negative for chest pain and palpitations.   Gastrointestinal: Negative for abdominal distention, abdominal pain, diarrhea, nausea and vomiting.     Objective:     Vital Signs (Most Recent):  Temp: 98.2 °F (36.8 °C) (05/16/17 0750)  Pulse: 108 (05/16/17 0750)  Resp: 18 (05/16/17 0750)  BP: (!) 147/95 (05/16/17 0750)  SpO2: 96 % (05/16/17 0750) Vital Signs (24h Range):  Temp:  [97.4 °F (36.3 °C)-98.6 °F (37 °C)] 98.2 °F (36.8 °C)  Pulse:  [102-108] 108  Resp:  [17-18] 18  SpO2:  [95 %-99 %] 96 %  BP: (114-147)/(73-95) 147/95     Weight: 108.9 kg (240 lb)  Body mass index is 41.2 kg/(m^2).    Intake/Output Summary (Last 24 hours) at 05/16/17 1147  Last data filed at 05/15/17 1658   Gross per 24 hour   Intake             1080 ml   Output             1600 ml   Net             -520 ml      Physical Exam   Constitutional: He is oriented to person, place, and time. He appears well-developed and well-nourished. No distress.   HENT:   Head: Normocephalic and atraumatic.   Nose: Nose normal.   Mouth/Throat: Oropharynx is clear and moist.   Eyes: Conjunctivae are normal. Pupils are equal, round, and reactive to light.   Neck: Normal range of motion. Neck supple.   Cardiovascular: Regular rhythm, normal heart sounds and intact distal pulses.    tachycardic   Pulmonary/Chest: Effort normal and breath sounds normal. He has no rales.   Abdominal: Soft. Bowel sounds are normal. He exhibits  no distension. There is no tenderness.   Musculoskeletal: Normal range of motion. He exhibits edema (trace BLE).   Palpable thrill LUE fistula; no erythema, induration or tenderness   Neurological: He is alert and oriented to person, place, and time.   Skin: Skin is warm and dry. No rash noted.   Vitals reviewed.       Significant Labs:   CBC:   Recent Labs  Lab 05/14/17  1423 05/15/17  0602 05/16/17  0625   WBC 11.68 8.61 6.38   HGB 10.7* 10.5* 9.6*   HCT 32.3* 32.5* 30.5*   * 99* 128*     CMP:   Recent Labs  Lab 05/14/17  1423 05/15/17  0602 05/16/17  0625    135* 133*   K 4.9 4.5 4.0   CL 93* 93* 100   CO2 26 28 23   GLU 83 77 113*   BUN 38* 47* 28*   CREATININE 8.8* 9.4* 7.2*   CALCIUM 9.1 9.3 9.3   PROT 8.1  --  8.0   ALBUMIN 2.7* 2.7* 2.7*   BILITOT 5.3*  --  2.5*   ALKPHOS 343*  --  293*   AST 61*  --  28   ALT 38  --  24   ANIONGAP 17* 14 10   EGFRNONAA 6.8* 6.2* 8.6*     Random vanc 33.9    Significant Imaging: U/S: I have reviewed all pertinent results/findings within the past 24 hours and my personal findings are:  negative for cholecystitis, although cholelithiasis and wall thickening noted

## 2017-05-16 NOTE — ASSESSMENT & PLAN NOTE
- ESRD on HD Mon/Wed/Fri; nephrology following. Appreciate assistance.   - Renvela switched to Tums per patient preference

## 2017-05-16 NOTE — PHARMACY MED REC
"MedMined Medication Reconciliation  Template    Admission Medication Reconciliation - Pharmacy Consult Note    The home medication history was taken by Chanel Lucas, Pharmacy Technician.  Based on information gathered and subsequent review by the clinical pharmacist, the items below may need attention.     You may go to "Admission" then "Reconcile Home Medications" tabs to review and/or act upon these items. Based on information gathered and subsequent review by the clinical pharmacist, the items below may need attention.    No discrepancies with current MAR and [facility]    Please address this information as you see fit.  Feel free to contact us if you have any questions or require assistance.    Becca Ramsey, PharmD, PGY-1 Pharmacy Resident   [id number]    Patient's prior to admission medication regimen was as follows:  Prescriptions Prior to Admission   Medication Sig Dispense Refill Last Dose    carvedilol (COREG) 3.125 MG tablet TK 1 T PO BID  3 Taking    oxycodone-acetaminophen (PERCOCET) 5-325 mg per tablet Take 1 tablet by mouth every 4 (four) hours as needed for Pain. 30 tablet 0 Taking    promethazine (PHENERGAN) 25 MG tablet Take 1 tablet (25 mg total) by mouth every 4 (four) hours as needed for Nausea. 30 tablet 3 Taking         Please add appropriate    SmartPhrase below:        "

## 2017-05-17 VITALS
OXYGEN SATURATION: 100 % | TEMPERATURE: 97 F | BODY MASS INDEX: 40.97 KG/M2 | SYSTOLIC BLOOD PRESSURE: 142 MMHG | HEIGHT: 64 IN | WEIGHT: 240 LBS | RESPIRATION RATE: 18 BRPM | HEART RATE: 106 BPM | DIASTOLIC BLOOD PRESSURE: 86 MMHG

## 2017-05-17 LAB
ALBUMIN SERPL BCP-MCNC: 2.8 G/DL
ALP SERPL-CCNC: 325 U/L
ALT SERPL W/O P-5'-P-CCNC: 19 U/L
ANION GAP SERPL CALC-SCNC: 13 MMOL/L
ANISOCYTOSIS BLD QL SMEAR: SLIGHT
AST SERPL-CCNC: 24 U/L
BASOPHILS # BLD AUTO: 0.02 K/UL
BASOPHILS NFR BLD: 0.3 %
BILIRUB SERPL-MCNC: 1.8 MG/DL
BUN SERPL-MCNC: 40 MG/DL
CALCIUM SERPL-MCNC: 9.2 MG/DL
CHLORIDE SERPL-SCNC: 102 MMOL/L
CO2 SERPL-SCNC: 19 MMOL/L
CREAT SERPL-MCNC: 8.8 MG/DL
DIFFERENTIAL METHOD: ABNORMAL
EOSINOPHIL # BLD AUTO: 0.2 K/UL
EOSINOPHIL NFR BLD: 2.7 %
ERYTHROCYTE [DISTWIDTH] IN BLOOD BY AUTOMATED COUNT: 18.6 %
EST. GFR  (AFRICAN AMERICAN): 7.8 ML/MIN/1.73 M^2
EST. GFR  (NON AFRICAN AMERICAN): 6.8 ML/MIN/1.73 M^2
GLUCOSE SERPL-MCNC: 80 MG/DL
HCT VFR BLD AUTO: 32.8 %
HGB BLD-MCNC: 10.8 G/DL
LYMPHOCYTES # BLD AUTO: 1.9 K/UL
LYMPHOCYTES NFR BLD: 27 %
MAGNESIUM SERPL-MCNC: 2.5 MG/DL
MCH RBC QN AUTO: 28.1 PG
MCHC RBC AUTO-ENTMCNC: 32.9 %
MCV RBC AUTO: 85 FL
MONOCYTES # BLD AUTO: 0.7 K/UL
MONOCYTES NFR BLD: 10.3 %
NEUTROPHILS # BLD AUTO: 4.1 K/UL
NEUTROPHILS NFR BLD: 59.7 %
PLATELET # BLD AUTO: 141 K/UL
PLATELET BLD QL SMEAR: ABNORMAL
PMV BLD AUTO: ABNORMAL FL
POTASSIUM SERPL-SCNC: 4.7 MMOL/L
PROT SERPL-MCNC: 8.4 G/DL
RBC # BLD AUTO: 3.85 M/UL
SODIUM SERPL-SCNC: 134 MMOL/L
WBC # BLD AUTO: 6.97 K/UL

## 2017-05-17 PROCEDURE — 80053 COMPREHEN METABOLIC PANEL: CPT

## 2017-05-17 PROCEDURE — 96372 THER/PROPH/DIAG INJ SC/IM: CPT

## 2017-05-17 PROCEDURE — 63600175 PHARM REV CODE 636 W HCPCS: Performed by: INTERNAL MEDICINE

## 2017-05-17 PROCEDURE — 36415 COLL VENOUS BLD VENIPUNCTURE: CPT

## 2017-05-17 PROCEDURE — 85025 COMPLETE CBC W/AUTO DIFF WBC: CPT

## 2017-05-17 PROCEDURE — 25000003 PHARM REV CODE 250: Performed by: STUDENT IN AN ORGANIZED HEALTH CARE EDUCATION/TRAINING PROGRAM

## 2017-05-17 PROCEDURE — 25000003 PHARM REV CODE 250: Performed by: INTERNAL MEDICINE

## 2017-05-17 PROCEDURE — 83735 ASSAY OF MAGNESIUM: CPT

## 2017-05-17 PROCEDURE — 80100016 HC MAINTENANCE HEMODIALYSIS

## 2017-05-17 PROCEDURE — 90935 HEMODIALYSIS ONE EVALUATION: CPT | Mod: GC,,, | Performed by: INTERNAL MEDICINE

## 2017-05-17 RX ORDER — CARVEDILOL 3.12 MG/1
3.12 TABLET ORAL 2 TIMES DAILY WITH MEALS
Status: DISCONTINUED | OUTPATIENT
Start: 2017-05-17 | End: 2017-05-17 | Stop reason: HOSPADM

## 2017-05-17 RX ORDER — HEPARIN SODIUM 1000 [USP'U]/ML
1000 INJECTION, SOLUTION INTRAVENOUS; SUBCUTANEOUS
Status: DISCONTINUED | OUTPATIENT
Start: 2017-05-17 | End: 2017-05-17 | Stop reason: HOSPADM

## 2017-05-17 RX ORDER — HEPARIN SODIUM 1000 [USP'U]/ML
2500 INJECTION, SOLUTION INTRAVENOUS; SUBCUTANEOUS
Status: DISCONTINUED | OUTPATIENT
Start: 2017-05-17 | End: 2017-05-17 | Stop reason: HOSPADM

## 2017-05-17 RX ORDER — CALCIUM CARBONATE 200(500)MG
500 TABLET,CHEWABLE ORAL
COMMUNITY
Start: 2017-05-17 | End: 2018-01-22

## 2017-05-17 RX ADMIN — SODIUM CHLORIDE: 0.9 INJECTION, SOLUTION INTRAVENOUS at 10:05

## 2017-05-17 RX ADMIN — LOPERAMIDE HYDROCHLORIDE 2 MG: 2 CAPSULE ORAL at 05:05

## 2017-05-17 RX ADMIN — HEPARIN SODIUM 1000 UNITS: 1000 INJECTION, SOLUTION INTRAVENOUS; SUBCUTANEOUS at 10:05

## 2017-05-17 RX ADMIN — CARVEDILOL 3.12 MG: 3.12 TABLET, FILM COATED ORAL at 12:05

## 2017-05-17 RX ADMIN — CALCIUM CARBONATE (ANTACID) CHEW TAB 500 MG 500 MG: 500 CHEW TAB at 09:05

## 2017-05-17 RX ADMIN — HEPARIN SODIUM 2500 UNITS: 1000 INJECTION, SOLUTION INTRAVENOUS; SUBCUTANEOUS at 09:05

## 2017-05-17 RX ADMIN — ERYTHROPOIETIN 5000 UNITS: 10000 INJECTION, SOLUTION INTRAVENOUS; SUBCUTANEOUS at 10:05

## 2017-05-17 NOTE — SUBJECTIVE & OBJECTIVE
Interval History: NAEON. Patient still presenting with loose bowel movements. Currently on Imodium. Cultures so fa negative. Evaluated during HD and no complaints.     Review of patient's allergies indicates:   Allergen Reactions    Ciprofloxacin Hives    Iodine and iodide containing products Hives and Itching     Current Facility-Administered Medications   Medication Frequency    0.9%  NaCl infusion PRN    0.9%  NaCl infusion Once    acetaminophen tablet 650 mg Q6H PRN    benzonatate capsule 100 mg TID PRN    calcium carbonate 200 mg calcium (500 mg) chewable tablet 500 mg TID WM    carvedilol tablet 3.125 mg BID WM    epoetin freddy injection 5,000 Units Every Mon, Wed, Fri    heparin (porcine) injection 1,000 Units PRN    heparin (porcine) injection 2,500 Units PRN    heparin (porcine) injection 5,000 Units Q8H    loperamide capsule 2 mg QID PRN    ondansetron disintegrating tablet 4 mg Q8H PRN    ondansetron injection 4 mg Q8H PRN       Objective:     Vital Signs (Most Recent):  Temp: 98.1 °F (36.7 °C) (05/17/17 0331)  Pulse: 105 (05/17/17 0915)  Resp: 17 (05/17/17 0331)  BP: (!) 140/86 (05/17/17 0915)  SpO2: 95 % (05/17/17 0331)  O2 Device (Oxygen Therapy): room air (05/17/17 0331) Vital Signs (24h Range):  Temp:  [97.6 °F (36.4 °C)-98.6 °F (37 °C)] 98.1 °F (36.7 °C)  Pulse:  [100-107] 105  Resp:  [17-18] 17  SpO2:  [94 %-95 %] 95 %  BP: (120-145)/(67-91) 140/86     Weight: 108.9 kg (240 lb) (05/14/17 2318)  Body mass index is 41.2 kg/(m^2).  Body surface area is 2.22 meters squared.    I/O last 3 completed shifts:  In: 840 [P.O.:840]  Out: 4 [Stool:4]    Physical Exam   Constitutional: He is oriented to person, place, and time. He appears well-developed and well-nourished. No distress.   HENT:   Head: Normocephalic and atraumatic.   Nose: Nose normal.   Mouth/Throat: Oropharynx is clear and moist.   Eyes: Conjunctivae are normal. Pupils are equal, round, and reactive to light.   Neck: Normal range  of motion. Neck supple.   Cardiovascular: Normal rate, regular rhythm, normal heart sounds and intact distal pulses.    Pulmonary/Chest: Effort normal and breath sounds normal.   Abdominal: Soft. Bowel sounds are normal. He exhibits no distension. There is no tenderness.   Musculoskeletal: Normal range of motion. He exhibits edema (trace BLE).   Palpable thrill LUE fistula; no erythema, induration or tenderness   Neurological: He is alert and oriented to person, place, and time.   Skin: Skin is warm and dry. No rash noted.   Vitals reviewed.      Significant Labs:  CBC:   Recent Labs  Lab 05/16/17  0625 05/17/17  0531   WBC 6.38 6.97   RBC 3.47* 3.85*   HGB 9.6* 10.8*   HCT 30.5* 32.8*   *  --    MCV 88 85   MCH 27.7 28.1   MCHC 31.5* 32.9     CMP:   Recent Labs  Lab 05/17/17  0531   GLU 80   CALCIUM 9.2   ALBUMIN 2.8*   PROT 8.4   *   K 4.7   CO2 19*      BUN 40*   CREATININE 8.8*   ALKPHOS 325*   ALT 19   AST 24   BILITOT 1.8*        Significant Imaging:  Labs: Reviewed  X-Ray: Reviewed

## 2017-05-17 NOTE — ASSESSMENT & PLAN NOTE
- Already on renal diet  - Check phosphorus levels    - Corrected calcium 10.2  - Discontinue calcium carbonate  - Start Sevelamer 800 mg PO with meals

## 2017-05-17 NOTE — ASSESSMENT & PLAN NOTE
- Target Hgb 10- 11.5   - Currently at goal (10.8)    - Will continue epoetin 5,000 units on HD days

## 2017-05-17 NOTE — PROGRESS NOTES
Patient received from [facility] with report from Jun Yañez.   Maintenance dialysis began per orders via 15 gauge fistula needles to left upper arm fistula.

## 2017-05-17 NOTE — PLAN OF CARE
Future Appointments  Date Time Provider Department Center   5/25/2017 1:15 PM Tl Sandoval MD [facility]          05/17/17 2793   Final Note   Assessment Type Final Discharge Note   Discharge Disposition Home   What phone number can be called within the next 1-3 days to see how you are doing after discharge? [phone]   Hospital Follow Up  Appt(s) scheduled? Yes   Offered [facility] -- Bedside Delivery? n/a   Referral to Outpatient Case Management complete? n/a   Referral to / orders for Home Health Complete? n/a   30 day supply of medicines given at discharge, if documented non-compliance / non-adherence? n/a   Any social issues identified prior to discharge? No   Did you assess the readiness or willingness of the family or caregiver to support self management of care? Yes   Right Care Referral Info   Post Acute Recommendation No Care

## 2017-05-17 NOTE — PLAN OF CARE
Problem: Patient Care Overview  Goal: Plan of Care Review  Outcome: Ongoing (interventions implemented as appropriate)  Dialysis terminated 20 minutes early due to clotted venous chamber.  Needles removed from left upper arm fistula with pressure held to needle sites for 10 minutes each with hemostasis achieved. Gauze and tape to sites. Patient dialyzed for 3 hours, 25 minutes with fluid removal of 3.8 liters. Tolerated well with stable vital signs. Report called to floor to Jun Yañez.

## 2017-05-17 NOTE — ASSESSMENT & PLAN NOTE
- Will provide dialysis for metabolic clearance and volume   - Seen and examined today during hemodialysis; tolerating treatment well without issues. Denied headaches, chest pain, abdominal pain, or muscle cramps   - Target ultrafiltration up to 1-2L as tolerated by patient   - Dialysate adjusted to current labs

## 2017-05-17 NOTE — PROGRESS NOTES
Progress Note  Hospital Medicine                                                              Team: [facility] HOSP MED 2    Patient Name: Livan Arevalo  YOB: 1975    Admit Date: 5/14/2017                     LOS: 3    SUBJECTIVE:     Reason for Admission:  Sepsis    HPI:  Mr. Arevalo is a 42/M with PMH HTN, ESRD on HD (Mon/Wed/Fri), HFrEF (2D Echo 04/23/17 EF 30%, mod MR, mod TR) who presented with two days of weakness, chills with rigors, and cough. He states symptoms began on 05/12 as he underwent dialysis, beginning with chills with rigors. He had significant malaise that evening. He states that symptoms worsened on 05/13, with severe chills and rigors while at a [place] outing in the park. He reports some nonproductive coughing with post-tussive emesis. He reports having several episodes of loose stool as well beginning on 05/13. Given persistence of symptoms, he presented to the ED 05/14 for further evaluation.     He attests to lightheadedness, chills, rigors, fatigue, cough and post-tussive emesis, as well as diarrhea. Denies history of C difficile, CP, palpitations, SOB; chart review reveals serratia on blood culture from 2012, with subsequent blood cultures negative.        Hospital Course:  5/15 Patient with episodes of loose stool. 5/14 cdiff negative. Immodium provided.  5/16 Diarrhea resolved. Patient remains afebrile. Antibiotics discontinued. Abd US negative for cholecystitis although cholelithiasis noted. Bcxs remain negative.  5/17: Bcxs ngtd. Afebrile. HD today.     Interval history: Afebrile overnight. Patient remains with multiple BMs (x4), requiring Imodium. No complaints this morning. In HD, states he feels well. Denies shortness of breath, nausea, vomiting, abdominal pain.       OBJECTIVE:     Vital Signs Range (Last 24H):  Temp:  [97.6 °F (36.4 °C)-98.6 °F (37 °C)]   Pulse:  [102-108]   Resp:  [17-18]   BP: (120-147)/(67-95)   SpO2:  [94 %-96 %] Body mass index is 41.2 kg/(m^2).  Wt  Readings from Last 1 Encounters:   05/14/17 2318 108.9 kg (240 lb)   05/14/17 1301 108.9 kg (240 lb)       I & O (Last 24H):  Intake/Output Summary (Last 24 hours) at 05/17/17 0628  Last data filed at 05/17/17 0331   Gross per 24 hour   Intake              840 ml   Output                4 ml   Net              836 ml       Physical Exam:  Constitutional: He is oriented to person, place, and time. He appears well-developed and well-nourished. No distress.   HENT:   Head: Normocephalic and atraumatic.   Nose: Nose normal.   Mouth/Throat: Oropharynx is clear and moist.   Eyes: Conjunctivae are normal. Pupils are equal, round, and reactive to light.   Neck: Normal range of motion. Neck supple.   Cardiovascular: Regular rhythm, normal heart sounds and intact distal pulses.   tachycardic   Pulmonary/Chest: Effort normal and breath sounds normal. He has no rales.   Abdominal: Soft. Bowel sounds are normal. He exhibits no distension. There is no tenderness.   Musculoskeletal: Normal range of motion. He exhibits edema (trace BLE).   Palpable thrill LUE fistula; no erythema, induration or tenderness   Neurological: He is alert and oriented to person, place, and time.   Skin: Skin is warm and dry. No rash noted.   Vitals reviewed.    Diagnostic Results:  Lab Results   Component Value Date    WBC 6.38 05/16/2017    HGB 9.6 (L) 05/16/2017    HCT 30.5 (L) 05/16/2017    MCV 88 05/16/2017     (L) 05/16/2017     No results for input(s): GLU, NA, K, CL, CO2, BUN, CREATININE, CALCIUM, MG in the last 24 hours.  Lab Results   Component Value Date    INR 1.5 (H) 05/14/2017    INR 1.4 (H) 04/23/2017    INR 1.2 09/05/2015     Lab Results   Component Value Date    HGBA1C 6.1 09/06/2015     No results for input(s): POCTGLUCOSE in the last 72 hours.    ASSESSMENT/PLAN:     Current Problems List:  Active Hospital Problems    Diagnosis  POA    *Sepsis [A41.9]  Yes    Obesity (BMI 30-39.9) [E66.9]  Yes     Chronic     Body mass index is  41.02 kg/(m^2).  Weight loss as out patient. Morbid.        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       Active Problems, Status & Treatment Plan Addressed Today:      Sepsis  - Sepsis with concern for blood source given frequent access for HD; lower suspicion for GI, respiratory infections. bcxs remain no growth to date.  - CXR overall unremarkable for infectious process.  - SIRS 2/4 in ED (tachycardia, tachypnea) with SOFA 8 prior to fluid resuscitation. LA 2.3, improved to 0.5 after fluids. Remains tachycardic although tachypnea improved.  - Received vancomycin, cefepime in ED; switched to ceftriaxone 1g IV q24hr, intermittently dosed vancomycin given ESRD on HD. Vancomycin level elevated yesterday   - Given patient has been afebrile and cultures have been negative, likely patient either with viral infection or symptoms due to ESRD as patient's symptoms improved with HD    Essential hypertension  - restart home coreg today as patient mildly tachycardic and SBP back to 140s     ESRD (end stage renal disease)  - ESRD on HD Mon/Wed/Fri; nephrology following. Appreciate assistance.   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     Anemia of chronic renal failure  - Anemia, thrombocytopenia associated with chronic disease (ESRD).  - Will continue to  monitor.     Chronic systolic heart failure  - Recent 2D Echo with EF 30%; pharmacologic stress test with no induced ischemia.  - Given received fluid resuscitation in ED will monitor for signs of volume overload. S/p HD yesterday.  - Will continue to monitor.    Atrial flutter  - coreg restarted today      Obesity (BMI 30-39.9)        VTE Risk Mitigation           Ordered       heparin (porcine) injection 5,000 Units Every 8 hours    Route: Subcutaneous          05/15/17 0232       Place BEN hose Until discontinued      05/14/17 2144       Place sequential compression device Until discontinued      05/14/17 2144       Medium Risk of VTE Once      05/14/17 2144     Dispo: d/c home today    Case discussed with Dr. Belle. Staff attestation to follow.     Signing Physician:  Becca Rogers MD

## 2017-05-17 NOTE — PLAN OF CARE
Problem: Patient Care Overview  Goal: Plan of Care Review  Outcome: Ongoing (interventions implemented as appropriate)  Pt remained free of falls and injury. Bed in low locked position side rails up x2 with call light and belongings in reach. Non skid socks in place. Pt stated he had multiple BM Imodium prn given. No acute events thus far. Will continue to monitor.

## 2017-05-17 NOTE — PROGRESS NOTES
[facility]  Nephrology  Progress Note    Patient Name: Livan Arevalo  MRN: [id number]  Admission Date: 5/14/2017  Hospital Length of Stay: 3 days  Attending Provider: Victorina Belle MD   Primary Care Physician: Jenn Sandoval MD  Principal Problem:Sepsis    Subjective:     HPI: A 42/M with PMH HTN, ESRD on HD (Mon/Wed/Fri), HFrEF (2D Echo 04/23/17 EF 30%, mod MR, mod TR) who presented with two days of weakness, chills with rigors, and cough. He states symptoms began on 05/12 as he underwent dialysis, beginning with chills with rigors. Patient was started on IV fluids and antibiotics on admission with slight improvement of symptoms. We are consulted for backup HD. Patient goes to [facility] on MWF under the care of Dr. Hernandez. His dry weight is 107 kg. Treatment duration 3 hours 45 minutes.     Interval History: NAEON. Patient still presenting with loose bowel movements. Currently on Imodium. Cultures so fa negative. Evaluated during HD and no complaints.     Review of patient's allergies indicates:   Allergen Reactions    Ciprofloxacin Hives    Iodine and iodide containing products Hives and Itching     Current Facility-Administered Medications   Medication Frequency    0.9%  NaCl infusion PRN    0.9%  NaCl infusion Once    acetaminophen tablet 650 mg Q6H PRN    benzonatate capsule 100 mg TID PRN    calcium carbonate 200 mg calcium (500 mg) chewable tablet 500 mg TID WM    carvedilol tablet 3.125 mg BID WM    epoetin freddy injection 5,000 Units Every Mon, Wed, Fri    heparin (porcine) injection 1,000 Units PRN    heparin (porcine) injection 2,500 Units PRN    heparin (porcine) injection 5,000 Units Q8H    loperamide capsule 2 mg QID PRN    ondansetron disintegrating tablet 4 mg Q8H PRN    ondansetron injection 4 mg Q8H PRN       Objective:     Vital Signs (Most Recent):  Temp: 98.1 °F (36.7 °C) (05/17/17 0331)  Pulse: 105 (05/17/17 0915)  Resp: 17 (05/17/17 0331)  BP: (!) 140/86  (05/17/17 0915)  SpO2: 95 % (05/17/17 0331)  O2 Device (Oxygen Therapy): room air (05/17/17 0331) Vital Signs (24h Range):  Temp:  [97.6 °F (36.4 °C)-98.6 °F (37 °C)] 98.1 °F (36.7 °C)  Pulse:  [100-107] 105  Resp:  [17-18] 17  SpO2:  [94 %-95 %] 95 %  BP: (120-145)/(67-91) 140/86     Weight: 108.9 kg (240 lb) (05/14/17 2318)  Body mass index is 41.2 kg/(m^2).  Body surface area is 2.22 meters squared.    I/O last 3 completed shifts:  In: 840 [P.O.:840]  Out: 4 [Stool:4]    Physical Exam   Constitutional: He is oriented to person, place, and time. He appears well-developed and well-nourished. No distress.   HENT:   Head: Normocephalic and atraumatic.   Nose: Nose normal.   Mouth/Throat: Oropharynx is clear and moist.   Eyes: Conjunctivae are normal. Pupils are equal, round, and reactive to light.   Neck: Normal range of motion. Neck supple.   Cardiovascular: Normal rate, regular rhythm, normal heart sounds and intact distal pulses.    Pulmonary/Chest: Effort normal and breath sounds normal.   Abdominal: Soft. Bowel sounds are normal. He exhibits no distension. There is no tenderness.   Musculoskeletal: Normal range of motion. He exhibits edema (trace BLE).   Palpable thrill LUE fistula; no erythema, induration or tenderness   Neurological: He is alert and oriented to person, place, and time.   Skin: Skin is warm and dry. No rash noted.   Vitals reviewed.      Significant Labs:  CBC:   Recent Labs  Lab 05/16/17  0625 05/17/17  0531   WBC 6.38 6.97   RBC 3.47* 3.85*   HGB 9.6* 10.8*   HCT 30.5* 32.8*   *  --    MCV 88 85   MCH 27.7 28.1   MCHC 31.5* 32.9     CMP:   Recent Labs  Lab 05/17/17  0531   GLU 80   CALCIUM 9.2   ALBUMIN 2.8*   PROT 8.4   *   K 4.7   CO2 19*      BUN 40*   CREATININE 8.8*   ALKPHOS 325*   ALT 19   AST 24   BILITOT 1.8*        Significant Imaging:  Labs: Reviewed  X-Ray: Reviewed    Assessment/Plan:     ESRD (end stage renal disease)  - Will provide dialysis for metabolic  clearance and volume   - Seen and examined today during hemodialysis; tolerating treatment well without issues. Denied headaches, chest pain, abdominal pain, or muscle cramps   - Target ultrafiltration up to 1-2L as tolerated by patient   - Dialysate adjusted to current labs                 Anemia of chronic renal failure  - Target Hgb 10- 11.5   - Currently at goal (10.8)    - Will continue epoetin 5,000 units on HD days         Chronic kidney disease-mineral and bone disorder  - Already on renal diet  - Check phosphorus levels    - Corrected calcium 10.2  - Discontinue calcium carbonate  - Start Sevelamer 800 mg PO with meals             Fredo Osman MD  Nephrology  [facility]

## 2017-05-17 NOTE — DISCHARGE SUMMARY
DISCHARGE SUMMARY  Hospital Medicine    Team: [facility]    Patient Name: Livan Arevalo  YOB: 1975    Admit Date: 5/14/2017    Discharge Date: 05/17/2017    Discharge Attending Physician: Victorina Belle MD    Resident on Service: Becca Rogers MD    Chief Complaint: Fatigue    Princilpal Diagnoses:  Active Hospital Problems    Diagnosis  POA    *Sepsis [A41.9]  Yes    Obesity (BMI 30-39.9) [E66.9]  Yes     Chronic     Body mass index is 41.02 kg/(m^2).  Weight loss as out patient. Morbid.        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       Discharged Condition: Admit problems have resolved    HOSPITAL COURSE:      Initial Presentation:    Mr. Arevalo is a 41 yo AAM w/ h/o ESRD (MWF) 2/2 to hypertensive nephropathy who [place] [facility] with a 2 day history of weakness, chills and vomiting.   Patient has his HD on Friday, where he states they pulled too much off. He remembers finishing dialysis and going to his car and turning the heat up because he was having significant chills. That evening had malaise and exhaustion. Patient went to bed that night, slept the entire night, but when he woke up his malaise was worse and he began having intermittent rigors. Yesterday afternoon he reports nausea and had one episode of vomiting this morning 2/2 to po intolerance. He states that his abdomen burns diffusely when after PO intake.     He denies CP, SOB and dyspnea, productive cough, Over the past day he has also felt light headed w/o any vertigo, palpitations.    Course of Principle Problem for Admission:    SIRS  - original concern for  sepsis and blood source given frequent access for HD; lower suspicion for GI, respiratory infections. bcxs remain no growth to date.  - CXR overall unremarkable for infectious process.  - SIRS 2/4 in ED (tachycardia, tachypnea) with SOFA 8 prior to fluid resuscitation. LA 2.3, improved to 0.5 after fluids. Remains tachycardic although tachypnea improved.  - Received vancomycin, cefepime in ED; switched to ceftriaxone 1g IV q24hr, intermittently dosed vancomycin given ESRD on HD. Vancomycin level elevated yesterday   - Given patient has been afebrile and cultures have been negative, likely patient either with viral infection or symptoms due to ESRD as patient's symptoms improved with HD    Other Medical Problems Addressed in the Hospital:    Essential hypertension  - restarted home coreg 5/17 as patient mildly tachycardic and SBP back to 140s      ESRD (end stage renal disease)  - ESRD on HD Mon/Wed/Fri; nephrology following. Appreciate assistance.   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      Anemia of chronic renal failure  - Anemia, thrombocytopenia associated with chronic disease (ESRD).  - stable      Chronic systolic heart failure  - Recent 2D Echo with EF 30%; pharmacologic stress test with no induced ischemia.  - Given received fluid resuscitation in ED will monitor for signs of volume overload. S/p HD x2 since admit     Atrial flutter  - coreg restarted 5/17    CONSULTS: Nephrology    Last CBC/BMP/HgbA1c (if applicable):  Recent Results (from the past 336 hour(s))   CBC auto differential    Collection Time: 05/17/17  5:31 AM   Result Value Ref Range    WBC 6.97 3.90 - 12.70 K/uL    Hemoglobin  10.8 (L) 14.0 - 18.0 g/dL    Hematocrit 32.8 (L) 40.0 - 54.0 %    Platelets 141 (L) 150 - 350 K/uL   CBC auto differential    Collection Time: 05/16/17  6:25 AM   Result Value Ref Range    WBC 6.38 3.90 - 12.70 K/uL    Hemoglobin 9.6 (L) 14.0 - 18.0 g/dL    Hematocrit 30.5 (L) 40.0 - 54.0 %    Platelets 128 (L) 150 - 350 K/uL   CBC auto differential    Collection Time: 05/15/17  6:02 AM   Result Value Ref Range    WBC 8.61 3.90 - 12.70 K/uL    Hemoglobin 10.5 (L) 14.0 - 18.0 g/dL    Hematocrit 32.5 (L) 40.0 - 54.0 %    Platelets 99 (L) 150 - 350 K/uL     No results found for this or any previous visit (from the past 336 hour(s)).  Lab Results   Component Value Date    HGBA1C 6.1 09/06/2015       Other Pertinent Lab Findings:    Results for GISSELLE DILLON (MRN [id number]) as of 5/17/2017 17:39   Ref. Range 5/16/2017 06:25 5/17/2017 05:31   Sodium Latest Ref Range: 136 - 145 mmol/L 133 (L) 134 (L)   Potassium Latest Ref Range: 3.5 - 5.1 mmol/L 4.0 4.7   Chloride Latest Ref Range: 95 - 110 mmol/L 100 102   CO2 Latest Ref Range: 23 - 29 mmol/L 23 19 (L)   Anion Gap Latest Ref Range: 8 - 16 mmol/L 10 13   BUN, Bld Latest Ref Range: 6 - 20 mg/dL 28 (H) 40 (H)   Creatinine Latest Ref Range: 0.5 - 1.4 mg/dL 7.2 (H) 8.8 (H)   eGFR if non African American Latest Ref Range: >60 mL/min/1.73 m^2 8.6 (A) 6.8 (A)   eGFR if African American Latest Ref Range: >60 mL/min/1.73 m^2 10.0 (A) 7.8 (A)   Glucose Latest Ref Range: 70 - 110 mg/dL 113 (H) 80   Calcium Latest Ref Range: 8.7 - 10.5 mg/dL 9.3 9.2   Magnesium Latest Ref Range: 1.6 - 2.6 mg/dL 2.2 2.5   Alkaline Phosphatase Latest Ref Range: 55 - 135 U/L 293 (H) 325 (H)   Total Protein Latest Ref Range: 6.0 - 8.4 g/dL 8.0 8.4   Albumin Latest Ref Range: 3.5 - 5.2 g/dL 2.7 (L) 2.8 (L)   Total Bilirubin Latest Ref Range: 0.1 - 1.0 mg/dL 2.5 (H) 1.8 (H)   AST Latest Ref Range: 10 - 40 U/L 28 24   ALT Latest Ref Range: 10 - 44 U/L 24 19         Pertinent/Significant Diagnostic Studies:    Microbiology Results (last 7 days)     Procedure Component Value Units Date/Time    Blood culture x two cultures. Draw prior to antibiotics. [[id number]] Collected:  05/14/17 1657    Order Status:  Completed Specimen:  Blood from Peripheral, Antecubital, Right Updated:  05/16/17 2012     Blood Culture, Routine No Growth to date     Blood Culture, Routine No Growth to date     Blood Culture, Routine No Growth to date    Narrative:       Aerobic and anaerobic    Blood culture x two cultures. Draw prior to antibiotics. [[id number]] Collected:  05/14/17 1536    Order Status:  Completed Specimen:  Blood from Peripheral, Antecubital, Left Updated:  05/16/17 1812     Blood Culture, Routine No Growth to date     Blood Culture, Routine No Growth to date     Blood Culture, Routine No Growth to date    Narrative:       Aerobic and anaerobic    Clostridium difficile EIA [[id number]] Collected:  05/14/17 2358    Order Status:  Completed Specimen:  Stool from Stool Updated:  05/15/17 1210     C. diff Antigen Negative     C difficile Toxins A+B, EIA Negative      Testing not recommended for children <24 months old.               Special Treatments/Procedures:  5/15 and 5/17    Disposition:  Home       Future Scheduled Appointments:  Future Appointments  Date Time Provider Department Center   5/25/2017 1:15 PM Tl Sandoval MD [facility] Jorge Wilks PCW       Discharge Medication List:     Livan Arevalo   Home Medication Instructions ANDREA:[id number]    Printed on:05/17/17 8091   Medication Information                      calcium carbonate (TUMS) 200 mg calcium (500 mg) chewable tablet  Take 1 tablet (500 mg total) by mouth 3 (three) times daily with meals.             carvedilol (COREG) 3.125 MG tablet  TK 1 T PO BID             oxycodone-acetaminophen (PERCOCET) 5-325 mg per tablet  Take 1 tablet by mouth every 4 (four) hours as needed for Pain.             promethazine (PHENERGAN) 25 MG tablet  Take 1 tablet (25 mg total) by  mouth every 4 (four) hours as needed for Nausea.                 Patient Instructions:    Discharge Procedure Orders  Call MD for:  temperature >100.4     Call MD for:  persistent nausea and vomiting or diarrhea     Call MD for:  severe uncontrolled pain     Call MD for:  redness, tenderness, or signs of infection (pain, swelling, redness, odor or green/yellow discharge around incision site)     Call MD for:  difficulty breathing or increased cough     Call MD for:  severe persistent headache     Call MD for:  persistent dizziness, light-headedness, or visual disturbances     Call MD for:  increased confusion or weakness         At the time of discharge patient was told to take all medications as prescribed, to keep all followup appointments, and to call their primary care physician or return to the emergency room if they have any worsening or concerning symptoms.    Signing Physician:  Becca Rogers MD

## 2017-05-19 LAB
BACTERIA BLD CULT: NORMAL
BACTERIA BLD CULT: NORMAL

## 2017-05-28 LAB — FUNGUS SPEC CULT: NORMAL

## 2017-06-01 ENCOUNTER — TELEPHONE (OUTPATIENT)
Dept: INTERVENTIONAL RADIOLOGY/VASCULAR | Facility: HOSPITAL | Age: 42
End: 2017-06-01

## 2017-06-05 ENCOUNTER — TELEPHONE (OUTPATIENT)
Dept: HEMATOLOGY/ONCOLOGY | Facility: CLINIC | Age: 42
End: 2017-06-05

## 2017-06-05 DIAGNOSIS — R22.2 CHEST WALL MASS: Primary | ICD-10-CM

## 2017-06-05 NOTE — TELEPHONE ENCOUNTER
Called pt, set up CT scan for next Tuesday at his request.  He cannot make appts on M,W,F.  Called IR they will schedule biopsy for next Thursday 6-15-17.

## 2017-06-22 ENCOUNTER — DOCUMENTATION ONLY (OUTPATIENT)
Dept: HEMATOLOGY/ONCOLOGY | Facility: CLINIC | Age: 42
End: 2017-06-22

## 2017-06-22 NOTE — PROGRESS NOTES
Pt needs repeat CT of the chest prior to being set up for a biopsy.  I have scheduled the patient twice & confirmed the appt with him both times & he has not shown up for the test either time.   is aware.

## 2017-06-24 ENCOUNTER — HOSPITAL ENCOUNTER (EMERGENCY)
Facility: HOSPITAL | Age: 42
Discharge: HOME | End: 2017-06-24
Attending: EMERGENCY MEDICINE | Admitting: EMERGENCY MEDICINE
Payer: MEDICARE

## 2017-06-24 VITALS
OXYGEN SATURATION: 99 % | HEIGHT: 64 IN | SYSTOLIC BLOOD PRESSURE: 132 MMHG | HEART RATE: 107 BPM | BODY MASS INDEX: 40.97 KG/M2 | TEMPERATURE: 98 F | RESPIRATION RATE: 17 BRPM | WEIGHT: 240 LBS | DIASTOLIC BLOOD PRESSURE: 78 MMHG

## 2017-06-24 DIAGNOSIS — I77.0 A-V FISTULA: Primary | ICD-10-CM

## 2017-06-24 PROCEDURE — 99283 EMERGENCY DEPT VISIT LOW MDM: CPT

## 2017-06-24 PROCEDURE — 99283 EMERGENCY DEPT VISIT LOW MDM: CPT | Mod: ,,, | Performed by: EMERGENCY MEDICINE

## 2017-06-24 NOTE — DISCHARGE INSTRUCTIONS
You should see your vascular surgeon for evaluation on Monday. If you develop any new or worsening symptoms such as pain, redness, or drainage from your fistula return to the ER for evaluation. If you develop bleeding from your fistula site call 911 and hold direct pressure as instructed.

## 2017-06-24 NOTE — ED PROVIDER NOTES
"Encounter Date: 6/24/2017       History     Chief Complaint   Patient presents with    Vascular Access Problem     dialysis access site infected states pt     41 yo M with ESRD on HD via left upper extremity AV fistula MWF, HTN, R lung mass presents for evaluation of infected graft. Patient reports over the past several months he has had a small scab on top of his AVF. Over the past 3 days the scab increased in size and was draining a clear, yellow fluid. During his dialysis session yesterday they were concerned about infection and it "busting open" and he was given vancomycin during his session. He also reports he had to have a procedure done several months ago to "open up the narrowing" in his fistula, this was performed at another hospital. He has had this graft in place for several years, initially placed by Dr. Aguero at [facility]. He gets dialysis at [facility]. He reports he has been feeling well- he denies fever, chills, pain to the AVF, nausea, or vomiting. He states he normally feels a little short of breath but states this has not changed over the past few days. He did not take his Coreg this am. Of note, he is being worked up for right lung mass. He he had a biopsy done in 4/2017 that was negative for malignancy, however, given the radiographic appearance of the lesion it was recommended he have the mass re biopsied. He has not shown up for 2 scheduled IR appointments because he thinks "they are using (him) for his insurance."      The history is provided by the patient.     Review of patient's allergies indicates:   Allergen Reactions    Ciprofloxacin Hives    Iodine and iodide containing products Hives and Itching     Past Medical History:   Diagnosis Date    ESRD (end stage renal disease)     Hypertension     Renal disorder      Past Surgical History:   Procedure Laterality Date    DIALYSIS FISTULA CREATION       Family History   Problem Relation Age of Onset    Kidney disease Mother      Social " History   Substance Use Topics    Smoking status: Never Smoker    Smokeless tobacco: Not on file    Alcohol use No     Review of Systems   Constitutional: Negative for chills and fever.   Respiratory: Positive for shortness of breath.    Cardiovascular: Negative for chest pain.   Gastrointestinal: Negative for abdominal pain, nausea and vomiting.   Skin: Positive for wound.   All other systems reviewed and are negative.      Physical Exam     Initial Vitals [06/24/17 1159]   BP Pulse Resp Temp SpO2   (!) 135/90 86 15 97.9 °F (36.6 °C) 99 %      MAP       105         Physical Exam    Nursing note and vitals reviewed.  Constitutional: He appears well-developed and well-nourished. He is not diaphoretic. No distress.   Eyes: Pupils are equal, round, and reactive to light.   Neck: Neck supple.   Cardiovascular: Regular rhythm. Tachycardia present.    Left upper extremity AVF- tortuous, dilated. There is a 1 cm scab overlying the AVF. The area is not hot, tender to palpation, or erythematous. +thrill and bruit.    Pulmonary/Chest: No respiratory distress.   Abdominal: Soft. Bowel sounds are normal. There is no tenderness.   Musculoskeletal: He exhibits no edema.   Neurological: He is alert and oriented to person, place, and time.         ED Course   Procedures  Labs Reviewed - No data to display                APC / Resident Notes:   42 year old male presents for evaluation of AVF. He is asymptomatic, non toxic appearing, graft does not appear to be infected on exam. He is tachycardic but I believe this is related to not taking his Coreg this am rather than infection. Discussed with dialysis center and will have patient follow up with his vascular surgeon on Monday (he does not know surgeon's name, states office located by [employer] [facility] [employer], he confirms he has surgeon's contact info). Reviewed return precautions with patient and advised to return to ER for any fever, redness, drainage from site. Scab over fistula  does not appear unstable but we did review what to do in case he experience bleeding. Also discussed with patient importance of rescheduling IR appointment for biopsy as his care team was concerned R lung mass may be malignant. Patient states he understands and agrees with this plan.  Ama Rock MD PGY3  [employer] EM  1:00 PM           Attending Attestation:   Physician Attestation Statement for Resident:  As the supervising MD   Physician Attestation Statement: I have personally seen and examined this patient.   I agree with the above history. -: 43 yo m, h/o ESRD, HD MWF, admit here 1 mo ago for fever, negative work-up, referred to ED by HD center 2/2 small ulceration over fistula site, mild oozing yesterday when at HD.  Pt reports feeling well, denies fevers or pain at site.  On exam, fistula with small 1 cm ulceration w firm eschar in place , no erythema/tenderness/induration at site.  Does not appear acutely infected and at this point eschar appears stable, not at high risk for bleeding.  Discussed bleeding precautions w pt, will f/u with vascular surgeon on Monday   As the supervising MD I agree with the above PE.    As the supervising MD I agree with the above treatment, course, plan, and disposition.  I have reviewed the following: old records at this facility.                    ED Course     Clinical Impression:   The encounter diagnosis was A-V fistula.                           mAa Rock MD  Resident  06/24/17 1529

## 2017-06-24 NOTE — ED TRIAGE NOTES
"Pt presents to the ED c/o vascular access problem that started yesterday. Pt states the dialysis nurse yesterday noticed the dialysis fistula infection; pt states it was oozing white and yellow. Pt reports the" dialysis nurses use tape and pull my scabs off." Pt reports no symptoms. Pt reports getting vancomycin yesterday. Pt completed dialysis yesterday.   "

## 2017-06-27 LAB
ACID FAST MOD KINY STN SPEC: NORMAL
MYCOBACTERIUM SPEC QL CULT: NORMAL

## 2017-09-11 ENCOUNTER — HOSPITAL ENCOUNTER (OUTPATIENT)
Facility: HOSPITAL | Age: 42
Discharge: HOME | End: 2017-09-13
Attending: EMERGENCY MEDICINE | Admitting: EMERGENCY MEDICINE
Payer: MEDICARE

## 2017-09-11 DIAGNOSIS — Z99.2 NEED FOR ACUTE HEMODIALYSIS: ICD-10-CM

## 2017-09-11 DIAGNOSIS — I50.22 CHRONIC SYSTOLIC HEART FAILURE: Chronic | ICD-10-CM

## 2017-09-11 DIAGNOSIS — E87.5 HYPERKALEMIA: ICD-10-CM

## 2017-09-11 DIAGNOSIS — D63.1 ANEMIA OF CHRONIC RENAL FAILURE, STAGE 5: Chronic | ICD-10-CM

## 2017-09-11 DIAGNOSIS — N18.6 ESRD (END STAGE RENAL DISEASE): Chronic | ICD-10-CM

## 2017-09-11 DIAGNOSIS — M62.81 GENERALIZED MUSCLE WEAKNESS: Primary | ICD-10-CM

## 2017-09-11 DIAGNOSIS — R19.7 DIARRHEA, UNSPECIFIED TYPE: ICD-10-CM

## 2017-09-11 DIAGNOSIS — R19.7 DIARRHEA: ICD-10-CM

## 2017-09-11 DIAGNOSIS — I10 HTN (HYPERTENSION): ICD-10-CM

## 2017-09-11 DIAGNOSIS — Z99.2 DIALYSIS PATIENT: ICD-10-CM

## 2017-09-11 DIAGNOSIS — R93.89 ABNORMAL CHEST X-RAY: ICD-10-CM

## 2017-09-11 DIAGNOSIS — N18.5 ANEMIA OF CHRONIC RENAL FAILURE, STAGE 5: Chronic | ICD-10-CM

## 2017-09-11 DIAGNOSIS — E80.6 HYPERBILIRUBINEMIA: ICD-10-CM

## 2017-09-11 DIAGNOSIS — I10 ESSENTIAL HYPERTENSION: Chronic | ICD-10-CM

## 2017-09-11 LAB
25(OH)D3+25(OH)D2 SERPL-MCNC: 15 NG/ML
ALBUMIN SERPL BCP-MCNC: 2.6 G/DL
ALBUMIN SERPL BCP-MCNC: 2.7 G/DL
ALBUMIN SERPL BCP-MCNC: 2.7 G/DL
ALP SERPL-CCNC: 364 U/L
ALP SERPL-CCNC: 364 U/L
ALT SERPL W/O P-5'-P-CCNC: 26 U/L
ALT SERPL W/O P-5'-P-CCNC: 26 U/L
ANION GAP SERPL CALC-SCNC: 18 MMOL/L
ANION GAP SERPL CALC-SCNC: 20 MMOL/L
ANISOCYTOSIS BLD QL SMEAR: SLIGHT
AST SERPL-CCNC: 40 U/L
AST SERPL-CCNC: 40 U/L
BASOPHILS # BLD AUTO: 0 K/UL
BASOPHILS NFR BLD: 0 %
BILIRUB DIRECT SERPL-MCNC: 3.8 MG/DL
BILIRUB SERPL-MCNC: 5.2 MG/DL
BILIRUB SERPL-MCNC: 5.2 MG/DL
BUN SERPL-MCNC: 55 MG/DL
BUN SERPL-MCNC: 60 MG/DL
BUN SERPL-MCNC: 62 MG/DL (ref 6–30)
CALCIUM SERPL-MCNC: 9.7 MG/DL
CALCIUM SERPL-MCNC: 9.8 MG/DL
CHLORIDE SERPL-SCNC: 93 MMOL/L
CHLORIDE SERPL-SCNC: 93 MMOL/L
CHLORIDE SERPL-SCNC: 95 MMOL/L (ref 95–110)
CO2 SERPL-SCNC: 24 MMOL/L
CO2 SERPL-SCNC: 28 MMOL/L
CREAT SERPL-MCNC: 10.6 MG/DL
CREAT SERPL-MCNC: 10.9 MG/DL
CREAT SERPL-MCNC: 10.9 MG/DL (ref 0.5–1.4)
DIFFERENTIAL METHOD: ABNORMAL
EOSINOPHIL # BLD AUTO: 0 K/UL
EOSINOPHIL NFR BLD: 0.1 %
ERYTHROCYTE [DISTWIDTH] IN BLOOD BY AUTOMATED COUNT: 18.1 %
EST. GFR  (AFRICAN AMERICAN): 6 ML/MIN/1.73 M^2
EST. GFR  (AFRICAN AMERICAN): 6.2 ML/MIN/1.73 M^2
EST. GFR  (NON AFRICAN AMERICAN): 5.2 ML/MIN/1.73 M^2
EST. GFR  (NON AFRICAN AMERICAN): 5.4 ML/MIN/1.73 M^2
FOLATE SERPL-MCNC: 2.7 NG/ML
GLUCOSE SERPL-MCNC: 40 MG/DL
GLUCOSE SERPL-MCNC: 50 MG/DL (ref 70–110)
GLUCOSE SERPL-MCNC: 69 MG/DL
HCT VFR BLD AUTO: 30.3 %
HCT VFR BLD CALC: 35 %PCV (ref 36–54)
HGB BLD-MCNC: 10.3 G/DL
HYPOCHROMIA BLD QL SMEAR: ABNORMAL
LYMPHOCYTES # BLD AUTO: 1.2 K/UL
LYMPHOCYTES NFR BLD: 7.5 %
MCH RBC QN AUTO: 28.3 PG
MCHC RBC AUTO-ENTMCNC: 34 G/DL
MCV RBC AUTO: 83 FL
MONOCYTES # BLD AUTO: 1.1 K/UL
MONOCYTES NFR BLD: 7.2 %
NEUTROPHILS # BLD AUTO: 13.1 K/UL
NEUTROPHILS NFR BLD: 85.2 %
OVALOCYTES BLD QL SMEAR: ABNORMAL
PHOSPHATE SERPL-MCNC: 6.3 MG/DL
PLATELET # BLD AUTO: 187 K/UL
PLATELET BLD QL SMEAR: ABNORMAL
PMV BLD AUTO: ABNORMAL FL
POC IONIZED CALCIUM: 1.04 MMOL/L (ref 1.06–1.42)
POC TCO2 (MEASURED): 30 MMOL/L (ref 23–29)
POCT GLUCOSE: 123 MG/DL (ref 70–110)
POCT GLUCOSE: 63 MG/DL (ref 70–110)
POIKILOCYTOSIS BLD QL SMEAR: SLIGHT
POLYCHROMASIA BLD QL SMEAR: ABNORMAL
POTASSIUM BLD-SCNC: 5.7 MMOL/L (ref 3.5–5.1)
POTASSIUM SERPL-SCNC: 4.9 MMOL/L
POTASSIUM SERPL-SCNC: 5.9 MMOL/L
PROT SERPL-MCNC: 9.2 G/DL
PROT SERPL-MCNC: 9.2 G/DL
RBC # BLD AUTO: 3.64 M/UL
SAMPLE: ABNORMAL
SODIUM BLD-SCNC: 136 MMOL/L (ref 136–145)
SODIUM SERPL-SCNC: 137 MMOL/L
SODIUM SERPL-SCNC: 139 MMOL/L
TSH SERPL DL<=0.005 MIU/L-ACNC: 0.55 UIU/ML
VIT B12 SERPL-MCNC: 651 PG/ML
WBC # BLD AUTO: 15.38 K/UL

## 2017-09-11 PROCEDURE — 25000003 PHARM REV CODE 250

## 2017-09-11 PROCEDURE — 80053 COMPREHEN METABOLIC PANEL: CPT

## 2017-09-11 PROCEDURE — 63600175 PHARM REV CODE 636 W HCPCS: Performed by: PHYSICIAN ASSISTANT

## 2017-09-11 PROCEDURE — 94761 N-INVAS EAR/PLS OXIMETRY MLT: CPT | Mod: GZ

## 2017-09-11 PROCEDURE — 82746 ASSAY OF FOLIC ACID SERUM: CPT

## 2017-09-11 PROCEDURE — G0378 HOSPITAL OBSERVATION PER HR: HCPCS

## 2017-09-11 PROCEDURE — 82962 GLUCOSE BLOOD TEST: CPT

## 2017-09-11 PROCEDURE — 99285 EMERGENCY DEPT VISIT HI MDM: CPT | Mod: ,,,

## 2017-09-11 PROCEDURE — 82306 VITAMIN D 25 HYDROXY: CPT

## 2017-09-11 PROCEDURE — 93010 ELECTROCARDIOGRAM REPORT: CPT | Mod: ,,, | Performed by: INTERNAL MEDICINE

## 2017-09-11 PROCEDURE — 36415 COLL VENOUS BLD VENIPUNCTURE: CPT

## 2017-09-11 PROCEDURE — 84100 ASSAY OF PHOSPHORUS: CPT

## 2017-09-11 PROCEDURE — 99220 PR INITIAL OBSERVATION CARE,LEVL III: CPT | Mod: ,,, | Performed by: PHYSICIAN ASSISTANT

## 2017-09-11 PROCEDURE — 85025 COMPLETE CBC W/AUTO DIFF WBC: CPT

## 2017-09-11 PROCEDURE — 96375 TX/PRO/DX INJ NEW DRUG ADDON: CPT

## 2017-09-11 PROCEDURE — 84443 ASSAY THYROID STIM HORMONE: CPT

## 2017-09-11 PROCEDURE — 82607 VITAMIN B-12: CPT

## 2017-09-11 PROCEDURE — 99285 EMERGENCY DEPT VISIT HI MDM: CPT | Mod: 25

## 2017-09-11 PROCEDURE — 25000003 PHARM REV CODE 250: Performed by: PHYSICIAN ASSISTANT

## 2017-09-11 PROCEDURE — 93005 ELECTROCARDIOGRAM TRACING: CPT

## 2017-09-11 PROCEDURE — 96374 THER/PROPH/DIAG INJ IV PUSH: CPT

## 2017-09-11 RX ORDER — MORPHINE SULFATE 2 MG/ML
4 INJECTION, SOLUTION INTRAMUSCULAR; INTRAVENOUS EVERY 4 HOURS PRN
Status: CANCELLED | OUTPATIENT
Start: 2017-09-11

## 2017-09-11 RX ORDER — IBUPROFEN 200 MG
16 TABLET ORAL
Status: DISCONTINUED | OUTPATIENT
Start: 2017-09-11 | End: 2017-09-13 | Stop reason: HOSPADM

## 2017-09-11 RX ORDER — HYDROCODONE BITARTRATE AND ACETAMINOPHEN 5; 325 MG/1; MG/1
1 TABLET ORAL EVERY 4 HOURS PRN
Status: DISCONTINUED | OUTPATIENT
Start: 2017-09-11 | End: 2017-09-13 | Stop reason: HOSPADM

## 2017-09-11 RX ORDER — CARVEDILOL 3.12 MG/1
3.12 TABLET ORAL 2 TIMES DAILY
Status: DISCONTINUED | OUTPATIENT
Start: 2017-09-11 | End: 2017-09-13 | Stop reason: HOSPADM

## 2017-09-11 RX ORDER — PANTOPRAZOLE SODIUM 40 MG/1
40 TABLET, DELAYED RELEASE ORAL DAILY
Status: DISCONTINUED | OUTPATIENT
Start: 2017-09-12 | End: 2017-09-13 | Stop reason: HOSPADM

## 2017-09-11 RX ORDER — ACETAMINOPHEN 325 MG/1
325 TABLET ORAL EVERY 8 HOURS PRN
Status: DISCONTINUED | OUTPATIENT
Start: 2017-09-11 | End: 2017-09-13 | Stop reason: HOSPADM

## 2017-09-11 RX ORDER — ONDANSETRON 2 MG/ML
4 INJECTION INTRAMUSCULAR; INTRAVENOUS EVERY 12 HOURS PRN
Status: DISCONTINUED | OUTPATIENT
Start: 2017-09-11 | End: 2017-09-13 | Stop reason: HOSPADM

## 2017-09-11 RX ORDER — PROMETHAZINE HYDROCHLORIDE 12.5 MG/1
25 TABLET ORAL EVERY 6 HOURS PRN
Status: DISCONTINUED | OUTPATIENT
Start: 2017-09-11 | End: 2017-09-13 | Stop reason: HOSPADM

## 2017-09-11 RX ORDER — AZITHROMYCIN 250 MG/1
500 TABLET, FILM COATED ORAL ONCE
Status: COMPLETED | OUTPATIENT
Start: 2017-09-11 | End: 2017-09-11

## 2017-09-11 RX ORDER — HEPARIN SODIUM 5000 [USP'U]/ML
5000 INJECTION, SOLUTION INTRAVENOUS; SUBCUTANEOUS EVERY 8 HOURS
Status: DISCONTINUED | OUTPATIENT
Start: 2017-09-11 | End: 2017-09-13 | Stop reason: HOSPADM

## 2017-09-11 RX ORDER — GLUCAGON 1 MG
1 KIT INJECTION
Status: DISCONTINUED | OUTPATIENT
Start: 2017-09-11 | End: 2017-09-13 | Stop reason: HOSPADM

## 2017-09-11 RX ORDER — AZITHROMYCIN 250 MG/1
250 TABLET, FILM COATED ORAL DAILY
Status: DISCONTINUED | OUTPATIENT
Start: 2017-09-12 | End: 2017-09-13 | Stop reason: HOSPADM

## 2017-09-11 RX ORDER — SODIUM BICARBONATE 1 MEQ/ML
100 SYRINGE (ML) INTRAVENOUS
Status: COMPLETED | OUTPATIENT
Start: 2017-09-11 | End: 2017-09-11

## 2017-09-11 RX ORDER — IBUPROFEN 200 MG
24 TABLET ORAL
Status: DISCONTINUED | OUTPATIENT
Start: 2017-09-11 | End: 2017-09-13 | Stop reason: HOSPADM

## 2017-09-11 RX ORDER — CALCIUM CARBONATE 200(500)MG
500 TABLET,CHEWABLE ORAL
Status: DISCONTINUED | OUTPATIENT
Start: 2017-09-12 | End: 2017-09-12

## 2017-09-11 RX ORDER — DEXTROSE 50 % IN WATER (D50W) INTRAVENOUS SYRINGE
25
Status: COMPLETED | OUTPATIENT
Start: 2017-09-11 | End: 2017-09-11

## 2017-09-11 RX ORDER — ONDANSETRON 2 MG/ML
4 INJECTION INTRAMUSCULAR; INTRAVENOUS EVERY 12 HOURS PRN
Status: CANCELLED | OUTPATIENT
Start: 2017-09-11

## 2017-09-11 RX ORDER — HYDROCODONE BITARTRATE AND ACETAMINOPHEN 10; 325 MG/1; MG/1
1 TABLET ORAL EVERY 4 HOURS PRN
Status: DISCONTINUED | OUTPATIENT
Start: 2017-09-11 | End: 2017-09-13 | Stop reason: HOSPADM

## 2017-09-11 RX ORDER — HYDROCODONE BITARTRATE AND ACETAMINOPHEN 5; 325 MG/1; MG/1
1 TABLET ORAL EVERY 4 HOURS PRN
Status: CANCELLED | OUTPATIENT
Start: 2017-09-11

## 2017-09-11 RX ADMIN — SODIUM BICARBONATE 100 MEQ: 84 INJECTION, SOLUTION INTRAVENOUS at 05:09

## 2017-09-11 RX ADMIN — DEXTROSE MONOHYDRATE 25 G: 25 INJECTION, SOLUTION INTRAVENOUS at 05:09

## 2017-09-11 RX ADMIN — HEPARIN SODIUM 5000 UNITS: 5000 INJECTION, SOLUTION INTRAVENOUS; SUBCUTANEOUS at 10:09

## 2017-09-11 RX ADMIN — CARVEDILOL 3.12 MG: 3.12 TABLET, FILM COATED ORAL at 10:09

## 2017-09-11 RX ADMIN — AZITHROMYCIN 500 MG: 250 TABLET, FILM COATED ORAL at 10:09

## 2017-09-11 RX ADMIN — CEFTRIAXONE 1 G: 1 INJECTION, SOLUTION INTRAVENOUS at 10:09

## 2017-09-11 NOTE — ED TRIAGE NOTES
Pt has had N/V/D since yesterday states he came here for the same thing 3 months ago. No CO stomach pain at this time, states he can't control his bowels. States he is too weak to walk. States his stool is beige. States he hasn't been able to eat or drink. No CO CP/SOB at this time.

## 2017-09-12 PROBLEM — R93.89 ABNORMAL CHEST X-RAY: Status: ACTIVE | Noted: 2017-09-12

## 2017-09-12 PROBLEM — D72.829 LEUKOCYTOSIS: Status: ACTIVE | Noted: 2017-09-12

## 2017-09-12 PROBLEM — E80.6 HYPERBILIRUBINEMIA: Status: ACTIVE | Noted: 2017-09-12

## 2017-09-12 PROBLEM — E16.2 HYPOGLYCEMIA, UNSPECIFIED: Status: ACTIVE | Noted: 2017-09-12

## 2017-09-12 PROBLEM — R19.7 DIARRHEA: Status: ACTIVE | Noted: 2017-09-12

## 2017-09-12 LAB
ALBUMIN SERPL BCP-MCNC: 2.4 G/DL
ALBUMIN SERPL BCP-MCNC: 2.4 G/DL
ALP SERPL-CCNC: 285 U/L
ALT SERPL W/O P-5'-P-CCNC: 19 U/L
ANION GAP SERPL CALC-SCNC: 18 MMOL/L
ANISOCYTOSIS BLD QL SMEAR: SLIGHT
AST SERPL-CCNC: 24 U/L
BASOPHILS # BLD AUTO: 0.01 K/UL
BASOPHILS NFR BLD: 0.1 %
BILIRUB DIRECT SERPL-MCNC: 3.2 MG/DL
BILIRUB SERPL-MCNC: 4 MG/DL
BUN SERPL-MCNC: 69 MG/DL
C DIFF GDH STL QL: POSITIVE
C DIFF TOX A+B STL QL IA: NEGATIVE
CALCIUM SERPL-MCNC: 9.7 MG/DL
CHLORIDE SERPL-SCNC: 91 MMOL/L
CO2 SERPL-SCNC: 29 MMOL/L
CREAT SERPL-MCNC: 11.7 MG/DL
DIFFERENTIAL METHOD: ABNORMAL
EOSINOPHIL # BLD AUTO: 0.1 K/UL
EOSINOPHIL NFR BLD: 0.6 %
ERYTHROCYTE [DISTWIDTH] IN BLOOD BY AUTOMATED COUNT: 18.1 %
EST. GFR  (AFRICAN AMERICAN): 5.5 ML/MIN/1.73 M^2
EST. GFR  (NON AFRICAN AMERICAN): 4.8 ML/MIN/1.73 M^2
GGT SERPL-CCNC: 432 U/L
GLUCOSE SERPL-MCNC: 64 MG/DL
HCT VFR BLD AUTO: 26.8 %
HGB BLD-MCNC: 9 G/DL
HYPOCHROMIA BLD QL SMEAR: ABNORMAL
INR PPP: 1.3
LIPASE SERPL-CCNC: 8 U/L
LYMPHOCYTES # BLD AUTO: 1.4 K/UL
LYMPHOCYTES NFR BLD: 12.1 %
MCH RBC QN AUTO: 27.4 PG
MCHC RBC AUTO-ENTMCNC: 33.6 G/DL
MCV RBC AUTO: 82 FL
MONOCYTES # BLD AUTO: 0.6 K/UL
MONOCYTES NFR BLD: 5.2 %
NEUTROPHILS # BLD AUTO: 9.2 K/UL
NEUTROPHILS NFR BLD: 82 %
OVALOCYTES BLD QL SMEAR: ABNORMAL
PHOSPHATE SERPL-MCNC: 6.9 MG/DL
PLATELET # BLD AUTO: 174 K/UL
PMV BLD AUTO: 9.9 FL
POCT GLUCOSE: 126 MG/DL (ref 70–110)
POCT GLUCOSE: 66 MG/DL (ref 70–110)
POCT GLUCOSE: 86 MG/DL (ref 70–110)
POIKILOCYTOSIS BLD QL SMEAR: SLIGHT
POLYCHROMASIA BLD QL SMEAR: ABNORMAL
POTASSIUM SERPL-SCNC: 4.5 MMOL/L
PROT SERPL-MCNC: 7.8 G/DL
PROTHROMBIN TIME: 13.6 SEC
RBC # BLD AUTO: 3.29 M/UL
SODIUM SERPL-SCNC: 138 MMOL/L
TARGETS BLD QL SMEAR: ABNORMAL
WBC # BLD AUTO: 11.19 K/UL

## 2017-09-12 PROCEDURE — 82247 BILIRUBIN TOTAL: CPT

## 2017-09-12 PROCEDURE — 83690 ASSAY OF LIPASE: CPT

## 2017-09-12 PROCEDURE — 89055 LEUKOCYTE ASSESSMENT FECAL: CPT

## 2017-09-12 PROCEDURE — 87493 C DIFF AMPLIFIED PROBE: CPT

## 2017-09-12 PROCEDURE — 25000003 PHARM REV CODE 250: Performed by: PHYSICIAN ASSISTANT

## 2017-09-12 PROCEDURE — 84075 ASSAY ALKALINE PHOSPHATASE: CPT

## 2017-09-12 PROCEDURE — 94761 N-INVAS EAR/PLS OXIMETRY MLT: CPT

## 2017-09-12 PROCEDURE — P9047 ALBUMIN (HUMAN), 25%, 50ML: HCPCS | Performed by: HOSPITALIST

## 2017-09-12 PROCEDURE — 25000003 PHARM REV CODE 250: Performed by: HOSPITALIST

## 2017-09-12 PROCEDURE — 87449 NOS EACH ORGANISM AG IA: CPT

## 2017-09-12 PROCEDURE — 27000207 HC ISOLATION

## 2017-09-12 PROCEDURE — 80100016 HC MAINTENANCE HEMODIALYSIS

## 2017-09-12 PROCEDURE — 36415 COLL VENOUS BLD VENIPUNCTURE: CPT

## 2017-09-12 PROCEDURE — 87209 SMEAR COMPLEX STAIN: CPT

## 2017-09-12 PROCEDURE — 80069 RENAL FUNCTION PANEL: CPT

## 2017-09-12 PROCEDURE — 82977 ASSAY OF GGT: CPT

## 2017-09-12 PROCEDURE — 99226 PR SUBSEQUENT OBSERVATION CARE,LEVEL III: CPT | Mod: ,,, | Performed by: PHYSICIAN ASSISTANT

## 2017-09-12 PROCEDURE — 63600175 PHARM REV CODE 636 W HCPCS: Performed by: PHYSICIAN ASSISTANT

## 2017-09-12 PROCEDURE — G0378 HOSPITAL OBSERVATION PER HR: HCPCS

## 2017-09-12 PROCEDURE — 99214 OFFICE O/P EST MOD 30 MIN: CPT | Mod: ,,, | Performed by: INTERNAL MEDICINE

## 2017-09-12 PROCEDURE — 85610 PROTHROMBIN TIME: CPT

## 2017-09-12 PROCEDURE — 87040 BLOOD CULTURE FOR BACTERIA: CPT | Mod: 59

## 2017-09-12 PROCEDURE — 63600175 PHARM REV CODE 636 W HCPCS: Performed by: HOSPITALIST

## 2017-09-12 PROCEDURE — G0257 UNSCHED DIALYSIS ESRD PT HOS: HCPCS

## 2017-09-12 PROCEDURE — 99220 PR INITIAL OBSERVATION CARE,LEVL III: CPT | Mod: GC,,, | Performed by: SURGERY

## 2017-09-12 PROCEDURE — 85025 COMPLETE CBC W/AUTO DIFF WBC: CPT

## 2017-09-12 PROCEDURE — 82962 GLUCOSE BLOOD TEST: CPT

## 2017-09-12 RX ORDER — DOXERCALCIFEROL 4 UG/2ML
4 INJECTION INTRAVENOUS
Status: DISCONTINUED | OUTPATIENT
Start: 2017-09-12 | End: 2017-09-13 | Stop reason: HOSPADM

## 2017-09-12 RX ORDER — SODIUM CHLORIDE 9 MG/ML
INJECTION, SOLUTION INTRAVENOUS ONCE
Status: COMPLETED | OUTPATIENT
Start: 2017-09-12 | End: 2017-09-12

## 2017-09-12 RX ORDER — FOLIC ACID 1 MG/1
1 TABLET ORAL DAILY
Status: DISCONTINUED | OUTPATIENT
Start: 2017-09-12 | End: 2017-09-13 | Stop reason: HOSPADM

## 2017-09-12 RX ORDER — SODIUM CHLORIDE 9 MG/ML
INJECTION, SOLUTION INTRAVENOUS
Status: DISCONTINUED | OUTPATIENT
Start: 2017-09-12 | End: 2017-09-13 | Stop reason: HOSPADM

## 2017-09-12 RX ORDER — ALBUMIN HUMAN 250 G/1000ML
25 SOLUTION INTRAVENOUS ONCE
Status: COMPLETED | OUTPATIENT
Start: 2017-09-12 | End: 2017-09-12

## 2017-09-12 RX ADMIN — Medication 16 G: at 10:09

## 2017-09-12 RX ADMIN — HEPARIN SODIUM 5000 UNITS: 5000 INJECTION, SOLUTION INTRAVENOUS; SUBCUTANEOUS at 05:09

## 2017-09-12 RX ADMIN — SODIUM CHLORIDE 300 ML: 900 INJECTION, SOLUTION INTRAVENOUS at 11:09

## 2017-09-12 RX ADMIN — AZITHROMYCIN 250 MG: 250 TABLET, FILM COATED ORAL at 09:09

## 2017-09-12 RX ADMIN — CEFTRIAXONE 1 G: 1 INJECTION, SOLUTION INTRAVENOUS at 09:09

## 2017-09-12 RX ADMIN — ALBUMIN (HUMAN) 25 G: 12.5 SOLUTION INTRAVENOUS at 01:09

## 2017-09-12 NOTE — HPI
Livan Arevalo is a 42M with PMHx of HTN, ESRD on HD MWF, aflutter, systolic dysfuntion EF 30%, anemia of chronic disease, mass of right chest wall,  who presents for evaluation of generalized weakness associated with diarrhea (5-10BMs) x 1 day and subsequently missed HD session today. The patient denies eating any note-worhy cuisines, reports recent travel to [place] 2 weeks ago, no recent sick contacts, no recent antibiotics. He reports nausea and emesis x1. Last meal was this AM PTA. He denies abdominal pain, hematochezia, melena, bloody emesis. fever, chills, cough, SOB at baseline, HA, confusion, CP, palpitations, wheeze.    Of note, patient recently admitted 05/14-17 for similar presentation, intially thought to be sepsis, but BCx negative and patient afebrile during stay, he improved s/p HD.

## 2017-09-12 NOTE — H&P
[facility] Hospital Medicine  History & Physical    Patient Name: Livan Arevalo  MRN: [id number]  Admission Date: 9/11/2017  Attending Physician: Gilmer Marley MD   Primary Care Provider: Jenn Sandoval MD    [facility] Medicine Team: Networked reference to record PCT  Jam Ruano PA-C     Patient information was obtained from patient, past medical records and ER records.     Subjective:     Principal Problem:Generalized muscle weakness    Chief Complaint:   Chief Complaint   Patient presents with    Diarrhea     Pt presented to the ED via EMS. Pt c/o diarrhea and weakness x 3 days. Pt alert.        HPI: Livan Arevalo is a 42M with PMHx of HTN, ESRD on HD MWF, aflutter, systolic dysfuntion EF 30%, anemia of chronic disease, mass of right chest wall,  who presents for evaluation of generalized weakness associated with diarrhea (5-10BMs) x 1 day and subsequently missed HD session today. The patient denies eating any note-worhy cuisines, reports recent travel to [place] 2 weeks ago, no recent sick contacts, no recent antibiotics. He reports nausea and emesis x1. Last meal was this AM PTA. He denies abdominal pain, hematochezia, melena, bloody emesis. fever, chills, cough, SOB at baseline, HA, confusion, CP, palpitations, wheeze.    Of note, patient recently admitted 05/14-17 for similar presentation, intially thought to be sepsis, but BCx negative and patient afebrile during stay, he improved s/p HD.    Past Medical History:   Diagnosis Date    ESRD (end stage renal disease)     Hypertension     Renal disorder        Past Surgical History:   Procedure Laterality Date    DIALYSIS FISTULA CREATION         Review of patient's allergies indicates:   Allergen Reactions    Ciprofloxacin Hives    Iodine and iodide containing products Hives and Itching       No current facility-administered medications on file prior to encounter.      Current Outpatient Prescriptions on File Prior to Encounter    Medication Sig    calcium carbonate (TUMS) 200 mg calcium (500 mg) chewable tablet Take 1 tablet (500 mg total) by mouth 3 (three) times daily with meals.    carvedilol (COREG) 3.125 MG tablet TK 1 T PO BID    oxycodone-acetaminophen (PERCOCET) 5-325 mg per tablet Take 1 tablet by mouth every 4 (four) hours as needed for Pain.    promethazine (PHENERGAN) 25 MG tablet Take 1 tablet (25 mg total) by mouth every 4 (four) hours as needed for Nausea.     Family History     Problem Relation (Age of Onset)    Kidney disease Mother        Social History Main Topics    Smoking status: Never Smoker    Smokeless tobacco: Not on file    Alcohol use No    Drug use: No    Sexual activity: Not on file     Review of Systems   Constitutional: Positive for fatigue. Negative for chills and fever.   HENT: Negative for sore throat and trouble swallowing.    Eyes: Negative for pain and visual disturbance.   Respiratory: Positive for cough. Negative for shortness of breath and wheezing.    Cardiovascular: Positive for leg swelling. Negative for chest pain and palpitations.   Gastrointestinal: Positive for diarrhea, nausea and vomiting. Negative for abdominal distention and abdominal pain.   Genitourinary:        Patient anuric    Musculoskeletal: Positive for arthralgias. Negative for joint swelling and neck stiffness.   Skin: Negative for color change and rash.   Neurological: Positive for weakness. Negative for dizziness, seizures and headaches.     Objective:     Vital Signs (Most Recent):  Temp: 99 °F (37.2 °C) (09/11/17 1614)  Pulse: (!) 117 (09/11/17 2339)  Resp: 18 (09/11/17 1731)  BP: 133/71 (09/11/17 2000)  SpO2: 96 % (09/11/17 2336) Vital Signs (24h Range):  Temp:  [98.3 °F (36.8 °C)-99 °F (37.2 °C)] 99 °F (37.2 °C)  Pulse:  [104-117] 117  Resp:  [18] 18  SpO2:  [95 %-99 %] 96 %  BP: (102-133)/(65-80) 133/71     Weight: 99.8 kg (220 lb)  Body mass index is 38.97 kg/m².    Physical Exam   Constitutional: He is oriented  to person, place, and time. No distress.   HENT:   Head: Normocephalic and atraumatic.   Nose: Nose normal.   Mouth/Throat: Oropharynx is clear and moist.   Eyes: Conjunctivae are normal. Pupils are equal, round, and reactive to light.   Neck: Normal range of motion. Neck supple.   Cardiovascular: Normal rate, regular rhythm and normal heart sounds.    Pulmonary/Chest: Effort normal and breath sounds normal. No respiratory distress. He has no wheezes.   midly decreased breath sounds to Right posterior lung   Abdominal: Soft. Bowel sounds are normal. He exhibits no distension. There is no tenderness.   Musculoskeletal: Normal range of motion. He exhibits edema (trace BLE).   Palpable thrill LUE fistula; no erythema, induration or tenderness   Neurological: He is alert and oriented to person, place, and time. GCS eye subscore is 4. GCS verbal subscore is 5. GCS motor subscore is 6.   Patient able to sit up in bed unassisted    Skin: Skin is warm and dry. No rash noted. He is not diaphoretic.   Tattoos, multiple areas of hyperpigmentation   Psychiatric: His speech is normal and behavior is normal. Judgment and thought content normal. His affect is blunt.   Vitals reviewed.       Significant Labs:   Blood Culture: No results for input(s): LABBLOO in the last 48 hours.  CBC:   Recent Labs  Lab 09/11/17  1611 09/11/17  1620   WBC 15.38*  --    HGB 10.3*  --    HCT 30.3* 35*     --      CMP:   Recent Labs  Lab 09/11/17  1611 09/11/17  2136    139   K 5.9* 4.9   CL 93* 93*   CO2 24 28   GLU 40* 69*   BUN 55* 60*   CREATININE 10.6* 10.9*   CALCIUM 9.7 9.8   PROT 9.2*  9.2*  --    ALBUMIN 2.7*  2.7* 2.6*   BILITOT 5.2*  5.2*  --    ALKPHOS 364*  364*  --    AST 40  40  --    ALT 26  26  --    ANIONGAP 20* 18*   EGFRNONAA 5.4* 5.2*     Cardiac Markers: No results for input(s): CKMB, MYOGLOBIN, BNP, TROPISTAT in the last 48 hours.  Lactic Acid: No results for input(s): LACTATE in the last 48 hours.  TSH:  "  Recent Labs  Lab 09/11/17  2136   TSH 0.545       Significant Imaging: CXR: I have reviewed all pertinent results/findings within the past 24 hours and my personal findings are:  consistent with report  EKG: I have reviewed all pertinent results/findings within the past 24 hours and my personal findings are: consistent with report    Assessment/Plan:     * Generalized muscle weakness    - likely 2/2 diarrhea and missed HD  - check TSH, B12, folate, vit D  - PT/OT  - no focal neuro deficits on exam  - appears depressed        ESRD (end stage renal disease)    - nephrology consulted for HD, missed HD 09/11  - lytes non-emergent, CXR as below, no increased SOB from baseline, no O2 requirements  - hold TUMS  - vitamin d low, consult with nephrology re: initiation of supplementation         Abnormal chest x-ray    - CXR: "Cardiomegaly with new bilateral interstitial and alveolar opacities, favored to represent pulmonary edema. Pneumonia or alveolar hemorrhage could have a similar appearance."  - given leukocytosis will treat as PNA for now with rocephin and azithromycin for CAP, (allergy to cipro, excluding moxi)  - 2/4 SIRS for WBC and tachycardia (which is chronic)  - ordered repeat CXR s/p HD for interval evaluation, if significant improvement then can consider de-escalation of abx  - follow BCx (drawn after administration of abx)        Diarrhea    - stool studies, no anti-motility agents until negative cdiff        Hyperbilirubinemia    - on admit Tbili 5.2, direct 3.8  - unclear significance, history of elevated Tbili to 5.3 during last admission with similar complaints  - Alk Phos elevated as well, chronically  - check GGT to isolate liver as source of alk phos elevation vs extrahepatic  - pending RUQ US  - pending lipase  - pending PT/INR  - biliary obstruction or intrahepatic cholestasis vs sepsis?        Leukocytosis    - trend, suspect 2/2 PNA vs GI issues vs AVF  - AVF without signs of infection      "   Hypoglycemia, unspecified    - hypoglycemic on admit, since resolved  - patient tolerating diet        Anemia of chronic renal failure    - stable H/H, does not support alveolar hemorrhage differential from CXR, patient denies cough, hemoptopysis        Essential hypertension    - coreg, controlled on admit          VTE Risk Mitigation         Ordered     heparin (porcine) injection 5,000 Units  Every 8 hours     Route:  Subcutaneous        09/11/17 2103     Medium Risk of VTE  Once      09/11/17 2103             Jam Ruano PA-C  Department of Hospital Medicine   [facility]

## 2017-09-12 NOTE — ASSESSMENT & PLAN NOTE
- stable H/H, does not support alveolar hemorrhage differential from CXR, patient denies cough, hemoptopysis

## 2017-09-12 NOTE — PLAN OF CARE
Problem: Patient Care Overview  Goal: Plan of Care Review  Outcome: Ongoing (interventions implemented as appropriate)  Rounding every 2 hours, call light within reach to reduce risk of falls. Proper hand hygiene and wearing of gloves to reduce risk of infection.

## 2017-09-12 NOTE — ASSESSMENT & PLAN NOTE
- nephrology consulted for HD, missed HD 09/11.   - Nephrology evaluated patient today during hemodialysis and discovered a bruit and thrill to DESTINEY-AVF.  May consult vascular surgery.  - Electrolyte values affected by missed HD.  Will re-assess tomorrow.

## 2017-09-12 NOTE — ASSESSMENT & PLAN NOTE
- likely 2/2 diarrhea and missed HD  - check TSH, B12, folate, vit D  - PT/OT  - no focal neuro deficits on exam  - appears depressed

## 2017-09-12 NOTE — ASSESSMENT & PLAN NOTE
- likely 2/2 diarrhea and missed HD  - Unable to assess weakness in setting of mental status  - TSH, B12 normal. Folate 2.7. Vitamin D 15  - will continue to monitor

## 2017-09-12 NOTE — ASSESSMENT & PLAN NOTE
ESRD on IHD MWF  Out patient HD Center - [facility] - Dr. Hernandez   On HD for: 6 years ESRd secondary to HTN  Duration of outpatient dialysis session - 3.75 hrs  EDW - 105 Kg  Residual Renal Function - no  - Will provide dialysis for metabolic clearance and volume management x 4 hrs  - Seen and examined today during hemodialysis; tolerating treatment well without issues. Denied headaches, chest pain, abdominal pain, or muscle cramps   -  ml/min  - Target ultrafiltration 3-4 lts as rolerated keep MAP >65  - Dialysate adjusted to current labs   Access: DESTINEY-AVF with good thrill and bruit

## 2017-09-12 NOTE — PLAN OF CARE
Patient off the unit having an ultrasound done per MD order when CM rounded. Will continue to follow.

## 2017-09-12 NOTE — HOSPITAL COURSE
Placed in Observation for evaluation of weakness and diarrhea in patient with ESRD and missed dialysis. Nephrology consulted and patient underwent HD 9/12. Vascular Surgery consulted and underwent vascular U/S of graft showing patency. During HD on day of discharge (9/13), patient's fistula became infiltrated. Spoke with nephrology who recommended patient resume regular HD schedule Friday and follow up with vascular surgery as noted. Noted to have abnormal CXR, leukocytosis and tachycardia on admission, started on empiric ABX. Blood Cx NGTD. Repeat CXR showed mild improvement today. Deescalated abx to azithromycin for total of 5 days. Patient also with complaints of diarrhea and C.Diff PCR negative. Started on imodium PRN. Patient discharged home in stable condition with outpatient follow up PCP and arrangements for home health.

## 2017-09-12 NOTE — CONSULTS
[facility]  Nephrology  Consult Note    Patient Name: Livan Arevalo  MRN: [id number]  Admission Date: 9/11/2017  Hospital Length of Stay: 0 days  Attending Provider: Gilmer Marley MD   Primary Care Physician: Jenn Sandoval MD  Principal Problem:Generalized muscle weakness    Inpatient consult to Nephrology  Consult performed by: PAM LIND  Consult ordered by: KELLY OLIVAS  Reason for consult: ESRD        Subjective:     HPI: Livan Arevalo is a 42 y.o. AA male with PMHx relevant for HTN, ESRD on HD (Mon/Wed/Fri), HFrEF (2D Echo 04/23/17 EF 30%, mod MR, mod TR), aflutter, CKD-MBD, anemia of chronic disease, mass of right chest wall who presented with two days of weakness associated with diarrhea (5-10BMs) x 1 day and missing HD session yesterday. He endorses recent travel to [place] 2 weeks ago, no recent sick contacts, no recent antibiotics. He reports nausea and emesis. Last meal was this AM PTA. He denies abdominal pain, hematochezia, melena, bloody emesis. fever, chills, cough, SOB at baseline, HA, confusion, CP, palpitations, wheeze. We are consulted for backup HD. Patient goes to [facility] on MWF under the care of Dr. Hernandez. His dry weight is 107 kg. Treatment duration 3 hours 45 minutes via DESTINEY-AVF. He had recent admission on 5/14-19/2017 with similar presentation but improved with HD treatments.     Past Medical History:   Diagnosis Date    ESRD (end stage renal disease)     Hypertension     Renal disorder        Past Surgical History:   Procedure Laterality Date    DIALYSIS FISTULA CREATION         Review of patient's allergies indicates:   Allergen Reactions    Ciprofloxacin Hives    Iodine and iodide containing products Hives and Itching     Current Facility-Administered Medications   Medication Frequency    0.9%  NaCl infusion PRN    0.9%  NaCl infusion Once    acetaminophen tablet 325 mg Q8H PRN    azithromycin tablet 250 mg Daily    carvedilol tablet  3.125 mg BID    cefTRIAXone (ROCEPHIN) 1 g in dextrose 5 % 50 mL IVPB Q24H    dextrose 50% injection 12.5 g PRN    dextrose 50% injection 25 g PRN    doxercalciferol injection 4 mcg Once per day on Mon Wed Fri    folic acid tablet 1 mg Daily    glucagon (human recombinant) injection 1 mg PRN    glucose chewable tablet 16 g PRN    glucose chewable tablet 24 g PRN    heparin (porcine) injection 5,000 Units Q8H    hydrocodone-acetaminophen 10-325mg per tablet 1 tablet Q4H PRN    hydrocodone-acetaminophen 5-325mg per tablet 1 tablet Q4H PRN    ondansetron injection 4 mg Q12H PRN    pantoprazole EC tablet 40 mg Daily    promethazine tablet 25 mg Q6H PRN     Family History     Problem Relation (Age of Onset)    Kidney disease Mother        Social History Main Topics    Smoking status: Never Smoker    Smokeless tobacco: Not on file    Alcohol use No    Drug use: No    Sexual activity: Not on file     Review of Systems   Constitutional: Positive for appetite change, chills and fatigue. Negative for fever and unexpected weight change.   HENT: Negative for facial swelling, hearing loss and tinnitus.    Eyes: Negative for visual disturbance.   Respiratory: Negative for chest tightness and shortness of breath.    Cardiovascular: Negative for chest pain and leg swelling.   Gastrointestinal: Positive for diarrhea and nausea. Negative for abdominal pain.   Genitourinary: Negative for difficulty urinating, dysuria and flank pain.   Musculoskeletal: Negative for arthralgias and myalgias.   Skin: Negative for color change.   Neurological: Positive for dizziness, weakness and light-headedness. Negative for syncope and headaches.   Psychiatric/Behavioral: Negative for agitation, behavioral problems and confusion.     Objective:     Vital Signs (Most Recent):  Temp: 98.1 °F (36.7 °C) (09/12/17 0627)  Pulse: 104 (09/12/17 0802)  Resp: 18 (09/12/17 0758)  BP: 118/64 (09/12/17 0802)  SpO2: (!) 92 % (09/12/17 0758)  O2  Device (Oxygen Therapy): room air (09/12/17 0758) Vital Signs (24h Range):  Temp:  [98 °F (36.7 °C)-99 °F (37.2 °C)] 98.1 °F (36.7 °C)  Pulse:  [104-118] 104  Resp:  [18] 18  SpO2:  [91 %-99 %] 92 %  BP: (102-133)/(57-80) 118/64     Weight: 105.2 kg (231 lb 14.4 oz) (09/11/17 2359)  Body mass index is 41.08 kg/m².  Body surface area is 2.16 meters squared.    No intake/output data recorded.    Physical Exam   Constitutional: He is oriented to person, place, and time. He appears well-developed and well-nourished. No distress.   HENT:   Head: Normocephalic and atraumatic.   Eyes: Conjunctivae are normal. Pupils are equal, round, and reactive to light.   Neck: Normal range of motion. Neck supple.   Cardiovascular: Normal rate, regular rhythm, normal heart sounds and intact distal pulses.  Exam reveals no gallop and no friction rub.    No murmur heard.  Pulmonary/Chest: Effort normal and breath sounds normal.   Abdominal: Soft. Bowel sounds are normal. He exhibits no distension. There is tenderness. There is no rebound and no guarding.   Musculoskeletal: Normal range of motion. He exhibits no edema.   Neurological: He is alert and oriented to person, place, and time.   Skin: Skin is warm and dry. Capillary refill takes 2 to 3 seconds. No rash noted.   Psychiatric: He has a normal mood and affect. His behavior is normal.       Significant Labs:  BMP:   Recent Labs  Lab 09/12/17 0518   GLU 64*   CL 91*   CO2 29   BUN 69*   CREATININE 11.7*   CALCIUM 9.7     Cardiac Markers: No results for input(s): CKMB, TROPONINT, MYOGLOBIN in the last 168 hours.  CBC:   Recent Labs  Lab 09/12/17 0518   WBC 11.19   RBC 3.29*   HGB 9.0*   HCT 26.8*      MCV 82   MCH 27.4   MCHC 33.6     CMP:   Recent Labs  Lab 09/12/17 0518   GLU 64*   CALCIUM 9.7   ALBUMIN 2.4*  2.4*   PROT 7.8      K 4.5   CO2 29   CL 91*   BUN 69*   CREATININE 11.7*   ALKPHOS 285*   ALT 19   AST 24   BILITOT 4.0*     Coagulation:   Recent Labs  Lab  09/12/17  0518   INR 1.3*     PTH: No results for input(s): PTH in the last 168 hours.  All labs within the past 24 hours have been reviewed.    Significant Imaging:  Labs: Reviewed  X-Ray: Reviewed    Assessment/Plan:     * Generalized muscle weakness    - as per primay team  - Cont HD         ESRD (end stage renal disease)    ESRD on IHD MWF  Out patient HD Center - [facility] - Dr. Hernandez   On HD for: 6 years ESRd secondary to HTN  Duration of outpatient dialysis session - 3.75 hrs  EDW - 105 Kg  Residual Renal Function - no  - Will provide dialysis for metabolic clearance and volume management x 4 hrs  - Seen and examined today during hemodialysis; tolerating treatment well without issues. Denied headaches, chest pain, abdominal pain, or muscle cramps   -  ml/min  - Target ultrafiltration 3-4 lts as rolerated keep MAP >65  - Dialysate adjusted to current labs   Access: DESTINEY-AVF with good thrill and bruit        Essential hypertension    · Well control BP, will continue to monitor. Goal for BP is <130 mmHg SBP and BDP <85 mmHg.   · Cont Current Regiment  · Check OVS          Chronic systolic heart failure    - Closely monitor Volume status  - as per primary team        Anemia of chronic renal failure    - Will resume MICHAELA from outpatient post care plan obtain  - Adequate iron stores as per most recent profile   - Hg < 10            Thank you for your consult. I will follow-up with patient. Please contact us if you have any additional questions.    Sen Lopez MD  Nephrology  [facility]      I have reviewed and concur with the fellow's history, physical, assessment, and plan. I have personally interviewed and examined the patient at bedside.

## 2017-09-12 NOTE — PROGRESS NOTES
Patient has returned to OBS # 5. Bedside commode placed at bedside with hat for stool collection.

## 2017-09-12 NOTE — PROGRESS NOTES
Pt drowsy and hard to rouse to take PO night meds. Food tray on bedside table. Does not appear pt ate any of it.

## 2017-09-12 NOTE — ASSESSMENT & PLAN NOTE
- on admit Tbili 5.2, direct 3.8  - unclear significance, history of elevated Tbili to 5.3 during last admission with similar complaints  - Alk Phos elevated as well, chronically  - check GGT to isolate liver as source of alk phos elevation vs extrahepatic  - pending RUQ US  - pending lipase  - pending PT/INR  - biliary obstruction or intrahepatic cholestasis vs sepsis?

## 2017-09-12 NOTE — PROGRESS NOTES
Very hard stick with suspected calcification and moderate high venous pressures. Will consult Vascular Sx for AVF eval. Discussed with Staff. Will also order HD access US.   Sen Lopez MD  Nephrology Fellow   [phone]

## 2017-09-12 NOTE — ASSESSMENT & PLAN NOTE
"- CXR: "Cardiomegaly with new bilateral interstitial and alveolar opacities, favored to represent pulmonary edema. Pneumonia or alveolar hemorrhage could have a similar appearance."  - given leukocytosis will treat as PNA for now with rocephin and azithromycin for CAP, (allergy to cipro, excluding moxi)  - 2/4 SIRS for WBC and tachycardia (which is chronic)  - ordered repeat CXR s/p HD for interval evaluation, if significant improvement then can consider de-escalation of abx  - follow BCx (drawn after administration of abx)  "

## 2017-09-12 NOTE — ASSESSMENT & PLAN NOTE
- on admit Tbili 5.2, direct 3.8  - unclear significance, history of elevated Tbili to 5.3 during last admission with similar complaints  - , Alk Phos 285 (on adm 364), direct and total bili elevated, possibly secondary to hepatic congestion. Ultrasound supports differential.    - Lipase 9.1  - PT/INR 13.6 and 1.3

## 2017-09-12 NOTE — PT/OT/SLP PROGRESS
Occupational Therapy      Livan Arevalo  MRN: [id number]    Patient not seen today secondary to Dialysis, Other (Comment) (pt does not have OT orders at this time.). Will follow-up.    JODI Peraza  9/12/2017

## 2017-09-12 NOTE — SUBJECTIVE & OBJECTIVE
Past Medical History:   Diagnosis Date    ESRD (end stage renal disease)     Hypertension     Renal disorder        Past Surgical History:   Procedure Laterality Date    DIALYSIS FISTULA CREATION         Review of patient's allergies indicates:   Allergen Reactions    Ciprofloxacin Hives    Iodine and iodide containing products Hives and Itching       No current facility-administered medications on file prior to encounter.      Current Outpatient Prescriptions on File Prior to Encounter   Medication Sig    calcium carbonate (TUMS) 200 mg calcium (500 mg) chewable tablet Take 1 tablet (500 mg total) by mouth 3 (three) times daily with meals.    carvedilol (COREG) 3.125 MG tablet TK 1 T PO BID    oxycodone-acetaminophen (PERCOCET) 5-325 mg per tablet Take 1 tablet by mouth every 4 (four) hours as needed for Pain.    promethazine (PHENERGAN) 25 MG tablet Take 1 tablet (25 mg total) by mouth every 4 (four) hours as needed for Nausea.     Family History     Problem Relation (Age of Onset)    Kidney disease Mother        Social History Main Topics    Smoking status: Never Smoker    Smokeless tobacco: Not on file    Alcohol use No    Drug use: No    Sexual activity: Not on file     Review of Systems   Constitutional: Positive for fatigue. Negative for chills and fever.   HENT: Negative for sore throat and trouble swallowing.    Eyes: Negative for pain and visual disturbance.   Respiratory: Positive for cough. Negative for shortness of breath and wheezing.    Cardiovascular: Positive for leg swelling. Negative for chest pain and palpitations.   Gastrointestinal: Positive for diarrhea, nausea and vomiting. Negative for abdominal distention and abdominal pain.   Genitourinary:        Patient anuric    Musculoskeletal: Positive for arthralgias. Negative for joint swelling and neck stiffness.   Skin: Negative for color change and rash.   Neurological: Positive for weakness. Negative for dizziness, seizures and  headaches.     Objective:     Vital Signs (Most Recent):  Temp: 99 °F (37.2 °C) (09/11/17 1614)  Pulse: (!) 117 (09/11/17 2339)  Resp: 18 (09/11/17 1731)  BP: 133/71 (09/11/17 2000)  SpO2: 96 % (09/11/17 2336) Vital Signs (24h Range):  Temp:  [98.3 °F (36.8 °C)-99 °F (37.2 °C)] 99 °F (37.2 °C)  Pulse:  [104-117] 117  Resp:  [18] 18  SpO2:  [95 %-99 %] 96 %  BP: (102-133)/(65-80) 133/71     Weight: 99.8 kg (220 lb)  Body mass index is 38.97 kg/m².    Physical Exam   Constitutional: He is oriented to person, place, and time. No distress.   HENT:   Head: Normocephalic and atraumatic.   Nose: Nose normal.   Mouth/Throat: Oropharynx is clear and moist.   Eyes: Conjunctivae are normal. Pupils are equal, round, and reactive to light.   Neck: Normal range of motion. Neck supple.   Cardiovascular: Normal rate, regular rhythm and normal heart sounds.    Pulmonary/Chest: Effort normal and breath sounds normal. No respiratory distress. He has no wheezes.   midly decreased breath sounds to Right posterior lung   Abdominal: Soft. Bowel sounds are normal. He exhibits no distension. There is no tenderness.   Musculoskeletal: Normal range of motion. He exhibits edema (trace BLE).   Palpable thrill LUE fistula; no erythema, induration or tenderness   Neurological: He is alert and oriented to person, place, and time. GCS eye subscore is 4. GCS verbal subscore is 5. GCS motor subscore is 6.   Patient able to sit up in bed unassisted    Skin: Skin is warm and dry. No rash noted. He is not diaphoretic.   Tattoos, multiple areas of hyperpigmentation   Psychiatric: His speech is normal and behavior is normal. Judgment and thought content normal. His affect is blunt.   Vitals reviewed.       Significant Labs:   Blood Culture: No results for input(s): LABBLOO in the last 48 hours.  CBC:   Recent Labs  Lab 09/11/17  1611 09/11/17  1620   WBC 15.38*  --    HGB 10.3*  --    HCT 30.3* 35*     --      CMP:   Recent Labs  Lab 09/11/17  1611  09/11/17 2136    139   K 5.9* 4.9   CL 93* 93*   CO2 24 28   GLU 40* 69*   BUN 55* 60*   CREATININE 10.6* 10.9*   CALCIUM 9.7 9.8   PROT 9.2*  9.2*  --    ALBUMIN 2.7*  2.7* 2.6*   BILITOT 5.2*  5.2*  --    ALKPHOS 364*  364*  --    AST 40  40  --    ALT 26  26  --    ANIONGAP 20* 18*   EGFRNONAA 5.4* 5.2*     Cardiac Markers: No results for input(s): CKMB, MYOGLOBIN, BNP, TROPISTAT in the last 48 hours.  Lactic Acid: No results for input(s): LACTATE in the last 48 hours.  TSH:   Recent Labs  Lab 09/11/17 2136   TSH 0.545       Significant Imaging: CXR: I have reviewed all pertinent results/findings within the past 24 hours and my personal findings are:  consistent with report  EKG: I have reviewed all pertinent results/findings within the past 24 hours and my personal findings are: consistent with report

## 2017-09-12 NOTE — ASSESSMENT & PLAN NOTE
· Well control BP, will continue to monitor. Goal for BP is <130 mmHg SBP and BDP <85 mmHg.   · Cont Current Regiment  · Check OVS

## 2017-09-12 NOTE — PLAN OF CARE
Problem: Patient Care Overview  Goal: Plan of Care Review  Outcome: Ongoing (interventions implemented as appropriate)  Patient AAOx4. Safety maintained. Bed locked in low with 2 side rails up. Call light within reach. Patient received hemodialysis today and had 1 L of fluid removed. Stool sample collected and sent to lab.

## 2017-09-12 NOTE — HPI
iLvan Arevalo is a 42 y.o. AA male with PMHx relevant for HTN, ESRD on HD (Mon/Wed/Fri), HFrEF (2D Echo 04/23/17 EF 30%, mod MR, mod TR), aflutter, CKD-MBD, anemia of chronic disease, mass of right chest wall who presented with two days of weakness associated with diarrhea (5-10BMs) x 1 day and missing HD session yesterday. He endorses recent travel to [place] 2 weeks ago, no recent sick contacts, no recent antibiotics. He reports nausea and emesis. Last meal was this AM PTA. He denies abdominal pain, hematochezia, melena, bloody emesis. fever, chills, cough, SOB at baseline, HA, confusion, CP, palpitations, wheeze. We are consulted for backup HD. Patient goes to [facility] on MWF under the care of Dr. Hernandez. His dry weight is 107 kg. Treatment duration 3 hours 45 minutes via DESTINEY-AVF. He had recent admission on 5/14-19/2017 with similar presentation but improved with HD treatments.

## 2017-09-12 NOTE — PROGRESS NOTES
Patient received from floor with report from MARCK Santiago. Maintenance dialysis began per orders via left upper arm fistula via 15 gauge fistula needles. Difficulty in cannulating venous portion of fistula due to clots in needles and poor flashback. Multiple sticks before success.  Blood in needles slightly darker. Successful cannulation above top aneurysm. Dr. Castano was here and assessed  fistula. Vascular to be consulted.

## 2017-09-12 NOTE — PROGRESS NOTES
Patient's cousin Ppao came to the desk requesting the patient's car keys. Aliya ACHARYA called dialysis and spoke to Cordelia ACHARYA who then asked the patient for permission to take the keys. Per Cordelia ACHARYA the patient consented to Papo taking the keys. Papo removed the patient's keys from the patient belongings bag and left the unit.

## 2017-09-12 NOTE — PROGRESS NOTES
Pt arrived to unit via stretcher with transporter. NAD. Pt able to ambulate from stretcher in villegas to bed in room. VSS. Requests for diaper change. PCT assisted with change. Pt requests something to eat but not a sandwich. Will continue to monitor.

## 2017-09-12 NOTE — CONSULTS
[facility]  Vascular Surgery  Consult Note    Inpatient consult to Vascular Surgery  Consult performed by: CYRUS OSORIO  Consult ordered by: PAM LIND        Subjective:     Chief Complaint/Reason for Admission: diarrhea    History of Present Illness: 43 yo M with h/o HTN, ESRD on HD MWF, aflutter,HFrEF ( EF 30%), anemia of chronic disease, who presents for evaluation of generalized weakness associated with diarrhea (5-10BMs) x 1 day. Also presented with a leukocytosis of 15, blood cultures thus far negative; on rocephin and azithromycin.  Vascular surgery consulted for aneurysmal degeneration of LUE AVF. Patient is a poor historian, reports that he had LUE AVF placed 5 years prior possibly at [facility] vs [facility].  His last intervention at [facility] was 2015 with fistulogram and PTA of venous outflow stenosis.  [facility].    Prescriptions Prior to Admission   Medication Sig Dispense Refill Last Dose    calcium carbonate (TUMS) 200 mg calcium (500 mg) chewable tablet Take 1 tablet (500 mg total) by mouth 3 (three) times daily with meals.       carvedilol (COREG) 3.125 MG tablet TK 1 T PO BID  3 6/23/2017    oxycodone-acetaminophen (PERCOCET) 5-325 mg per tablet Take 1 tablet by mouth every 4 (four) hours as needed for Pain. 30 tablet 0 Taking    promethazine (PHENERGAN) 25 MG tablet Take 1 tablet (25 mg total) by mouth every 4 (four) hours as needed for Nausea. 30 tablet 3 Taking       Review of patient's allergies indicates:   Allergen Reactions    Ciprofloxacin Hives    Iodine and iodide containing products Hives and Itching       Past Medical History:   Diagnosis Date    ESRD (end stage renal disease)     Hypertension     Renal disorder      Past Surgical History:   Procedure Laterality Date    DIALYSIS FISTULA CREATION       Family History     Problem Relation (Age of Onset)    Kidney disease Mother        Social History Main Topics     Smoking status: Never Smoker    Smokeless tobacco: Not on file    Alcohol use No    Drug use: No    Sexual activity: Not on file     Review of Systems   Constitutional: Positive for fatigue.   HENT: Negative for congestion, dental problem and drooling.    Eyes: Negative for pain, discharge and itching.   Respiratory: Negative for apnea.    Cardiovascular: Negative for chest pain.   Gastrointestinal: Positive for abdominal pain and diarrhea.   Endocrine: Negative for cold intolerance, heat intolerance and polydipsia.   Genitourinary: Negative for difficulty urinating.   Musculoskeletal: Negative for arthralgias and back pain.   Allergic/Immunologic: Negative for environmental allergies and food allergies.   Neurological: Negative for dizziness.   Hematological: Negative for adenopathy.   Psychiatric/Behavioral: Negative for agitation.     Objective:     Vital Signs (Most Recent):  Temp: 97.9 °F (36.6 °C) (09/12/17 1000)  Pulse: 102 (09/12/17 1330)  Resp: 18 (09/12/17 0758)  BP: (!) 131/58 (09/12/17 1330)  SpO2: (!) 92 % (09/12/17 0758) Vital Signs (24h Range):  Temp:  [97.9 °F (36.6 °C)-98.1 °F (36.7 °C)] 97.9 °F (36.6 °C)  Pulse:  [101-118] 102  Resp:  [18] 18  SpO2:  [91 %-96 %] 92 %  BP: ()/(46-88) 131/58     Weight: 105.2 kg (231 lb 14.4 oz)  Body mass index is 41.08 kg/m².    Physical Exam   Constitutional: He is oriented to person, place, and time. He appears well-developed and well-nourished.   HENT:   Head: Normocephalic and atraumatic.   Eyes: EOM are normal. Pupils are equal, round, and reactive to light.   Cardiovascular: An irregular rhythm present.   Pulses:       Radial pulses are 2+ on the right side, and 1+ on the left side.        Femoral pulses are 2+ on the right side, and 2+ on the left side.  No palpable distal pulses    Evidence of L brachiobasilic AVF, large pseudoaneurysmal dilationx2 with strong thrill no pulsatility   Pulmonary/Chest: Effort normal. He has no wheezes.    Abdominal: Soft. He exhibits no distension.   Musculoskeletal:   Diffuse edema bilateral lower extremities   Neurological: He is alert and oriented to person, place, and time.   Skin: Skin is warm and dry.       Significant Labs:  CBC:   Recent Labs  Lab 09/12/17 0518   WBC 11.19   RBC 3.29*   HGB 9.0*   HCT 26.8*      MCV 82   MCH 27.4   MCHC 33.6     CMP:   Recent Labs  Lab 09/12/17 0518   GLU 64*   CALCIUM 9.7   ALBUMIN 2.4*  2.4*   PROT 7.8      K 4.5   CO2 29   CL 91*   BUN 69*   CREATININE 11.7*   ALKPHOS 285*   ALT 19   AST 24   BILITOT 4.0*     Coagulation:   Recent Labs  Lab 09/12/17 0518   LABPROT 13.6*   INR 1.3*       Significant Diagnostics:      Assessment/Plan:     Anemia of chronic renal failure    43 yo M with h/o HTN, ESRD on HD MWF, aflutter,HFrEF ( EF 30%), anemia of chronic disease, who presents for evaluation of generalized weakness associated with diarrhea (5-10BMs) x 1 day with aneurysmal degeneration of LUE AVF.    Patient tolerated HD today with no high venous pressures  Would avoid cannulating large pseudoaneurysms  Recommend excision of pseudoaneurysms with revision of AVF with conversion to AVG as an outpatient  Will make follow up with Dr. Strong as an outpatient            Thank you for your consult.     Samantha Ann MD  Vascular Surgery  [facility]    Vascular Attending  Agree with above  Active GI infection, diarrhea - ? C diff - being w/u  Has a L BVT AVF with two moderate-sized proximal aneurysms ~6 cm; thrill palpable; ganesh HD today  Check u/s; vein mapping   Fu as outpatient with me and we can discern when best to excise these aneurysms and determine if there is a good autogenous option in the contralateral arm - as he is only 43 yo - or if he needs an AVG.    James Strong MD FACS

## 2017-09-12 NOTE — SUBJECTIVE & OBJECTIVE
Past Medical History:   Diagnosis Date    ESRD (end stage renal disease)     Hypertension     Renal disorder        Past Surgical History:   Procedure Laterality Date    DIALYSIS FISTULA CREATION         Review of patient's allergies indicates:   Allergen Reactions    Ciprofloxacin Hives    Iodine and iodide containing products Hives and Itching     Current Facility-Administered Medications   Medication Frequency    0.9%  NaCl infusion PRN    0.9%  NaCl infusion Once    acetaminophen tablet 325 mg Q8H PRN    azithromycin tablet 250 mg Daily    carvedilol tablet 3.125 mg BID    cefTRIAXone (ROCEPHIN) 1 g in dextrose 5 % 50 mL IVPB Q24H    dextrose 50% injection 12.5 g PRN    dextrose 50% injection 25 g PRN    doxercalciferol injection 4 mcg Once per day on Mon Wed Fri    folic acid tablet 1 mg Daily    glucagon (human recombinant) injection 1 mg PRN    glucose chewable tablet 16 g PRN    glucose chewable tablet 24 g PRN    heparin (porcine) injection 5,000 Units Q8H    hydrocodone-acetaminophen 10-325mg per tablet 1 tablet Q4H PRN    hydrocodone-acetaminophen 5-325mg per tablet 1 tablet Q4H PRN    ondansetron injection 4 mg Q12H PRN    pantoprazole EC tablet 40 mg Daily    promethazine tablet 25 mg Q6H PRN     Family History     Problem Relation (Age of Onset)    Kidney disease Mother        Social History Main Topics    Smoking status: Never Smoker    Smokeless tobacco: Not on file    Alcohol use No    Drug use: No    Sexual activity: Not on file     Review of Systems   Constitutional: Positive for appetite change, chills and fatigue. Negative for fever and unexpected weight change.   HENT: Negative for facial swelling, hearing loss and tinnitus.    Eyes: Negative for visual disturbance.   Respiratory: Negative for chest tightness and shortness of breath.    Cardiovascular: Negative for chest pain and leg swelling.   Gastrointestinal: Positive for diarrhea and nausea. Negative for  abdominal pain.   Genitourinary: Negative for difficulty urinating, dysuria and flank pain.   Musculoskeletal: Negative for arthralgias and myalgias.   Skin: Negative for color change.   Neurological: Positive for dizziness, weakness and light-headedness. Negative for syncope and headaches.   Psychiatric/Behavioral: Negative for agitation, behavioral problems and confusion.     Objective:     Vital Signs (Most Recent):  Temp: 98.1 °F (36.7 °C) (09/12/17 0627)  Pulse: 104 (09/12/17 0802)  Resp: 18 (09/12/17 0758)  BP: 118/64 (09/12/17 0802)  SpO2: (!) 92 % (09/12/17 0758)  O2 Device (Oxygen Therapy): room air (09/12/17 0758) Vital Signs (24h Range):  Temp:  [98 °F (36.7 °C)-99 °F (37.2 °C)] 98.1 °F (36.7 °C)  Pulse:  [104-118] 104  Resp:  [18] 18  SpO2:  [91 %-99 %] 92 %  BP: (102-133)/(57-80) 118/64     Weight: 105.2 kg (231 lb 14.4 oz) (09/11/17 2359)  Body mass index is 41.08 kg/m².  Body surface area is 2.16 meters squared.    No intake/output data recorded.    Physical Exam   Constitutional: He is oriented to person, place, and time. He appears well-developed and well-nourished. No distress.   HENT:   Head: Normocephalic and atraumatic.   Eyes: Conjunctivae are normal. Pupils are equal, round, and reactive to light.   Neck: Normal range of motion. Neck supple.   Cardiovascular: Normal rate, regular rhythm, normal heart sounds and intact distal pulses.  Exam reveals no gallop and no friction rub.    No murmur heard.  Pulmonary/Chest: Effort normal and breath sounds normal.   Abdominal: Soft. Bowel sounds are normal. He exhibits no distension. There is tenderness. There is no rebound and no guarding.   Musculoskeletal: Normal range of motion. He exhibits no edema.   Neurological: He is alert and oriented to person, place, and time.   Skin: Skin is warm and dry. Capillary refill takes 2 to 3 seconds. No rash noted.   Psychiatric: He has a normal mood and affect. His behavior is normal.       Significant Labs:  BMP:    Recent Labs  Lab 09/12/17 0518   GLU 64*   CL 91*   CO2 29   BUN 69*   CREATININE 11.7*   CALCIUM 9.7     Cardiac Markers: No results for input(s): CKMB, TROPONINT, MYOGLOBIN in the last 168 hours.  CBC:   Recent Labs  Lab 09/12/17 0518   WBC 11.19   RBC 3.29*   HGB 9.0*   HCT 26.8*      MCV 82   MCH 27.4   MCHC 33.6     CMP:   Recent Labs  Lab 09/12/17 0518   GLU 64*   CALCIUM 9.7   ALBUMIN 2.4*  2.4*   PROT 7.8      K 4.5   CO2 29   CL 91*   BUN 69*   CREATININE 11.7*   ALKPHOS 285*   ALT 19   AST 24   BILITOT 4.0*     Coagulation:   Recent Labs  Lab 09/12/17 0518   INR 1.3*     PTH: No results for input(s): PTH in the last 168 hours.  All labs within the past 24 hours have been reviewed.    Significant Imaging:  Labs: Reviewed  X-Ray: Reviewed

## 2017-09-12 NOTE — ASSESSMENT & PLAN NOTE
- Will resume MICHAELA from outpatient post care plan obtain  - Adequate iron stores as per most recent profile   - Hg < 10

## 2017-09-12 NOTE — PROGRESS NOTES
[facility] Hospital Medicine  Progress Note    Patient Name: Livan Arevalo  MRN: [id number]  Patient Class: OP- Observation   Admission Date: 9/11/2017  Length of Stay: 0 days  Attending Physician: Gilmer Marley MD  Primary Care Provider: Suzanna Will MD    [facility] Medicine Team: [facility] E Joanna Schafer PA-C    Subjective:     Principal Problem:Generalized muscle weakness    HPI:  Livan Arevalo is a 42M with PMHx of HTN, ESRD on HD MWF, aflutter, systolic dysfuntion EF 30%, anemia of chronic disease, mass of right chest wall,  who presents for evaluation of generalized weakness associated with diarrhea (5-10BMs) x 1 day and subsequently missed HD session today. The patient denies eating any note-worhy cuisines, reports recent travel to [place] 2 weeks ago, no recent sick contacts, no recent antibiotics. He reports nausea and emesis x1. Last meal was this AM PTA. He denies abdominal pain, hematochezia, melena, bloody emesis. fever, chills, cough, SOB at baseline, HA, confusion, CP, palpitations, wheeze.    Of note, patient recently admitted 05/14-17 for similar presentation, intially thought to be sepsis, but BCx negative and patient afebrile during stay, he improved s/p HD.    Hospital Course:  Placed in Observation for evaluation of weakness and diarrhea in patient with ESRD and missed dialysis. Nephrology consulted and patient underwent HD 9/12. Noted to have abnormal CXR, leukocytosis and tachycardia on admission, started on empiric ABX. Blood Cx NGTD (prelim). Repeat CXR pending. Stool studies sent for diarrhea. Labs also showed , Alk Phos 285 (on adm 364), direct and total bili elevated, possibly secondary to hepatic congestion. Ultrasound showed evidence of portal hypertension, elevated right heart pressure, hepatosplenomegaly without signs of acute cholecystitis. Repeat CXR 9/12 pending. Continue to monitor overnight.      Interval History: Patient seen on HD,  drowsy and lethargic on exam. Limited history provided.     Past Medical History:   Diagnosis Date    ESRD (end stage renal disease)     Hypertension     Renal disorder        Past Surgical History:   Procedure Laterality Date    DIALYSIS FISTULA CREATION         Review of patient's allergies indicates:   Allergen Reactions    Ciprofloxacin Hives    Iodine and iodide containing products Hives and Itching       No current facility-administered medications on file prior to encounter.      Current Outpatient Prescriptions on File Prior to Encounter   Medication Sig    calcium carbonate (TUMS) 200 mg calcium (500 mg) chewable tablet Take 1 tablet (500 mg total) by mouth 3 (three) times daily with meals.    carvedilol (COREG) 3.125 MG tablet TK 1 T PO BID    oxycodone-acetaminophen (PERCOCET) 5-325 mg per tablet Take 1 tablet by mouth every 4 (four) hours as needed for Pain.    promethazine (PHENERGAN) 25 MG tablet Take 1 tablet (25 mg total) by mouth every 4 (four) hours as needed for Nausea.     Family History     Problem Relation (Age of Onset)    Kidney disease Mother        Social History Main Topics    Smoking status: Never Smoker    Smokeless tobacco: Not on file    Alcohol use No    Drug use: No    Sexual activity: Not on file     Review of Systems   Unable to perform ROS: Mental status change     Objective:     Vital Signs (Most Recent):  Temp: 97.9 °F (36.6 °C) (09/12/17 1415)  Pulse: 100 (09/12/17 1415)  Resp: 18 (09/12/17 1415)  BP: 126/76 (09/12/17 1415)  SpO2: 97 % (09/12/17 1415) Vital Signs (24h Range):  Temp:  [97.9 °F (36.6 °C)-98.1 °F (36.7 °C)] 97.9 °F (36.6 °C)  Pulse:  [100-118] 100  Resp:  [18] 18  SpO2:  [91 %-97 %] 97 %  BP: ()/(46-88) 126/76     Weight: 105.2 kg (231 lb 14.4 oz)  Body mass index is 41.08 kg/m².    Physical Exam   Constitutional: He appears well-developed and well-nourished. No distress.   Drowsy    Neck: Normal range of motion. Neck supple.   Cardiovascular:  Normal rate, regular rhythm and normal heart sounds.    Pulmonary/Chest: Effort normal and breath sounds normal. No respiratory distress.   Abdominal: Soft. He exhibits no distension. There is no tenderness.   Neurological: He is alert.   Able to arouse temporarily   Skin: Skin is warm and dry.   Psychiatric:   Unable to asses   Nursing note and vitals reviewed.       Significant Labs:   Bilirubin:   Recent Labs  Lab 09/11/17  1611 09/12/17  0518   BILIDIR 3.8* 3.2*   BILITOT 5.2*  5.2* 4.0*     Blood Culture:   Recent Labs  Lab 09/12/17  0033 09/12/17  0035   LABBLOO No Growth to date No Growth to date     CMP:   Recent Labs  Lab 09/11/17  1611 09/11/17  2136 09/12/17  0518    139 138   K 5.9* 4.9 4.5   CL 93* 93* 91*   CO2 24 28 29   GLU 40* 69* 64*   BUN 55* 60* 69*   CREATININE 10.6* 10.9* 11.7*   CALCIUM 9.7 9.8 9.7   PROT 9.2*  9.2*  --  7.8   ALBUMIN 2.7*  2.7* 2.6* 2.4*  2.4*   BILITOT 5.2*  5.2*  --  4.0*   ALKPHOS 364*  364*  --  285*   AST 40  40  --  24   ALT 26  26  --  19   ANIONGAP 20* 18* 18*   EGFRNONAA 5.4* 5.2* 4.8*     All pertinent labs within the past 24 hours have been reviewed.    Significant Imaging: I have reviewed all pertinent imaging results/findings within the past 24 hours.    Assessment/Plan:      * Generalized muscle weakness    - likely 2/2 diarrhea and missed HD  - Unable to assess weakness in setting of mental status  - TSH, B12 normal. Folate 2.7. Vitamin D 15  - will continue to monitor          Hyperbilirubinemia    - on admit Tbili 5.2, direct 3.8  - unclear significance, history of elevated Tbili to 5.3 during last admission with similar complaints  - , Alk Phos 285 (on adm 364), direct and total bili elevated, possibly secondary to hepatic congestion. Ultrasound supports differential.    - Lipase 9.1  - PT/INR 13.6 and 1.3          Diarrhea    - stool studies, no anti-motility agents until negative cdiff  - waiting on studies but pt able to pass soft,  "formed stools today.    - Difficulty collecting sample from patient (stools not liquified enough).        Leukocytosis    - trending down   - AVF without signs of infection  - will continue to monitor        Hypoglycemia, unspecified    - hypoglycemic on admit, since resolved  - patient tolerating diet        Abnormal chest x-ray    - CXR: "Cardiomegaly with new bilateral interstitial and alveolar opacities, favored to represent pulmonary edema. Pneumonia or alveolar hemorrhage could have a similar appearance."  - given leukocytosis will treat as PNA for now with rocephin and azithromycin for CAP, (allergy to cipro, excluding moxi)  - 2/4 SIRS for WBC and tachycardia (which is chronic)  - ordered repeat CXR s/p HD for interval evaluation, if significant improvement then can consider de-escalation of abx   - follow BCx (drawn after administration of abx)        Chronic systolic heart failure    - Ultrasound of Abdomen showed "perisplenic collaterals and a minimal volume of ascites, suggestive of portal hypertension", " distention of hepatic veins suggesting elevated right heart pressure", and HSM.    - No current LE edema noted on interview          Anemia of chronic renal failure    - stable H/H, does not support alveolar hemorrhage differential from CXR, patient denies cough, hemoptopysis  - awaiting follow up CXR to assess any changes        Mass of right chest wall    - will continue to monitor          ESRD (end stage renal disease)    - nephrology consulted for HD, missed HD 09/11.   - Nephrology evaluated patient today during hemodialysis and discovered a bruit and thrill to DESTINEY-AVF.  May consult vascular surgery.  - Electrolyte values affected by missed HD.  Will re-assess tomorrow.        Essential hypertension    - coreg 3.125 BID, controlled on admit  - HR ranges from 100-110 on most readings (baseline)          VTE Risk Mitigation         Ordered     heparin (porcine) injection 5,000 Units  Every 8 hours  "    Route:  Subcutaneous        09/11/17 2103     Medium Risk of VTE  Once      09/11/17 2103          Joanna Schafer PA-C  Department of Hospital Medicine   [facility]

## 2017-09-12 NOTE — PROGRESS NOTES
Orthostatics performed. Tele monitor placed on patient per MD order. Patient left unit via wheelchair to Ultrasound. Pt stable.

## 2017-09-12 NOTE — ASSESSMENT & PLAN NOTE
"- Ultrasound of Abdomen showed "perisplenic collaterals and a minimal volume of ascites, suggestive of portal hypertension", " distention of hepatic veins suggesting elevated right heart pressure", and HSM.    - No current LE edema noted on interview    "

## 2017-09-12 NOTE — PROGRESS NOTES
Telemetry monitor alarming lead fail. Pt lying in bed and telemetry leads and box on bedside table. Pt told he needs to be monitored by telemetry per MD order. Pt states to tell doctor he refuses because he cannot sleep with the monitor on him.    [id number] Spoke with JOSE A Garces regarding telemetry and pts refusal. New orders noted.

## 2017-09-12 NOTE — SUBJECTIVE & OBJECTIVE
Past Medical History:   Diagnosis Date    ESRD (end stage renal disease)     Hypertension     Renal disorder        Past Surgical History:   Procedure Laterality Date    DIALYSIS FISTULA CREATION         Review of patient's allergies indicates:   Allergen Reactions    Ciprofloxacin Hives    Iodine and iodide containing products Hives and Itching       No current facility-administered medications on file prior to encounter.      Current Outpatient Prescriptions on File Prior to Encounter   Medication Sig    calcium carbonate (TUMS) 200 mg calcium (500 mg) chewable tablet Take 1 tablet (500 mg total) by mouth 3 (three) times daily with meals.    carvedilol (COREG) 3.125 MG tablet TK 1 T PO BID    oxycodone-acetaminophen (PERCOCET) 5-325 mg per tablet Take 1 tablet by mouth every 4 (four) hours as needed for Pain.    promethazine (PHENERGAN) 25 MG tablet Take 1 tablet (25 mg total) by mouth every 4 (four) hours as needed for Nausea.     Family History     Problem Relation (Age of Onset)    Kidney disease Mother        Social History Main Topics    Smoking status: Never Smoker    Smokeless tobacco: Not on file    Alcohol use No    Drug use: No    Sexual activity: Not on file     Review of Systems   Unable to perform ROS: Mental status change     Objective:     Vital Signs (Most Recent):  Temp: 97.9 °F (36.6 °C) (09/12/17 1415)  Pulse: 100 (09/12/17 1415)  Resp: 18 (09/12/17 1415)  BP: 126/76 (09/12/17 1415)  SpO2: 97 % (09/12/17 1415) Vital Signs (24h Range):  Temp:  [97.9 °F (36.6 °C)-98.1 °F (36.7 °C)] 97.9 °F (36.6 °C)  Pulse:  [100-118] 100  Resp:  [18] 18  SpO2:  [91 %-97 %] 97 %  BP: ()/(46-88) 126/76     Weight: 105.2 kg (231 lb 14.4 oz)  Body mass index is 41.08 kg/m².    Physical Exam   Constitutional: He appears well-developed and well-nourished. No distress.   Drowsy    Neck: Normal range of motion. Neck supple.   Cardiovascular: Normal rate, regular rhythm and normal heart sounds.     Pulmonary/Chest: Effort normal and breath sounds normal. No respiratory distress.   Abdominal: Soft. He exhibits no distension. There is no tenderness.   Neurological: He is alert.   Able to arouse temporarily   Skin: Skin is warm and dry.   Psychiatric:   Unable to asses   Nursing note and vitals reviewed.       Significant Labs:   Bilirubin:   Recent Labs  Lab 09/11/17  1611 09/12/17  0518   BILIDIR 3.8* 3.2*   BILITOT 5.2*  5.2* 4.0*     Blood Culture:   Recent Labs  Lab 09/12/17  0033 09/12/17  0035   LABBLOO No Growth to date No Growth to date     CMP:   Recent Labs  Lab 09/11/17  1611 09/11/17  2136 09/12/17  0518    139 138   K 5.9* 4.9 4.5   CL 93* 93* 91*   CO2 24 28 29   GLU 40* 69* 64*   BUN 55* 60* 69*   CREATININE 10.6* 10.9* 11.7*   CALCIUM 9.7 9.8 9.7   PROT 9.2*  9.2*  --  7.8   ALBUMIN 2.7*  2.7* 2.6* 2.4*  2.4*   BILITOT 5.2*  5.2*  --  4.0*   ALKPHOS 364*  364*  --  285*   AST 40  40  --  24   ALT 26  26  --  19   ANIONGAP 20* 18* 18*   EGFRNONAA 5.4* 5.2* 4.8*     All pertinent labs within the past 24 hours have been reviewed.    Significant Imaging: I have reviewed all pertinent imaging results/findings within the past 24 hours.

## 2017-09-12 NOTE — ASSESSMENT & PLAN NOTE
- stable H/H, does not support alveolar hemorrhage differential from CXR, patient denies cough, hemoptopysis  - awaiting follow up CXR to assess any changes

## 2017-09-12 NOTE — PLAN OF CARE
09/12/17 1320   Discharge Assessment   Assessment Type Discharge Planning Assessment   Confirmed/corrected address and phone number on facesheet? Yes   Assessment information obtained from? Patient   Expected Length of Stay (days) 2   Communicated expected length of stay with patient/caregiver yes   Prior to hospitilization cognitive status: Alert/Oriented   Prior to hospitalization functional status: Assistive Equipment   Current cognitive status: Alert/Oriented   Current Functional Status: Needs Assistance   Lives With alone   Able to Return to Prior Arrangements no   Is patient able to care for self after discharge? Yes   Patient's perception of discharge disposition home or selfcare   Readmission Within The Last 30 Days no previous admission in last 30 days   Patient currently being followed by outpatient case management? No   Patient currently receives any other outside agency services? No   Equipment Currently Used at Home shower chair;walker, rolling   Do you have any problems affording any of your prescribed medications? No   Is the patient taking medications as prescribed? yes   Does the patient have transportation home? Yes   Transportation Available family or friend will provide   Does the patient receive services at the [facility]? No   Discharge Plan A Home   Discharge Plan B Home Health   Patient/Family In Agreement With Plan yes     CM met with patient in the in-pt hemodialysis center to obtain discharge planning assessment information. Patient resting quietly in bed while receiving HD treatment via LUE fistula. Patient was admitted with generalized muscle weakness. Consults noted for neph & PT/OT. Patient lives alone, has equipment to assist with ambulation, & [facility] HD [facility] Plan to discharge patient home alone or home with home health when medically stable. Patient denied the need for assistance with transportation at time of discharge. Patient stated that  he does not have a PCP & requested assistance getting established. Hospital follow up appointment scheduled for the patient with Dr. Suzanna Will on 9/21/17 at 1100 & appointment to establish care scheduled for the patient with Dr. Suzanna Will on 11/9/17 at 0900. Will continue to follow.

## 2017-09-12 NOTE — ASSESSMENT & PLAN NOTE
41 yo M with h/o HTN, ESRD on HD MWF, aflutter,HFrEF ( EF 30%), anemia of chronic disease, who presents for evaluation of generalized weakness associated with diarrhea (5-10BMs) x 1 day with aneurysmal degeneration of LUE AVF.    Patient tolerated HD today with no high venous pressures  Would avoid cannulating large pseudoaneurysms  Recommend excision of pseudoaneurysms with revision of AVF with conversion to AVG as an outpatient  Will make follow up with Dr. Strong as an outpatient

## 2017-09-12 NOTE — ASSESSMENT & PLAN NOTE
- nephrology consulted for HD, missed HD 09/11  - lytes non-emergent, CXR as below, no increased SOB from baseline, no O2 requirements  - hold TUMS  - vitamin d low, consult with nephrology re: initiation of supplementation

## 2017-09-12 NOTE — SUBJECTIVE & OBJECTIVE
Prescriptions Prior to Admission   Medication Sig Dispense Refill Last Dose    calcium carbonate (TUMS) 200 mg calcium (500 mg) chewable tablet Take 1 tablet (500 mg total) by mouth 3 (three) times daily with meals.       carvedilol (COREG) 3.125 MG tablet TK 1 T PO BID  3 6/23/2017    oxycodone-acetaminophen (PERCOCET) 5-325 mg per tablet Take 1 tablet by mouth every 4 (four) hours as needed for Pain. 30 tablet 0 Taking    promethazine (PHENERGAN) 25 MG tablet Take 1 tablet (25 mg total) by mouth every 4 (four) hours as needed for Nausea. 30 tablet 3 Taking       Review of patient's allergies indicates:   Allergen Reactions    Ciprofloxacin Hives    Iodine and iodide containing products Hives and Itching       Past Medical History:   Diagnosis Date    ESRD (end stage renal disease)     Hypertension     Renal disorder      Past Surgical History:   Procedure Laterality Date    DIALYSIS FISTULA CREATION       Family History     Problem Relation (Age of Onset)    Kidney disease Mother        Social History Main Topics    Smoking status: Never Smoker    Smokeless tobacco: Not on file    Alcohol use No    Drug use: No    Sexual activity: Not on file     Review of Systems   Constitutional: Positive for fatigue.   HENT: Negative for congestion, dental problem and drooling.    Eyes: Negative for pain, discharge and itching.   Respiratory: Negative for apnea.    Cardiovascular: Negative for chest pain.   Gastrointestinal: Positive for abdominal pain and diarrhea.   Endocrine: Negative for cold intolerance, heat intolerance and polydipsia.   Genitourinary: Negative for difficulty urinating.   Musculoskeletal: Negative for arthralgias and back pain.   Allergic/Immunologic: Negative for environmental allergies and food allergies.   Neurological: Negative for dizziness.   Hematological: Negative for adenopathy.   Psychiatric/Behavioral: Negative for agitation.     Objective:     Vital Signs (Most Recent):  Temp:  97.9 °F (36.6 °C) (09/12/17 1000)  Pulse: 102 (09/12/17 1330)  Resp: 18 (09/12/17 0758)  BP: (!) 131/58 (09/12/17 1330)  SpO2: (!) 92 % (09/12/17 0758) Vital Signs (24h Range):  Temp:  [97.9 °F (36.6 °C)-98.1 °F (36.7 °C)] 97.9 °F (36.6 °C)  Pulse:  [101-118] 102  Resp:  [18] 18  SpO2:  [91 %-96 %] 92 %  BP: ()/(46-88) 131/58     Weight: 105.2 kg (231 lb 14.4 oz)  Body mass index is 41.08 kg/m².    Physical Exam   Constitutional: He is oriented to person, place, and time. He appears well-developed and well-nourished.   HENT:   Head: Normocephalic and atraumatic.   Eyes: EOM are normal. Pupils are equal, round, and reactive to light.   Cardiovascular: An irregular rhythm present.   Pulses:       Radial pulses are 2+ on the right side, and 1+ on the left side.        Femoral pulses are 2+ on the right side, and 2+ on the left side.  No palpable distal pulses    Evidence of L brachiobasilic AVF, large pseudoaneurysmal dilationx2 with strong thrill no pulsatility   Pulmonary/Chest: Effort normal. He has no wheezes.   Abdominal: Soft. He exhibits no distension.   Musculoskeletal:   Diffuse edema bilateral lower extremities   Neurological: He is alert and oriented to person, place, and time.   Skin: Skin is warm and dry.       Significant Labs:  CBC:   Recent Labs  Lab 09/12/17 0518   WBC 11.19   RBC 3.29*   HGB 9.0*   HCT 26.8*      MCV 82   MCH 27.4   MCHC 33.6     CMP:   Recent Labs  Lab 09/12/17 0518   GLU 64*   CALCIUM 9.7   ALBUMIN 2.4*  2.4*   PROT 7.8      K 4.5   CO2 29   CL 91*   BUN 69*   CREATININE 11.7*   ALKPHOS 285*   ALT 19   AST 24   BILITOT 4.0*     Coagulation:   Recent Labs  Lab 09/12/17 0518   LABPROT 13.6*   INR 1.3*       Significant Diagnostics:

## 2017-09-12 NOTE — HPI
41 yo M with h/o HTN, ESRD on HD MWF, aflutter,HFrEF ( EF 30%), anemia of chronic disease, who presents for evaluation of generalized weakness associated with diarrhea (5-10BMs) x 1 day. Also presented with a leukocytosis of 15, blood cultures thus far negative; on rocephin and azithromycin.  Vascular surgery consulted for aneurysmal degeneration of LUE AVF. Patient is a poor historian, reports that he had LUE AVF placed 5 years prior possibly at [facility] vs [facility].  His last intervention at [facility] was 2015 with fistulogram and PTA of venous outflow stenosis.  He is followed by the [facility] in [facility].

## 2017-09-12 NOTE — ED NOTES
Transported to floor with transport team, with paperwork, with personal belongings, via stretcher, on monitor

## 2017-09-12 NOTE — ASSESSMENT & PLAN NOTE
- stool studies, no anti-motility agents until negative cdiff  - waiting on studies but pt able to pass soft, formed stools today.    - Difficulty collecting sample from patient (stools not liquified enough).

## 2017-09-12 NOTE — PT/OT/SLP PROGRESS
Physical Therapy      Livan Arevalo  MRN: [id number]    Patient not seen today secondary to Dialysis. Attempted to treat pt at 100pm.  Will follow-up with pt at the next scheduled tx session.    Maricruz Mayer, PT

## 2017-09-13 VITALS
RESPIRATION RATE: 16 BRPM | OXYGEN SATURATION: 96 % | SYSTOLIC BLOOD PRESSURE: 108 MMHG | BODY MASS INDEX: 41.09 KG/M2 | HEART RATE: 102 BPM | WEIGHT: 231.88 LBS | TEMPERATURE: 97 F | DIASTOLIC BLOOD PRESSURE: 69 MMHG | HEIGHT: 63 IN

## 2017-09-13 PROBLEM — D72.829 LEUKOCYTOSIS: Status: RESOLVED | Noted: 2017-09-12 | Resolved: 2017-09-13

## 2017-09-13 LAB
ALBUMIN SERPL BCP-MCNC: 2.6 G/DL
ALBUMIN SERPL BCP-MCNC: 2.6 G/DL
ALP SERPL-CCNC: 286 U/L
ALT SERPL W/O P-5'-P-CCNC: 15 U/L
ANION GAP SERPL CALC-SCNC: 11 MMOL/L
AST SERPL-CCNC: 20 U/L
BASOPHILS # BLD AUTO: 0.01 K/UL
BASOPHILS NFR BLD: 0.1 %
BILIRUB DIRECT SERPL-MCNC: 2 MG/DL
BILIRUB SERPL-MCNC: 2.5 MG/DL
BUN SERPL-MCNC: 44 MG/DL
C DIFF TOX GENS STL QL NAA+PROBE: NEGATIVE
CALCIUM SERPL-MCNC: 9.6 MG/DL
CHLORIDE SERPL-SCNC: 100 MMOL/L
CO2 SERPL-SCNC: 24 MMOL/L
CREAT SERPL-MCNC: 8.2 MG/DL
DIFFERENTIAL METHOD: ABNORMAL
EOSINOPHIL # BLD AUTO: 0.1 K/UL
EOSINOPHIL NFR BLD: 1.5 %
ERYTHROCYTE [DISTWIDTH] IN BLOOD BY AUTOMATED COUNT: 18.1 %
EST. GFR  (AFRICAN AMERICAN): 8.5 ML/MIN/1.73 M^2
EST. GFR  (NON AFRICAN AMERICAN): 7.4 ML/MIN/1.73 M^2
GLUCOSE SERPL-MCNC: 72 MG/DL
HCT VFR BLD AUTO: 27.6 %
HGB BLD-MCNC: 9.4 G/DL
LYMPHOCYTES # BLD AUTO: 1.6 K/UL
LYMPHOCYTES NFR BLD: 19.2 %
MCH RBC QN AUTO: 28 PG
MCHC RBC AUTO-ENTMCNC: 34.1 G/DL
MCV RBC AUTO: 82 FL
MONOCYTES # BLD AUTO: 0.4 K/UL
MONOCYTES NFR BLD: 4.5 %
NEUTROPHILS # BLD AUTO: 6.3 K/UL
NEUTROPHILS NFR BLD: 74.6 %
O+P STL TRI STN: NORMAL
PHOSPHATE SERPL-MCNC: 5.3 MG/DL
PLATELET # BLD AUTO: 166 K/UL
PMV BLD AUTO: 9.6 FL
POTASSIUM SERPL-SCNC: 4.4 MMOL/L
PROT SERPL-MCNC: 8.2 G/DL
RBC # BLD AUTO: 3.36 M/UL
SODIUM SERPL-SCNC: 135 MMOL/L
WBC # BLD AUTO: 8.47 K/UL
WBC #/AREA STL HPF: NORMAL /[HPF]

## 2017-09-13 PROCEDURE — G8979 MOBILITY GOAL STATUS: HCPCS | Mod: CJ

## 2017-09-13 PROCEDURE — 99217 PR OBSERVATION CARE DISCHARGE: CPT | Mod: ,,, | Performed by: PHYSICIAN ASSISTANT

## 2017-09-13 PROCEDURE — 85025 COMPLETE CBC W/AUTO DIFF WBC: CPT

## 2017-09-13 PROCEDURE — G0257 UNSCHED DIALYSIS ESRD PT HOS: HCPCS

## 2017-09-13 PROCEDURE — G0378 HOSPITAL OBSERVATION PER HR: HCPCS

## 2017-09-13 PROCEDURE — 97165 OT EVAL LOW COMPLEX 30 MIN: CPT

## 2017-09-13 PROCEDURE — 80069 RENAL FUNCTION PANEL: CPT

## 2017-09-13 PROCEDURE — 25000003 PHARM REV CODE 250: Performed by: PHYSICIAN ASSISTANT

## 2017-09-13 PROCEDURE — 80100016 HC MAINTENANCE HEMODIALYSIS

## 2017-09-13 PROCEDURE — 90935 HEMODIALYSIS ONE EVALUATION: CPT | Mod: ,,, | Performed by: INTERNAL MEDICINE

## 2017-09-13 PROCEDURE — 84075 ASSAY ALKALINE PHOSPHATASE: CPT

## 2017-09-13 PROCEDURE — 36415 COLL VENOUS BLD VENIPUNCTURE: CPT

## 2017-09-13 PROCEDURE — G8988 SELF CARE GOAL STATUS: HCPCS | Mod: CI

## 2017-09-13 PROCEDURE — G8987 SELF CARE CURRENT STATUS: HCPCS | Mod: CJ

## 2017-09-13 PROCEDURE — G8980 MOBILITY D/C STATUS: HCPCS | Mod: CI

## 2017-09-13 PROCEDURE — G8989 SELF CARE D/C STATUS: HCPCS | Mod: CJ

## 2017-09-13 PROCEDURE — 93990 DOPPLER FLOW TESTING: CPT | Performed by: SURGERY

## 2017-09-13 PROCEDURE — 97161 PT EVAL LOW COMPLEX 20 MIN: CPT

## 2017-09-13 RX ORDER — AZITHROMYCIN 250 MG/1
TABLET, FILM COATED ORAL
Qty: 6 TABLET | Refills: 0 | Status: CANCELLED | OUTPATIENT
Start: 2017-09-14 | End: 2017-09-18

## 2017-09-13 RX ORDER — SODIUM CHLORIDE 9 MG/ML
INJECTION, SOLUTION INTRAVENOUS ONCE
Status: DISCONTINUED | OUTPATIENT
Start: 2017-09-13 | End: 2017-09-13 | Stop reason: HOSPADM

## 2017-09-13 RX ORDER — LOPERAMIDE HYDROCHLORIDE 2 MG/1
2 CAPSULE ORAL 4 TIMES DAILY PRN
Qty: 30 CAPSULE | Refills: 0 | Status: SHIPPED | OUTPATIENT
Start: 2017-09-13 | End: 2017-09-23

## 2017-09-13 RX ORDER — AZITHROMYCIN 250 MG/1
250 TABLET, FILM COATED ORAL DAILY
Qty: 3 TABLET | Refills: 0 | Status: SHIPPED | OUTPATIENT
Start: 2017-09-14 | End: 2017-09-17

## 2017-09-13 RX ORDER — SODIUM CHLORIDE 9 MG/ML
INJECTION, SOLUTION INTRAVENOUS
Status: DISCONTINUED | OUTPATIENT
Start: 2017-09-13 | End: 2017-09-13 | Stop reason: HOSPADM

## 2017-09-13 RX ORDER — LOPERAMIDE HYDROCHLORIDE 2 MG/1
2 CAPSULE ORAL 4 TIMES DAILY PRN
Status: DISCONTINUED | OUTPATIENT
Start: 2017-09-13 | End: 2017-09-13 | Stop reason: HOSPADM

## 2017-09-13 RX ADMIN — AZITHROMYCIN 250 MG: 250 TABLET, FILM COATED ORAL at 08:09

## 2017-09-13 RX ADMIN — LOPERAMIDE HYDROCHLORIDE 2 MG: 2 CAPSULE ORAL at 02:09

## 2017-09-13 NOTE — PLAN OF CARE
Problem: Patient Care Overview  Goal: Plan of Care Review  Outcome: Ongoing (interventions implemented as appropriate)  Discharge summary reviewed with pt. Pt verbalized understanding and all questions were answered. IV catheter removed and catheter tip intact. Pt sitting up on side of bed eating dinner. States he will call a family member to come pick him up. Will continue to monitor in the meantime.

## 2017-09-13 NOTE — PROGRESS NOTES
JOSE A Fisher with [facility] called and stated to proceed with d/c. Pt to f/u with o/p dialysis on Friday. Will d/c pt upon arrival back to unit.

## 2017-09-13 NOTE — ASSESSMENT & PLAN NOTE
- H/H 9.4 & 27.6 on discharge. Consistent with previous values.    - No events of falling.  Pt does not endorse dizziness, lightheadedness, weakness currently.

## 2017-09-13 NOTE — PLAN OF CARE
Problem: Occupational Therapy Goal  Goal: Occupational Therapy Goal  Goals to be met by: 9/23/17     Patient will increase functional independence with ADLs by performing:    UE Dressing with Set-up Assistance.  LE Dressing with Modified [place].  Grooming while standing at sink with Supervision.  Toileting from toilet with Supervision for hygiene and clothing management.   Stand pivot transfers with Modified [place].  Toilet transfer to toilet with Modified [place].    Outcome: Ongoing (interventions implemented as appropriate)  OT evaluation completed. POC established.  JODI Blackman  9/13/2017

## 2017-09-13 NOTE — PLAN OF CARE
Problem: Physical Therapy Goal  Goal: Physical Therapy Goal  Goals to be met by: 2017     Patient will increase functional independence with mobility by performin. Sit to stand transfer with Supervision  2. Bed to chair transfer with Supervision using QC or RW  3. Gait  x 200 feet with Supervision using QC or RW    Outcome: Ongoing (interventions implemented as appropriate)  Upon initial PT evaluation, pt presents with decreased endurance, impaired functional mobility, and imbalance. Pt completed bed mobility with mod (I), transfers with supervision, and ambulated 120' with SBA using RW. PTA, pt was mod (I) for mobility using QC or RW and (I) with ADLs. Pt would benefit from skilled PT services to address these deficits and improve return to PLOF. Anticipate d/c to outpatient PT.     Oly Summers DPT, PT  2017

## 2017-09-13 NOTE — PROGRESS NOTES
Patient arrived VIA stretcher and was transferred to bed without complication.  AV Fistula to DESTINEY was then prepped and cannulated with 15 gauge needles as per protocol.  Both lines aspirate and flush without resistance or infiltration.  Patient denies any pain.  Maintenance dialysis was then initiated.    However, soon after dialysis began, patient was found sitting up in bed.  Patient c.o. Blood oozing from an old puncture site.  Brought band - aide to bedside.  Patient began to pick at tape over graft and ignored this nurse's advice to leave the tape alone.  Venous pressure shot above 300, and large hematoma discovered on arm.  Dialysis stopped.  Dr. Castano called to bedside.  Began to return blood through arterial needle.  Dr. Castano gave order to discontinue treatment. Needles were removed and area dressed with gauze and tape.

## 2017-09-13 NOTE — PLAN OF CARE
[facility]    HOME HEALTH ORDERS  FACE TO FACE ENCOUNTER    Patient Name: Livan Arevalo  YOB: 1975    PCP: Suzanna Will MD   [facility] [address]  PCP Phone Number: [phone]  PCP Fax: [phone]    Encounter Date: 09/13/2017    Admit to Home Health    Diagnoses:  Active Hospital Problems    Diagnosis  POA    *Generalized muscle weakness [M62.81]  Yes    Abnormal chest x-ray [R93.8]  Yes    Hypoglycemia, unspecified [E16.2]  Yes    Diarrhea [R19.7]  Yes    Hyperbilirubinemia [E80.6]  Yes    Chronic systolic heart failure [I50.22]  Yes     Chronic    Mass of right chest wall [R22.2]  Yes     Chronic     Biopsy 04/24/17 demonstrated no identified neoplasm, extensive dystrophic calcifications and degenerative material; planning for repeat outpatient IR biopsy given high suspicion of neoplastic process      Anemia of chronic renal failure [N18.9, D63.1]  Yes     Chronic    ESRD (end stage renal disease) [N18.6]  Yes     Chronic    Essential hypertension [I10]  Yes     Chronic      Resolved Hospital Problems    Diagnosis Date Resolved POA    Leukocytosis [D72.829] 09/13/2017 Yes       Future Appointments  Date Time Provider Department Center   9/21/2017 11:00 AM Suzanna Will MD [facility]   11/9/2017 9:00 AM Suzanna Will MD [facility]     Follow-up Information     Suzanna Will MD On 9/21/2017.    Specialty:  Internal Medicine  Why:  at 11:00 AM; hospital follow up appointment  Contact information:  [address]  [facility] [address]  [phone]             Suzanna Will MD On 11/9/2017.    Specialty:  Internal Medicine  Why:  at 9:00 AM; appointment to establish care with a new PCP  Contact information:  [address]  [facility] [address]  [phone]             James Strong MD. Schedule an appointment as soon as possible for a visit in 1 week.    Specialty:  Vascular  Surgery  Why:  For evaluation and recommendations for graft stenosis  Contact information:  [address]  [facility] [address]  [phone]                     I have seen and examined this patient face to face today. My clinical findings that support the need for the home health skilled services and home bound status are the following:  Weakness/numbness causing balance and gait disturbance due to Weakness/Debility making it taxing to leave home.    Allergies:  Review of patient's allergies indicates:   Allergen Reactions    Ciprofloxacin Hives    Iodine and iodide containing products Hives and Itching     Pt states he is not allergic to iodine       Diet: cardiac diet, renal diet and 2 gram sodium diet    Activities: activity as tolerated    Nursing:   SN to complete comprehensive assessment including routine vital signs. Instruct on disease process and s/s of complications to report to MD. Review/verify medication list sent home with the patient at time of discharge  and instruct patient/caregiver as needed. Frequency may be adjusted depending on start of care date.    Notify MD if SBP > 160 or < 90; DBP > 90 or < 50; HR > 120 or < 50; Temp > 101;       CONSULTS:    Physical Therapy to evaluate and treat. Evaluate for home safety and equipment needs; Establish/upgrade home exercise program. Perform / instruct on therapeutic exercises, gait training, transfer training, and Range of Motion.  Occupational Therapy to evaluate and treat. Evaluate home environment for safety and equipment needs. Perform/Instruct on transfers, ADL training, ROM, and therapeutic exercises.    MISCELLANEOUS CARE:  N/A    WOUND CARE ORDERS  n/a    Medications: Review discharge medications with patient and family and provide education.      Current Discharge Medication List      START taking these medications    Details   azithromycin (Z-KRISTA) 250 MG tablet Take 1 tablet (250 mg total) by mouth once daily.  Qty: 3 tablet, Refills: 0       loperamide (IMODIUM) 2 mg capsule Take 1 capsule (2 mg total) by mouth 4 (four) times daily as needed for Diarrhea.  Qty: 30 capsule, Refills: 0         CONTINUE these medications which have NOT CHANGED    Details   calcium carbonate (TUMS) 200 mg calcium (500 mg) chewable tablet Take 1 tablet (500 mg total) by mouth 3 (three) times daily with meals.      carvedilol (COREG) 3.125 MG tablet TK 1 T PO BID  Refills: 3      oxycodone-acetaminophen (PERCOCET) 5-325 mg per tablet Take 1 tablet by mouth every 4 (four) hours as needed for Pain.  Qty: 30 tablet, Refills: 0      promethazine (PHENERGAN) 25 MG tablet Take 1 tablet (25 mg total) by mouth every 4 (four) hours as needed for Nausea.  Qty: 30 tablet, Refills: 3             I certify that this patient is confined to his home and needs physical therapy and occupational therapy.

## 2017-09-13 NOTE — PT/OT/SLP EVAL
"Occupational Therapy  Evaluation    Livan Arevalo   MRN: [id number]   Admitting Diagnosis: Generalized muscle weakness    OT Date of Treatment: 09/13/17 Co-eval with PT  OT Start Time: 0810  OT Stop Time: 0832  OT Total Time (min): 22 min    Billable Minutes:  Evaluation 22 min    Diagnosis: Generalized muscle weakness       Past Medical History:   Diagnosis Date    ESRD (end stage renal disease)     Hypertension     Renal disorder       Past Surgical History:   Procedure Laterality Date    DIALYSIS FISTULA CREATION         Referring physician: Gilmer Marley MD  Date referred to OT: 9/13/17    General Precautions: Standard, fall  Orthopedic Precautions: N/A  Braces: N/A    Do you have any cultural, spiritual, Yazdanism conflicts, given your current situation?: None     Patient History:  Living Environment  Lives With: alone  Living Arrangements: apartment  Home Accessibility:  (no concerns)  Home Layout: Able to live on 1st floor  Transportation Available: family or friend will provide  Living Environment Comment: Pt states he lives in a 1st floor apartment with 0 CYDNEY. PTA, he was using a QC for mobility in home and in community, and (I) for ADLs.He states he has family close by to assist if needed. He drives himself to dialysis weekly. He has a 3 y/o son he spends time with daily, however does not live with him.  Equipment Currently Used at Home: cane, quad, walker, rolling, shower chair    Prior level of function:   Bed Mobility/Transfers: needs device  Grooming: independent  Bathing: independent  Upper Body Dressing: independent  Lower Body Dressing: independent  Toileting: independent  Home Management Skills: independent        Dominant hand: right    Subjective:  Communicated with RN prior to session.  Pt agreeable to OT/PT evaluation.  Chief Complaint: "They just woke me up, I'm sorry y'all."  Patient/Family stated goals: Return home    Pain/Comfort  Pain Rating 1: 0/10  Pain Rating Post-Intervention 1: " 0/10    Objective:  Patient found with: telemetry    Cognitive Exam:  Oriented to: Person, Place, Time and Situation  Follows Commands/attention: Follows multistep  commands  Communication: clear/fluent  Memory:  No Deficits noted  Safety awareness/insight to disability: intact  Coping skills/emotional control: Appropriate to situation    Visual/perceptual:  Intact    Physical Exam:  Postural examination/scapula alignment: Head forward  Skin integrity: Visible skin intact  Edema: None noted     Sensation:   Intact    Upper Extremity Range of Motion:  Right Upper Extremity: WFL  Left Upper Extremity: WFL    Upper Extremity Strength:  Right Upper Extremity: WFL  Left Upper Extremity: WFL   Strength: WFL bilaterally    Fine motor coordination:   Intact    Gross motor coordination: WFL    Functional Mobility:  Bed Mobility:  Scooting/Bridging: Modified Independent (in sitting scooting to EOB)    Transfers:  Sit <> Stand Assistance: Supervision (from EOB)  Sit <> Stand Assistive Device: Rolling Walker    Functional Ambulation: Pt performed mobility within room and down hallway with SBA and RW. No evidence of LOB, and pt traveled household distance.    Activities of Daily Living:    UE Dressing Level of Assistance: Minimum assistance (to don gown and 2nd gown as jacket)    LE Dressing Level of Assistance: Supervision (to don sandals while seated EOB)    Grooming Position: Seated, EOB  Grooming Level of Assistance: Stand by assistance (to wash face while seated EOB and to perform hand hygiene while standing)    Balance:   Static Sit: (I) sitting EOB  Dynamic Sit: Supervision sitting EOB  Static Stand: SBA with RW  Dynamic stand: SBA with RW    Therapeutic Activities and Exercises:  - Pt educated on OT role and POC, as well as D/C recs.    AM-PAC 6 CLICK ADL  How much help from another person does this patient currently need?  1 = Unable, Total/Dependent Assistance  2 = A lot, Maximum/Moderate Assistance  3 = A little,  "Minimum/Contact Guard/Supervision  4 = None, Modified [place]/Independent    Putting on and taking off regular lower body clothing? : 3  Bathing (including washing, rinsing, drying)?: 3  Toileting, which includes using toilet, bedpan, or urinal? : 3  Putting on and taking off regular upper body clothing?: 3  Taking care of personal grooming such as brushing teeth?: 4  Eating meals?: 4  Total Score: 20    AM-PAC Raw Score CMS "G-Code Modifier Level of Impairment Assistance   6 % Total / Unable   7 - 9 CM 80 - 100% Maximal Assist   10-14 CL 60 - 80% Moderate Assist   15 - 19 CK 40 - 60% Moderate Assist   20 - 22 CJ 20 - 40% Minimal Assist   23 CI 1-20% SBA / CGA   24 CH 0% Independent/ Mod I       Patient left seated EOB with all lines intact and call button in reach    Assessment:  Livan Arevalo is a 42 y.o. male with a medical diagnosis of Generalized muscle weakness and presents with increased time required for all ADL/mobility tasks. He tolerated session well and was pleasant. He would benefit from skilled OT services in acute setting, as well as through HH at D/c to maximize return to PLOF as well as to valued roles as caretaker to self and father.    Rehab identified problem list/impairments: Rehab identified problem list/impairments: impaired functional mobilty, impaired balance, impaired endurance, impaired self care skills, weakness    Rehab potential is good.    Activity tolerance: Good    Discharge recommendations: Discharge Facility/Level Of Care Needs: home health OT, home health PT     Barriers to discharge: Barriers to Discharge: Decreased caregiver support (pt lives alone)    Equipment recommendations: none     GOALS:    Occupational Therapy Goals        Problem: Occupational Therapy Goal    Goal Priority Disciplines Outcome Interventions   Occupational Therapy Goal     OT, PT/OT Ongoing (interventions implemented as appropriate)    Description:  Goals to be met by: 9/23/17     Patient will " increase functional independence with ADLs by performing:    UE Dressing with Set-up Assistance.  LE Dressing with Modified [place].  Grooming while standing at sink with Supervision.  Toileting from toilet with Supervision for hygiene and clothing management.   Stand pivot transfers with Modified [place].  Toilet transfer to toilet with Modified [place].                      PLAN:  Patient to be seen 3 x/week to address the above listed problems via self-care/home management, therapeutic activities, therapeutic exercises  Plan of Care expires: 10/13/17  Plan of Care reviewed with: patient    OT G-codes  Functional Assessment Tool Used: [facility]  Score: 20  Functional Limitation: Self care  Self Care Current Status (): CJ  Self Care Goal Status (): CI    JODI Flores  09/13/2017

## 2017-09-13 NOTE — ASSESSMENT & PLAN NOTE
"- CXR: "Cardiomegaly with new bilateral interstitial and alveolar opacities, favored to represent pulmonary edema. Pneumonia or alveolar hemorrhage could have a similar appearance."  - Treated with rocephin 1g daily x 2 days and azithromycin 250 mg daily for 2 days for CAP, (allergy to cipro, excluding moxi).  WBC normalized after 1 day of treatment.  - instructed patient to continue azithromycin to complete 5 day course.  - repeat CXR showed improvement.    - BCx show NGTD  "

## 2017-09-13 NOTE — ASSESSMENT & PLAN NOTE
- C. Diff negative  - Continues to pass loose stools.    - No fevers, abd pain, nausea.    - communicated plan with patient and prescribed imodium.

## 2017-09-13 NOTE — PROGRESS NOTES
Dialysis completed. Needles removed from left upper fistula with pressure held to needle sites for 10 minutes each with hemostasis achieved. Gauze and tape to sites. Patient dialyzed for 3 hours with fluid removal of 1 liter. Tolerated well with stable vital signs. Report called to floor to MARCK Santiago.

## 2017-09-13 NOTE — PROGRESS NOTES
Plan of care reviewed with patient. Pt reports still having loose diarrhea. Pt refusing tele at this time after explaining the importance of monitoring. Peripheral IV saline locked. Contact precautions maintained. Instructed to call for assistance as needed.

## 2017-09-13 NOTE — PROGRESS NOTES
Pt refused telemetry monitoring after being instructed on benefits of wearing it. Pt states that he can't sleep with it on. Will continue to monitor closely.

## 2017-09-13 NOTE — PROGRESS NOTES
Patient returned back to the unit via transport from Vascular Lab. Employee returning patient stated that the patient had an episode of diarrhea while in the clinic. Staff RN made employee aware that it was to be expected for a patient with C. Diff. Employee stated that she was unaware that the patient had C. Diff. I spoke with the Unit [place] who stated that she printed a ticket to ride and wrote in red pen and with explanation marks that the patient was on special contact precautions due to C.diff. The ticket to ride did not return with the patient from vascular lab. Employee left unit and called back within 5 minutes to state that the clinic needs to be shut down now to be cleaned. I made her aware that the ticket to ride noted that the patient was on precautions for C.diff.

## 2017-09-13 NOTE — DISCHARGE SUMMARY
[facility] Hospital Medicine  Discharge Summary      Patient Name: Livan Arevalo  MRN: [id number]  Admission Date: 9/11/2017  Hospital Length of Stay: 0 days  Discharge Date and Time:  09/13/2017 4:55 PM  Attending Physician: Jorge Mendoza MD   Discharging Provider: Joanna Schafer PA-C  Primary Care Provider: Suzanna Will MD  Hospital Medicine Team: [facility] Joanna Schafer PA-C    HPI:   Livan Arevalo is a 42M with PMHx of HTN, ESRD on HD MWF, aflutter, systolic dysfuntion EF 30%, anemia of chronic disease, mass of right chest wall,  who presents for evaluation of generalized weakness associated with diarrhea (5-10BMs) x 1 day and subsequently missed HD session today. The patient denies eating any note-worhy cuisines, reports recent travel to [place] 2 weeks ago, no recent sick contacts, no recent antibiotics. He reports nausea and emesis x1. Last meal was this AM PTA. He denies abdominal pain, hematochezia, melena, bloody emesis. fever, chills, cough, SOB at baseline, HA, confusion, CP, palpitations, wheeze.    Of note, patient recently admitted 05/14-17 for similar presentation, intially thought to be sepsis, but BCx negative and patient afebrile during stay, he improved s/p HD.    * No surgery found *      Indwelling Lines/Drains at time of discharge:   Lines/Drains/Airways     Drain                 Hemodialysis AV Fistula Left upper arm -- days              Hospital Course:   Placed in Observation for evaluation of weakness and diarrhea in patient with ESRD and missed dialysis. Nephrology consulted and patient underwent HD 9/12. Vascular Surgery consulted and underwent vascular U/S of graft showing patency. During HD on day of discharge (9/13), patient's fistula became infiltrated. Spoke with nephrology who recommended patient resume regular HD schedule Friday and follow up with vascular surgery as noted. Noted to have abnormal CXR, leukocytosis and tachycardia on  admission, started on empiric ABX. Blood Cx NGTD. Repeat CXR showed mild improvement today. Deescalated abx to azithromycin for total of 5 days. Patient also with complaints of diarrhea and C.Diff PCR negative. Started on imodium PRN. Patient discharged home in stable condition with outpatient follow up PCP and arrangements for home health.      Consults:   Consults         Status Ordering Provider     Inpatient consult to Nephrology  Once     Provider:  (Not yet assigned)    Completed RYAN ABEL     Inpatient consult to Vascular Surgery  Once     Provider:  (Not yet assigned)    Completed PAM LIND          Significant Diagnostic Studies: Labs:   CMP   Recent Labs  Lab 09/11/17  2136 09/12/17  0518 09/13/17  0502    138 135*   K 4.9 4.5 4.4   CL 93* 91* 100   CO2 28 29 24   GLU 69* 64* 72   BUN 60* 69* 44*   CREATININE 10.9* 11.7* 8.2*   CALCIUM 9.8 9.7 9.6   PROT  --  7.8 8.2   ALBUMIN 2.6* 2.4*  2.4* 2.6*  2.6*   BILITOT  --  4.0* 2.5*   ALKPHOS  --  285* 286*   AST  --  24 20   ALT  --  19 15   ANIONGAP 18* 18* 11   ESTGFRAFRICA 6.0* 5.5* 8.5*   EGFRNONAA 5.2* 4.8* 7.4*   , CBC   Recent Labs  Lab 09/12/17  0518 09/13/17  0502   WBC 11.19 8.47   HGB 9.0* 9.4*   HCT 26.8* 27.6*    166    and All labs within the past 24 hours have been reviewed    Pending Diagnostic Studies:     None        Final Active Diagnoses:    Diagnosis Date Noted POA    PRINCIPAL PROBLEM:  Generalized muscle weakness [M62.81] 04/22/2017 Yes    Abnormal chest x-ray [R93.8] 09/12/2017 Yes    Hypoglycemia, unspecified [E16.2] 09/12/2017 Yes    Diarrhea [R19.7] 09/12/2017 Yes    Hyperbilirubinemia [E80.6] 09/12/2017 Yes    Chronic systolic heart failure [I50.22] 04/24/2017 Yes     Chronic    Mass of right chest wall [R22.2] 04/22/2017 Yes     Chronic    Anemia of chronic renal failure [N18.9, D63.1] 04/22/2017 Yes     Chronic    ESRD (end stage renal disease) [N18.6] 03/18/2014 Yes     Chronic     "Essential hypertension [I10] 03/18/2014 Yes     Chronic      Problems Resolved During this Admission:    Diagnosis Date Noted Date Resolved POA    Leukocytosis [D72.829] 09/12/2017 09/13/2017 Yes      * Generalized muscle weakness    - likely 2/2 diarrhea and missed HD.  Weakness improved after HD.  Pt is able to walk around and sit up in bed.    - Assessed by PT/OT and HH recommended.  - CM/SW to set up home therapy program for weakness      Hyperbilirubinemia    - on admit Tbili 5.2, direct 3.8.,, Alk Phos  364, possibly secondary to hepatic congestion. Ultrasound supports differential.    - unclear significance, history of elevated Tbili to 5.3 during last admission with similar complaints  - Bili trended down. No nausea or vomiting.    - Follow up PCP and consider referral to hepatology for further work up      Diarrhea    - C. Diff PCR negative and okay to remove contact precautions  - Continues to have loose stools.    - No fevers, abd pain, nausea.    - okay to use imodium PRN      Hypoglycemia, unspecified    - hypoglycemic on admit, since resolved  - patient tolerating diet.       Abnormal chest x-ray    - CXR: "Cardiomegaly with new bilateral interstitial and alveolar opacities, favored to represent pulmonary edema. Pneumonia or alveolar hemorrhage could have a similar appearance."  - Treated with rocephin 1g daily x 2 days and azithromycin 250 mg daily for 2 days for CAP, (allergy to cipro, excluding moxi).  WBC normalized after 1 day of treatment.  - instructed patient to continue azithromycin to complete 5 day course.  - repeat CXR showed improvement.    - BCx show NGTD (prelim)  - F/U PCP       Chronic systolic heart failure    - Ultrasound of Abdomen showed "perisplenic collaterals and a minimal volume of ascites, suggestive of portal hypertension", " distention of hepatic veins suggesting elevated right heart pressure", and HSM.    - No current LE edema noted on interview  - Pt denies SOB, " chest tightness.  No LE edema on exam.      Anemia of chronic renal failure    - H/H 9.4 & 27.6 on discharge. Consistent with previous values.    - No events of falling.  Pt does not endorse dizziness, lightheadedness, weakness currently.        Mass of right chest wall    - No further issues at this time      ESRD (end stage renal disease)    - nephrology consulted for HD, missed HD 09/11. HD completed 9/12.   - During HD on 9/13, fistula infiltrated and HD unable to be completed.  Spoke with Dr. Castano  who recommended pt to continue with HD regimen on Friday in dialysis clinic and follow up with vascular surgery.     - Vascular surgery assessed access.  Discharge instructions to f/u with Dr. Strong as outpatient.        Essential hypertension    - coreg 3.125 BID, controlled on admit  - HR ranges from 100-110 on most readings (baseline)  - instructed to follow up with PCP          Discharged Condition: Good    Disposition: Home or Self Care    Follow Up:  Follow-up Information     Suzanna Will MD On 9/21/2017.    Specialty:  Internal Medicine  Why:  at 11:00 AM; hospital follow up appointment  Contact information:  [address]  [facility] [address]  [phone]             Suzanna Will MD On 11/9/2017.    Specialty:  Internal Medicine  Why:  at 9:00 AM; appointment to establish care with a new PCP  Contact information:  [address]  [facility] [id number] [phone]             James Strong MD. Schedule an appointment as soon as possible for a visit in 1 week.    Specialty:  Vascular Surgery  Why:  For evaluation and recommendations for graft stenosis  Contact information:  [address]  [facility] [address]  [phone]                 Patient Instructions:     Diet Cardiac     Diet renal     Diet Low Sodium, 2gm     Activity as tolerated     Call MD for:  temperature >100.4     Call MD for:  redness, tenderness, or signs of infection (pain, swelling, redness, odor or  green/yellow discharge around incision site)     Call MD for:  persistent dizziness, light-headedness, or visual disturbances       Medications:  Reconciled Home Medications:   Current Discharge Medication List      START taking these medications    Details   azithromycin (Z-KRISTA) 250 MG tablet Take 1 tablet (250 mg total) by mouth once daily.  Qty: 3 tablet, Refills: 0      loperamide (IMODIUM) 2 mg capsule Take 1 capsule (2 mg total) by mouth 4 (four) times daily as needed for Diarrhea.  Qty: 30 capsule, Refills: 0         CONTINUE these medications which have NOT CHANGED    Details   calcium carbonate (TUMS) 200 mg calcium (500 mg) chewable tablet Take 1 tablet (500 mg total) by mouth 3 (three) times daily with meals.      carvedilol (COREG) 3.125 MG tablet TK 1 T PO BID  Refills: 3      oxycodone-acetaminophen (PERCOCET) 5-325 mg per tablet Take 1 tablet by mouth every 4 (four) hours as needed for Pain.  Qty: 30 tablet, Refills: 0      promethazine (PHENERGAN) 25 MG tablet Take 1 tablet (25 mg total) by mouth every 4 (four) hours as needed for Nausea.  Qty: 30 tablet, Refills: 3           Time spent on the discharge of patient: 32 minutes    HOS POC IP DISCHARGE SUMMARY    Joanna Schafer PA-C  Department of Hospital Medicine  [facility]

## 2017-09-13 NOTE — ASSESSMENT & PLAN NOTE
- coreg 3.125 BID, controlled on admit  - HR ranges from 100-110 on most readings (baseline)  - instructed to follow up with PCP

## 2017-09-13 NOTE — PLAN OF CARE
SW attempted to meet with pt in his room to discuss HH options. The pt was not in the room. SW attempted to call the pt; no answer.    Martha Vasquez, DINAH  [id number]

## 2017-09-13 NOTE — ASSESSMENT & PLAN NOTE
- likely 2/2 diarrhea and missed HD.  Weakness improved after HD.  Pt is able to walk around and sit up in bed.    - Assessed by PT/OT and HH recommended.  - CM/SW to set up home therapy program for weakness

## 2017-09-13 NOTE — PT/OT/SLP EVAL
"Physical Therapy  Evaluation    Livan Arevalo   MRN: [id number]   Admitting Diagnosis: Generalized muscle weakness    PT Received On: 09/13/17  PT Start Time: 0810     PT Stop Time: 0830    PT Total Time (min): 20 min       Billable Minutes:  Evaluation  20 minutes    Diagnosis: Generalized muscle weakness    Past Medical History:   Diagnosis Date    ESRD (end stage renal disease)     Hypertension     Renal disorder       Past Surgical History:   Procedure Laterality Date    DIALYSIS FISTULA CREATION         Referring physician: Jam Ruano PA-C  Date referred to PT: 9/12/2017    General Precautions: Standard, fall  Orthopedic Precautions: N/A   Braces: N/A       Do you have any cultural, spiritual, Latter-day conflicts, given your current situation?: None stated     Patient History:  Lives With: alone  Living Arrangements: apartment  Home Accessibility:  (no concerns)  Transportation Available: family or friend will provide  Living Environment Comment: Pt reports living alone in 1st floor apartment with 0 CYDNEY. Bathroom has tub/shower combo with shower chair. PTA, pt was mod (I) for mobility using QC or RW and (I) with ADLs. Pt drives himself weekly to dialysis. Pt does not work. Pt enjoys spending time with his 3 yr son. Pt reports good support from family and friends upon DC. Pt currently receiving HHPT.   Equipment Currently Used at Home: cane, quad, shower chair, walker, rolling    Previous Level of Function:  Ambulation Skills: needs device  Transfer Skills: needs device  ADL Skills: independent  Work/Leisure Activity: independent    Subjective:  Communicated with RN prior to session.  Pt agreeable to PT/OT evaluation. Pt states "I'm sorry. I'm moving slow. I am just waking up and not a morning person."   Chief Complaint: fatigue   Patient goals: return home     Pain/Comfort  Pain Rating 1: 0/10  Pain Rating Post-Intervention 1: 0/10      Objective:   Patient found with: telemetry     Cognitive " Exam:  Oriented to: Person, Place, Time and Situation    Follows Commands/attention: Follows multistep  commands  Communication: clear/fluent  Safety awareness/insight to disability: intact    Physical Exam:  Postural examination/scapula alignment: No postural abnormalities identified    Skin integrity: Visible skin intact  Edema: Mild in BLE    Sensation:   Intact     Lower Extremity Range of Motion:  Right Lower Extremity: WFL  Left Lower Extremity: WFL    Lower Extremity Strength:  Right Lower Extremity: WFL  Left Lower Extremity: WFL     Fine motor coordination:  Intact    Gross motor coordination: WFL    Functional Mobility:  Bed Mobility:  Scooting/Bridging: Modified Independent  Supine to Sit: Modified Independent    Transfers:  Sit <> Stand Assistance: Supervision  Sit <> Stand Assistive Device: Rolling Walker    Gait:   Gait Distance: 120'   Assistance 1: Stand by Assistance  Gait Assistive Device: Rolling walker  Gait Pattern: reciprocal  Gait Deviation(s): decreased grace, decreased step length, decreased stride length    Balance:   Static Sit: GOOD: Takes MODERATE challenges from all directions  Dynamic Sit: GOOD: Maintains balance through MODERATE excursions of active trunk movement  Static Stand: GOOD-: Takes MODERATE challenges from all directions inconsistently  Dynamic stand: GOOD-: Needs SUPERVISION only during gait and able to self right with moderate     Therapeutic Activities and Exercises:  Pt educated on role of PT and POC/goals for therapy as well as safety with mobility. Pt verbalized understanding. Pt expressed no further concerns/questions.   · Pt educated pt on incr OOB activity including sitting in bedside chair majority of day and amb with nsg and PCT.   · **Mobility Technician appropriate to perform: out of bed, up to chair, back to bed, ambulation in room, ambulation in hallway.  · Updated white board with appropriate PT information; notified nsg       AM-PAC 6 CLICK MOBILITY  How  much help from another person does this patient currently need?   1 = Unable, Total/Dependent Assistance  2 = A lot, Maximum/Moderate Assistance  3 = A little, Minimum/Contact Guard/Supervision  4 = None, Modified Davidson/Independent    Turning over in bed (including adjusting bedclothes, sheets and blankets)?: 4  Sitting down on and standing up from a chair with arms (e.g., wheelchair, bedside commode, etc.): 4  Moving from lying on back to sitting on the side of the bed?: 4  Moving to and from a bed to a chair (including a wheelchair)?: 4  Need to walk in hospital room?: 3  Climbing 3-5 steps with a railing?: 3  Total Score: 22     AM-PAC Raw Score CMS G-Code Modifier Level of Impairment Assistance   6 % Total / Unable   7 - 9 CM 80 - 100% Maximal Assist   10 - 14 CL 60 - 80% Moderate Assist   15 - 19 CK 40 - 60% Moderate Assist   20 - 22 CJ 20 - 40% Minimal Assist   23 CI 1-20% SBA / CGA   24 CH 0% Independent/ Mod I     Patient left sitting EOB with all lines intact and call button in reach.    Assessment:   Livan Arevalo is a 42 y.o. male with a medical diagnosis of Generalized muscle weakness. Upon initial PT evaluation, pt presents with decreased endurance, impaired functional mobility, and imbalance. Pt completed bed mobility with mod (I), transfers with supervision, and ambulated 120' with SBA using RW. PTA, pt was mod (I) for mobility using QC or RW and (I) with ADLs. Pt would benefit from skilled PT services to address these deficits and improve return to PLOF. Anticipate d/c to outpatient PT.     Rehab identified problem list/impairments: Rehab identified problem list/impairments: impaired endurance, impaired functional mobilty, impaired self care skills, impaired balance    Rehab potential is good.    Activity tolerance: Good    Discharge recommendations: outpatient PT    Barriers to discharge: Barriers to Discharge: Decreased caregiver support    Equipment recommendations: Equipment Needed  After Discharge: none     GOALS:    Physical Therapy Goals        Problem: Physical Therapy Goal    Goal Priority Disciplines Outcome Goal Variances Interventions   Physical Therapy Goal     PT/OT, PT Ongoing (interventions implemented as appropriate)     Description:  Goals to be met by: 2017     Patient will increase functional independence with mobility by performin. Sit to stand transfer with Supervision  2. Bed to chair transfer with Supervision using QC or RW  3. Gait  x 200 feet with Supervision using QC or RW                      PLAN:    Patient to be seen 2 x/week to address the above listed problems via gait training, therapeutic activities, therapeutic exercises  Plan of Care expires: 10/13/17  Plan of Care reviewed with: patient    Oly TREVIÑO Melina, PT  2017

## 2017-09-13 NOTE — ASSESSMENT & PLAN NOTE
- on admit Tbili 5.2, direct 3.8.,, Alk Phos  364, possibly secondary to hepatic congestion. Ultrasound supports differential.    - unclear significance, history of elevated Tbili to 5.3 during last admission with similar complaints  - Bili trended down. No nausea or vomiting.

## 2017-09-13 NOTE — ASSESSMENT & PLAN NOTE
"- Ultrasound of Abdomen showed "perisplenic collaterals and a minimal volume of ascites, suggestive of portal hypertension", " distention of hepatic veins suggesting elevated right heart pressure", and HSM.    - No current LE edema noted on interview  - Pt denies SOB, chest tightness.  No LE edema on exam.    "

## 2017-09-13 NOTE — ASSESSMENT & PLAN NOTE
- nephrology consulted for HD, missed HD 09/11. HD completed 9/12.   - During HD on 9/13, fistula infiltrated and HD unable to be performed.  Spoke with Dr. Castano who recommended pt to continue with HD regimen on Friday.     - Vascular surgery assessed bruit.  Discharge instructions to f/u with Dr. Strong as outpatient.

## 2017-09-13 NOTE — PLAN OF CARE
Problem: Patient Care Overview  Goal: Plan of Care Review  Outcome: Ongoing (interventions implemented as appropriate)  Pt refuses to wear the telemetry monitor. RN educated pt on importance of wearing it. Still refusing.NAD noted. Will continue to monitor.

## 2017-09-13 NOTE — PROGRESS NOTES
Report received from [facility] acute dialysis RN. RN stated that, Pt did not received dialysis because fistula infiltrated and is being sent back to unit. States Dr. Castano notified. Will page FLAVIA and notify.

## 2017-09-14 ENCOUNTER — TELEPHONE (OUTPATIENT)
Dept: VASCULAR SURGERY | Facility: CLINIC | Age: 42
End: 2017-09-14

## 2017-09-14 NOTE — TELEPHONE ENCOUNTER
----- Message from Herlinda Siegel sent at 9/14/2017  9:12 AM CDT -----  [phone]//pt states that she needs to speak with nurse in ref to scheduling an access appointment//please call//thank you

## 2017-09-14 NOTE — PLAN OF CARE
SW completed referral to [facility] for h/h services.  Referral faxed through [facility] [employer] system.   Neha, , and can be reached @ [phone].      Alia Will LMSW  [id number]

## 2017-09-14 NOTE — PLAN OF CARE
09/14/17 0910   Final Note   Assessment Type Final Discharge Note     Patient discharged home with  9/13/17.

## 2017-09-14 NOTE — PT/OT/SLP DISCHARGE
//Physical Therapy Discharge Summary  /  Livan Arevalo  MRN: [id number]   Generalized muscle weakness   Patient Discharged from acute Physical Therapy on 17.  Please refer to prior PT noted date on 17 for functional status.     Assessment:   Patient was discharge unexpectedly.  Information required to complete and accurate discharge summary is unknown.  Refer to therapy initial evaluation and last progress note for initial and most recent functional status and goal achievement.  Recommendations made may be found in medical record. Patient has not met goals.  GOALS:    Physical Therapy Goals        Problem: Physical Therapy Goal    Goal Priority Disciplines Outcome Goal Variances Interventions   Physical Therapy Goal     PT/OT, PT Ongoing (interventions implemented as appropriate)     Description:  Goals to be met by: 2017     Patient will increase functional independence with mobility by performin. Sit to stand transfer with Supervision  2. Bed to chair transfer with Supervision using QC or RW  3. Gait  x 200 feet with Supervision using QC or RW                    Reasons for Discontinuation of Therapy Services  Transfer to alternate level of care.      Plan:  Patient Discharged to: Home with Home Health Service. PT recommended OPPT    Oly Summers DPT, PT  2017

## 2017-09-14 NOTE — TELEPHONE ENCOUNTER
Spoke to the pt, appt was scheduled for his hospital f/u  .L BVT AVF with two moderate-sized proximal aneurysms

## 2017-09-14 NOTE — PLAN OF CARE
09/14/17 0910   Final Note   Assessment Type Final Discharge Note     Patient discharged back to [facility] 9/13/17.

## 2017-09-17 LAB
BACTERIA BLD CULT: NORMAL
BACTERIA BLD CULT: NORMAL

## 2017-09-18 NOTE — PT/OT/SLP DISCHARGE
Occupational Therapy Discharge Summary    Livan Arevalo  MRN: [id number]   Generalized muscle weakness   Patient Discharged from acute Occupational Therapy on 9/13/17.  Please refer to prior OT note dated on 9/13/17 for functional status.     Assessment:   Patient appropriate for care in another setting.  GOALS:    Occupational Therapy Goals        Problem: Occupational Therapy Goal    Goal Priority Disciplines Outcome Interventions   Occupational Therapy Goal     OT, PT/OT Ongoing (interventions implemented as appropriate)    Description:  Goals to be met by: 9/23/17     Patient will increase functional independence with ADLs by performing:    UE Dressing with Set-up Assistance.  LE Dressing with Modified Benson.  Grooming while standing at sink with Supervision.  Toileting from toilet with Supervision for hygiene and clothing management.   Stand pivot transfers with Modified Benson.  Toilet transfer to toilet with Modified Benson.                    Reasons for Discontinuation of Therapy Services  Transfer to alternate level of care.      Plan:  Patient Discharged to: Home with Home Health Service.    JODI Blackman  9/18/2017

## 2018-01-11 ENCOUNTER — OFFICE VISIT (OUTPATIENT)
Dept: HEMATOLOGY/ONCOLOGY | Facility: CLINIC | Age: 43
End: 2018-01-11
Payer: MEDICARE

## 2018-01-11 ENCOUNTER — LAB VISIT (OUTPATIENT)
Dept: LAB | Facility: HOSPITAL | Age: 43
End: 2018-01-11
Attending: INTERNAL MEDICINE
Payer: MEDICARE

## 2018-01-11 VITALS
DIASTOLIC BLOOD PRESSURE: 76 MMHG | OXYGEN SATURATION: 95 % | WEIGHT: 225.19 LBS | HEIGHT: 64 IN | TEMPERATURE: 98 F | HEART RATE: 106 BPM | SYSTOLIC BLOOD PRESSURE: 132 MMHG | BODY MASS INDEX: 38.44 KG/M2

## 2018-01-11 DIAGNOSIS — N18.6 ESRD (END STAGE RENAL DISEASE): Chronic | ICD-10-CM

## 2018-01-11 DIAGNOSIS — I10 ESSENTIAL HYPERTENSION: Chronic | ICD-10-CM

## 2018-01-11 DIAGNOSIS — R22.2 CHEST WALL MASS: ICD-10-CM

## 2018-01-11 DIAGNOSIS — R22.2 CHEST WALL MASS: Primary | ICD-10-CM

## 2018-01-11 PROBLEM — A41.9 SEPSIS: Status: RESOLVED | Noted: 2017-05-14 | Resolved: 2018-01-11

## 2018-01-11 LAB
ALBUMIN SERPL BCP-MCNC: 3.1 G/DL
ALP SERPL-CCNC: 208 U/L
ALT SERPL W/O P-5'-P-CCNC: 8 U/L
ANION GAP SERPL CALC-SCNC: 11 MMOL/L
AST SERPL-CCNC: 16 U/L
BASOPHILS # BLD AUTO: 0.04 K/UL
BASOPHILS NFR BLD: 0.6 %
BILIRUB SERPL-MCNC: 1.4 MG/DL
BUN SERPL-MCNC: 28 MG/DL
CALCIUM SERPL-MCNC: 10 MG/DL
CHLORIDE SERPL-SCNC: 92 MMOL/L
CO2 SERPL-SCNC: 35 MMOL/L
CREAT SERPL-MCNC: 6.8 MG/DL
DIFFERENTIAL METHOD: ABNORMAL
EOSINOPHIL # BLD AUTO: 0.1 K/UL
EOSINOPHIL NFR BLD: 1.1 %
ERYTHROCYTE [DISTWIDTH] IN BLOOD BY AUTOMATED COUNT: 18.2 %
EST. GFR  (AFRICAN AMERICAN): 11 ML/MIN/1.73 M^2
EST. GFR  (NON AFRICAN AMERICAN): 9 ML/MIN/1.73 M^2
GLUCOSE SERPL-MCNC: 70 MG/DL
HCT VFR BLD AUTO: 35.5 %
HGB BLD-MCNC: 11.4 G/DL
LDH SERPL L TO P-CCNC: 152 U/L
LYMPHOCYTES # BLD AUTO: 1.9 K/UL
LYMPHOCYTES NFR BLD: 30.1 %
MCH RBC QN AUTO: 28 PG
MCHC RBC AUTO-ENTMCNC: 32.1 G/DL
MCV RBC AUTO: 87 FL
MONOCYTES # BLD AUTO: 0.9 K/UL
MONOCYTES NFR BLD: 14.6 %
NEUTROPHILS # BLD AUTO: 3.3 K/UL
NEUTROPHILS NFR BLD: 53.4 %
PLATELET # BLD AUTO: 161 K/UL
PMV BLD AUTO: 10 FL
POTASSIUM SERPL-SCNC: 4 MMOL/L
PROT SERPL-MCNC: 8.9 G/DL
RBC # BLD AUTO: 4.07 M/UL
SODIUM SERPL-SCNC: 138 MMOL/L
WBC # BLD AUTO: 6.18 K/UL

## 2018-01-11 PROCEDURE — 36415 COLL VENOUS BLD VENIPUNCTURE: CPT

## 2018-01-11 PROCEDURE — 99214 OFFICE O/P EST MOD 30 MIN: CPT | Mod: S$PBB,,, | Performed by: INTERNAL MEDICINE

## 2018-01-11 PROCEDURE — 83615 LACTATE (LD) (LDH) ENZYME: CPT

## 2018-01-11 PROCEDURE — 99999 PR PBB SHADOW E&M-EST. PATIENT-LVL V: CPT | Mod: PBBFAC,,, | Performed by: INTERNAL MEDICINE

## 2018-01-11 PROCEDURE — 99215 OFFICE O/P EST HI 40 MIN: CPT | Mod: PBBFAC | Performed by: INTERNAL MEDICINE

## 2018-01-11 PROCEDURE — 80053 COMPREHEN METABOLIC PANEL: CPT

## 2018-01-11 PROCEDURE — 85025 COMPLETE CBC W/AUTO DIFF WBC: CPT

## 2018-01-11 NOTE — Clinical Note
IR biopsy of chest wall mass ( previously scheduled) CT c/a/p  Referral to dr. nelson following bx Labs today

## 2018-01-11 NOTE — PROGRESS NOTES
"Subjective:       Patient ID: Livan Arevalo is a 42 y.o. male.    Chief Complaint: Follow-up    HPI     PT LOST TO FOLLOW-UP  LAST VISIT 5/2017    POOR HISTORIAN      HPI42 year old male with ESRD on HD and HTN seen today for f/u for rt chest wall lesion. He presented April 2017 with generalized weakness x 1 day and  intermittent nausea w/out vomiting which is not a change since undergoing dialysis. He also notes loose stools x 3days,non-bloody. No bowel or bladder incontinence. No fevers. No back pain. No numbness or tingling in LE. No SOB/cough. No HA/vision changes. Abnml CXR revealing RUL opacity. CT of chest w/out contrast abnormal and shows a "Large densely calcified mass arising from the anterior margins of the right 1st rib near the costovertebral junction measuring up to 8.4 cm concerning for neoplasm such as chondrosarcoma. In addition, multiple enlarged bilateral axillary lymph nodes and borderline enlarged mediastinal AP window node noted MRI of brain obtained to further evaluate weakness and leg "giving out" when walking. A biopsy of mass done on 4/24 via IR. Pathology pending at time of hospital d/c. MRI showed acute infarcts and Neuro followed pt during hospitalization. Cards was consulted as well for decreased EF and the patient went for a stress test on 4/26 that showed a fixed deficit without any areas of ischemia.         Pt here to re establish care  Patient is uncertain why he has not followed up  Patient reports that his friends and family are concerned regarding his enlarging right chest wall mass  Patient was set up for a repeat biopsy but patient did not pursue  He reports that the chest wall masses increased over the past few months  No pain  No fevers  Previously underwent CT-guided biopsy of the rib lesion  Pt s/p CT guided bx of rib lesion. Pt also underwent attempt of axillary LN bx. Purulent fluid drained from LN.   Pt  was to follow-up with CT surgery for evaluation    RIGHT RIB " "LESION (BIOPSY): 4/24/2017   -No neoplasm identified  -Extensive dystrophic calcifications and degenerative material      Past Medical History:   Diagnosis Date    ESRD (end stage renal disease)     Hypertension     Renal disorder          Review of Systems   Constitutional: Negative for appetite change, fatigue, fever and unexpected weight change.   HENT: Negative for mouth sores and nosebleeds.    Eyes: Negative for visual disturbance.   Respiratory: Negative for cough and shortness of breath.    Cardiovascular: Negative for chest pain and leg swelling.        Chest wall mass   Gastrointestinal: Negative for abdominal pain, blood in stool, constipation, diarrhea, nausea and vomiting.   Genitourinary: Negative for frequency and hematuria.   Musculoskeletal: Negative for arthralgias and back pain.   Skin: Negative for rash.   Neurological: Negative for dizziness, weakness, light-headedness, numbness and headaches.   Hematological: Negative for adenopathy.   Psychiatric/Behavioral: The patient is not nervous/anxious.        Objective:        Vitals:    01/11/18 0955 01/11/18 0957   BP: (!) 145/87 132/76   BP Location: Right arm Right arm   Patient Position: Sitting Sitting   BP Method: Medium (Automatic) Medium (Manual)   Pulse: 106    Temp: 98.4 °F (36.9 °C)    TempSrc: Oral    SpO2: 95%    Weight: 102.2 kg (225 lb 3.2 oz)    Height: 5' 4" (1.626 m)        Physical Exam   Constitutional: He is oriented to person, place, and time. He appears well-developed and well-nourished.   HENT:   Head: Normocephalic.   Mouth/Throat: Oropharynx is clear and moist. No oropharyngeal exudate.   Eyes: Conjunctivae are normal. No scleral icterus.   Neck: Normal range of motion. Neck supple. No thyromegaly present.   Cardiovascular: Normal rate, regular rhythm and normal heart sounds.    No murmur heard.  Pulmonary/Chest: Effort normal and breath sounds normal. He has no wheezes. He has no rales.   Rt chest wall mass 11 x 12 cm   "   Abdominal: Soft. Bowel sounds are normal. He exhibits no distension and no mass. There is no hepatosplenomegaly. There is no tenderness. There is no rebound and no guarding.   Musculoskeletal: Normal range of motion. He exhibits no edema.   Lymphadenopathy:     He has no axillary adenopathy.        Right: No supraclavicular adenopathy present.        Left: No supraclavicular adenopathy present.   Neurological: He is alert and oriented to person, place, and time. No cranial nerve deficit.   Skin: No rash noted. No erythema.   Psychiatric: He has a normal mood and affect.           CT a/p w/contrast 4/24/2017  Cardiomegaly.  Coronary artery calcification.  Mosaic attenuation of the lung bases suggest underlying inflammatory small airway disease.  Small amount of ascites.  Significant atherosclerotic plaque of the aorta and its branches.  Hypoattenuating lesion of the liver is likely benign, it is perceptible on the prior study of 1/24/12 but more conspicuous on today's contrast study    CT chest w/out contrast 4/22/2017  Large densely calcified mass arising from the anterior margins of the right 1st rib near the costovertebral junction measuring up to 8.4 cm.  Findings are concerning for neoplasm such as chondrosarcoma. findings are new from prior CT dated 9/2012.      Multiple enlarged bilateral axillary lymph nodes and borderline enlarged mediastinal AP window node.  Metastatic adenopathy not excluded.    RIGHT RIB LESION (BIOPSY):  -No neoplasm identified  -Extensive dystrophic calcifications and degenerative material  Assessment:       1. Chest wall mass    2. ESRD (end stage renal disease)    3. Essential hypertension        Plan:     Detailed d/w patient reviewing previous radiographic and pathology findings.Pathology findings reveals no neoplasm however; concern for neoplasm remains high.   PLAN CT imaging studies   PLAN AMBULATORY REFERRAL TO IR FOR BIOPSY FOR CHEST WALL MASS  PLAN AMBULATORY REFERRAL TO CT  SURGERY     Pt strongly advised on importance of compliance with follow-ups  Pt acknowledged understanding     CBC,CMP today    Follow-up in 1 month     Greater than 25 minutes spent during this visit of which greater than 50% devoted to counseling and coordination of care regarding diagnosis and management plan.

## 2018-01-11 NOTE — PROGRESS NOTES
Patient Livan Arevalo, MRN [id number], was dependent on dialysis (ICD10 Z99.2) at the time of this visit on 1/11/18. This addendum is made to the medical record on 01/11/2018.

## 2018-01-16 ENCOUNTER — HOSPITAL ENCOUNTER (OUTPATIENT)
Dept: RADIOLOGY | Facility: HOSPITAL | Age: 43
Discharge: HOME | End: 2018-01-16
Attending: INTERNAL MEDICINE
Payer: MEDICARE

## 2018-01-16 DIAGNOSIS — R22.2 CHEST WALL MASS: ICD-10-CM

## 2018-01-16 PROCEDURE — 71250 CT THORAX DX C-: CPT | Mod: 26,,, | Performed by: RADIOLOGY

## 2018-01-16 PROCEDURE — 74176 CT ABD & PELVIS W/O CONTRAST: CPT | Mod: TC

## 2018-01-16 PROCEDURE — 74176 CT ABD & PELVIS W/O CONTRAST: CPT | Mod: 26,,, | Performed by: RADIOLOGY

## 2018-01-16 PROCEDURE — 71250 CT THORAX DX C-: CPT | Mod: TC

## 2018-01-17 ENCOUNTER — TELEPHONE (OUTPATIENT)
Dept: INTERVENTIONAL RADIOLOGY/VASCULAR | Facility: HOSPITAL | Age: 43
End: 2018-01-17

## 2018-01-22 ENCOUNTER — HOSPITAL ENCOUNTER (OUTPATIENT)
Dept: PREADMISSION TESTING | Facility: HOSPITAL | Age: 43
Discharge: HOME | End: 2018-01-22
Attending: RADIOLOGY
Payer: MEDICARE

## 2018-01-22 VITALS
TEMPERATURE: 98 F | DIASTOLIC BLOOD PRESSURE: 90 MMHG | SYSTOLIC BLOOD PRESSURE: 129 MMHG | OXYGEN SATURATION: 96 % | BODY MASS INDEX: 38.7 KG/M2 | HEIGHT: 65 IN | WEIGHT: 232.31 LBS | HEART RATE: 108 BPM | RESPIRATION RATE: 20 BRPM

## 2018-01-22 DIAGNOSIS — Z01.818 PREOPERATIVE TESTING: Primary | ICD-10-CM

## 2018-01-22 DIAGNOSIS — N18.9 CHRONIC KIDNEY DISEASE, UNSPECIFIED CKD STAGE: ICD-10-CM

## 2018-01-22 LAB
INR PPP: 1.1
PROTHROMBIN TIME: 12 SEC

## 2018-01-22 PROCEDURE — 85610 PROTHROMBIN TIME: CPT

## 2018-01-22 PROCEDURE — 36415 COLL VENOUS BLD VENIPUNCTURE: CPT

## 2018-01-22 NOTE — DISCHARGE INSTRUCTIONS
Your surgery is scheduled for__TUESDAY 1/23_______________.        Report to SAME DAY SURGERY UNIT at _7 AM______am on the 2nd floor of the hospital.  Use the front entrance of the hospital before 6 am.  If you need wheelchair assistance, call [phone] from your cell phone,  or call 0 from the courtesy phone in the hospital lobby.    Important instructions:   Do not eat or drink after 12 midnight, including water.  It is okay to brush your teeth.  Do not have gum, candy or mints.                    Prep instructions:    SHOWER   OTHER_____________     Please shower the night before and the morning of your surgery.       Use Hibiclens soap to your surgery site if instructed by your pre op nurse.   If your surgery is on your abdomen, be sure to wash your naval.  Be sure to rinse off Hibiclens after it is on your skin for several minutes.  Do not use Hibiclens on your face or genitals.        You may wear deodorant only.      Do not wear powder, body lotion or cologne.     Do not wear any jewelry or have any metal on your body.     Ple.     If you are going home on the same day of surgery, you must have arrangements for a ride home.  You will not be able to drive home if you were given anesthesia or sedation.               Stop taking Aspirin, Ibuprofen, Motrin and Aleve at least 7 days before your surgery. You may use Tylenol.     Stop taking fish oil and vitamin E for least 7 days before surgery.     Wear loose fitting clothes allowing for bandages.     Please leave money and valuables home.       You may bring your cell phone.     Call the doctor if fever or illness should occur before your surgery.    Call [phone] to contact us here at [facility] if needed.

## 2018-01-22 NOTE — PLAN OF CARE
Pre-operative instructions, medication directives and pain scales reviewed with Mr Arevalo. All questions the patient had  were answered. Re-assurance about surgical procedure and day of surgery routine given as needed. Mr Arevalo verbalized understanding of the pre-op instructions.Mr Arevalo instructed to arrive to [facility] at 7 am.

## 2018-01-23 ENCOUNTER — HOSPITAL ENCOUNTER (OUTPATIENT)
Facility: HOSPITAL | Age: 43
Discharge: HOME | End: 2018-01-23
Attending: RADIOLOGY | Admitting: RADIOLOGY
Payer: MEDICARE

## 2018-01-23 VITALS
OXYGEN SATURATION: 98 % | DIASTOLIC BLOOD PRESSURE: 78 MMHG | RESPIRATION RATE: 18 BRPM | TEMPERATURE: 98 F | HEART RATE: 104 BPM | SYSTOLIC BLOOD PRESSURE: 126 MMHG

## 2018-01-23 DIAGNOSIS — R22.2 MASS OF CHEST WALL, RIGHT: ICD-10-CM

## 2018-01-23 DIAGNOSIS — R22.2 MASS OF RIGHT CHEST WALL: ICD-10-CM

## 2018-01-23 PROCEDURE — 87075 CULTR BACTERIA EXCEPT BLOOD: CPT

## 2018-01-23 PROCEDURE — 87205 SMEAR GRAM STAIN: CPT

## 2018-01-23 PROCEDURE — 88342 IMHCHEM/IMCYTCHM 1ST ANTB: CPT | Mod: 26,,, | Performed by: PATHOLOGY

## 2018-01-23 PROCEDURE — 88305 TISSUE EXAM BY PATHOLOGIST: CPT | Performed by: PATHOLOGY

## 2018-01-23 PROCEDURE — 88341 IMHCHEM/IMCYTCHM EA ADD ANTB: CPT | Mod: 26,,, | Performed by: PATHOLOGY

## 2018-01-23 PROCEDURE — 87070 CULTURE OTHR SPECIMN AEROBIC: CPT

## 2018-01-23 PROCEDURE — 88341 IMHCHEM/IMCYTCHM EA ADD ANTB: CPT | Performed by: PATHOLOGY

## 2018-01-23 PROCEDURE — 88342 IMHCHEM/IMCYTCHM 1ST ANTB: CPT | Mod: 59 | Performed by: PATHOLOGY

## 2018-01-23 PROCEDURE — 63600175 PHARM REV CODE 636 W HCPCS: Performed by: RADIOLOGY

## 2018-01-23 PROCEDURE — 88364 INSITU HYBRIDIZATION (FISH): CPT | Mod: 26,,, | Performed by: PATHOLOGY

## 2018-01-23 PROCEDURE — 88365 INSITU HYBRIDIZATION (FISH): CPT | Mod: 26,,, | Performed by: PATHOLOGY

## 2018-01-23 PROCEDURE — 88305 TISSUE EXAM BY PATHOLOGIST: CPT | Mod: 26,,, | Performed by: PATHOLOGY

## 2018-01-23 RX ORDER — FENTANYL CITRATE 50 UG/ML
INJECTION, SOLUTION INTRAMUSCULAR; INTRAVENOUS CODE/TRAUMA/SEDATION MEDICATION
Status: COMPLETED | OUTPATIENT
Start: 2018-01-23 | End: 2018-01-23

## 2018-01-23 RX ORDER — MIDAZOLAM HYDROCHLORIDE 1 MG/ML
INJECTION INTRAMUSCULAR; INTRAVENOUS CODE/TRAUMA/SEDATION MEDICATION
Status: COMPLETED | OUTPATIENT
Start: 2018-01-23 | End: 2018-01-23

## 2018-01-23 RX ADMIN — FENTANYL CITRATE 25 MCG: 50 INJECTION INTRAMUSCULAR; INTRAVENOUS at 08:01

## 2018-01-23 RX ADMIN — FENTANYL CITRATE 50 MCG: 50 INJECTION INTRAMUSCULAR; INTRAVENOUS at 08:01

## 2018-01-23 RX ADMIN — MIDAZOLAM HYDROCHLORIDE 1 MG: 1 INJECTION, SOLUTION INTRAMUSCULAR; INTRAVENOUS at 08:01

## 2018-01-23 RX ADMIN — MIDAZOLAM HYDROCHLORIDE 0.5 MG: 1 INJECTION, SOLUTION INTRAMUSCULAR; INTRAVENOUS at 08:01

## 2018-01-23 NOTE — OP NOTE
[facility]  Interventional Radiology  Procedure - Outpatient    Date: 01/23/2018 Time: 9:27 AM    Pre-Op Diagnosis: Right chest wall mass with right axillary adenopathy    Post-Op Diagnosis: same    Procedure Performed by: Aguila Black MD    Assistant: none    Procedure: U/S guided biopsy of chest wall mass and U/S guided biopsy of right axillary adenopathy    Specimen/Tissue Removed: 10 mL of thick creamy yellowish fluid, numerous 14 gauge cores of chest wall mass, 3 x 18 gauge cores of axillary adenopathy    Estimated Blood Loss: Less than 10 mL    Procedure Note/Findings: 13 gauge coaxial needle advanced into mass under U/S guidance. About 10 mL of thick creamy yellowish fluid aspirated. And sent for cultures. Numerous 14 gauge cores obtained with most of tissue removed appearing paste like with minimal solid appearing tissue cores. U/S guided biopsy of right axillary adenopathy then performed with 3 x 18 gauge cores obtained. No immediate post-procedure complications noted.    Please refer to dictated report for additional details.

## 2018-01-23 NOTE — DISCHARGE INSTRUCTIONS
BATHING:  ? You may shower tomorrow.  DRESSING:  ? Remove dressing tomorrow.        ACTIVITY LEVEL: If you have received sedation or an anesthetic, you may feel sleepy for several hours. Rest until you are more awake. Gradually resume your normal activities    Do not drive, drink alcohol, or sign legal documents for 24 hours, or if taking narcotic pain medication.      DIET: You may resume your home diet. If nausea is present, increase your diet gradually with fluids and bland foods.    Medications: Pain medication should be taken only if needed and as directed. If antibiotics are prescribed, the medication should be taken until completed. You will be given an updated list of you medications.  ? No driving, alcoholic beverages or signing legal documents for next 24 hours if you have had sedation, or while taking pain medication    CALL THE DOCTOR:   For any obvious bleeding (some dried blood over the incision is normal).     Redness, swelling, foul smell around incision or fever over 101.  Shortness of breath.  Persistent pain or nausea not relieved by medication.  Call  [phone]     to speak with an Interventional Radiologist    If any unusual problems or difficulties occur contact your doctor. If you cannot contact your doctor but feel your signs and symptoms warrant a physicians attention return to the emergency room.     Fall Prevention  Millions of people fall every year and injure themselves. You may have had anesthesia or sedation which may increase your risk of falling. You may have health issues that put you at an increased risk of falling.     Here are ways to reduce your risk of falling.  ·   · Make your home safe by keeping walkways clear of objects you may trip over.  · Use non-slip pads under rugs. Do not use area rugs or small throw rugs.  · Use non-slip mats in bathtubs and showers.  · Install handrails and lights on staircases.  · Do not walk in poorly lit areas.  · Do not stand on chairs or wobbly  ladders.  · Use caution when reaching overhead or looking upward. This position can cause a loss of balance.  · Be sure your shoes fit properly, have non-slip bottoms and are in good condition.   · Wear shoes both inside and out. Avoid going barefoot or wearing slippers.  · Be cautious when going up and down stairs, curbs, and when walking on uneven sidewalks.  · If your balance is poor, consider using a cane or walker.  · If your fall was related to alcohol use, stop or limit alcohol intake.   · If your fall was related to use of sleeping medicines, talk to your doctor about this. You may need to reduce your dosage at bedtime if you awaken during the night to go to the bathroom.    · To reduce the need for nighttime bathroom trips:  ¨ Avoid drinking fluids for several hours before going to bed  ¨ Empty your bladder before going to bed  ¨ Men can keep a urinal at the bedside  · Stay as active as you can. Balance, flexibility, strength, and endurance all come from exercise. They all play a role in preventing falls. Ask your healthcare provider which types of activity are right for you.  · Get your vision checked on a regular basis.  · If you have pets, know where they are before you stand up or walk so you don't trip over them.  Use night lights.

## 2018-01-23 NOTE — DISCHARGE SUMMARY
Radiology Discharge Summary      Admit date: 1/23/2018  6:28 AM  Discharge date: January 23, 2018    Instructions Given to patient: YesVerbal    Diet: Regular    Activity:NO Restrictions    Medications on discharge (List): Refer to Discharge Medication List    Hospital Course: U/S guided biopsy of right chest wall mass and right axillary adenopathy    Description of Condition on Discharge: stable    Discharge Disposition: Home    Discharge Diagnosis: Right chest wall mass and right axillary adenopathy    Follow up with Dr. Erickson as scheduled.

## 2018-01-27 LAB
BACTERIA FLD AEROBE CULT: NO GROWTH
BACTERIA SPEC ANAEROBE CULT: NORMAL
GRAM STN SPEC: NORMAL
GRAM STN SPEC: NORMAL

## 2018-01-30 ENCOUNTER — TELEPHONE (OUTPATIENT)
Dept: HEMATOLOGY/ONCOLOGY | Facility: CLINIC | Age: 43
End: 2018-01-30

## 2018-01-30 ENCOUNTER — TELEPHONE (OUTPATIENT)
Dept: CARDIOTHORACIC SURGERY | Facility: CLINIC | Age: 43
End: 2018-01-30

## 2018-01-30 NOTE — TELEPHONE ENCOUNTER
"Received a referral from Dr. Erickson to Dr. Guerra re: large left chest wall mass noted on CT chest/abdomen/pelvis. Pt had an abnormal CXR that warranted CT imaging. CT scans on 1/16/18 revealed "Large indeterminate 15 cm calcified mass centered in the right first costosternal junction, extending anteriorly underneath the pectoralis musculature and posteriorly into the lung apex, increased in size since the 4/22/17 exam".  Pt unable to have an enhanced CT chest or MRI chest due to ESD, currently on HD every M-W-F.   Spoke with the pt, he would like to be seen this week. Scheduled with Dr. Guerra on 2/1/18 at 845a, provided the pt with location information.   Pt wanted to know about pathology, discussed that results were not conclusive, so he is being referred to  for additional tissue. Pt will call Dr. Erickson to review pathology.  "

## 2018-01-30 NOTE — TELEPHONE ENCOUNTER
Pt calling for results of biopsy.  Already spoke with thoracic nurse navigator to coordinate appt at main, awaiting word back.

## 2018-01-31 ENCOUNTER — ANESTHESIA EVENT (OUTPATIENT)
Dept: SURGERY | Facility: HOSPITAL | Age: 43
DRG: 263 | End: 2018-01-31
Payer: MEDICARE

## 2018-01-31 ENCOUNTER — HOSPITAL ENCOUNTER (INPATIENT)
Facility: HOSPITAL | Age: 43
LOS: 3 days | Discharge: HOME | DRG: 263 | End: 2018-02-03
Attending: EMERGENCY MEDICINE | Admitting: EMERGENCY MEDICINE
Payer: MEDICARE

## 2018-01-31 ENCOUNTER — ANESTHESIA (OUTPATIENT)
Dept: SURGERY | Facility: HOSPITAL | Age: 43
DRG: 263 | End: 2018-01-31
Payer: MEDICARE

## 2018-01-31 ENCOUNTER — SURGERY (OUTPATIENT)
Age: 43
End: 2018-01-31

## 2018-01-31 DIAGNOSIS — N18.6 END STAGE RENAL DISEASE: ICD-10-CM

## 2018-01-31 DIAGNOSIS — N18.6 ESRD (END STAGE RENAL DISEASE): Chronic | ICD-10-CM

## 2018-01-31 DIAGNOSIS — T82.7XXA: Primary | ICD-10-CM

## 2018-01-31 DIAGNOSIS — T81.72XA COMPLICATION OF VEIN FOLLOWING PROCEDURE, NOT ELSEWHERE CLASSIFIED, INITIAL ENCOUNTER: ICD-10-CM

## 2018-01-31 DIAGNOSIS — T82.9XXA COMPLICATION OF ARTERIOVENOUS DIALYSIS FISTULA, INITIAL ENCOUNTER: ICD-10-CM

## 2018-01-31 DIAGNOSIS — E83.9 CHRONIC KIDNEY DISEASE-MINERAL AND BONE DISORDER: ICD-10-CM

## 2018-01-31 DIAGNOSIS — T82.7XXA ARTERIOVENOUS GRAFT INFECTION, INITIAL ENCOUNTER: ICD-10-CM

## 2018-01-31 DIAGNOSIS — T85.868A GRAFT FAILURE DUE TO THROMBOSIS, INITIAL ENCOUNTER: ICD-10-CM

## 2018-01-31 DIAGNOSIS — Z99.2 DEPENDENCE ON RENAL DIALYSIS: ICD-10-CM

## 2018-01-31 DIAGNOSIS — Z98.890: ICD-10-CM

## 2018-01-31 DIAGNOSIS — N18.9 CHRONIC KIDNEY DISEASE-MINERAL AND BONE DISORDER: ICD-10-CM

## 2018-01-31 DIAGNOSIS — M89.9 CHRONIC KIDNEY DISEASE-MINERAL AND BONE DISORDER: ICD-10-CM

## 2018-01-31 LAB
ANION GAP SERPL CALC-SCNC: 15 MMOL/L
BASOPHILS # BLD AUTO: 0.03 K/UL
BASOPHILS NFR BLD: 0.4 %
BUN SERPL-MCNC: 46 MG/DL
CALCIUM SERPL-MCNC: 9.6 MG/DL
CHLORIDE SERPL-SCNC: 92 MMOL/L
CO2 SERPL-SCNC: 29 MMOL/L
CREAT SERPL-MCNC: 9.6 MG/DL
DIFFERENTIAL METHOD: ABNORMAL
EOSINOPHIL # BLD AUTO: 0.1 K/UL
EOSINOPHIL NFR BLD: 0.9 %
ERYTHROCYTE [DISTWIDTH] IN BLOOD BY AUTOMATED COUNT: 17.5 %
EST. GFR  (AFRICAN AMERICAN): 7 ML/MIN/1.73 M^2
EST. GFR  (NON AFRICAN AMERICAN): 6.1 ML/MIN/1.73 M^2
GLUCOSE SERPL-MCNC: 71 MG/DL
HCT VFR BLD AUTO: 33.5 %
HGB BLD-MCNC: 10.6 G/DL
IMM GRANULOCYTES # BLD AUTO: 0.03 K/UL
IMM GRANULOCYTES NFR BLD AUTO: 0.4 %
LYMPHOCYTES # BLD AUTO: 1.5 K/UL
LYMPHOCYTES NFR BLD: 19.2 %
MAGNESIUM SERPL-MCNC: 2.3 MG/DL
MCH RBC QN AUTO: 27.8 PG
MCHC RBC AUTO-ENTMCNC: 31.6 G/DL
MCV RBC AUTO: 88 FL
MONOCYTES # BLD AUTO: 0.9 K/UL
MONOCYTES NFR BLD: 11.2 %
NEUTROPHILS # BLD AUTO: 5.5 K/UL
NEUTROPHILS NFR BLD: 67.9 %
NRBC BLD-RTO: 0 /100 WBC
PLATELET # BLD AUTO: 190 K/UL
PMV BLD AUTO: 11.1 FL
POTASSIUM SERPL-SCNC: 4.8 MMOL/L
RBC # BLD AUTO: 3.81 M/UL
SODIUM SERPL-SCNC: 136 MMOL/L
WBC # BLD AUTO: 8.02 K/UL

## 2018-01-31 PROCEDURE — 63600175 PHARM REV CODE 636 W HCPCS

## 2018-01-31 PROCEDURE — 80048 BASIC METABOLIC PNL TOTAL CA: CPT

## 2018-01-31 PROCEDURE — 76942 ECHO GUIDE FOR BIOPSY: CPT | Performed by: ANESTHESIOLOGY

## 2018-01-31 PROCEDURE — 36000707: Performed by: SURGERY

## 2018-01-31 PROCEDURE — 25000003 PHARM REV CODE 250: Performed by: SURGERY

## 2018-01-31 PROCEDURE — 63600175 PHARM REV CODE 636 W HCPCS: Performed by: SURGERY

## 2018-01-31 PROCEDURE — 25000003 PHARM REV CODE 250

## 2018-01-31 PROCEDURE — 37000009 HC ANESTHESIA EA ADD 15 MINS: Performed by: SURGERY

## 2018-01-31 PROCEDURE — A4216 STERILE WATER/SALINE, 10 ML: HCPCS | Performed by: NURSE ANESTHETIST, CERTIFIED REGISTERED

## 2018-01-31 PROCEDURE — 37000008 HC ANESTHESIA 1ST 15 MINUTES: Performed by: SURGERY

## 2018-01-31 PROCEDURE — 87040 BLOOD CULTURE FOR BACTERIA: CPT | Mod: 59

## 2018-01-31 PROCEDURE — 25000003 PHARM REV CODE 250: Performed by: NURSE ANESTHETIST, CERTIFIED REGISTERED

## 2018-01-31 PROCEDURE — 71000016 HC POSTOP RECOV ADDL HR: Performed by: SURGERY

## 2018-01-31 PROCEDURE — 36556 INSERT NON-TUNNEL CV CATH: CPT | Mod: GC,ICN,, | Performed by: SURGERY

## 2018-01-31 PROCEDURE — 99223 1ST HOSP IP/OBS HIGH 75: CPT | Mod: 57,,, | Performed by: SURGERY

## 2018-01-31 PROCEDURE — D9220A PRA ANESTHESIA: Mod: ANES,,, | Performed by: ANESTHESIOLOGY

## 2018-01-31 PROCEDURE — 06HM33Z INSERTION OF INFUSION DEVICE INTO RIGHT FEMORAL VEIN, PERCUTANEOUS APPROACH: ICD-10-PCS | Performed by: SURGERY

## 2018-01-31 PROCEDURE — 63600175 PHARM REV CODE 636 W HCPCS: Performed by: NURSE ANESTHETIST, CERTIFIED REGISTERED

## 2018-01-31 PROCEDURE — C1769 GUIDE WIRE: HCPCS | Performed by: SURGERY

## 2018-01-31 PROCEDURE — 64415 NJX AA&/STRD BRCH PLXS IMG: CPT | Mod: 59,LT,, | Performed by: ANESTHESIOLOGY

## 2018-01-31 PROCEDURE — 99284 EMERGENCY DEPT VISIT MOD MDM: CPT | Mod: ,,, | Performed by: EMERGENCY MEDICINE

## 2018-01-31 PROCEDURE — 71000045 HC DOSC ROUTINE RECOVERY EA ADD'L HR: Performed by: SURGERY

## 2018-01-31 PROCEDURE — 71000015 HC POSTOP RECOV 1ST HR: Performed by: SURGERY

## 2018-01-31 PROCEDURE — 99285 EMERGENCY DEPT VISIT HI MDM: CPT

## 2018-01-31 PROCEDURE — C1894 INTRO/SHEATH, NON-LASER: HCPCS | Performed by: SURGERY

## 2018-01-31 PROCEDURE — 36000706: Performed by: SURGERY

## 2018-01-31 PROCEDURE — 05QA0ZZ REPAIR LEFT BRACHIAL VEIN, OPEN APPROACH: ICD-10-PCS | Performed by: SURGERY

## 2018-01-31 PROCEDURE — D9220A PRA ANESTHESIA: Mod: CRNA,,, | Performed by: NURSE ANESTHETIST, CERTIFIED REGISTERED

## 2018-01-31 PROCEDURE — 71000044 HC DOSC ROUTINE RECOVERY FIRST HOUR: Performed by: SURGERY

## 2018-01-31 PROCEDURE — 27200671 HC STIMUCATH NEEDLE/ CATHETER: Performed by: STUDENT IN AN ORGANIZED HEALTH CARE EDUCATION/TRAINING PROGRAM

## 2018-01-31 PROCEDURE — 85025 COMPLETE CBC W/AUTO DIFF WBC: CPT

## 2018-01-31 PROCEDURE — 11000001 HC ACUTE MED/SURG PRIVATE ROOM

## 2018-01-31 PROCEDURE — 99222 1ST HOSP IP/OBS MODERATE 55: CPT | Mod: ,,, | Performed by: HOSPITALIST

## 2018-01-31 PROCEDURE — 83735 ASSAY OF MAGNESIUM: CPT

## 2018-01-31 PROCEDURE — 36832 AV FISTULA REVISION OPEN: CPT | Mod: GC,ICN,, | Performed by: SURGERY

## 2018-01-31 RX ORDER — OXYCODONE HYDROCHLORIDE 5 MG/1
10 TABLET ORAL EVERY 6 HOURS PRN
Status: DISCONTINUED | OUTPATIENT
Start: 2018-01-31 | End: 2018-02-03 | Stop reason: HOSPADM

## 2018-01-31 RX ORDER — ONDANSETRON 2 MG/ML
INJECTION INTRAMUSCULAR; INTRAVENOUS
Status: DISCONTINUED | OUTPATIENT
Start: 2018-01-31 | End: 2018-01-31

## 2018-01-31 RX ORDER — PROPOFOL 10 MG/ML
VIAL (ML) INTRAVENOUS
Status: DISCONTINUED | OUTPATIENT
Start: 2018-01-31 | End: 2018-01-31

## 2018-01-31 RX ORDER — CEFEPIME HYDROCHLORIDE 1 G/1
1 INJECTION, POWDER, FOR SOLUTION INTRAMUSCULAR; INTRAVENOUS
Status: DISPENSED | OUTPATIENT
Start: 2018-01-31 | End: 2018-01-31

## 2018-01-31 RX ORDER — HEPARIN SODIUM 1000 [USP'U]/ML
INJECTION, SOLUTION INTRAVENOUS; SUBCUTANEOUS
Status: DISCONTINUED | OUTPATIENT
Start: 2018-01-31 | End: 2018-01-31 | Stop reason: HOSPADM

## 2018-01-31 RX ORDER — MIDAZOLAM HYDROCHLORIDE 1 MG/ML
INJECTION, SOLUTION INTRAMUSCULAR; INTRAVENOUS
Status: DISCONTINUED | OUTPATIENT
Start: 2018-01-31 | End: 2018-01-31

## 2018-01-31 RX ORDER — SODIUM CHLORIDE 9 MG/ML
INJECTION, SOLUTION INTRAVENOUS ONCE
Status: COMPLETED | OUTPATIENT
Start: 2018-01-31 | End: 2018-02-01

## 2018-01-31 RX ORDER — FENTANYL CITRATE 50 UG/ML
INJECTION, SOLUTION INTRAMUSCULAR; INTRAVENOUS
Status: DISCONTINUED | OUTPATIENT
Start: 2018-01-31 | End: 2018-01-31

## 2018-01-31 RX ORDER — PROPOFOL 10 MG/ML
VIAL (ML) INTRAVENOUS CONTINUOUS PRN
Status: DISCONTINUED | OUTPATIENT
Start: 2018-01-31 | End: 2018-01-31

## 2018-01-31 RX ORDER — CARVEDILOL 3.12 MG/1
3.12 TABLET ORAL 2 TIMES DAILY
Status: DISCONTINUED | OUTPATIENT
Start: 2018-02-01 | End: 2018-01-31

## 2018-01-31 RX ORDER — ONDANSETRON 2 MG/ML
4 INJECTION INTRAMUSCULAR; INTRAVENOUS DAILY PRN
Status: DISCONTINUED | OUTPATIENT
Start: 2018-01-31 | End: 2018-01-31 | Stop reason: HOSPADM

## 2018-01-31 RX ORDER — PROMETHAZINE HYDROCHLORIDE 25 MG/ML
INJECTION, SOLUTION INTRAMUSCULAR; INTRAVENOUS
Status: DISCONTINUED | OUTPATIENT
Start: 2018-01-31 | End: 2018-01-31

## 2018-01-31 RX ORDER — CEFEPIME HYDROCHLORIDE 1 G/50ML
1 INJECTION, SOLUTION INTRAVENOUS
Status: DISCONTINUED | OUTPATIENT
Start: 2018-01-31 | End: 2018-01-31

## 2018-01-31 RX ORDER — SODIUM CHLORIDE 9 MG/ML
INJECTION, SOLUTION INTRAVENOUS CONTINUOUS PRN
Status: DISCONTINUED | OUTPATIENT
Start: 2018-01-31 | End: 2018-01-31

## 2018-01-31 RX ORDER — ACETAMINOPHEN 325 MG/1
650 TABLET ORAL EVERY 6 HOURS PRN
Status: DISCONTINUED | OUTPATIENT
Start: 2018-01-31 | End: 2018-02-03 | Stop reason: HOSPADM

## 2018-01-31 RX ORDER — HYDROCODONE BITARTRATE AND ACETAMINOPHEN 7.5; 325 MG/1; MG/1
1 TABLET ORAL EVERY 6 HOURS PRN
Status: DISCONTINUED | OUTPATIENT
Start: 2018-01-31 | End: 2018-02-03 | Stop reason: HOSPADM

## 2018-01-31 RX ORDER — DEXMEDETOMIDINE HYDROCHLORIDE 100 UG/ML
INJECTION, SOLUTION INTRAVENOUS
Status: DISCONTINUED | OUTPATIENT
Start: 2018-01-31 | End: 2018-01-31

## 2018-01-31 RX ORDER — SODIUM CHLORIDE 0.9 % (FLUSH) 0.9 %
3 SYRINGE (ML) INJECTION
Status: DISCONTINUED | OUTPATIENT
Start: 2018-01-31 | End: 2018-02-03 | Stop reason: HOSPADM

## 2018-01-31 RX ADMIN — PROPOFOL 30 MG: 10 INJECTION, EMULSION INTRAVENOUS at 01:01

## 2018-01-31 RX ADMIN — VANCOMYCIN 1000 MG: 1 INJECTION, SOLUTION INTRAVENOUS at 01:01

## 2018-01-31 RX ADMIN — DEXMEDETOMIDINE HYDROCHLORIDE 0.5 MCG/KG/HR: 100 INJECTION, SOLUTION, CONCENTRATE INTRAVENOUS at 01:01

## 2018-01-31 RX ADMIN — HEPARIN SODIUM 10000 UNITS: 1000 INJECTION, SOLUTION INTRAVENOUS; SUBCUTANEOUS at 01:01

## 2018-01-31 RX ADMIN — FENTANYL CITRATE 100 MCG: 50 INJECTION, SOLUTION INTRAMUSCULAR; INTRAVENOUS at 01:01

## 2018-01-31 RX ADMIN — DEXMEDETOMIDINE HYDROCHLORIDE 20 MCG: 100 INJECTION, SOLUTION, CONCENTRATE INTRAVENOUS at 02:01

## 2018-01-31 RX ADMIN — PROPOFOL 50 MG: 10 INJECTION, EMULSION INTRAVENOUS at 01:01

## 2018-01-31 RX ADMIN — ONDANSETRON 8 MG: 2 INJECTION INTRAMUSCULAR; INTRAVENOUS at 01:01

## 2018-01-31 RX ADMIN — PROPOFOL 75 MCG/KG/MIN: 10 INJECTION, EMULSION INTRAVENOUS at 01:01

## 2018-01-31 RX ADMIN — SODIUM CHLORIDE: 0.9 INJECTION, SOLUTION INTRAVENOUS at 12:01

## 2018-01-31 RX ADMIN — CEFEPIME HYDROCHLORIDE 1 G: 1 INJECTION, POWDER, FOR SOLUTION INTRAMUSCULAR; INTRAVENOUS at 01:01

## 2018-01-31 RX ADMIN — MIDAZOLAM HYDROCHLORIDE 2 MG: 1 INJECTION, SOLUTION INTRAMUSCULAR; INTRAVENOUS at 12:01

## 2018-01-31 RX ADMIN — HYDROCODONE BITARTRATE AND ACETAMINOPHEN 1 TABLET: 7.5; 325 TABLET ORAL at 09:01

## 2018-01-31 RX ADMIN — PROMETHAZINE HYDROCHLORIDE 12.5 MG: 25 INJECTION INTRAMUSCULAR; INTRAVENOUS at 01:01

## 2018-01-31 RX ADMIN — DEXMEDETOMIDINE HYDROCHLORIDE 40 MCG: 100 INJECTION, SOLUTION, CONCENTRATE INTRAVENOUS at 01:01

## 2018-01-31 NOTE — ED PROVIDER NOTES
"Encounter Date: 1/31/2018    SCRIBE #1 NOTE: I, Mara Shay, am scribing for, and in the presence of,  Dr. Dickerson . I have scribed the following portions of the note - the Resident attestation.       History     Chief Complaint   Patient presents with    Dialysis shunt problem     Mr. Arevalo is a 41 yo man with PMHx significant for ESRD on HD (MWF) who presents to the ED with the CC of edema of his AVF site. 2-3 weeks ago patient states he had a pustule over his AVF site, and was treated with IV abx with mild improvement. Patient reports he completed HD without complication Monday. Yesterday, he began to experience "itching" over his access site, and overnight he developed erythema and edema. Presents today with tennis ball-sized area of edema. Denies fever/chills, cough. Does report nausea over the last 2-3 days, with 2 episodes of emesis (nonbloody, nonbilious, looked like "mucous").           Review of patient's allergies indicates:   Allergen Reactions    Ciprofloxacin Hives    Iodine and iodide containing products Hives and Itching     Pt states he is not allergic to iodine     Past Medical History:   Diagnosis Date    CHF (congestive heart failure)     ESRD (end stage renal disease)     Hemodialysis access, fistula mature     THRILL AND BRUIT PRESENT    Hemodialysis patient     M-W-F    Hypertension     Myocardial infarction     Renal disorder     Stroke     3 MILD STROKES     Past Surgical History:   Procedure Laterality Date    DIALYSIS FISTULA CREATION       Family History   Problem Relation Age of Onset    Kidney disease Mother      Social History   Substance Use Topics    Smoking status: Never Smoker    Smokeless tobacco: Never Used    Alcohol use No     Review of Systems   Constitutional: Negative for activity change, appetite change, chills, diaphoresis, fatigue and fever.   HENT: Negative for sore throat.    Respiratory: Negative for shortness of breath.    Cardiovascular: " Negative for chest pain.   Gastrointestinal: Positive for nausea and vomiting.   Genitourinary: Negative for dysuria.   Musculoskeletal: Negative for back pain.   Skin: Negative for rash.   Neurological: Negative for weakness.   Hematological: Does not bruise/bleed easily.       Physical Exam     Initial Vitals [01/31/18 0937]   BP Pulse Resp Temp SpO2   137/75 110 18 98 °F (36.7 °C) 99 %      MAP       95.67         Physical Exam    Nursing note and vitals reviewed.  Constitutional: He appears well-developed and well-nourished. No distress.   HENT:   Head: Normocephalic and atraumatic.   Eyes: EOM are normal. Pupils are equal, round, and reactive to light. No scleral icterus.   Neck: Normal range of motion. Neck supple.   Cardiovascular: Regular rhythm, normal heart sounds and intact distal pulses. Tachycardia present.    Tennis ball-sized area of induration over AVF  Thrill palpable  TTP over anterior aspect   Pulmonary/Chest: Breath sounds normal. No respiratory distress.   Abdominal: Soft. He exhibits no distension. There is no tenderness.   Neurological: He is alert and oriented to person, place, and time. No cranial nerve deficit.   Skin: Skin is warm and dry. Capillary refill takes less than 2 seconds.   Psychiatric: He has a normal mood and affect.         ED Course   Procedures  Labs Reviewed   CBC W/ AUTO DIFFERENTIAL - Abnormal; Notable for the following:        Result Value    RBC 3.81 (*)     Hemoglobin 10.6 (*)     Hematocrit 33.5 (*)     MCHC 31.6 (*)     RDW 17.5 (*)     All other components within normal limits   BASIC METABOLIC PANEL - Abnormal; Notable for the following:     Chloride 92 (*)     BUN, Bld 46 (*)     Creatinine 9.6 (*)     eGFR if  7.0 (*)     eGFR if non  6.1 (*)     All other components within normal limits   CULTURE, BLOOD   CULTURE, BLOOD   MAGNESIUM   TYPE & SCREEN             Medical Decision Making:   History:   Old Medical Records: I decided to  obtain old medical records.  Differential Diagnosis:   CBC, BMP, Mag pending to evaluate for infection and need for dialysis. U/s of access ordered, vascular surgery consulted.    Bedside u/s performed, appears to be hematoma vs. cellulitis. Blood cultures drawn, dose of vanc and cefepime given. Evaluated by Vascular Surgery, will plan for OR today for AVF removal and placement of temporary access. Patient made NPO.    Patient admitted to Hospital Medicine, Dr. Santana.  Clinical Tests:   Lab Tests: Ordered and Reviewed  Radiological Study: Ordered and Reviewed  Other:   I have discussed this case with another health care provider.       <> Summary of the Discussion: Vascular Surgery            Scribe Attestation:   Scribe #1: I performed the above scribed service and the documentation accurately describes the services I performed. I attest to the accuracy of the note.    Attending Attestation:   Physician Attestation Statement for Resident:  As the supervising MD   Physician Attestation Statement: I have personally seen and examined this patient.   I agree with the above history. -: Swelling and redness at left upper extremity AV fistula site, concerning for cellulitis. Abx. US ordered. Vascular Surgery consulted for repair.   As the supervising MD I agree with the above PE.    As the supervising MD I agree with the above treatment, course, plan, and disposition.          Physician Attestation for Scribe:      Comments: I, Dr. Buddy Dickerson, personally performed the services described in this documentation. All medical record entries made by the scribe were at my direction and in my presence.  I have reviewed the chart and agree that the record reflects my personal performance and is accurate and complete. Buddy Dcikerson MD.  10:39 AM 01/31/2018              ED Course      Clinical Impression:   The encounter diagnosis was Dialysis AV fistula infection, initial encounter.                           Buddy Dickerson,  MD  01/31/18 1155

## 2018-01-31 NOTE — ED TRIAGE NOTES
PT reports he noticed yesterday his left arm was itching near fistula site. Pt reports he didn't look at this arm until last night and realized it was swollen and red.

## 2018-01-31 NOTE — HPI
43 yo M with h/o HTN, ESRD on HD MWF, aflutter,HFrEF ( EF 30%), anemia of chronic disease, who presents with c/o increasing erythema of his left AVF.  He reports AVF had purulent drainage two weeks ago, was given a course of abx with HD with some improvement, but had purulent drainage again from AVF.  Patient knows AVF created at [facility] but does not know when, possibly 5 years ago.  His last intervention at [facility] was 2015 with fistulogram and PTA of venous outflow stenosis.  He is followed by the [facility].  Last HD was Monday.

## 2018-01-31 NOTE — CONSULTS
[facility]  Vascular Surgery  Consult Note    Inpatient consult to Vascular Surgery  Consult performed by: CYRUS OSORIO  Consult ordered by: CHERY LAU        Subjective:     Chief Complaint/Reason for Admission: infection AVF    History of Present Illness: 41 yo M with h/o HTN, ESRD on HD MWF, aflutter,HFrEF ( EF 30%), anemia of chronic disease, who presents with c/o increasing erythema of his left AVF.  He reports AVF had purulent drainage two weeks ago, was given a course of abx with HD with some improvement, but had purulent drainage again from AVF.  Patient knows AVF created at [facility] but does not know when, possibly 5 years ago.  His last intervention at [facility] was 2015 with fistulogram and PTA of venous outflow stenosis.  He is followed by the [facility] in [facility].  Last HD was Monday.      (Not in a hospital admission)    Review of patient's allergies indicates:   Allergen Reactions    Ciprofloxacin Hives    Iodine and iodide containing products Hives and Itching     Pt states he is not allergic to iodine       Past Medical History:   Diagnosis Date    CHF (congestive heart failure)     ESRD (end stage renal disease)     Hemodialysis access, fistula mature     THRILL AND BRUIT PRESENT    Hemodialysis patient     M-W-F    Hypertension     Myocardial infarction     Renal disorder     Stroke     3 MILD STROKES     Past Surgical History:   Procedure Laterality Date    DIALYSIS FISTULA CREATION       Family History     Problem Relation (Age of Onset)    Kidney disease Mother        Social History Main Topics    Smoking status: Never Smoker    Smokeless tobacco: Never Used    Alcohol use No    Drug use: No    Sexual activity: Not on file     Review of Systems   Constitutional: Negative for activity change and chills.   HENT: Negative for congestion and dental problem.    Eyes: Negative for discharge and itching.   Respiratory: Negative for apnea and chest tightness.     Cardiovascular: Negative for chest pain and leg swelling.   Endocrine: Negative for cold intolerance and heat intolerance.   Allergic/Immunologic: Negative for environmental allergies.   Neurological: Negative for dizziness and facial asymmetry.   Hematological: Negative for adenopathy. Does not bruise/bleed easily.   Psychiatric/Behavioral: Negative for agitation.     Objective:     Vital Signs (Most Recent):  Temp: 98 °F (36.7 °C) (01/31/18 0937)  Pulse: 110 (01/31/18 0937)  Resp: 18 (01/31/18 0937)  BP: 137/75 (01/31/18 0937)  SpO2: 99 % (01/31/18 0937) Vital Signs (24h Range):  Temp:  [98 °F (36.7 °C)] 98 °F (36.7 °C)  Pulse:  [110] 110  Resp:  [18] 18  SpO2:  [99 %] 99 %  BP: (137)/(75) 137/75     Weight: 102.1 kg (225 lb)  Body mass index is 37.44 kg/m².    Physical Exam   Constitutional: He is oriented to person, place, and time. He appears well-developed and well-nourished.   HENT:   Head: Normocephalic and atraumatic.   Eyes: EOM are normal. Pupils are equal, round, and reactive to light.   Cardiovascular: Normal rate and regular rhythm.    Pulses:       Radial pulses are 1+ on the left side.   Pulsatile throughout AVF   Pulmonary/Chest: Effort normal. No respiratory distress.   Abdominal: Soft. He exhibits no distension.   Musculoskeletal:   Left BVT AVF with proximal aneurysmal degeneration with two large aneurysms 6 mm and 4mm respectively.  More proximal aneurysm with erythema, warmth, with tense skin.  No large ulceration. No bleeding.    Neurological: He is alert and oriented to person, place, and time.   Skin: Skin is warm and dry.       Significant Labs:  CBC:   Recent Labs  Lab 01/31/18  1017   WBC 8.02   RBC 3.81*   HGB 10.6*   HCT 33.5*      MCV 88   MCH 27.8   MCHC 31.6*     CMP:   Recent Labs  Lab 01/31/18  1017   GLU 71   CALCIUM 9.6      K 4.8   CO2 29   CL 92*   BUN 46*   CREATININE 9.6*     Coagulation: No results for input(s): LABPROT, INR, APTT in the last 48  hours.    Significant Diagnostics:      Assessment/Plan:     Dialysis AV fistula infection, initial encounter    43 yo M with h/o HTN, ESRD on HD MWF, aflutter,HFrEF ( EF 30%), anemia of chronic disease, who presents with c/o increasing erythema of his left AVF with likely AVF infection.    Blood cultures x2 with IV antibiotics  Will plan for OR for AVF excision/ligation; placement of temporary HD catheter  Will need permacath placement if blood cultures negative  Will plan for future AV access as outpatient            Thank you for your consult.     Samantha Ann MD  Vascular Surgery  [facility]

## 2018-01-31 NOTE — TRANSFER OF CARE
"Anesthesia Transfer of Care Note    Patient: Livan Arevalo    Procedure(s) Performed: Procedure(s) (LRB):  EXCISION-ANEURYSM L AVF (Left)  INSERTION-CATHETER-HEMODIALYSIS FEMORAL (Right)    Patient location: [facility]    Anesthesia Type: general    Transport from OR: Transported from OR on 2-3 L/min O2 by NC with adequate spontaneous ventilation    Post pain: adequate analgesia    Post assessment: no apparent anesthetic complications and tolerated procedure well    Post vital signs: stable    Level of consciousness: sedated and responds to stimulation    Nausea/Vomiting: no nausea/vomiting    Complications: none    Transfer of care protocol was followed      Last vitals:   Visit Vitals  /65 (BP Location: Right arm, Patient Position: Lying)   Pulse 108   Temp 36.6 °C (97.9 °F) (Oral)   Resp 18   Ht 5' 5" (1.651 m)   Wt 102.1 kg (225 lb)   SpO2 99%   BMI 37.44 kg/m²     "

## 2018-01-31 NOTE — ANESTHESIA PREPROCEDURE EVALUATION
01/31/2018  Livan Arevalo is a 42 y.o., male.  Pre-operative evaluation for Procedure(s) (LRB):  [id number]; excision infected aneurysm (Left)    Livan Arevalo is a 42 y.o. 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    Anemia of chronic renal failure    Lymphadenopathy, mediastinal    Chronic systolic heart failure    Moderate mitral regurgitation by prior echocardiogram    Moderate tricuspid regurgitation by prior echocardiogram    Pulmonary hypertension    Obesity (BMI 30-39.9)    History of stroke    Chronic kidney disease-mineral and bone disorder    Hyperkalemia    Abnormal chest x-ray    Hypoglycemia, unspecified    Diarrhea    Hyperbilirubinemia    Mass of chest wall, right    Dialysis AV fistula infection, initial encounter       Review of patient's allergies indicates:   Allergen Reactions    Ciprofloxacin Hives    Iodine and iodide containing products Hives and Itching     Pt states he is not allergic to iodine       No current facility-administered medications on file prior to encounter.      No current outpatient prescriptions on file prior to encounter.       Past Surgical History:   Procedure Laterality Date    DIALYSIS FISTULA CREATION         Social History     Social History    Marital status: Single     Spouse name: N/A    Number of children: N/A    Years of education: N/A     Occupational History    Not on file.     Social History Main Topics    Smoking status: Never Smoker    Smokeless tobacco: Never Used    Alcohol use No    Drug use: No    Sexual activity: Not on file     Other Topics Concern    Not on file     Social History Narrative    No narrative on file          Vital Signs Range (Last 24H):  Temp:  [36.6 °C (97.9 °F)-36.7 °C (98 °F)]   Pulse:  [108-110]   Resp:  [18]   BP: (114-137)/(65-75)   SpO2:  [99 %]       CBC:   Recent Labs      01/31/18   1017   WBC  8.02   RBC  3.81*   HGB  10.6*   HCT  33.5*   PLT  190   MCV  88   MCH  27.8   MCHC  31.6*       CMP:   Recent Labs      01/31/18   1017   NA  136   K  4.8   CL  92*   CO2  29   BUN  46*   CREATININE  9.6*   GLU  71   MG  2.3   CALCIUM  9.6         Anesthesia Evaluation    I have reviewed the Patient Summary Reports.    I have reviewed the Nursing Notes.   I have reviewed the Medications.     Review of Systems  Anesthesia Hx:  No problems with previous Anesthesia  History of prior surgery of interest to airway management or planning: Denies Family Hx of Anesthesia complications.   Denies Personal Hx of Anesthesia complications.   Hematology/Oncology:     Oncology Normal    -- Anemia:   EENT/Dental:EENT/Dental Normal   Cardiovascular:   Hypertension Past MI  CHF    Pulmonary:  Pulmonary Normal    Renal/:   Chronic Renal Disease, ESRD, Dialysis    Hepatic/GI:  Hepatic/GI Normal    Neurological:   CVA, no residual symptoms        Physical Exam  General:  Well nourished, Morbid Obesity    Airway/Jaw/Neck:  Airway Findings: Mouth Opening: Normal Tongue: Normal  General Airway Assessment: Adult  Mallampati: III  Improves to II with phonation.  TM Distance: Normal, at least 6 cm  Jaw/Neck Findings:  Neck ROM: Normal ROM      Dental:  Dental Findings: In tact   Chest/Lungs:  Chest/Lungs Findings: Clear to auscultation, Normal Respiratory Rate     Heart/Vascular:  Heart Findings: Rate: Normal  Rhythm: Regular Rhythm  Sounds: Normal        Mental Status:  Mental Status Findings:  Cooperative, Alert and Oriented         Anesthesia Plan  Type of Anesthesia, risks & benefits discussed:  Anesthesia Type:  general, MAC, regional  Patient's Preference:   Intra-op Monitoring Plan:   Intra-op Monitoring Plan Comments:   Post  Op Pain Control Plan:   Post Op Pain Control Plan Comments:   Induction:   IV  Beta Blocker:  Patient is not currently on a Beta-Blocker (No further documentation required).       Informed Consent: Patient understands risks and agrees with Anesthesia plan.  Questions answered. Anesthesia consent signed with patient.  ASA Score: 4     Day of Surgery Review of History & Physical:    H&P update referred to the surgeon.         Ready For Surgery From Anesthesia Perspective.

## 2018-01-31 NOTE — BRIEF OP NOTE
[facility]  Brief Operative Note    SUMMARY     Surgery Date: 1/31/2018     Surgeon(s) and Role:     * MIGUEL Grover III, MD - Primary     * Samantha Ann MD - Resident - Assisting        Pre-op Diagnosis:  Dialysis AV fistula infection, initial encounter [T82.7XXA]    Post-op Diagnosis:  Post-Op Diagnosis Codes:     * Dialysis AV fistula infection, initial encounter [T82.7XXA]    PROCEDURES:    1. Urgent excision L AVF  2. Femoral hemodialysis  Catheter placement    Anesthesia: Regional    Description of Procedure: as above    Description of the findings of the procedure: as above    Estimated Blood Loss: 25cc         Specimens:   Specimen (12h ago through future)    None

## 2018-01-31 NOTE — SUBJECTIVE & OBJECTIVE
(Not in a hospital admission)    Review of patient's allergies indicates:   Allergen Reactions    Ciprofloxacin Hives    Iodine and iodide containing products Hives and Itching     Pt states he is not allergic to iodine       Past Medical History:   Diagnosis Date    CHF (congestive heart failure)     ESRD (end stage renal disease)     Hemodialysis access, fistula mature     THRILL AND BRUIT PRESENT    Hemodialysis patient     M-W-F    Hypertension     Myocardial infarction     Renal disorder     Stroke     3 MILD STROKES     Past Surgical History:   Procedure Laterality Date    DIALYSIS FISTULA CREATION       Family History     Problem Relation (Age of Onset)    Kidney disease Mother        Social History Main Topics    Smoking status: Never Smoker    Smokeless tobacco: Never Used    Alcohol use No    Drug use: No    Sexual activity: Not on file     Review of Systems   Constitutional: Negative for activity change and chills.   HENT: Negative for congestion and dental problem.    Eyes: Negative for discharge and itching.   Respiratory: Negative for apnea and chest tightness.    Cardiovascular: Negative for chest pain and leg swelling.   Endocrine: Negative for cold intolerance and heat intolerance.   Allergic/Immunologic: Negative for environmental allergies.   Neurological: Negative for dizziness and facial asymmetry.   Hematological: Negative for adenopathy. Does not bruise/bleed easily.   Psychiatric/Behavioral: Negative for agitation.     Objective:     Vital Signs (Most Recent):  Temp: 98 °F (36.7 °C) (01/31/18 0937)  Pulse: 110 (01/31/18 0937)  Resp: 18 (01/31/18 0937)  BP: 137/75 (01/31/18 0937)  SpO2: 99 % (01/31/18 0937) Vital Signs (24h Range):  Temp:  [98 °F (36.7 °C)] 98 °F (36.7 °C)  Pulse:  [110] 110  Resp:  [18] 18  SpO2:  [99 %] 99 %  BP: (137)/(75) 137/75     Weight: 102.1 kg (225 lb)  Body mass index is 37.44 kg/m².    Physical Exam   Constitutional: He is oriented to person,  place, and time. He appears well-developed and well-nourished.   HENT:   Head: Normocephalic and atraumatic.   Eyes: EOM are normal. Pupils are equal, round, and reactive to light.   Cardiovascular: Normal rate and regular rhythm.    Pulses:       Radial pulses are 1+ on the left side.   Pulsatile throughout AVF   Pulmonary/Chest: Effort normal. No respiratory distress.   Abdominal: Soft. He exhibits no distension.   Musculoskeletal:   Left BVT AVF with proximal aneurysmal degeneration with two large aneurysms 6 mm and 4mm respectively.  More proximal aneurysm with erythema, warmth, with tense skin.  No large ulceration. No bleeding.    Neurological: He is alert and oriented to person, place, and time.   Skin: Skin is warm and dry.       Significant Labs:  CBC:   Recent Labs  Lab 01/31/18  1017   WBC 8.02   RBC 3.81*   HGB 10.6*   HCT 33.5*      MCV 88   MCH 27.8   MCHC 31.6*     CMP:   Recent Labs  Lab 01/31/18  1017   GLU 71   CALCIUM 9.6      K 4.8   CO2 29   CL 92*   BUN 46*   CREATININE 9.6*     Coagulation: No results for input(s): LABPROT, INR, APTT in the last 48 hours.    Significant Diagnostics:

## 2018-01-31 NOTE — NURSING TRANSFER
Nursing Transfer Note      1/31/2018     Transfer To: 906A    Transfer via stretcher    Transported by RN    Chart send with patient: Yes    Notified: attempted to notify family, no one in waiting room    Patient reassessed at: 1700 1/31/2018    Upon arrival to floor: patient oriented to room, call bell in reach and bed in lowest position

## 2018-01-31 NOTE — LETTER
February 2, 2018       [facility]  [facility] [address]  [place]  [address]  Phone: [phone]  Fax: [phone] February 2, 2018     Patient: Livan Arevalo   YOB: 1975     Diagnosis:    Active Hospital Problems    Diagnosis  POA    *Dialysis AV fistula infection, initial encounter [T82.7XXA]  Yes    Chronic systolic heart failure [I50.22]  Yes     Chronic    Essential hypertension [I10]  Yes     Chronic    ESRD (end stage renal disease) [N18.6]  Yes     Chronic          PRESCRIPTION    VANCOMYCIN HCL (VANCOMYCIN 1 G/250 ML NS, READY TO MIX SYSTEM,)  Inject 250 mLs (1,000 mg total) into the vein every Mon, Wed, Fri.   Given after Dialysis. Last dose, 2/21/18       Need weekly CBC CMP ESR CRP and vanc trough sent to ID clinic at [phone].                ______________________________  Arnel Santana MD   NPI: [id number]  Hospital Medicine Staff  Department of Hospital Medicine   [facility]  2/2/2018

## 2018-01-31 NOTE — ASSESSMENT & PLAN NOTE
43 yo M with h/o HTN, ESRD on HD MWF, aflutter,HFrEF ( EF 30%), anemia of chronic disease, who presents with c/o increasing erythema of his left AVF with likely AVF infection.    Blood cultures x2 with IV antibiotics  Will plan for OR for AVF excision/ligation; placement of temporary HD catheter  Will need permacath placement if blood cultures negative  Will plan for future AV access as outpatient

## 2018-01-31 NOTE — ANESTHESIA PROCEDURE NOTES
LEFT Axillary Brachial Plexus Single Injection Block    Patient location during procedure: pre-op   Block not for primary anesthetic.  Reason for block: at surgeon's request and post-op pain management   Post-op Pain Location: LEFT AVF revision  Start time: 1/31/2018 1:06 PM  Timeout: 1/31/2018 1:05 PM   End time: 1/31/2018 1:15 PM  Staffing  Anesthesiologist: RIGO LOGAN  Resident/CRNA: MONSERRAT BARRERA  Performed: resident/CRNA   Preanesthetic Checklist  Completed: patient identified, site marked, surgical consent, pre-op evaluation, timeout performed, IV checked, risks and benefits discussed and monitors and equipment checked  Peripheral Block  Patient position: supine  Prep: ChloraPrep  Patient monitoring: heart rate, cardiac monitor, continuous pulse ox, continuous capnometry and frequent blood pressure checks  Block type: axillary  Laterality: left  Injection technique: single shot  Needle  Needle type: Stimuplex   Needle gauge: 21 G  Needle length: 4 in  Needle localization: anatomical landmarks and ultrasound guidance   -ultrasound image captured on disc.  Assessment  Injection assessment: negative aspiration and negative parasthesia  Paresthesia pain: none  Heart rate change: no  Slow fractionated injection: yes  Medications:  Bolus administered: 30 mL of 1.5 mepivacaine/bupivacaine  Epinephrine added: 3.75 mcg/mL (1/300,000)  Additional Notes  VSS.  DOS[facility] RN monitoring vitals throughout procedure.  Patient tolerated procedure well.  ICB performed as well - 10cc given.

## 2018-01-31 NOTE — ED NOTES
LOC: The patient is awake, alert, and oriented to place, time, situation. Affect is appropriate.  Speech is appropriate and clear.     APPEARANCE: Patient resting comfortably in no acute distress.  Patient is clean and well groomed.    SKIN: The skin is warm and dry; color consistent with ethnicity.  Patient has normal skin turgor and moist mucus membranes.  AV shunt left arm with tennis ball and golf ball size swelling at shunt site.  Thrill noted lower site.     MUSCULOSKELETAL: Patient moving upper and lower extremities without difficulty.  Denies weakness.     RESPIRATORY: Airway is open and patent. Respirations spontaneous, even, easy, and non-labored.  Patient has a normal effort and rate.  No accessory muscle use noted. Denies cough.     CARDIAC: No peripheral edema noted. No complaints of chest pain.      ABDOMEN: Soft and non tender to palpation.  No distention noted.     NEUROLOGIC: Eyes open spontaneously.  Behavior appropriate to situation.  Follows commands; facial expression symmetrical.  Purposeful motor response noted; normal sensation in all extremities.

## 2018-01-31 NOTE — OP NOTE
[facility]  Vascular Surgery  Operative Note    SUMMARY     Date of Procedure: 1/31/2018     Procedure: 1. Urgent excision L AVF    2. Femoral hemodialysis  Catheter placement    Surgeon(s) and Role:     * MIGUEL Grover III, MD - Primary     * Samantha Ann MD - Resident - Assisting        Pre-Operative Diagnosis: Dialysis AV fistula infection, initial encounter [T82.7XXA]    Post-Operative Diagnosis: Post-Op Diagnosis Codes:     * Dialysis AV fistula infection, initial encounter [T82.7XXA]    Anesthesia: Regional    Indication for operation: 43 yo M with ESRD with L BVT presenting with infected aneurysm of proximal L BVT AVF.    Description of the Findings of the Procedure: heavily calcified outflow vein, aneurysms x2 proximally; placement of R fem temp HD catheter      Complications: No    Estimated Blood Loss (EBL): 50 mL         Implants:   Implant Name Type Inv. Item Serial No.  Lot No. LRB No. Used                        Specimens:   Specimen (12h ago through future)    None                  Condition: Good    Disposition: PACU - hemodynamically stable.     Operation in detail:Informed consent was obtained and patient taken to operating room and placed in supine position.  Regional block was performed by anesthesia. Patient was given cefepime and vancomycin as perioperative antibiotics.  Left arm was prepped and draped in sterile fashion.  Tourniquet was applied after exsanguination via esmark.  Elliptical incision made around patient's proximal aneurysms with 15 blade scalpel.  Bovie electrocautery was used to dissect subcutaneous tissue and to identify outflow vein, which was noted to be heavily calcified.  This was transected and ligated distally with running two layer 4-0 prolene.  Fistula was then excised with bovie electrocautery to include inflammed skin, by doing so excised infected proximal aneurysm.  Proximally normal caliber, healthy appearing fistula  identified with transection of aneurysmal and infected aneurysms of fistula, this was passed off the field.  Proximal fistula ligated with running 2 layer closure of 4-0 prolene.  Tourniquet was taken down. Extensive hemostasis obtained with silk suture ligation of venous branches and with bovie electrocautery.  After hemostasis obtained, wound was closed with 3-0 prolene deep sutures. Skin reapproximated with 3-0 nylon horizontal mattress sutures, sterile dressing applied.    Attention then turned to temporary HD access.  Right groin was prepped and draped in sterile fashion.  US was used to visualize R CFV which was noted to be patent. This was accessed with micropuncture needle wire then micropunture sheath.  Wire was advanced easily and confirmed in CFV with US.  Wire was exchanged for stiff angled Glide wire witih serial dilation of tract, this was followed by introduction of 20 cm temporary HD catheter.  This was sutured in place with silk sutures.  Ports withdrew and flushed easily.  Ports were flushed with heparinized saline and sterile dressing was applied.  Patient was taken to PACU in stable condition.

## 2018-02-01 LAB
ALBUMIN SERPL BCP-MCNC: 2.7 G/DL
ALP SERPL-CCNC: 213 U/L
ALT SERPL W/O P-5'-P-CCNC: 7 U/L
ANION GAP SERPL CALC-SCNC: 13 MMOL/L
AST SERPL-CCNC: 18 U/L
BASOPHILS # BLD AUTO: 0.03 K/UL
BASOPHILS NFR BLD: 0.4 %
BILIRUB SERPL-MCNC: 0.8 MG/DL
BUN SERPL-MCNC: 60 MG/DL
CALCIUM SERPL-MCNC: 9.5 MG/DL
CHLORIDE SERPL-SCNC: 92 MMOL/L
CO2 SERPL-SCNC: 29 MMOL/L
CREAT SERPL-MCNC: 11 MG/DL
DIFFERENTIAL METHOD: ABNORMAL
EOSINOPHIL # BLD AUTO: 0.1 K/UL
EOSINOPHIL NFR BLD: 1 %
ERYTHROCYTE [DISTWIDTH] IN BLOOD BY AUTOMATED COUNT: 17.7 %
EST. GFR  (AFRICAN AMERICAN): 6 ML/MIN/1.73 M^2
EST. GFR  (NON AFRICAN AMERICAN): 5.2 ML/MIN/1.73 M^2
GLUCOSE SERPL-MCNC: 73 MG/DL
HCT VFR BLD AUTO: 32.5 %
HGB BLD-MCNC: 10.4 G/DL
IMM GRANULOCYTES # BLD AUTO: 0.03 K/UL
IMM GRANULOCYTES NFR BLD AUTO: 0.4 %
LYMPHOCYTES # BLD AUTO: 1.4 K/UL
LYMPHOCYTES NFR BLD: 17.1 %
MCH RBC QN AUTO: 28 PG
MCHC RBC AUTO-ENTMCNC: 32 G/DL
MCV RBC AUTO: 87 FL
MONOCYTES # BLD AUTO: 0.8 K/UL
MONOCYTES NFR BLD: 9.8 %
NEUTROPHILS # BLD AUTO: 5.9 K/UL
NEUTROPHILS NFR BLD: 71.3 %
NRBC BLD-RTO: 0 /100 WBC
PHOSPHATE SERPL-MCNC: 8.5 MG/DL
PLATELET # BLD AUTO: 179 K/UL
PMV BLD AUTO: 11.7 FL
POTASSIUM SERPL-SCNC: 5.2 MMOL/L
PROT SERPL-MCNC: 8.7 G/DL
RBC # BLD AUTO: 3.72 M/UL
SODIUM SERPL-SCNC: 134 MMOL/L
WBC # BLD AUTO: 8.28 K/UL

## 2018-02-01 PROCEDURE — 85025 COMPLETE CBC W/AUTO DIFF WBC: CPT

## 2018-02-01 PROCEDURE — 80053 COMPREHEN METABOLIC PANEL: CPT

## 2018-02-01 PROCEDURE — 90935 HEMODIALYSIS ONE EVALUATION: CPT | Mod: ,,, | Performed by: NURSE PRACTITIONER

## 2018-02-01 PROCEDURE — 25000003 PHARM REV CODE 250: Performed by: NURSE PRACTITIONER

## 2018-02-01 PROCEDURE — 84100 ASSAY OF PHOSPHORUS: CPT

## 2018-02-01 PROCEDURE — 11000001 HC ACUTE MED/SURG PRIVATE ROOM

## 2018-02-01 PROCEDURE — 99232 SBSQ HOSP IP/OBS MODERATE 35: CPT | Mod: ,,, | Performed by: HOSPITALIST

## 2018-02-01 PROCEDURE — 63600175 PHARM REV CODE 636 W HCPCS: Performed by: HOSPITALIST

## 2018-02-01 PROCEDURE — 36415 COLL VENOUS BLD VENIPUNCTURE: CPT

## 2018-02-01 PROCEDURE — 63600175 PHARM REV CODE 636 W HCPCS: Performed by: NURSE PRACTITIONER

## 2018-02-01 PROCEDURE — 25000003 PHARM REV CODE 250: Performed by: HOSPITALIST

## 2018-02-01 PROCEDURE — 25000003 PHARM REV CODE 250: Performed by: SURGERY

## 2018-02-01 PROCEDURE — 90935 HEMODIALYSIS ONE EVALUATION: CPT

## 2018-02-01 RX ORDER — SEVELAMER CARBONATE 800 MG/1
1600 TABLET, FILM COATED ORAL
Status: DISCONTINUED | OUTPATIENT
Start: 2018-02-01 | End: 2018-02-03 | Stop reason: HOSPADM

## 2018-02-01 RX ORDER — HEPARIN SODIUM 1000 [USP'U]/ML
1000 INJECTION, SOLUTION INTRAVENOUS; SUBCUTANEOUS
Status: DISCONTINUED | OUTPATIENT
Start: 2018-02-01 | End: 2018-02-03 | Stop reason: HOSPADM

## 2018-02-01 RX ADMIN — SEVELAMER CARBONATE 1600 MG: 800 TABLET, FILM COATED ORAL at 06:02

## 2018-02-01 RX ADMIN — OXYCODONE HYDROCHLORIDE 10 MG: 5 TABLET ORAL at 07:02

## 2018-02-01 RX ADMIN — HEPARIN SODIUM 1000 UNITS: 1000 INJECTION, SOLUTION INTRAVENOUS; SUBCUTANEOUS at 11:02

## 2018-02-01 RX ADMIN — HYDROCODONE BITARTRATE AND ACETAMINOPHEN 1 TABLET: 7.5; 325 TABLET ORAL at 01:02

## 2018-02-01 RX ADMIN — Medication 1000 MG: at 06:02

## 2018-02-01 RX ADMIN — SODIUM CHLORIDE 300 ML: 0.9 INJECTION, SOLUTION INTRAVENOUS at 09:02

## 2018-02-01 RX ADMIN — SEVELAMER CARBONATE 1600 MG: 800 TABLET, FILM COATED ORAL at 01:02

## 2018-02-01 RX ADMIN — OXYCODONE HYDROCHLORIDE 10 MG: 5 TABLET ORAL at 06:02

## 2018-02-01 NOTE — ASSESSMENT & PLAN NOTE
ESRD on [facility]:  -[facility]  -3:45  --101 kg.    -HD treatment today for metabolic clearance/volume management.  UF goal 3-3.5L as tolerated.  -currently with temporary dialysis catheter to R fem, BC currently NGTD.  Will need tunneled dialysis catheter.  Will discuss with VIVI about possible placement on Monday either as outpatient or in-patient dependent on primary teams discharge plan.      Hyperphosphatemia  -Start renvela 1600 mg PO TID with meals.

## 2018-02-01 NOTE — PLAN OF CARE
Went to pt room. Nobody in room.No pt. No family at bs.   PCP listed as Suzanna Will CM pulled discharge planning assessment forward from 9/12/17 admission. Per notes, pt may have used [facility] in past. CM will round again later today to try to verify info.  Per chart notes, pt has HD on [facility] but needs permacth         09/12/17 1320   Discharge Assessment   Assessment Type Discharge Planning Assessment   Confirmed/corrected address and phone number on facesheet? Yes   Assessment information obtained from? Patient   Expected Length of Stay (days) 2   Communicated expected length of stay with patient/caregiver yes   Prior to hospitilization cognitive status: Alert/Oriented   Prior to hospitalization functional status: Assistive Equipment   Current cognitive status: Alert/Oriented   Current Functional Status: Needs Assistance   Lives With alone   Able to Return to Prior Arrangements no   Is patient able to care for self after discharge? Yes   Patient's perception of discharge disposition home or selfcare   Readmission Within The Last 30 Days no previous admission in last 30 days   Patient currently being followed by outpatient case management? No   Patient currently receives any other outside agency services? No   Equipment Currently Used at Home shower chair;walker, rolling   Do you have any problems affording any of your prescribed medications? No   Is the patient taking medications as prescribed? yes   Does the patient have transportation home? Yes   Transportation Available family or friend will provide   Does the patient receive services at the [facility]? No   Discharge Plan A Home   Discharge Plan B Home Health   Patient/Family In Agreement With Plan yes       Patient lives alone, has equipment to assist with ambulation, & receives HD treatments at [facility] ([facility]). Plan to discharge patient home alone or home with home health when medically stable.   02/01/18 0911   Discharge Assessment    Assessment Type Discharge Planning Assessment

## 2018-02-01 NOTE — PLAN OF CARE
Problem: Patient Care Overview  Goal: Plan of Care Review  Outcome: Ongoing (interventions implemented as appropriate)  AAOx4. VSS. Denies chills, SOB, diaphoresis, pain. Afebrile. Pain controlled with PRN med, tolerated well and full relief obtained. No acute events. Will continue to monitor and reassess.     Problem: Hemodialysis (Adult)  Goal: Signs and Symptoms of Listed Potential Problems Will be Absent, Minimized or Managed (Hemodialysis)  Signs and symptoms of listed potential problems will be absent, minimized or managed by discharge/transition of care (reference Hemodialysis (Adult) CPG).   Outcome: Ongoing (interventions implemented as appropriate)  Right femoral site clean, dry and intact    Problem: Fall Risk (Adult)  Goal: Identify Related Risk Factors and Signs and Symptoms  Related risk factors and signs and symptoms are identified upon initiation of Human Response Clinical Practice Guideline (CPG)   Outcome: Ongoing (interventions implemented as appropriate)  Remained free of injury/fall/trauma. Call bell and personal belongings within reach.

## 2018-02-01 NOTE — ASSESSMENT & PLAN NOTE
43 yo M with h/o HTN, ESRD on HD MWF, aflutter,HFrEF ( EF 30%), anemia of chronic disease, who presents with c/o increasing erythema of his left AVF with likely AVF infection now POD#1 excision left AVF and placement of femoral dialysis catheter on right    - Blood cultures x2 with NGTD (drawn 1/31), on IV antibiotics   - Will need permacath placement if blood cultures negative with VIVI  - Will plan for future AV access as outpatient  - medical management per primary team

## 2018-02-01 NOTE — CONSULTS
"[facility]  Nephrology  Consult Note    Patient Name: Livan Arevalo  MRN: [id number]  Admission Date: 1/31/2018  Hospital Length of Stay: 1 days  Attending Provider: Arnel Santana MD   Primary Care Physician: Primary Doctor No  Principal Problem:Dialysis AV fistula infection, initial encounter    Inpatient consult to Nephrology  Consult performed by: HEATHER JIMENEZ  Consult ordered by: CHERY LAU  Reason for consult: ESRD on HD        Subjective:     HPI: Mr. Arevalo is a 41 yo AAM with HTN, aflutter, HFrEF ( EF 30%), anemia of chronic disease, and ESRD on HD MWF, who presents with c/o increasing erythema of his left AVF that started since 1/29 (3 days).  He reports AVF had purulent drainage two weeks ago, was given a course of abx with HD (Vancomycin) with some improvement, but had purulent drainage again from AVF. Patient reports he completed HD without complication Monday. Yesterday, he began to experience "itching" over his access site, and overnight he developed erythema and edema. He presented to [facility] ED on 1/31 with tennis ball-sized area of edema. Denies fever/chills, cough. Does report nausea over the last 2-3 days, with 2 episodes of emesis. Bedside u/s performed, which appeared to be hematoma vs. cellulitis. Blood cultures drawn, dose of vanc and cefepime given. Evaluated by Vascular Surgery and underwent excision of his AVF and temporary dialysis catheter was placed.  Nephrology was consulted for ESRD management.    He dialyzes at [facility].  Typical dialysis run time of 3:45 minutes and EDW of 101 kg.  He does not make urine anymore and has been on dialysis x 6 years.  He is being evaluated for kidney transplant.       Past Medical History:   Diagnosis Date    CHF (congestive heart failure)     ESRD (end stage renal disease)     Hemodialysis access, fistula mature     THRILL AND BRUIT PRESENT    Hemodialysis patient     M-W-F    Hypertension     Myocardial infarction  "    Renal disorder     Stroke     3 MILD STROKES       Past Surgical History:   Procedure Laterality Date    DIALYSIS FISTULA CREATION         Review of patient's allergies indicates:   Allergen Reactions    Ciprofloxacin Hives    Iodine and iodide containing products Hives and Itching     Pt states he is not allergic to iodine     Current Facility-Administered Medications   Medication Frequency    0.9%  NaCl infusion Once    acetaminophen tablet 650 mg Q6H PRN    hydrocodone-acetaminophen 7.5-325mg per tablet 1 tablet Q6H PRN    oxyCODONE immediate release tablet 10 mg Q6H PRN    sodium chloride 0.9% flush 3 mL PRN     Family History     Problem Relation (Age of Onset)    Kidney disease Mother        Social History Main Topics    Smoking status: Never Smoker    Smokeless tobacco: Never Used    Alcohol use No    Drug use: No    Sexual activity: Not on file     Review of Systems   Constitutional: Negative for activity change and chills.   HENT: Negative for congestion and dental problem.    Eyes: Negative for discharge and itching.   Respiratory: Negative for apnea and chest tightness.    Cardiovascular: Negative for chest pain and leg swelling.   Gastrointestinal: Negative for abdominal distention, abdominal pain, constipation, diarrhea, nausea and vomiting.   Endocrine: Negative for cold intolerance and heat intolerance.   Musculoskeletal: Negative for arthralgias, back pain and gait problem.   Allergic/Immunologic: Negative for environmental allergies.   Neurological: Negative for dizziness and facial asymmetry.   Hematological: Negative for adenopathy. Does not bruise/bleed easily.   Psychiatric/Behavioral: Negative for agitation, behavioral problems and confusion.     Objective:     Vital Signs (Most Recent):  Temp: 98.1 °F (36.7 °C) (02/01/18 0818)  Pulse: 78 (02/01/18 0930)  Resp: 18 (02/01/18 0818)  BP: 117/75 (02/01/18 0930)  SpO2: (!) 93 % (02/01/18 0745)  O2 Device (Oxygen Therapy): room air  (02/01/18 0818) Vital Signs (24h Range):  Temp:  [97 °F (36.1 °C)-98.4 °F (36.9 °C)] 98.1 °F (36.7 °C)  Pulse:  [] 78  Resp:  [16-19] 18  SpO2:  [93 %-100 %] 93 %  BP: ()/(58-96) 117/75     Weight: 109 kg (240 lb 4.8 oz) (01/31/18 1813)  Body mass index is 39.99 kg/m².  Body surface area is 2.24 meters squared.    I/O last 3 completed shifts:  In: 110 [I.V.:110]  Out: -     Physical Exam   Constitutional: He is oriented to person, place, and time. He appears well-developed and well-nourished. No distress.   HENT:   Head: Normocephalic and atraumatic.   Right Ear: External ear normal.   Left Ear: External ear normal.   Eyes: Conjunctivae and EOM are normal. Right eye exhibits no discharge. Left eye exhibits no discharge. No scleral icterus.   Neck: Normal range of motion. Neck supple.   Cardiovascular: Normal rate and regular rhythm.  Exam reveals no friction rub.    No murmur heard.  Pulmonary/Chest: Effort normal and breath sounds normal. No respiratory distress. He has no wheezes. He has no rales.   Abdominal: Soft. Bowel sounds are normal. He exhibits no distension. There is no tenderness.   Musculoskeletal: Normal range of motion. He exhibits no edema or deformity.   Neurological: He is alert and oriented to person, place, and time.   Skin: Skin is warm and dry. He is not diaphoretic.   Dressing to DESTINEY dry/intact       Significant Labs:  CBC:   Recent Labs  Lab 02/01/18  0608   WBC 8.28   RBC 3.72*   HGB 10.4*   HCT 32.5*      MCV 87   MCH 28.0   MCHC 32.0     CMP:   Recent Labs  Lab 02/01/18  0608   GLU 73   CALCIUM 9.5   ALBUMIN 2.7*   PROT 8.7*   *   K 5.2*   CO2 29   CL 92*   BUN 60*   CREATININE 11.0*   ALKPHOS 213*   ALT 7*   AST 18   BILITOT 0.8         Assessment/Plan:     ESRD (end stage renal disease)    ESRD on iHD MWF  -[facility]:  [facility]  -3:45  --101 kg.    -HD treatment today for metabolic clearance/volume management.  UF goal 3-3.5L as tolerated.  -currently with  temporary dialysis catheter to R fem, BC currently NGTD.  Will need tunneled dialysis catheter.  Will discuss with [facility]I about possible placement on Monday either as outpatient or in-patient dependent on primary teams discharge plan.      Hyperphosphatemia  -Start renvela 1600 mg PO TID with meals.           Abiel Mckeon NP  Nephrology  [facility]  Pager:  [phone]

## 2018-02-01 NOTE — SUBJECTIVE & OBJECTIVE
Medications:  Continuous Infusions:  Scheduled Meds:   sodium chloride 0.9%   Intravenous Once     PRN Meds:acetaminophen, hydrocodone-acetaminophen 7.5-325mg, oxyCODONE, sodium chloride 0.9%     Objective:     Vital Signs (Most Recent):  Temp: 98.4 °F (36.9 °C) (02/01/18 0745)  Pulse: 104 (02/01/18 0745)  Resp: 16 (01/31/18 2317)  BP: 129/89 (02/01/18 0745)  SpO2: (!) 93 % (02/01/18 0745) Vital Signs (24h Range):  Temp:  [97 °F (36.1 °C)-98.4 °F (36.9 °C)] 98.4 °F (36.9 °C)  Pulse:  [] 104  Resp:  [16-19] 16  SpO2:  [93 %-100 %] 93 %  BP: ()/(58-89) 129/89          Physical Exam   Constitutional: He is oriented to person, place, and time. He appears well-developed and well-nourished.   HENT:   Head: Normocephalic and atraumatic.   Cardiovascular: Normal rate.    Pulmonary/Chest: Effort normal. No respiratory distress.   Neurological: He is alert and oriented to person, place, and time.   Skin: Skin is warm and dry.   Incision clean, dry and intact.       Significant Labs:  CBC:   Recent Labs  Lab 02/01/18  0608   WBC 8.28   RBC 3.72*   HGB 10.4*   HCT 32.5*      MCV 87   MCH 28.0   MCHC 32.0     CMP:   Recent Labs  Lab 02/01/18  0608   GLU 73   CALCIUM 9.5   ALBUMIN 2.7*   PROT 8.7*   *   K 5.2*   CO2 29   CL 92*   BUN 60*   CREATININE 11.0*   ALKPHOS 213*   ALT 7*   AST 18   BILITOT 0.8       Significant Diagnostics:  none new

## 2018-02-01 NOTE — H&P
"[facility] Hospital Medicine  History & Physical    Patient Name: Livan Arevalo  MRN: [id number]  Admission Date: 1/31/2018  Attending Physician: Arnel Santana MD  Primary Care Provider: Primary Doctor [facility] Medicine Team: Networked reference to record PCT  Arnel Santana MD     Patient information was obtained from patient, past medical records and ER records.     Subjective:     Principal Problem:Dialysis AV fistula infection, initial encounter    Chief Complaint:   Chief Complaint   Patient presents with    Dialysis shunt problem        HPI: This is a 43 yo M with h/o HTN, ESRD on HD MWF, aflutter, HFrEF ( EF 30%), anemia of chronic disease, who presents with c/o increasing erythema of his left AVF that started since Monday.  He reports AVF had purulent drainage two weeks ago, was given a course of abx with HD (Vancomycin) with some improvement, but had purulent drainage again from AVF. Patient reports he completed HD without complication Monday. Yesterday, he began to experience "itching" over his access site, and overnight he developed erythema and edema. Presents today with tennis ball-sized area of edema. Denies fever/chills, cough. Does report nausea over the last 2-3 days, with 2 episodes of emesis. Per ED, bedside u/s performed, appears to be hematoma vs. cellulitis. Blood cultures drawn, dose of vanc and cefepime given. Evaluated by Vascular Surgery, will plan for OR today for AVF removal and placement of temporary access.       Patient report at this time to not be taking any medications.     Past Medical History:   Diagnosis Date    CHF (congestive heart failure)     ESRD (end stage renal disease)     Hemodialysis access, fistula mature     THRILL AND BRUIT PRESENT    Hemodialysis patient     M-W-F    Hypertension     Myocardial infarction     Renal disorder     Stroke     3 MILD STROKES       Past Surgical History:   Procedure Laterality Date    DIALYSIS FISTULA " CREATION         Review of patient's allergies indicates:   Allergen Reactions    Ciprofloxacin Hives    Iodine and iodide containing products Hives and Itching     Pt states he is not allergic to iodine       No current facility-administered medications on file prior to encounter.      No current outpatient prescriptions on file prior to encounter.     Family History     Problem Relation (Age of Onset)    Kidney disease Mother        Social History Main Topics    Smoking status: Never Smoker    Smokeless tobacco: Never Used    Alcohol use No    Drug use: No    Sexual activity: Not on file       Review of Systems   Constitutional: Negative for activity change, appetite change, chills, diaphoresis, fatigue and fever.   HENT: Negative for sore throat.    Respiratory: Negative for shortness of breath.    Cardiovascular: Negative for chest pain.   Gastrointestinal: Positive for nausea and vomiting.   Genitourinary: Negative for dysuria.   Musculoskeletal: Negative for back pain.   Skin: Negative for rash. Positive for wound  Neurological: Negative for weakness.   Hematological: Does not bruise/bleed easily.        Objective:     Vital Signs (Most Recent):  Temp: 97.8 °F (36.6 °C) (01/31/18 1730)  Pulse: 95 (01/31/18 1730)  Resp: 18 (01/31/18 1730)  BP: 124/89 (01/31/18 1730)  SpO2: 100 % (01/31/18 1730) Vital Signs (24h Range):  Temp:  [97.8 °F (36.6 °C)-98.1 °F (36.7 °C)] 97.8 °F (36.6 °C)  Pulse:  [] 95  Resp:  [18-19] 18  SpO2:  [99 %-100 %] 100 %  BP: ()/(58-89) 124/89     Weight: 109 kg (240 lb 4.8 oz)  Body mass index is 39.99 kg/m².    Physical Exam    Constitutional: He is oriented to person, place, and time. He appears well-developed and well-nourished.   HENT:   Head: Normocephalic and atraumatic.   Eyes: EOM are normal. Pupils are equal, round, and reactive to light.   Cardiovascular: Normal rate and regular rhythm.    Pulses: Radial pulses are 1+ on the left side. Pulsatile throughout AVF    Pulmonary/Chest: Effort normal. No respiratory distress.   Abdominal: Soft. He exhibits no distension.   Musculoskeletal:   Left BVT AVF with proximal aneurysmal degeneration with two large aneurysms 6 mm and 4mm respectively.  More proximal aneurysm with erythema, warmth, with tense skin.  No large ulceration. No bleeding.    Neurological: He is alert and oriented to person, place, and time.   Skin: Skin is warm and dry.     Significant Labs:   CBC:   Recent Labs  Lab 01/31/18  1017   WBC 8.02   HGB 10.6*   HCT 33.5*        CMP:   Recent Labs  Lab 01/31/18  1017      K 4.8   CL 92*   CO2 29   GLU 71   BUN 46*   CREATININE 9.6*   CALCIUM 9.6   ANIONGAP 15   EGFRNONAA 6.1*       Significant Imaging: I have reviewed all pertinent imaging results/findings within the past 24 hours.    Assessment/Plan:     Active Diagnoses:    Diagnosis Date Noted POA    PRINCIPAL PROBLEM:  Dialysis AV fistula infection, initial encounter [T82.7XXA] 01/31/2018 Yes    Chronic systolic heart failure [I50.22] 04/24/2017 Yes     Chronic    Mass of right chest wall [R22.2] 04/22/2017 Yes     Chronic    Essential hypertension [I10] 03/18/2014 Yes     Chronic    ESRD (end stage renal disease) [N18.6] 03/18/2014 Yes     Chronic      Problems Resolved During this Admission:    Diagnosis Date Noted Date Resolved POA     VTE Risk Mitigation         Ordered     Medium Risk of VTE  Once      01/31/18 1547     Reason for No Pharmacological VTE Prophylaxis  Once      01/31/18 1547          Dialysis AV fistula infection, initial encounter  - Increasing erythema of his left AVF with likely AVF infection. With recent infection treated with IV (Vanc per pt)  - Blood cultures x2 with IV antibiotics, OR for AVF excision/ligation 1/31 by Vascular Surgery  - placement of temporary HD catheter in R groin, permacath placement if blood cultures negative  - dose of vanc and cefepime given in ED, will stay in his system as he is not getting  HD  - plan to redose post HD    Essential hypertension  - no longer on medication per medication  - CTM    ESRD (end stage renal disease)  - Nephrology consulted for HD  - Vascular surgery to eval for permacath placement when stable    Chronic systolic heart failure  - stable at this time  - report not taking any medications    Mass of right chest wall, chronic  - s/p biopsy per note  - outpatient follow up      Dispo: Pending treatment by vascular surgery      Arnel Santana MD  Department of Hospital Medicine   [facility]

## 2018-02-01 NOTE — PROGRESS NOTES
[facility]  Vascular Surgery  Progress Note    Patient Name: Livan Arevalo  MRN: [id number]  Admission Date: 1/31/2018  Primary Care Provider: Primary Doctor No    Subjective:     Interval History: No acute events overnight. Afebrile, vital signs stable on room air. WBC remains within normal limits at 8.28. H/H stable at 10.4/32.5.     Post-Op Info:  Procedure(s) (LRB):  EXCISION-ANEURYSM L AVF (Left)  INSERTION-CATHETER-HEMODIALYSIS FEMORAL (Right)   1 Day Post-Op       Medications:  Continuous Infusions:  Scheduled Meds:   sodium chloride 0.9%   Intravenous Once     PRN Meds:acetaminophen, hydrocodone-acetaminophen 7.5-325mg, oxyCODONE, sodium chloride 0.9%     Objective:     Vital Signs (Most Recent):  Temp: 98.4 °F (36.9 °C) (02/01/18 0745)  Pulse: 104 (02/01/18 0745)  Resp: 16 (01/31/18 2317)  BP: 129/89 (02/01/18 0745)  SpO2: (!) 93 % (02/01/18 0745) Vital Signs (24h Range):  Temp:  [97 °F (36.1 °C)-98.4 °F (36.9 °C)] 98.4 °F (36.9 °C)  Pulse:  [] 104  Resp:  [16-19] 16  SpO2:  [93 %-100 %] 93 %  BP: ()/(58-89) 129/89          Physical Exam   Constitutional: He is oriented to person, place, and time. He appears well-developed and well-nourished.   HENT:   Head: Normocephalic and atraumatic.   Cardiovascular: Normal rate.    Pulmonary/Chest: Effort normal. No respiratory distress.   Neurological: He is alert and oriented to person, place, and time.   Skin: Skin is warm and dry.   Incision clean, dry and intact.       Significant Labs:  CBC:   Recent Labs  Lab 02/01/18  0608   WBC 8.28   RBC 3.72*   HGB 10.4*   HCT 32.5*      MCV 87   MCH 28.0   MCHC 32.0     CMP:   Recent Labs  Lab 02/01/18  0608   GLU 73   CALCIUM 9.5   ALBUMIN 2.7*   PROT 8.7*   *   K 5.2*   CO2 29   CL 92*   BUN 60*   CREATININE 11.0*   ALKPHOS 213*   ALT 7*   AST 18   BILITOT 0.8       Significant Diagnostics:  none new    Assessment/Plan:     * Dialysis AV fistula infection, initial encounter     41 yo M with h/o HTN, ESRD on HD MWF, aflutter,HFrEF ( EF 30%), anemia of chronic disease, who presents with c/o increasing erythema of his left AVF with likely AVF infection now POD#1 excision left AVF and placement of femoral dialysis catheter on right    - Blood cultures x2 with NGTD (drawn 1/31), on IV antibiotics   - Will need permacath placement if blood cultures negative with VIVI  - Will plan for future AV access as outpatient  - medical management per primary team            Casi Ramírez MD  Vascular Surgery  [facility]

## 2018-02-01 NOTE — PROGRESS NOTES
Discussed with VIVI, plan to place perm-a-cath tomorrow afternoon after dialysis.  Will need to be NPO after midnight.      DANIELLE Camara, FNP-BC  Nephrology  Pager:  [phone]

## 2018-02-01 NOTE — PROGRESS NOTES
3 hr maintenance HD treatment initiated via RTFemoral temporary dialysis catheter, sluggish flows to both ports noted, unable to obtain optimal pump speed, max obtained; 265 mls/min

## 2018-02-01 NOTE — PLAN OF CARE
Problem: Patient Care Overview  Goal: Plan of Care Review  Outcome: Ongoing (interventions implemented as appropriate)  4 HR HD completed, net fluid odpghgi=9286 mls, target goal achieved. Pt tolerated well. Cath-Flow instilled into each lumen of dialysis catheter to fill lumen volume, ends capped and secured. Ok for cath -Flow to dwell till next tx  per SANAZ Leahy.   Report given to Sarah ACHARYA.

## 2018-02-01 NOTE — HPI
"Mr. Arevalo is a 43 yo AAM with HTN, aflutter, HFrEF ( EF 30%), anemia of chronic disease, and ESRD on HD MWF, who presents with c/o increasing erythema of his left AVF that started since 1/29 (3 days).  He reports AVF had purulent drainage two weeks ago, was given a course of abx with HD (Vancomycin) with some improvement, but had purulent drainage again from AVF. Patient reports he completed HD without complication Monday. Yesterday, he began to experience "itching" over his access site, and overnight he developed erythema and edema. He presented to [facility] ED on 1/31 with tennis ball-sized area of edema. Denies fever/chills, cough. Does report nausea over the last 2-3 days, with 2 episodes of emesis. Bedside u/s performed, which appeared to be hematoma vs. cellulitis. Blood cultures drawn, dose of vanc and cefepime given. Evaluated by Vascular Surgery and underwent excision of his AVF and temporary dialysis catheter was placed.  Nephrology was consulted for ESRD management.    He dialyzes at [facility].  Typical dialysis run time of 3:45 minutes and EDW of 101 kg.  He does not make urine anymore and has been on dialysis x 6 years.  He is being evaluated for kidney transplant.     "

## 2018-02-01 NOTE — PLAN OF CARE
Problem: Patient Care Overview  Goal: Plan of Care Review  2:45 HR HD completed, system clotted 15 minutes away from completion, net fluid vlshofo=4238 mls, target goal achieved. Pt tolerated well. Dialysis catheter ports locked with HEPARIN 1000units/ml to fill lumen volume, ends capped and secured. Report given to Sarah ACHARYA

## 2018-02-01 NOTE — SUBJECTIVE & OBJECTIVE
Past Medical History:   Diagnosis Date    CHF (congestive heart failure)     ESRD (end stage renal disease)     Hemodialysis access, fistula mature     THRILL AND BRUIT PRESENT    Hemodialysis patient     M-W-F    Hypertension     Myocardial infarction     Renal disorder     Stroke     3 MILD STROKES       Past Surgical History:   Procedure Laterality Date    DIALYSIS FISTULA CREATION         Review of patient's allergies indicates:   Allergen Reactions    Ciprofloxacin Hives    Iodine and iodide containing products Hives and Itching     Pt states he is not allergic to iodine     Current Facility-Administered Medications   Medication Frequency    0.9%  NaCl infusion Once    acetaminophen tablet 650 mg Q6H PRN    hydrocodone-acetaminophen 7.5-325mg per tablet 1 tablet Q6H PRN    oxyCODONE immediate release tablet 10 mg Q6H PRN    sodium chloride 0.9% flush 3 mL PRN     Family History     Problem Relation (Age of Onset)    Kidney disease Mother        Social History Main Topics    Smoking status: Never Smoker    Smokeless tobacco: Never Used    Alcohol use No    Drug use: No    Sexual activity: Not on file     Review of Systems   Constitutional: Negative for activity change and chills.   HENT: Negative for congestion and dental problem.    Eyes: Negative for discharge and itching.   Respiratory: Negative for apnea and chest tightness.    Cardiovascular: Negative for chest pain and leg swelling.   Gastrointestinal: Negative for abdominal distention, abdominal pain, constipation, diarrhea, nausea and vomiting.   Endocrine: Negative for cold intolerance and heat intolerance.   Musculoskeletal: Negative for arthralgias, back pain and gait problem.   Allergic/Immunologic: Negative for environmental allergies.   Neurological: Negative for dizziness and facial asymmetry.   Hematological: Negative for adenopathy. Does not bruise/bleed easily.   Psychiatric/Behavioral: Negative for agitation, behavioral  problems and confusion.     Objective:     Vital Signs (Most Recent):  Temp: 98.1 °F (36.7 °C) (02/01/18 0818)  Pulse: 78 (02/01/18 0930)  Resp: 18 (02/01/18 0818)  BP: 117/75 (02/01/18 0930)  SpO2: (!) 93 % (02/01/18 0745)  O2 Device (Oxygen Therapy): room air (02/01/18 0818) Vital Signs (24h Range):  Temp:  [97 °F (36.1 °C)-98.4 °F (36.9 °C)] 98.1 °F (36.7 °C)  Pulse:  [] 78  Resp:  [16-19] 18  SpO2:  [93 %-100 %] 93 %  BP: ()/(58-96) 117/75     Weight: 109 kg (240 lb 4.8 oz) (01/31/18 1813)  Body mass index is 39.99 kg/m².  Body surface area is 2.24 meters squared.    I/O last 3 completed shifts:  In: 110 [I.V.:110]  Out: -     Physical Exam   Constitutional: He is oriented to person, place, and time. He appears well-developed and well-nourished. No distress.   HENT:   Head: Normocephalic and atraumatic.   Right Ear: External ear normal.   Left Ear: External ear normal.   Eyes: Conjunctivae and EOM are normal. Right eye exhibits no discharge. Left eye exhibits no discharge. No scleral icterus.   Neck: Normal range of motion. Neck supple.   Cardiovascular: Normal rate and regular rhythm.  Exam reveals no friction rub.    No murmur heard.  Pulmonary/Chest: Effort normal and breath sounds normal. No respiratory distress. He has no wheezes. He has no rales.   Abdominal: Soft. Bowel sounds are normal. He exhibits no distension. There is no tenderness.   Musculoskeletal: Normal range of motion. He exhibits no edema or deformity.   Neurological: He is alert and oriented to person, place, and time.   Skin: Skin is warm and dry. He is not diaphoretic.   Dressing to DESTINEY dry/intact       Significant Labs:  CBC:   Recent Labs  Lab 02/01/18  0608   WBC 8.28   RBC 3.72*   HGB 10.4*   HCT 32.5*      MCV 87   MCH 28.0   MCHC 32.0     CMP:   Recent Labs  Lab 02/01/18  0608   GLU 73   CALCIUM 9.5   ALBUMIN 2.7*   PROT 8.7*   *   K 5.2*   CO2 29   CL 92*   BUN 60*   CREATININE 11.0*   ALKPHOS 213*   ALT 7*    AST 18   BILITOT 0.8

## 2018-02-02 LAB
ALBUMIN SERPL BCP-MCNC: 2.7 G/DL
ALP SERPL-CCNC: 236 U/L
ALT SERPL W/O P-5'-P-CCNC: 7 U/L
ANION GAP SERPL CALC-SCNC: 16 MMOL/L
AST SERPL-CCNC: 17 U/L
BASOPHILS # BLD AUTO: 0.04 K/UL
BASOPHILS NFR BLD: 0.5 %
BILIRUB SERPL-MCNC: 0.8 MG/DL
BUN SERPL-MCNC: 48 MG/DL
CALCIUM SERPL-MCNC: 9.8 MG/DL
CHLORIDE SERPL-SCNC: 100 MMOL/L
CO2 SERPL-SCNC: 19 MMOL/L
CREAT SERPL-MCNC: 9.1 MG/DL
DIFFERENTIAL METHOD: ABNORMAL
EOSINOPHIL # BLD AUTO: 0.2 K/UL
EOSINOPHIL NFR BLD: 2.3 %
ERYTHROCYTE [DISTWIDTH] IN BLOOD BY AUTOMATED COUNT: 17.5 %
EST. GFR  (AFRICAN AMERICAN): 7.5 ML/MIN/1.73 M^2
EST. GFR  (NON AFRICAN AMERICAN): 6.5 ML/MIN/1.73 M^2
GLUCOSE SERPL-MCNC: 67 MG/DL
HCT VFR BLD AUTO: 32.9 %
HGB BLD-MCNC: 10.4 G/DL
IMM GRANULOCYTES # BLD AUTO: 0.02 K/UL
IMM GRANULOCYTES NFR BLD AUTO: 0.3 %
LYMPHOCYTES # BLD AUTO: 1.5 K/UL
LYMPHOCYTES NFR BLD: 19.5 %
MCH RBC QN AUTO: 28.2 PG
MCHC RBC AUTO-ENTMCNC: 31.6 G/DL
MCV RBC AUTO: 89 FL
MONOCYTES # BLD AUTO: 1.1 K/UL
MONOCYTES NFR BLD: 14.1 %
NEUTROPHILS # BLD AUTO: 4.8 K/UL
NEUTROPHILS NFR BLD: 63.3 %
NRBC BLD-RTO: 0 /100 WBC
PHOSPHATE SERPL-MCNC: 7 MG/DL
PLATELET # BLD AUTO: 151 K/UL
PMV BLD AUTO: 12.2 FL
POTASSIUM SERPL-SCNC: 4.9 MMOL/L
PROT SERPL-MCNC: 9.3 G/DL
RBC # BLD AUTO: 3.69 M/UL
SODIUM SERPL-SCNC: 135 MMOL/L
WBC # BLD AUTO: 7.52 K/UL

## 2018-02-02 PROCEDURE — C1750 CATH, HEMODIALYSIS,LONG-TERM: HCPCS

## 2018-02-02 PROCEDURE — 25000003 PHARM REV CODE 250: Performed by: NURSE PRACTITIONER

## 2018-02-02 PROCEDURE — 99152 MOD SED SAME PHYS/QHP 5/>YRS: CPT | Mod: ,,, | Performed by: INTERNAL MEDICINE

## 2018-02-02 PROCEDURE — 77001 FLUOROGUIDE FOR VEIN DEVICE: CPT | Mod: 26,,, | Performed by: INTERNAL MEDICINE

## 2018-02-02 PROCEDURE — 80053 COMPREHEN METABOLIC PANEL: CPT

## 2018-02-02 PROCEDURE — 25000003 PHARM REV CODE 250: Performed by: SURGERY

## 2018-02-02 PROCEDURE — 36415 COLL VENOUS BLD VENIPUNCTURE: CPT

## 2018-02-02 PROCEDURE — 36558 INSERT TUNNELED CV CATH: CPT | Mod: ,,, | Performed by: INTERNAL MEDICINE

## 2018-02-02 PROCEDURE — 25000003 PHARM REV CODE 250

## 2018-02-02 PROCEDURE — 85025 COMPLETE CBC W/AUTO DIFF WBC: CPT

## 2018-02-02 PROCEDURE — 25000003 PHARM REV CODE 250: Performed by: HOSPITALIST

## 2018-02-02 PROCEDURE — 90935 HEMODIALYSIS ONE EVALUATION: CPT | Mod: ,,, | Performed by: INTERNAL MEDICINE

## 2018-02-02 PROCEDURE — 84100 ASSAY OF PHOSPHORUS: CPT

## 2018-02-02 PROCEDURE — C1769 GUIDE WIRE: HCPCS

## 2018-02-02 PROCEDURE — 90935 HEMODIALYSIS ONE EVALUATION: CPT

## 2018-02-02 PROCEDURE — 63600175 PHARM REV CODE 636 W HCPCS: Performed by: HOSPITALIST

## 2018-02-02 PROCEDURE — 63600175 PHARM REV CODE 636 W HCPCS

## 2018-02-02 PROCEDURE — 11000001 HC ACUTE MED/SURG PRIVATE ROOM

## 2018-02-02 PROCEDURE — 06HM33Z INSERTION OF INFUSION DEVICE INTO RIGHT FEMORAL VEIN, PERCUTANEOUS APPROACH: ICD-10-PCS | Performed by: INTERNAL MEDICINE

## 2018-02-02 PROCEDURE — 99232 SBSQ HOSP IP/OBS MODERATE 35: CPT | Mod: ,,, | Performed by: HOSPITALIST

## 2018-02-02 PROCEDURE — 0JHL3XZ INSERTION OF TUNNELED VASCULAR ACCESS DEVICE INTO RIGHT UPPER LEG SUBCUTANEOUS TISSUE AND FASCIA, PERCUTANEOUS APPROACH: ICD-10-PCS | Performed by: INTERNAL MEDICINE

## 2018-02-02 RX ORDER — SODIUM CHLORIDE 9 MG/ML
INJECTION, SOLUTION INTRAVENOUS ONCE
Status: COMPLETED | OUTPATIENT
Start: 2018-02-02 | End: 2018-02-02

## 2018-02-02 RX ORDER — SEVELAMER CARBONATE 800 MG/1
1600 TABLET, FILM COATED ORAL
Qty: 180 TABLET | Refills: 3 | Status: SHIPPED | OUTPATIENT
Start: 2018-02-02 | End: 2018-02-03

## 2018-02-02 RX ORDER — LOPERAMIDE HYDROCHLORIDE 2 MG/1
2 CAPSULE ORAL 4 TIMES DAILY PRN
Status: DISCONTINUED | OUTPATIENT
Start: 2018-02-02 | End: 2018-02-03 | Stop reason: HOSPADM

## 2018-02-02 RX ADMIN — SODIUM CHLORIDE: 0.9 INJECTION, SOLUTION INTRAVENOUS at 08:02

## 2018-02-02 RX ADMIN — OXYCODONE HYDROCHLORIDE 10 MG: 5 TABLET ORAL at 01:02

## 2018-02-02 RX ADMIN — Medication 1000 MG: at 04:02

## 2018-02-02 RX ADMIN — SEVELAMER CARBONATE 1600 MG: 800 TABLET, FILM COATED ORAL at 04:02

## 2018-02-02 RX ADMIN — HYDROCODONE BITARTRATE AND ACETAMINOPHEN 1 TABLET: 7.5; 325 TABLET ORAL at 04:02

## 2018-02-02 RX ADMIN — LOPERAMIDE HYDROCHLORIDE 2 MG: 2 CAPSULE ORAL at 08:02

## 2018-02-02 NOTE — PROGRESS NOTES
"[facility]  Nephrology  Consult Note     Patient Name: Livan Arevalo  MRN: [id number]  Admission Date: 1/31/2018  Hospital Length of Stay: 1 days  Attending Provider: Arnel Santana MD   Primary Care Physician: Primary Doctor No  Principal Problem:Dialysis AV fistula infection, initial encounter     Inpatient consult to Nephrology  Consult performed by: HEATHER JIMENEZ  Consult ordered by: CHERY LAU  Reason for consult: ESRD on HD        Subjective:      HPI: Mr. Arevalo is a 41 yo AAM with HTN, aflutter, HFrEF ( EF 30%), anemia of chronic disease, and ESRD on HD MWF, who presents with c/o increasing erythema of his left AVF that started since 1/29 (3 days).  He reports AVF had purulent drainage two weeks ago, was given a course of abx with HD (Vancomycin) with some improvement, but had purulent drainage again from AVF. Patient reports he completed HD without complication Monday. Yesterday, he began to experience "itching" over his access site, and overnight he developed erythema and edema. He presented to [facility] ED on 1/31 with tennis ball-sized area of edema. Denies fever/chills, cough. Does report nausea over the last 2-3 days, with 2 episodes of emesis. Bedside u/s performed, which appeared to be hematoma vs. cellulitis. Blood cultures drawn, dose of vanc and cefepime given. Evaluated by Vascular Surgery and underwent excision of his AVF and temporary dialysis catheter was placed.  Nephrology was consulted for ESRD management.     He dialyzes at [facility].  Typical dialysis run time of 3:45 minutes and EDW of 101 kg.  He does not make urine anymore and has been on dialysis x 6 years.  He is being evaluated for kidney transplant.      VIVI consulted for permacath placement. Cultures NGTD.        Past Medical History:   Diagnosis Date    CHF (congestive heart failure)      ESRD (end stage renal disease)      Hemodialysis access, fistula mature       THRILL AND BRUIT PRESENT    " Hemodialysis patient       M-W-F    Hypertension      Myocardial infarction      Renal disorder      Stroke       3 MILD STROKES               Past Surgical History:   Procedure Laterality Date    DIALYSIS FISTULA CREATION                   Review of patient's allergies indicates:   Allergen Reactions    Ciprofloxacin Hives    Iodine and iodide containing products Hives and Itching       Pt states he is not allergic to iodine           Current Facility-Administered Medications   Medication Frequency    0.9%  NaCl infusion Once    acetaminophen tablet 650 mg Q6H PRN    hydrocodone-acetaminophen 7.5-325mg per tablet 1 tablet Q6H PRN    oxyCODONE immediate release tablet 10 mg Q6H PRN    sodium chloride 0.9% flush 3 mL PRN           Family History      Problem Relation (Age of Onset)     Kidney disease Mother               Social History Main Topics    Smoking status: Never Smoker    Smokeless tobacco: Never Used    Alcohol use No    Drug use: No    Sexual activity: Not on file      Review of Systems   Constitutional: Negative for activity change and chills.   HENT: Negative for congestion and dental problem.    Eyes: Negative for discharge and itching.   Respiratory: Negative for apnea and chest tightness.    Cardiovascular: Negative for chest pain and leg swelling.   Gastrointestinal: Negative for abdominal distention, abdominal pain, constipation, diarrhea, nausea and vomiting.   Endocrine: Negative for cold intolerance and heat intolerance.   Musculoskeletal: Negative for arthralgias, back pain and gait problem.   Allergic/Immunologic: Negative for environmental allergies.   Neurological: Negative for dizziness and facial asymmetry.   Hematological: Negative for adenopathy. Does not bruise/bleed easily.   Psychiatric/Behavioral: Negative for agitation, behavioral problems and confusion.      Objective:      Vital Signs (Most Recent):  Temp: 98.1 °F (36.7 °C) (02/01/18 0818)  Pulse: 78 (02/01/18  0930)  Resp: 18 (02/01/18 0818)  BP: 117/75 (02/01/18 0930)  SpO2: (!) 93 % (02/01/18 0745)  O2 Device (Oxygen Therapy): room air (02/01/18 0818) Vital Signs (24h Range):  Temp:  [97 °F (36.1 °C)-98.4 °F (36.9 °C)] 98.1 °F (36.7 °C)  Pulse:  [] 78  Resp:  [16-19] 18  SpO2:  [93 %-100 %] 93 %  BP: ()/(58-96) 117/75      Weight: 109 kg (240 lb 4.8 oz) (01/31/18 1813)  Body mass index is 39.99 kg/m².  Body surface area is 2.24 meters squared.     I/O last 3 completed shifts:  In: 110 [I.V.:110]  Out: -      Physical Exam   Constitutional: He is oriented to person, place, and time. He appears well-developed and well-nourished. No distress.   HENT:   Head: Normocephalic and atraumatic.   Right Ear: External ear normal.   Left Ear: External ear normal.   Eyes: Conjunctivae and EOM are normal. Right eye exhibits no discharge. Left eye exhibits no discharge. No scleral icterus.   Neck: Normal range of motion. Neck supple.   Cardiovascular: Normal rate and regular rhythm.  Exam reveals no friction rub.    No murmur heard.  Pulmonary/Chest: Effort normal and breath sounds normal. No respiratory distress. He has no wheezes. He has no rales.   Abdominal: Soft. Bowel sounds are normal. He exhibits no distension. There is no tenderness.   Musculoskeletal: Normal range of motion. He exhibits no edema or deformity.   Neurological: He is alert and oriented to person, place, and time.   Skin: Skin is warm and dry. He is not diaphoretic.   Dressing to DESTINEY dry/intact         Significant Labs:  CBC:   Recent Labs  Lab 02/01/18  0608   WBC 8.28   RBC 3.72*   HGB 10.4*   HCT 32.5*      MCV 87   MCH 28.0   MCHC 32.0      CMP:   Recent Labs  Lab 02/01/18  0608   GLU 73   CALCIUM 9.5   ALBUMIN 2.7*   PROT 8.7*   *   K 5.2*   CO2 29   CL 92*   BUN 60*   CREATININE 11.0*   ALKPHOS 213*   ALT 7*   AST 18   BILITOT 0.8            Assessment/Plan:          ESRD (end stage renal disease)     Will place tunneled HD catheter  for dialysis.

## 2018-02-02 NOTE — PROGRESS NOTES
[facility]  General Surgery  Progress Note    Subjective:     Interval History: No events    Post-Op Info:  Procedure(s) (LRB):  EXCISION-ANEURYSM L AVF (Left)  INSERTION-CATHETER-HEMODIALYSIS FEMORAL (Right)   2 Days Post-Op      Medications:  Continuous Infusions:  Scheduled Meds:   sodium chloride 0.9%   Intravenous Once    sevelamer carbonate  1,600 mg Oral TID WM     PRN Meds:acetaminophen, heparin (porcine), hydrocodone-acetaminophen 7.5-325mg, loperamide, oxyCODONE, sodium chloride 0.9%     Objective:     Vital Signs (Most Recent):  Temp: 97.7 °F (36.5 °C) (02/02/18 1018)  Pulse: 101 (02/02/18 1018)  Resp: 18 (02/02/18 1018)  BP: 131/80 (02/02/18 1018)  SpO2: 97 % (02/02/18 0749) Vital Signs (24h Range):  Temp:  [97.4 °F (36.3 °C)-98.8 °F (37.1 °C)] 97.7 °F (36.5 °C)  Pulse:  [] 101  Resp:  [16-20] 18  SpO2:  [91 %-97 %] 97 %  BP: (105-140)/(69-93) 131/80       Intake/Output Summary (Last 24 hours) at 02/02/18 1137  Last data filed at 02/02/18 1018   Gross per 24 hour   Intake              500 ml   Output             3500 ml   Net            -3000 ml       Physical Exam   Incisions is clean    Significant Labs:  BMP:   Recent Labs  Lab 02/02/18  0411   GLU 67*   *   K 4.9      CO2 19*   BUN 48*   CREATININE 9.1*   CALCIUM 9.8     CBC:   Recent Labs  Lab 02/02/18  0411   WBC 7.52   RBC 3.69*   HGB 10.4*   HCT 32.9*      MCV 89   MCH 28.2   MCHC 31.6*       Significant Diagnostics:      Assessment/Plan:     Active Diagnoses:    Diagnosis Date Noted POA    PRINCIPAL PROBLEM:  Dialysis AV fistula infection, initial encounter [T82.7XXA] 01/31/2018 Yes    Chronic systolic heart failure [I50.22] 04/24/2017 Yes     Chronic    Mass of right chest wall [R22.2] 04/22/2017 Yes     Chronic    Essential hypertension [I10] 03/18/2014 Yes     Chronic    ESRD (end stage renal disease) [N18.6] 03/18/2014 Yes     Chronic      Problems Resolved During this Admission:     Diagnosis Date Noted Date Resolved POA     S/p Infected fistula removal  Plan for permacath placement with nephrology access  F/u up with Dr. Grover with vein mapping to discuss new access  Will sign off  Please call with questions    Tyron Comer MD  Vascular Surgery  [facility]

## 2018-02-02 NOTE — PLAN OF CARE
"Dr. Santana requests "S/p Infected fistula removal  Plan for permacath placement with nephrology access  F/u up with Dr. Grover with vein mapping to discuss new access"      CM called [facility] and msg being sent to DR. Grover's nurse to make this appt.  Future Appointments  Date Time Provider Department Center   2/8/2018 10:15 AM Iker Guerra MD [facility]       "

## 2018-02-02 NOTE — PROGRESS NOTES
[facility]  Vascular Surgery  Progress Note    Patient Name: Livan Arevalo  MRN: [id number]  Admission Date: 1/31/2018  Primary Care Provider: Primary Doctor No    Subjective:     Interval History: No acute events overnight. Vital signs stable on room air. Dialyzed yesterday. NPO for nephrology placement of permacath today.    Post-Op Info:  Procedure(s) (LRB):  EXCISION-ANEURYSM L AVF (Left)  INSERTION-CATHETER-HEMODIALYSIS FEMORAL (Right)   2 Days Post-Op       Medications:  Continuous Infusions:  Scheduled Meds:   sevelamer carbonate  1,600 mg Oral TID WM     PRN Meds:acetaminophen, heparin (porcine), hydrocodone-acetaminophen 7.5-325mg, loperamide, oxyCODONE, sodium chloride 0.9%     Objective:     Vital Signs (Most Recent):  Temp: 97.6 °F (36.4 °C) (02/02/18 0749)  Pulse: 105 (02/02/18 0749)  Resp: 16 (02/02/18 0749)  BP: 122/78 (02/02/18 0749)  SpO2: 97 % (02/02/18 0749) Vital Signs (24h Range):  Temp:  [97.4 °F (36.3 °C)-98.8 °F (37.1 °C)] 97.6 °F (36.4 °C)  Pulse:  [] 105  Resp:  [16-20] 16  SpO2:  [91 %-97 %] 97 %  BP: (111-154)/(58-96) 122/78          Physical Exam   Constitutional: He is oriented to person, place, and time. He appears well-developed and well-nourished.   HENT:   Head: Normocephalic and atraumatic.   Cardiovascular: Normal rate.    Pulmonary/Chest: Effort normal. No respiratory distress.   Neurological: He is alert and oriented to person, place, and time.   Skin: Skin is warm and dry.   Incision clean, dry and intact.   Nursing note and vitals reviewed.      Significant Labs:  CBC:     Recent Labs  Lab 02/02/18 0411   WBC 7.52   RBC 3.69*   HGB 10.4*   HCT 32.9*      MCV 89   MCH 28.2   MCHC 31.6*     CMP:     Recent Labs  Lab 02/02/18  0411   GLU 67*   CALCIUM 9.8   ALBUMIN 2.7*   PROT 9.3*   *   K 4.9   CO2 19*      BUN 48*   CREATININE 9.1*   ALKPHOS 236*   ALT 7*   AST 17   BILITOT 0.8       Significant Diagnostics:  none  new    Assessment/Plan:     * Dialysis AV fistula infection, initial encounter    41 yo M with h/o HTN, ESRD on HD MWF, aflutter,HFrEF ( EF 30%), anemia of chronic disease, who presents with c/o increasing erythema of his left AVF with likely AVF infection now POD#1 excision left AVF and placement of femoral dialysis catheter on right    - Blood cultures x2 with NGTD (drawn 1/31), on IV antibiotics   - Will need permacath placement if blood cultures negative with VIVI  - Will plan for future AV access as outpatient, will need vein mapping bilateral upper extremity as outpatient (ordered)  - medical management per primary team  - will sign off at this time, please call with questions            Casi Ramírez MD  Vascular Surgery  [facility]

## 2018-02-02 NOTE — PROGRESS NOTES
"[facility] Hospital Medicine  Progress Note    Patient Name: Livan Arevalo  MRN: [id number]  Patient Class: IP- Inpatient   Admission Date: 1/31/2018  Length of Stay: 1 days  Attending Physician: Arnel Santana MD  Primary Care Provider: Primary Doctor [facility] Medicine Team: [facility] Arnel Santana MD    Subjective:     Principal Problem:Dialysis AV fistula infection, initial encounter    HPI: This is a 43 yo M with h/o HTN, ESRD on HD MWF, aflutter, HFrEF ( EF 30%), anemia of chronic disease, who presents with c/o increasing erythema of his left AVF that started since Monday.  He reports AVF had purulent drainage two weeks ago, was given a course of abx with HD (Vancomycin) with some improvement, but had purulent drainage again from AVF. Patient reports he completed HD without complication Monday. Yesterday, he began to experience "itching" over his access site, and overnight he developed erythema and edema. Presents today with tennis ball-sized area of edema. Denies fever/chills, cough. Does report nausea over the last 2-3 days, with 2 episodes of emesis. Per ED, bedside u/s performed, appears to be hematoma vs. cellulitis. Blood cultures drawn, dose of vanc and cefepime given. Evaluated by Vascular Surgery, will plan for OR today for AVF removal and placement of temporary access.       Patient report at this time to not be taking any medications.       Hospital Course: Admitted on 1/31, after he began to experience "itching" over his access site, and overnight he developed erythema and edema. He presented to [facility] [place] on 1/31 with tennis ball-sized area of edema. He reports AVF had purulent drainage two weeks ago, was given a course of abx with HD (Vancomycin) with some improvement, but had purulent drainage again from AVF. Denies fever/chills, cough. Does report nausea over the last 2-3 days, with 2 episodes of emesis. Bedside u/s performed, which appeared to be hematoma vs. " cellulitis. Blood cultures drawn, dose of vanc and cefepime given. Evaluated by Vascular Surgery and underwent excision of his AVF 1/31 and temporary dialysis catheter was placed. Discussed with VIVI, plan to place perm-a-cath tomorrow afternoon after dialysis.        Interval History: Patient seen and examined at bedside, no acute events overnight; patient states he feels well.       Review of Systems   Constitutional: Negative for activity change, appetite change, chills, diaphoresis, fatigue and fever.   HENT: Negative for sore throat.    Respiratory: Negative for shortness of breath.    Cardiovascular: Negative for chest pain.   Gastrointestinal: Positive for nausea and vomiting.   Genitourinary: Negative for dysuria.   Musculoskeletal: Negative for back pain.   Skin: Negative for rash. Positive for wound  Neurological: Negative for weakness.   Hematological: Does not bruise/bleed easily.      Objective:     Vital Signs (Most Recent):  Temp: 98.4 °F (36.9 °C) (02/01/18 2031)  Pulse: 105 (02/01/18 2031)  Resp: 16 (02/01/18 2031)  BP: 129/79 (02/01/18 2031)  SpO2: (!) 93 % (02/01/18 2031) Vital Signs (24h Range):  Temp:  [97.4 °F (36.3 °C)-98.4 °F (36.9 °C)] 98.4 °F (36.9 °C)  Pulse:  [] 105  Resp:  [16-20] 16  SpO2:  [91 %-97 %] 93 %  BP: (111-154)/(58-96) 129/79     Weight: 109 kg (240 lb 4.8 oz)  Body mass index is 39.99 kg/m².    Intake/Output Summary (Last 24 hours) at 02/01/18 2137  Last data filed at 02/01/18 1115   Gross per 24 hour   Intake              600 ml   Output             3647 ml   Net            -3047 ml        Physical Exam  Constitutional: He is oriented to person, place, and time. He appears well-developed and well-nourished.   HENT:   Head: Normocephalic and atraumatic.   Eyes: EOM are normal. Pupils are equal, round, and reactive to light.   Cardiovascular: Normal rate and regular rhythm.    Pulmonary/Chest: Effort normal. No respiratory distress.   Abdominal: Soft. He exhibits no  distension.   Musculoskeletal: Left bicep wrapped   Neurological: He is alert and oriented to person, place, and time.   Skin: Skin is warm and dry.     Significant Labs:   CBC:   Recent Labs  Lab 01/31/18  1017 02/01/18  0608   WBC 8.02 8.28   HGB 10.6* 10.4*   HCT 33.5* 32.5*    179     CMP:   Recent Labs  Lab 01/31/18  1017 02/01/18  0608    134*   K 4.8 5.2*   CL 92* 92*   CO2 29 29   GLU 71 73   BUN 46* 60*   CREATININE 9.6* 11.0*   CALCIUM 9.6 9.5   PROT  --  8.7*   ALBUMIN  --  2.7*   BILITOT  --  0.8   ALKPHOS  --  213*   AST  --  18   ALT  --  7*   ANIONGAP 15 13   EGFRNONAA 6.1* 5.2*       Significant Imaging: I have reviewed all pertinent imaging results/findings within the past 24 hours.    Assessment/Plan:      Active Diagnoses:    Diagnosis Date Noted POA    PRINCIPAL PROBLEM:  Dialysis AV fistula infection, initial encounter [T82.7XXA] 01/31/2018 Yes    Chronic systolic heart failure [I50.22] 04/24/2017 Yes     Chronic    Mass of right chest wall [R22.2] 04/22/2017 Yes     Chronic    Essential hypertension [I10] 03/18/2014 Yes     Chronic    ESRD (end stage renal disease) [N18.6] 03/18/2014 Yes     Chronic      Problems Resolved During this Admission:    Diagnosis Date Noted Date Resolved POA     VTE Risk Mitigation         Ordered     heparin (porcine) injection 1,000 Units  As needed (PRN)     Route:  Intra-Catheter        02/01/18 1128     Medium Risk of VTE  Once      01/31/18 1547     Reason for No Pharmacological VTE Prophylaxis  Once      01/31/18 1547         Dialysis AV fistula infection, initial encounter  - Increasing erythema of his left AVF with likely AVF infection. With recent infection treated with IV (Vanc per pt). S/P OR for AVF excision/ligation 1/31 by Vascular Surgery, placement of temporary HD catheter in R groin  - Blood cultures x2: NGTD with IV antibiotics,  - permacath placement 2/2/18 if blood cultures continue to be negative per VIVI  - dose of vanc and  cefepime given in ED, will stay in his system as he is not getting HD  - redose vanc post HD 2/1     Essential hypertension  - no longer on medication per medication  - CTM     ESRD (end stage renal disease)  - Nephrology consulted for HD  - VIVI plan for  permacath placement when stable, maybe 2/2/18  - started on sevelemer     Chronic systolic heart failure  - stable at this time  - report not taking any medications     Mass of right chest wall, chronic  - s/p biopsy per note  - outpatient follow up     Full code    Dispo: Pending permacath placement    Arnel Santana MD  Department of Hospital Medicine   [facility]

## 2018-02-02 NOTE — PLAN OF CARE
Problem: Patient Care Overview  Goal: Plan of Care Review  Outcome: Ongoing (interventions implemented as appropriate)  2 hour dialysis complete.  Blood rinsed back.  Right femoral catheter flushed with normal saline, capped and taped.  Net UF 3L. Tolerated well.

## 2018-02-02 NOTE — PLAN OF CARE
Problem: Patient Care Overview  Goal: Plan of Care Review  Outcome: Ongoing (interventions implemented as appropriate)  Returned to unit, very verbal about being hungry. Called team and orders received. Safety:  call light in reach, patient oriented to room & instructed how to notify nurse if assistance is needed, questions & concerns addressed, bed in lowest position with wheels locked & side rails up X 2, fall precautions followed, patient free from fall & injury thus far this shift;  VTE/bleeding precautions maintained.  Activity:  patient up with assistance, weight shifted at least every other hour.  Neurological:  patient A&O X 4, follows commands, equal  strength & dorsi/plantarflexion, neuro checks performed as ordered & WDL.  Respiratory:  patient tolerates room air without distress, denies SOB.  Cardiac:  Denies chest pain, BP stable.  HR stable.  NSR on telemetry.  Afebrile this shift.  GI:  Patient tolerates PO intake well, denies nausea, LBM 2/2/18.  :  patient voids clear yellow urine without foul odor spontaneously & without difficulty, adequate output for shift.  Skin:  CDI.  Devices:  PIV CDI, negative for s/sx of infection & infiltration.  Pain:  patient denies pain.  Will continue to monitor patient.

## 2018-02-02 NOTE — PLAN OF CARE
Problem: Patient Care Overview  Goal: Plan of Care Review  Outcome: Ongoing (interventions implemented as appropriate)   02/01/18 4449   Coping/Psychosocial   Plan Of Care Reviewed With patient     POC reviewed pt. Pt went for dialysis today 3L removed. Pt also updated on starting antibiotics. Complains of pain to left arm. Pain controlled with meds on MAR. No complaints or questions at this time.

## 2018-02-02 NOTE — PLAN OF CARE
"Rounded to room. No pt or family in room.    Update per Dr Santana in huddle: most likely IV abx w HD    Per Nephrology: "Will place tunneled HD catheter for dialysis.'   "

## 2018-02-02 NOTE — CONSULTS
Consult acknowledged. Patient was out of his room on multiple checks. Full consult to follow tomorrow. Given his history of concern for intravascular infection that improved on vanco, would continue vanco regardless of negative cxs. Duration will be 2-4 weeks    -continue vanco  -goal trough 15-20    ID will follow up tomorrow with full consult

## 2018-02-02 NOTE — ASSESSMENT & PLAN NOTE
41 yo M with h/o HTN, ESRD on HD MWF, aflutter,HFrEF ( EF 30%), anemia of chronic disease, who presents with c/o increasing erythema of his left AVF with likely AVF infection now POD#1 excision left AVF and placement of femoral dialysis catheter on right    - Blood cultures x2 with NGTD (drawn 1/31), on IV antibiotics   - Will need permacath placement if blood cultures negative with [facility]  - Will plan for future AV access as outpatient, will need vein mapping bilateral upper extremity as outpatient (ordered)  - medical management per primary team  - will sign off at this time, please call with questions

## 2018-02-02 NOTE — PROCEDURES
DATE OF PROCEDURE: 2/2/18     PROCEDURE TYPE:   1. Placement of right femoral vein tunneled dialysis catheter rewiring from the existing temporary catheter site.   2. Conscious Sedation    INDICATIONS: HD access    Pre-procedure Diagnoses:   ESRD  Infected AVG    Post-procedure diagnosis:  ESRD  Infected AVG    Operators: Bebeto Ramos MD     PROCEDURE NOTE: After informed consent was obtained, the area of the right femoral vein and right groin/thigh were sterilely prepped and draped in the usual fashion. Anesthesia was then obtained with 2% lidocaine with epinephrine, then guidewire was inserted under fluoroscopy guidance and parked in the RA. Then, temporary catheter was removed. A tear-away sheath was then introduced over the guidewire and the dilator was removed. Then a 44cm Palindrome catheter was inserted into the tear-away sheath over the wire and the catheter was advanced into the RA using fluoroscopic guidance. The sheath was teared away. A counterincision was made at the venotomy site and was blunt dissected. A suitable area on the right groin was then selected; anesthesia again obtained with 2% lidocaine with Epinephrine. Using a tunnel device, tunnel was constructed inserting it from the exit site to the venotomy site. Using the tunnel device, the catheter was pulled back through the exit site. The tip of the catheter was confirmed to be in the center of the RA via fluoroscopy. The lock hub was connected. There was excellent aspiration and flush through both ports. The venotomy site was then closed with 3-0 Vicryl in a subcuticular stitch and the exit site was closed with 3-0 Prolene in a wrapped stitch. He tolerated the procedure well. There were no immediate complications. Estimated blood loss was less than 5 mL. He was then discharged from the Nephrology [facility] in stable condition back to his room.    Anesthesia:  Conscious sedation was achieved with 75 mcg of Fentanyl and 1.5 mg of  Midazolam (Versed) 1MG/1ML. Local anesthesia was achieved with 20 ml of Lidocaine 2%. Moderate conscious sedation was performed and cardiorespiratory functions were monitored the entire procedure by me and RN. Sedation began at 1152am and concluded at 1242pm, totaling 50 minutes.

## 2018-02-02 NOTE — SUBJECTIVE & OBJECTIVE
Medications:  Continuous Infusions:  Scheduled Meds:   sevelamer carbonate  1,600 mg Oral TID WM     PRN Meds:acetaminophen, heparin (porcine), hydrocodone-acetaminophen 7.5-325mg, loperamide, oxyCODONE, sodium chloride 0.9%     Objective:     Vital Signs (Most Recent):  Temp: 97.6 °F (36.4 °C) (02/02/18 0749)  Pulse: 105 (02/02/18 0749)  Resp: 16 (02/02/18 0749)  BP: 122/78 (02/02/18 0749)  SpO2: 97 % (02/02/18 0749) Vital Signs (24h Range):  Temp:  [97.4 °F (36.3 °C)-98.8 °F (37.1 °C)] 97.6 °F (36.4 °C)  Pulse:  [] 105  Resp:  [16-20] 16  SpO2:  [91 %-97 %] 97 %  BP: (111-154)/(58-96) 122/78          Physical Exam   Constitutional: He is oriented to person, place, and time. He appears well-developed and well-nourished.   HENT:   Head: Normocephalic and atraumatic.   Cardiovascular: Normal rate.    Pulmonary/Chest: Effort normal. No respiratory distress.   Neurological: He is alert and oriented to person, place, and time.   Skin: Skin is warm and dry.   Incision clean, dry and intact.   Nursing note and vitals reviewed.      Significant Labs:  CBC:     Recent Labs  Lab 02/02/18 0411   WBC 7.52   RBC 3.69*   HGB 10.4*   HCT 32.9*      MCV 89   MCH 28.2   MCHC 31.6*     CMP:     Recent Labs  Lab 02/02/18 0411   GLU 67*   CALCIUM 9.8   ALBUMIN 2.7*   PROT 9.3*   *   K 4.9   CO2 19*      BUN 48*   CREATININE 9.1*   ALKPHOS 236*   ALT 7*   AST 17   BILITOT 0.8       Significant Diagnostics:  none new

## 2018-02-02 NOTE — PROGRESS NOTES
Right femeral permacath inserted in the cath lab per Dr Ramos. Pt. Tolerated well. Plcement verified with xray. OK to use per Dr Ramos.

## 2018-02-02 NOTE — PLAN OF CARE
2:57 PM  SW received notification that pt will need IV antibiotic at [facility] location. Contacted [facility] [phone] and spoke with nurse informing her of order. Faxed order to [phone]. Will f/u as needed.    Katie Duvall LMSW   [facility]  Ext [phone]

## 2018-02-02 NOTE — PLAN OF CARE
Problem: Patient Care Overview  Goal: Plan of Care Review  Outcome: Ongoing (interventions implemented as appropriate)  Pt has maintained NPO status. Medicated x1 for pain in left arm.

## 2018-02-03 VITALS
DIASTOLIC BLOOD PRESSURE: 81 MMHG | BODY MASS INDEX: 40.04 KG/M2 | HEART RATE: 105 BPM | TEMPERATURE: 97 F | WEIGHT: 240.31 LBS | RESPIRATION RATE: 17 BRPM | SYSTOLIC BLOOD PRESSURE: 128 MMHG | HEIGHT: 65 IN | OXYGEN SATURATION: 97 %

## 2018-02-03 LAB
ALBUMIN SERPL BCP-MCNC: 2.5 G/DL
ALP SERPL-CCNC: 403 U/L
ALT SERPL W/O P-5'-P-CCNC: 10 U/L
ANION GAP SERPL CALC-SCNC: 12 MMOL/L
AST SERPL-CCNC: 26 U/L
BASOPHILS # BLD AUTO: 0.03 K/UL
BASOPHILS NFR BLD: 0.4 %
BILIRUB SERPL-MCNC: 1 MG/DL
BUN SERPL-MCNC: 48 MG/DL
CALCIUM SERPL-MCNC: 9.3 MG/DL
CHLORIDE SERPL-SCNC: 99 MMOL/L
CO2 SERPL-SCNC: 24 MMOL/L
CREAT SERPL-MCNC: 9.9 MG/DL
DIFFERENTIAL METHOD: ABNORMAL
EOSINOPHIL # BLD AUTO: 0.2 K/UL
EOSINOPHIL NFR BLD: 2.2 %
ERYTHROCYTE [DISTWIDTH] IN BLOOD BY AUTOMATED COUNT: 17.4 %
EST. GFR  (AFRICAN AMERICAN): 6.8 ML/MIN/1.73 M^2
EST. GFR  (NON AFRICAN AMERICAN): 5.9 ML/MIN/1.73 M^2
GLUCOSE SERPL-MCNC: 65 MG/DL
HCT VFR BLD AUTO: 31.8 %
HGB BLD-MCNC: 10 G/DL
IMM GRANULOCYTES # BLD AUTO: 0.02 K/UL
IMM GRANULOCYTES NFR BLD AUTO: 0.3 %
LYMPHOCYTES # BLD AUTO: 1.5 K/UL
LYMPHOCYTES NFR BLD: 19.7 %
MCH RBC QN AUTO: 27.6 PG
MCHC RBC AUTO-ENTMCNC: 31.4 G/DL
MCV RBC AUTO: 88 FL
MONOCYTES # BLD AUTO: 0.8 K/UL
MONOCYTES NFR BLD: 10.4 %
NEUTROPHILS # BLD AUTO: 5.1 K/UL
NEUTROPHILS NFR BLD: 67 %
NRBC BLD-RTO: 0 /100 WBC
PHOSPHATE SERPL-MCNC: 6.5 MG/DL
PLATELET # BLD AUTO: 156 K/UL
PMV BLD AUTO: 10.5 FL
POTASSIUM SERPL-SCNC: 4.8 MMOL/L
PROT SERPL-MCNC: 8.4 G/DL
RBC # BLD AUTO: 3.62 M/UL
SODIUM SERPL-SCNC: 135 MMOL/L
WBC # BLD AUTO: 7.63 K/UL

## 2018-02-03 PROCEDURE — 80053 COMPREHEN METABOLIC PANEL: CPT

## 2018-02-03 PROCEDURE — 25000003 PHARM REV CODE 250: Performed by: SURGERY

## 2018-02-03 PROCEDURE — 36415 COLL VENOUS BLD VENIPUNCTURE: CPT

## 2018-02-03 PROCEDURE — 99222 1ST HOSP IP/OBS MODERATE 55: CPT | Mod: ,,, | Performed by: INTERNAL MEDICINE

## 2018-02-03 PROCEDURE — 25000003 PHARM REV CODE 250: Performed by: NURSE PRACTITIONER

## 2018-02-03 PROCEDURE — 99238 HOSP IP/OBS DSCHRG MGMT 30/<: CPT | Mod: ,,, | Performed by: HOSPITALIST

## 2018-02-03 PROCEDURE — 84100 ASSAY OF PHOSPHORUS: CPT

## 2018-02-03 PROCEDURE — 85025 COMPLETE CBC W/AUTO DIFF WBC: CPT

## 2018-02-03 RX ORDER — OXYCODONE AND ACETAMINOPHEN 10; 325 MG/1; MG/1
1 TABLET ORAL EVERY 8 HOURS PRN
Qty: 21 TABLET | Refills: 0 | Status: ON HOLD | OUTPATIENT
Start: 2018-02-03 | End: 2018-02-27

## 2018-02-03 RX ORDER — SEVELAMER CARBONATE 800 MG/1
1600 TABLET, FILM COATED ORAL
Qty: 180 TABLET | Refills: 3 | Status: SHIPPED | OUTPATIENT
Start: 2018-02-03 | End: 2019-02-03

## 2018-02-03 RX ORDER — SEVELAMER CARBONATE 800 MG/1
1600 TABLET, FILM COATED ORAL
Qty: 180 TABLET | Refills: 3 | Status: SHIPPED | OUTPATIENT
Start: 2018-02-03 | End: 2018-02-03

## 2018-02-03 RX ADMIN — SEVELAMER CARBONATE 1600 MG: 800 TABLET, FILM COATED ORAL at 09:02

## 2018-02-03 RX ADMIN — HYDROCODONE BITARTRATE AND ACETAMINOPHEN 1 TABLET: 7.5; 325 TABLET ORAL at 09:02

## 2018-02-03 NOTE — PROGRESS NOTES
"[facility] Hospital Medicine  Progress Note    Patient Name: Livan Arevalo  MRN: [id number]  Patient Class: IP- Inpatient   Admission Date: 1/31/2018  Length of Stay: 3 days  Attending Physician: Arnel Santana MD  Primary Care Provider: Primary Doctor [facility] Medicine Team: [facility] Arnel Santana MD    Subjective:     Principal Problem:Dialysis AV fistula infection, initial encounter    HPI: This is a 41 yo M with h/o HTN, ESRD on HD MWF, aflutter, HFrEF ( EF 30%), anemia of chronic disease, who presents with c/o increasing erythema of his left AVF that started since Monday.  He reports AVF had purulent drainage two weeks ago, was given a course of abx with HD (Vancomycin) with some improvement, but had purulent drainage again from AVF. Patient reports he completed HD without complication Monday. Yesterday, he began to experience "itching" over his access site, and overnight he developed erythema and edema. Presents today with tennis ball-sized area of edema. Denies fever/chills, cough. Does report nausea over the last 2-3 days, with 2 episodes of emesis. Per ED, bedside u/s performed, appears to be hematoma vs. cellulitis. Blood cultures drawn, dose of vanc and cefepime given. Evaluated by Vascular Surgery, will plan for OR today for AVF removal and placement of temporary access.       Patient report at this time to not be taking any medications.       Hospital Course: Admitted on 1/31, after he began to experience "itching" over his access site, and overnight he developed erythema and edema. He presented to [facility] on 1/31 with tennis ball-sized area of edema. He reports AVF had purulent drainage two weeks ago, was given a course of abx with HD (Vancomycin) with some improvement, but had purulent drainage again from AVF. Denies fever/chills, cough. Does report nausea over the last 2-3 days, with 2 episodes of emesis. Bedside u/s performed, which appeared to be hematoma vs. " cellulitis. Blood cultures drawn, dose of vanc and cefepime given. Evaluated by Vascular Surgery and underwent excision of his AVF 1/31 and temporary dialysis catheter was placed. Discussed with VIVI, plan to place perm-a-cath placed on 2/2/18 after dialysis. Patient is to get IV Vanc tonight. ID recommend 4 weeks of IV Vancomycin after HD. Prescription sent per SW. Will discharge home tomorrow. Pt request pain medication on discharge, I told him I will only give him a 7 days supply. CM setting up vascular surgery follow up.  2/3/18, doing well today, ready to be discharge home.       Interval History: Patient seen and examined at bedside, no acute events overnight; patient states he feels well.       Review of Systems   Constitutional: Negative for activity change, appetite change, chills, diaphoresis, fatigue and fever.   HENT: Negative for sore throat.    Respiratory: Negative for shortness of breath.    Cardiovascular: Negative for chest pain.   Genitourinary: Negative for dysuria.   Musculoskeletal: Negative for back pain.   Neurological: Negative for weakness.   Hematological: Does not bruise/bleed easily.      Objective:     Vital Signs (Most Recent):  Temp: 97.4 °F (36.3 °C) (02/03/18 0759)  Pulse: 105 (02/03/18 0759)  Resp: 17 (02/03/18 0759)  BP: 128/81 (02/03/18 0759)  SpO2: 97 % (02/03/18 0759) Vital Signs (24h Range):  Temp:  [96.7 °F (35.9 °C)-98.4 °F (36.9 °C)] 97.4 °F (36.3 °C)  Pulse:  [101-108] 105  Resp:  [16-18] 17  SpO2:  [96 %-98 %] 97 %  BP: ()/(67-89) 128/81     Weight: 109 kg (240 lb 4.8 oz)  Body mass index is 39.99 kg/m².    Intake/Output Summary (Last 24 hours) at 02/03/18 0999  Last data filed at 02/02/18 1646   Gross per 24 hour   Intake             1230 ml   Output             3500 ml   Net            -2270 ml        Physical Exam  Constitutional: He is oriented to person, place, and time. He appears well-developed and well-nourished.   HENT:   Head: Normocephalic and atraumatic.    Eyes: EOM are normal. Pupils are equal, round, and reactive to light.   Cardiovascular: Normal rate and regular rhythm.    Pulmonary/Chest: Effort normal. No respiratory distress.   Abdominal: Soft. He exhibits no distension.   Musculoskeletal: Left bicep wrapped   Neurological: He is alert and oriented to person, place, and time.   Skin: Skin is warm and dry.     Significant Labs:   CBC:     Recent Labs  Lab 02/02/18  0411 02/03/18  0515   WBC 7.52 7.63   HGB 10.4* 10.0*   HCT 32.9* 31.8*    156     CMP:     Recent Labs  Lab 02/02/18  0411 02/03/18  0515   * 135*   K 4.9 4.8    99   CO2 19* 24   GLU 67* 65*   BUN 48* 48*   CREATININE 9.1* 9.9*   CALCIUM 9.8 9.3   PROT 9.3* 8.4   ALBUMIN 2.7* 2.5*   BILITOT 0.8 1.0   ALKPHOS 236* 403*   AST 17 26   ALT 7* 10   ANIONGAP 16 12   EGFRNONAA 6.5* 5.9*       Significant Imaging: I have reviewed all pertinent imaging results/findings within the past 24 hours.    Assessment/Plan:      Active Diagnoses:    Diagnosis Date Noted POA    PRINCIPAL PROBLEM:  Dialysis AV fistula infection, initial encounter [T82.7XXA] 01/31/2018 Yes    Chronic systolic heart failure [I50.22] 04/24/2017 Yes     Chronic    Mass of right chest wall [R22.2] 04/22/2017 Yes     Chronic    Essential hypertension [I10] 03/18/2014 Yes     Chronic    ESRD (end stage renal disease) [N18.6] 03/18/2014 Yes     Chronic      Problems Resolved During this Admission:    Diagnosis Date Noted Date Resolved POA     VTE Risk Mitigation         Ordered     heparin (porcine) injection 1,000 Units  As needed (PRN)     Route:  Intra-Catheter        02/01/18 1128     Medium Risk of VTE  Once      01/31/18 1547     Reason for No Pharmacological VTE Prophylaxis  Once      01/31/18 1547         Dialysis AV fistula infection, initial encounter  - Increasing erythema of his left AVF with likely AVF infection. With recent infection treated with IV (Vanc per pt). S/P OR for AVF excision/ligation 1/31 by  Vascular Surgery, placement of temporary HD catheter in R groin  - Blood cultures x2: NGTD for the last 3 days  - permacath placement 2/2/18 by VIVI  - redose vanc post HD 2/2/18  - Appreciate ID rec, Given his history of concern for intravascular infection that improved on vanco, would continue vanco regardless of negative cxs.   - Plan for duration of 4 weeks, given the fact of recent 2 week course of IV Vanc  - prescription sent to his HD Unit, vanc to be given MWF  - d/c home 2/3/2018      Essential hypertension  - no longer on medication per medication  - CTM       ESRD (end stage renal disease)  - Nephrology consulted for HD  - VIVI plan for  permacath placement when stable, maybe 2/2/18  - presciption given for  sevelemer       Chronic systolic heart failure  - stable at this time  - report not taking any medications       Mass of right chest wall, chronic  - s/p biopsy per note  - outpatient follow up     Full code    Dispo: Discharge  Home    Arnel Santana MD  Department of Hospital Medicine   [facility]

## 2018-02-03 NOTE — NURSING
IV removed from right arm intact.  Bleeding noted.  Pressure dressing applied.  Patient instructed to leave dressing intact x 2hrs, and then removed.  Discharge instructions and two prescriptions provided.  Patient asked questions and questions answered.  Dressing to surgical site previous AV fistula left arm changed aseptic technique.  Soft drain sponges applied after rinsing with sterile saline.  Small amount of bleeding.  Sutures intact.  Secured with kerlix.  Tolerated fairly with complaint of pain.  Oral pain medication administered.

## 2018-02-03 NOTE — ASSESSMENT & PLAN NOTE
43 yo M with h/o HTN, ESRD on HD MWF, aflutter, HFrEF ( EF 30%), anemia of chronic disease, who was admitted for infected AVF fistula now s/p removal. Cxs all negative    -continue vanco x 4 weeks total for intra-vascular infection from 2/2  -dose with dialysis per dialysis center   -Goal trough is 15-20  -Would not arrange follow up with ID clinic since his dialysis center will be dosing

## 2018-02-03 NOTE — CONSULTS
"[facility]  Infectious Disease  Consult Note    Patient Name: Livan Arevalo  MRN: [id number]  Admission Date: 1/31/2018  Hospital Length of Stay: 3 days  Attending Physician: Arnel Santana MD  Primary Care Provider: Primary Doctor No     Isolation Status: No active isolations    Patient information was obtained from patient and past medical records.      Consults  Assessment/Plan:     * Dialysis AV fistula infection, initial encounter    43 yo M with h/o HTN, ESRD on HD MWF, aflutter, HFrEF ( EF 30%), anemia of chronic disease, who was admitted for infected AVF fistula now s/p removal. Cxs all negative    -continue vanco x 4 weeks total for intra-vascular infection from 2/2  -dose with dialysis per dialysis center   -Goal trough is 15-20  -Would not arrange follow up with ID clinic since his dialysis center will be dosing            Thank you for your consult. I will sign off. Please contact us if you have any additional questions.    Jeremiah Petty MD  Infectious Disease  [facility]    Subjective:     Principal Problem: Dialysis AV fistula infection, initial encounter    HPI: 43 yo M with PMH HTN, ESRD on HD MWF, aflutter, HFrEF ( EF 30%), anemia of chronic disease, who presents with c/o increasing erythema of his left AVF that started since Monday.  He reports AVF had purulent drainage two weeks ago, was given a course of abx with HD (Vancomycin) with some improvement, but had continued purulent drainage from AVF. Patient reports he completed HD without complication Monday. Yesterday, he began to experience "itching" over his access site, and overnight he developed erythema and edema. Had the fistula excised by vascular on 2/2 and had a temporary dialysis access placed. Has been continued on vanco. All cxs negative    Past Medical History:   Diagnosis Date    CHF (congestive heart failure)     ESRD (end stage renal disease)     Hemodialysis access, fistula mature     " THRILL AND BRUIT PRESENT    Hemodialysis patient     M-W-F    Hypertension     Myocardial infarction     Renal disorder     Stroke     3 MILD STROKES       Past Surgical History:   Procedure Laterality Date    DIALYSIS FISTULA CREATION         Review of patient's allergies indicates:   Allergen Reactions    Ciprofloxacin Hives    Iodine and iodide containing products Hives and Itching     Pt states he is not allergic to iodine       Medications:  No prescriptions prior to admission.     Antibiotics     Start     Stop Route Frequency Ordered    01/31/18 1045  ceFEPIme injection 1 g      01/31 2244 IV ED 1 Time 01/31/18 1029        Antifungals     None        Antivirals     None             There is no immunization history on file for this patient.    Family History     Problem Relation (Age of Onset)    Kidney disease Mother        Social History     Social History    Marital status: Single     Spouse name: N/A    Number of children: N/A    Years of education: N/A     Social History Main Topics    Smoking status: Never Smoker    Smokeless tobacco: Never Used    Alcohol use No    Drug use: No    Sexual activity: Not Asked     Other Topics Concern    None     Social History Narrative    None     Review of Systems   Constitutional: Negative for activity change and chills.   HENT: Negative for congestion and dental problem.    Eyes: Negative for discharge and itching.   Respiratory: Negative for apnea and chest tightness.    Cardiovascular: Negative for chest pain and leg swelling.   Gastrointestinal: Negative for abdominal distention, abdominal pain, constipation, diarrhea, nausea and vomiting.   Endocrine: Negative for cold intolerance and heat intolerance.   Musculoskeletal: Negative for arthralgias, back pain and gait problem.   Allergic/Immunologic: Negative for environmental allergies.   Neurological: Negative for dizziness and facial asymmetry.   Hematological: Negative for adenopathy. Does not  bruise/bleed easily.   Psychiatric/Behavioral: Negative for agitation, behavioral problems and confusion.     Objective:     Vital Signs (Most Recent):  Temp: 97.4 °F (36.3 °C) (02/03/18 0759)  Pulse: 105 (02/03/18 0759)  Resp: 17 (02/03/18 0759)  BP: 128/81 (02/03/18 0759)  SpO2: 97 % (02/03/18 0759) Vital Signs (24h Range):  Temp:  [96.7 °F (35.9 °C)-98.4 °F (36.9 °C)] 97.4 °F (36.3 °C)  Pulse:  [103-108] 105  Resp:  [16-18] 17  SpO2:  [96 %-98 %] 97 %  BP: ()/(67-89) 128/81     Weight: 109 kg (240 lb 4.8 oz)  Body mass index is 39.99 kg/m².    Estimated Creatinine Clearance: 11.3 mL/min (based on SCr of 9.9 mg/dL (H)).    Physical Exam   Constitutional: He is oriented to person, place, and time. He appears well-developed and well-nourished.   HENT:   Head: Normocephalic and atraumatic.   Cardiovascular: Normal rate.    Pulmonary/Chest: Effort normal. No respiratory distress.   Neurological: He is alert and oriented to person, place, and time.   Skin: Skin is warm and dry.   Incision clean, dry and intact.   Nursing note and vitals reviewed.      Significant Labs: All pertinent labs within the past 24 hours have been reviewed.    Significant Imaging: I have reviewed all pertinent imaging results/findings within the past 24 hours.

## 2018-02-03 NOTE — PROGRESS NOTES
"[facility] Hospital Medicine  Progress Note    Patient Name: Livan Arevalo  MRN: [id number]  Patient Class: IP- Inpatient   Admission Date: 1/31/2018  Length of Stay: 2 days  Attending Physician: Arnel Santana MD  Primary Care Provider: Primary Doctor [facility] Medicine Team: [facility] HOSP MED A Arnel Santana MD    Subjective:     Principal Problem:Dialysis AV fistula infection, initial encounter    HPI: This is a 41 yo M with h/o HTN, ESRD on HD MWF, aflutter, HFrEF ( EF 30%), anemia of chronic disease, who presents with c/o increasing erythema of his left AVF that started since Monday.  He reports AVF had purulent drainage two weeks ago, was given a course of abx with HD (Vancomycin) with some improvement, but had purulent drainage again from AVF. Patient reports he completed HD without complication Monday. Yesterday, he began to experience "itching" over his access site, and overnight he developed erythema and edema. Presents today with tennis ball-sized area of edema. Denies fever/chills, cough. Does report nausea over the last 2-3 days, with 2 episodes of emesis. Per ED, bedside u/s performed, appears to be hematoma vs. cellulitis. Blood cultures drawn, dose of vanc and cefepime given. Evaluated by Vascular Surgery, will plan for OR today for AVF removal and placement of temporary access.       Patient report at this time to not be taking any medications.       Hospital Course: Admitted on 1/31, after he began to experience "itching" over his access site, and overnight he developed erythema and edema. He presented to [facility] ED on 1/31 with tennis ball-sized area of edema. He reports AVF had purulent drainage two weeks ago, was given a course of abx with HD (Vancomycin) with some improvement, but had purulent drainage again from AVF. Denies fever/chills, cough. Does report nausea over the last 2-3 days, with 2 episodes of emesis. Bedside u/s performed, which appeared to be hematoma vs. " cellulitis. Blood cultures drawn, dose of vanc and cefepime given. Evaluated by Vascular Surgery and underwent excision of his AVF 1/31 and temporary dialysis catheter was placed. Discussed with VIVI, plan to place perm-a-cath placed on 2/2/18 after dialysis. Patient is to get IV Vanc tonight. ID recommend 4 weeks of IV Vancomycin after HD. Prescription sent per SW. Will discharge home tomorrow. Pt request pain medication on discharge, I told him I will only give him a 7 days supply. CM setting up vascular surgery follow up.        Interval History: Patient seen and examined at bedside, no acute events overnight; patient states he feels well.       Review of Systems   Constitutional: Negative for activity change, appetite change, chills, diaphoresis, fatigue and fever.   HENT: Negative for sore throat.    Respiratory: Negative for shortness of breath.    Cardiovascular: Negative for chest pain.   Genitourinary: Negative for dysuria.   Musculoskeletal: Negative for back pain.   Neurological: Negative for weakness.   Hematological: Does not bruise/bleed easily.      Objective:     Vital Signs (Most Recent):  Temp: 98.4 °F (36.9 °C) (02/02/18 1602)  Pulse: 104 (02/02/18 1602)  Resp: 18 (02/02/18 1602)  BP: 123/79 (02/02/18 1602)  SpO2: 98 % (02/02/18 1602) Vital Signs (24h Range):  Temp:  [97.6 °F (36.4 °C)-98.8 °F (37.1 °C)] 98.4 °F (36.9 °C)  Pulse:  [] 104  Resp:  [16-18] 18  SpO2:  [93 %-98 %] 98 %  BP: (105-140)/(69-93) 123/79     Weight: 109 kg (240 lb 4.8 oz)  Body mass index is 39.99 kg/m².    Intake/Output Summary (Last 24 hours) at 02/02/18 1834  Last data filed at 02/02/18 1646   Gross per 24 hour   Intake             1230 ml   Output             3500 ml   Net            -2270 ml        Physical Exam  Constitutional: He is oriented to person, place, and time. He appears well-developed and well-nourished.   HENT:   Head: Normocephalic and atraumatic.   Eyes: EOM are normal. Pupils are equal, round, and  reactive to light.   Cardiovascular: Normal rate and regular rhythm.    Pulmonary/Chest: Effort normal. No respiratory distress.   Abdominal: Soft. He exhibits no distension.   Musculoskeletal: Left bicep wrapped   Neurological: He is alert and oriented to person, place, and time.   Skin: Skin is warm and dry.     Significant Labs:   CBC:     Recent Labs  Lab 02/01/18  0608 02/02/18  0411   WBC 8.28 7.52   HGB 10.4* 10.4*   HCT 32.5* 32.9*    151     CMP:     Recent Labs  Lab 02/01/18  0608 02/02/18  0411   * 135*   K 5.2* 4.9   CL 92* 100   CO2 29 19*   GLU 73 67*   BUN 60* 48*   CREATININE 11.0* 9.1*   CALCIUM 9.5 9.8   PROT 8.7* 9.3*   ALBUMIN 2.7* 2.7*   BILITOT 0.8 0.8   ALKPHOS 213* 236*   AST 18 17   ALT 7* 7*   ANIONGAP 13 16   EGFRNONAA 5.2* 6.5*       Significant Imaging: I have reviewed all pertinent imaging results/findings within the past 24 hours.    Assessment/Plan:      Active Diagnoses:    Diagnosis Date Noted POA    PRINCIPAL PROBLEM:  Dialysis AV fistula infection, initial encounter [T82.7XXA] 01/31/2018 Yes    Chronic systolic heart failure [I50.22] 04/24/2017 Yes     Chronic    Mass of right chest wall [R22.2] 04/22/2017 Yes     Chronic    Essential hypertension [I10] 03/18/2014 Yes     Chronic    ESRD (end stage renal disease) [N18.6] 03/18/2014 Yes     Chronic      Problems Resolved During this Admission:    Diagnosis Date Noted Date Resolved POA     VTE Risk Mitigation         Ordered     heparin (porcine) injection 1,000 Units  As needed (PRN)     Route:  Intra-Catheter        02/01/18 1128     Medium Risk of VTE  Once      01/31/18 1547     Reason for No Pharmacological VTE Prophylaxis  Once      01/31/18 1547         Dialysis AV fistula infection, initial encounter  - Increasing erythema of his left AVF with likely AVF infection. With recent infection treated with IV (Vanc per pt). S/P OR for AVF excision/ligation 1/31 by Vascular Surgery, placement of temporary HD  catheter in R groin  - Blood cultures x2: NGTD for the last 3 days  - permacath placement 2/2/18 by VIVI  - redose vanc post HD 2/2/18  - Appreciate ID rec, Given his history of concern for intravascular infection that improved on vanco, would continue vanco regardless of negative cxs.   - Plan for duration of 4 weeks, given the fact of recent 2 week course of IV Vanc  - prescription sent to his HD Unit, vanc to be given MWF       Essential hypertension  - no longer on medication per medication  - CTM       ESRD (end stage renal disease)  - Nephrology consulted for HD  - VIVI plan for  permacath placement when stable, maybe 2/2/18  - started on sevelemer       Chronic systolic heart failure  - stable at this time  - report not taking any medications       Mass of right chest wall, chronic  - s/p biopsy per note  - outpatient follow up     Full code    Dispo: Discharge tomorrow, pt is to get IV vanc nya Santana MD  Department of Hospital Medicine   [facility]

## 2018-02-03 NOTE — SUBJECTIVE & OBJECTIVE
Past Medical History:   Diagnosis Date    CHF (congestive heart failure)     ESRD (end stage renal disease)     Hemodialysis access, fistula mature     THRILL AND BRUIT PRESENT    Hemodialysis patient     M-W-F    Hypertension     Myocardial infarction     Renal disorder     Stroke     3 MILD STROKES       Past Surgical History:   Procedure Laterality Date    DIALYSIS FISTULA CREATION         Review of patient's allergies indicates:   Allergen Reactions    Ciprofloxacin Hives    Iodine and iodide containing products Hives and Itching     Pt states he is not allergic to iodine       Medications:  No prescriptions prior to admission.     Antibiotics     Start     Stop Route Frequency Ordered    01/31/18 1045  ceFEPIme injection 1 g      01/31 2244 IV ED 1 Time 01/31/18 1029        Antifungals     None        Antivirals     None             There is no immunization history on file for this patient.    Family History     Problem Relation (Age of Onset)    Kidney disease Mother        Social History     Social History    Marital status: Single     Spouse name: N/A    Number of children: N/A    Years of education: N/A     Social History Main Topics    Smoking status: Never Smoker    Smokeless tobacco: Never Used    Alcohol use No    Drug use: No    Sexual activity: Not Asked     Other Topics Concern    None     Social History Narrative    None     Review of Systems   Constitutional: Negative for activity change and chills.   HENT: Negative for congestion and dental problem.    Eyes: Negative for discharge and itching.   Respiratory: Negative for apnea and chest tightness.    Cardiovascular: Negative for chest pain and leg swelling.   Gastrointestinal: Negative for abdominal distention, abdominal pain, constipation, diarrhea, nausea and vomiting.   Endocrine: Negative for cold intolerance and heat intolerance.   Musculoskeletal: Negative for arthralgias, back pain and gait problem.    Allergic/Immunologic: Negative for environmental allergies.   Neurological: Negative for dizziness and facial asymmetry.   Hematological: Negative for adenopathy. Does not bruise/bleed easily.   Psychiatric/Behavioral: Negative for agitation, behavioral problems and confusion.     Objective:     Vital Signs (Most Recent):  Temp: 97.4 °F (36.3 °C) (02/03/18 0759)  Pulse: 105 (02/03/18 0759)  Resp: 17 (02/03/18 0759)  BP: 128/81 (02/03/18 0759)  SpO2: 97 % (02/03/18 0759) Vital Signs (24h Range):  Temp:  [96.7 °F (35.9 °C)-98.4 °F (36.9 °C)] 97.4 °F (36.3 °C)  Pulse:  [103-108] 105  Resp:  [16-18] 17  SpO2:  [96 %-98 %] 97 %  BP: ()/(67-89) 128/81     Weight: 109 kg (240 lb 4.8 oz)  Body mass index is 39.99 kg/m².    Estimated Creatinine Clearance: 11.3 mL/min (based on SCr of 9.9 mg/dL (H)).    Physical Exam   Constitutional: He is oriented to person, place, and time. He appears well-developed and well-nourished.   HENT:   Head: Normocephalic and atraumatic.   Cardiovascular: Normal rate.    Pulmonary/Chest: Effort normal. No respiratory distress.   Neurological: He is alert and oriented to person, place, and time.   Skin: Skin is warm and dry.   Incision clean, dry and intact.   Nursing note and vitals reviewed.      Significant Labs: All pertinent labs within the past 24 hours have been reviewed.    Significant Imaging: I have reviewed all pertinent imaging results/findings within the past 24 hours.

## 2018-02-03 NOTE — HPI
"41 yo M with PMH HTN, ESRD on HD MWF, aflutter, HFrEF ( EF 30%), anemia of chronic disease, who presents with c/o increasing erythema of his left AVF that started since Monday.  He reports AVF had purulent drainage two weeks ago, was given a course of abx with HD (Vancomycin) with some improvement, but had continued purulent drainage from AVF. Patient reports he completed HD without complication Monday. Yesterday, he began to experience "itching" over his access site, and overnight he developed erythema and edema. Had the fistula excised by vascular on 2/2 and had a temporary dialysis access placed. Has been continued on vanco. All cxs negative  "

## 2018-02-05 ENCOUNTER — PATIENT OUTREACH (OUTPATIENT)
Dept: ADMINISTRATIVE | Facility: CLINIC | Age: 43
End: 2018-02-05

## 2018-02-05 LAB
BACTERIA BLD CULT: NORMAL
BACTERIA BLD CULT: NORMAL

## 2018-02-05 NOTE — PATIENT INSTRUCTIONS
Arteriovenous (AV) Fistula for Dialysis  An AV fistula is a connection between an artery and a vein. For this procedure, an AV fistula is surgically created using an artery and a vein in your arm. (Your healthcare provider will let you know if another site is to be used.) When the artery and vein are joined, blood flow increases from the artery into the vein. As a result, the vein gets bigger over time. The enlarged vein provides easier access to the blood for a treatment for kidney failure (dialysis). This sheet explains the procedure and what to expect.     An AV fistula increases blood flow from the artery into the vein. Over time, the vein becomes stronger and enlarged.   Preparing for the procedure  Prepare as you have been told. In addition:  · Tell your healthcare provider about all the medicines you take. This includes all over-the-counter and prescription medicines, and street drugs. It also includes herbs, vitamins, and other supplements. You may need to stop taking some or all of them before the procedure.  · Follow any directions youre given for not eating or drinking before the procedure.  · Do not allow anyone to draw blood from or take blood pressure on the arm that will have the fistula before the procedure.  The day of the procedure  The procedure takes about 1 to 2 hours. Youll likely go home the same day.  Before the procedure begins:  · An IV (intravenous) line is put into a vein in the arm or hand not being used for the procedure. This line supplies fluids and medicines.  · To keep you free of pain during the procedure, youre given general anesthesia. This medicine puts you into a state like a deep sleep through the procedure. Or a nerve block may be used. This medicine numbs the arm. With it, you may also be given medicine that makes you relaxed and drowsy through the procedure.  During the procedure:  · The skin over your arm may be injected with numbing medicine.  · One or more small  cuts (incisions) are then made through the numbed skin. This depends on the size of your arm and the depth of the vein in your arm.  · The vein is attached to the selected artery.  · Any incisions made are then closed with stitches (sutures), staples, surgical glue, or strips of surgical tape.  After the procedure:  · Youll be asked to keep your arm raised (elevated) as often as possible for at least a week after the procedure.  · Youll be given medicines to manage pain as needed.  · Your arm and hand will be checked to make sure blood is flowing through the fistula properly. The feeling of blood rushing through the fistula is called a thrill. It is somewhat similar to the purring of a cat. Youll be taught how to check for this feeling each day to make sure there are no problems with your fistula. Youll also be taught how to care for your fistula at home.  · When its time for you to leave the hospital, have an adult family member or friend ready to drive you home.  Recovering at home  Once at home, follow all of the instructions youve been given. Be sure to:  · Take all medicines as directed.  · Care for your incision as instructed.  · Check for signs of infection at the incision site (see below).  · Avoid heavy lifting and strenuous activities as directed.  · Monitor and care for your fistula as instructed.  · Do your hand and arm exercises as instructed. This usually involves squeezing a ball in your hand for a few minutes each hour.  Call your healthcare provider if you have any of the following:  · Fever of 100.4°F (38°C) or higher  · Signs of infection at the incision site, such as increased redness or swelling, warmth, worsening pain, bleeding, or bad-smelling drainage  · You cant feel a thrill (the vibration of blood going through your arm)  · Pain or numbness in your fingers, hand, or arm  · Bleeding, redness, or warmth around your fistula  · Sudden bulging of the fistula (more than usual; a slight  bulge is normal)   Follow-Up  Your healthcare provider will check your fistula within 1 to 2 weeks after the procedure. It will likely take about 6 to 8 weeks for the fistula to enlarge enough to start dialysis. After that, make sure the fistula is checked each time you have dialysis. Your healthcare provider may also suggest checkups every 6 months.  Risks and possible complications include:  · The fistula not working properly  · Long wait before the fistula is ready (up to 6 months)  · Coldness or numbness in the hand (due to blood flowing away from the hand and into the fistula)  · An unsightly bump under the skin (due to enlargement of the fistula)  · Prolonged bleeding from the fistula after dialysis  · Narrowing or weakening of the blood vessels used for the fistula  · Formation of blood clots in the blood vessels used for the fistula  · Risks of anesthesia or any other medicines used during the procedure   Living with an AV Fistula  A problem, such as a narrowing (stricture) of the vein or an infection, can make the fistula unusable. If this happens, you may need other treatments to repair or make a new fistula. To protect your fistula, follow these and any other guidelines youre given:  · Check your fistula as often as your healthcare provider says. If you cant feel your thrill, let your provider know right away.  · Make sure your fistula is checked before each dialysis treatment.  · Dont let anyone draw blood from or take blood pressure on the arm that has the fistula.  · Wash your hands often and keep the area around your fistula clean.  · Dont sleep on the arm that has the fistula.  · Dont wear tight jewelry or a watch on the arm with your fistula.  · Protect your fistula from cuts, scrapes, or blows.  Date Last Reviewed: 1/1/2017  © 9320-4609 [employer]. [address] [place] [address], [place] [address]. All rights reserved. This information is not intended as a substitute for professional  medical care. Always follow your healthcare professional's instructions.

## 2018-02-08 ENCOUNTER — OFFICE VISIT (OUTPATIENT)
Dept: VASCULAR SURGERY | Facility: CLINIC | Age: 43
End: 2018-02-08
Payer: MEDICARE

## 2018-02-08 ENCOUNTER — OFFICE VISIT (OUTPATIENT)
Dept: CARDIOTHORACIC SURGERY | Facility: CLINIC | Age: 43
End: 2018-02-08
Payer: MEDICARE

## 2018-02-08 VITALS
WEIGHT: 225.5 LBS | DIASTOLIC BLOOD PRESSURE: 78 MMHG | HEART RATE: 107 BPM | HEIGHT: 65 IN | SYSTOLIC BLOOD PRESSURE: 157 MMHG | TEMPERATURE: 98 F | BODY MASS INDEX: 37.57 KG/M2

## 2018-02-08 VITALS
BODY MASS INDEX: 38.58 KG/M2 | DIASTOLIC BLOOD PRESSURE: 97 MMHG | HEART RATE: 107 BPM | HEIGHT: 64 IN | SYSTOLIC BLOOD PRESSURE: 143 MMHG | WEIGHT: 226 LBS | OXYGEN SATURATION: 98 %

## 2018-02-08 DIAGNOSIS — T82.7XXA: Primary | ICD-10-CM

## 2018-02-08 DIAGNOSIS — R22.2 MASS OF CHEST WALL, RIGHT: Primary | ICD-10-CM

## 2018-02-08 PROCEDURE — 99999 PR PBB SHADOW E&M-EST. PATIENT-LVL III: CPT | Mod: PBBFAC,,, | Performed by: THORACIC SURGERY (CARDIOTHORACIC VASCULAR SURGERY)

## 2018-02-08 PROCEDURE — 99213 OFFICE O/P EST LOW 20 MIN: CPT | Mod: PBBFAC,27 | Performed by: SURGERY

## 2018-02-08 PROCEDURE — 99213 OFFICE O/P EST LOW 20 MIN: CPT | Mod: PBBFAC | Performed by: THORACIC SURGERY (CARDIOTHORACIC VASCULAR SURGERY)

## 2018-02-08 PROCEDURE — 99024 POSTOP FOLLOW-UP VISIT: CPT | Mod: POP,,, | Performed by: SURGERY

## 2018-02-08 PROCEDURE — 99205 OFFICE O/P NEW HI 60 MIN: CPT | Mod: S$PBB,,, | Performed by: THORACIC SURGERY (CARDIOTHORACIC VASCULAR SURGERY)

## 2018-02-08 PROCEDURE — 99999 PR PBB SHADOW E&M-EST. PATIENT-LVL III: CPT | Mod: PBBFAC,,, | Performed by: SURGERY

## 2018-02-08 NOTE — PROGRESS NOTES
Persistent ooze from right femoral HD cath insertion site.   No problems with catheter during HD. No hematoma or leg swelling.    Skin cleaned with chloraprep, anes with 1% lido, 3-0 monocryl purse string suture placed  Dressing reaplied.    Follow up with Lenore Ortega MD FACS [facility]  Vascular & Endovascular Surgery

## 2018-02-08 NOTE — PROGRESS NOTES
History & Physical    SUBJECTIVE:     History of Present Illness:   42 y.o. male presents with PMH of ESRD (HD MWF), TIA, HTN, hypokalemia  and chronic systolic heart failure (EF 30%) presents for surgical evaluation of large chest wall mass. History dates back to April 2017 when he presented to [facility] ED for generalized weakness. Mass has been biopsied twice via core needle. Pathology returned as non diagnostic. It seems patient has been noncompliant with several follow up appointments. Chest wall mass continues to grow. He states mass is not painful. Denies fever, chills, facial swelling, upper extremity weakness, SOB, cough, N/V or changes in bowel and bladder function. 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 Complains of bleeding at insertion site in right groin.       Chief Complaint   Patient presents with    Consult       Review of patient's allergies indicates:   Allergen Reactions    Ciprofloxacin Hives    Iodine and iodide containing products Hives and Itching     Pt states he is not allergic to iodine       Current Outpatient Prescriptions   Medication Sig Dispense Refill    oxyCODONE-acetaminophen (PERCOCET)  mg per tablet Take 1 tablet by mouth every 8 (eight) hours as needed for Pain (Take for severe pain). 21 tablet 0    sevelamer carbonate (RENVELA) 800 mg Tab Take 2 tablets (1,600 mg total) by mouth 3 (three) times daily with meals. Contact Nephrologist for refills 180 tablet 3    VANCOMYCIN HCL (VANCOMYCIN 1 G/250 ML NS, READY TO MIX SYSTEM,) Inject 250 mLs (1,000 mg total) into the vein every Mon, Wed, Fri. Given after Dialysis. Last dose, 2/21/18 250 mL 0     No current facility-administered medications for this visit.        Past Medical History:   Diagnosis Date    CHF (congestive heart failure)     ESRD (end stage renal disease)     Hemodialysis access, fistula mature     THRILL AND BRUIT PRESENT     "Hemodialysis patient     M-W-F    Hypertension     Myocardial infarction     Renal disorder     Stroke     3 MILD STROKES     Past Surgical History:   Procedure Laterality Date    DIALYSIS FISTULA CREATION       Family History   Problem Relation Age of Onset    Kidney disease Mother      Social History   Substance Use Topics    Smoking status: Never Smoker    Smokeless tobacco: Never Used    Alcohol use No        Review of Systems:  Review of Systems   Constitutional: Negative for activity change, appetite change, fatigue and fever.   HENT: Negative for congestion, trouble swallowing and voice change.    Respiratory: Positive for chest tightness. Negative for cough, shortness of breath and wheezing.    Cardiovascular: Negative for chest pain, palpitations and leg swelling.   Gastrointestinal: Negative for abdominal distention, abdominal pain, nausea and vomiting.   Genitourinary: Negative for difficulty urinating.   Neurological: Negative for dizziness, syncope and numbness.   Hematological: Negative for adenopathy.       OBJECTIVE:     Vital Signs (Most Recent)  Pulse: 107 (02/08/18 0900)  BP: (!) 143/97 (02/08/18 0900)  SpO2: 98 % (02/08/18 0900)  5' 4" (1.626 m)  102.5 kg (225 lb 15.5 oz)     Physical Exam:  Physical Exam   Constitutional: He is oriented to person, place, and time. He appears well-developed and well-nourished.   Obese   HENT:   Head: Atraumatic.   Mouth/Throat: Oropharynx is clear and moist.   Neck: Normal range of motion. No tracheal deviation present.   Cardiovascular: Normal rate and intact distal pulses.  A regularly irregular rhythm present.   No murmur heard.  Pulmonary/Chest: No respiratory distress. He has no decreased breath sounds. He has no wheezes. He exhibits mass. He exhibits no tenderness.   Approximately 15cm x 10cm mass protruding from right chest. Mass extends from sternal border to medial axillary line. Firm, nontender.    Abdominal: Soft. Bowel sounds are normal. He " exhibits no distension.   Musculoskeletal: Normal range of motion. He exhibits no edema.   Lymphadenopathy:     He has no cervical adenopathy.   Neurological: He is alert and oriented to person, place, and time.   Psychiatric: He has a normal mood and affect.     Diagnostic Results:    1/16/18 Chest and Abdomen CT-   Large indeterminate 15 cm calcified mass centered in the right first costosternal junction, extending anteriorly underneath the pectoralis musculature and posteriorly into the lung apex, increased in size since the 4/22/17 exam. It has imaging features which can be seen with tumoral calcinosis. Prominent right chest wall soft tissue edema with moderate axillary lymphadenopathy noted, also increased.  Small amount of ascites and increased number of retroperitoneal and mesenteric lymph nodes, similar to prior exam.  Atrophic kidneys.  Extensive calcified atherosclerotic disease.    Pathology:  4/24/17- RIGHT RIB LESION (BIOPSY):  -No neoplasm identified  -Extensive dystrophic calcifications and degenerative material  [facility]    1/23/18- Core bx of chest wall mass  -Prominent calcium deposition with associated chronic inflammation, prominent giant cell reaction and  fibrosis  -These changes can be seen in response to old inflammation or abscess and, rarely, in conditions  associated with increased calcium levels  -No malignancy is identified area  Part 2  Lymphoid and fibrous tissue (submitted as right axillary lymph node):  -Diffuse small lymphocyte population  -No evidence of a metastatic process is identified  -Slides will be sent in consultation with a supplemental report to follow.      ASSESSMENT/PLAN:     42 y.o. male presents with PMH of ESRD (HD MWF), TIA, HTN, hypokalemia  and chronic systolic heart failure (EF 30%) presents for surgical evaluation of large chest wall mass.    PLAN:Plan   Will present at multidisciplinary thoracic tumor board to discuss incisional surgical biopsy vs. radiation.    He  will be seen by vascular surgery today for femoral access concerns.       ATTENDING ATTESTATION:    I evaluated the patient and I agree with the assessment and plan.  Enlarging right chest wall mass, biopsies non-diagnostic to this point.  Will present at multi-disciplinary thoracic oncology conference.  Will likely need incisional biopsy.  Unlikely to be a candidate for resection given co-morbidities.

## 2018-02-08 NOTE — LETTER
February 9, 2018      Sparkle Erickson MD  [address] [facility]  [address]  [place] [address]           [facility] Thoracic Surgery  [facility]  [place] [address]  Phone: [phone]  Fax: [phone]          Patient: Livan Arevalo   MR Number: [id number]   YOB: 1975   Date of Visit: 2/8/2018       Dear Dr. Sparkle Erickson:    Thank you for referring Livan Arevalo to me for evaluation. Attached you will find relevant portions of my assessment and plan of care.    If you have questions, please do not hesitate to call me. I look forward to following Livan Arevalo along with you.    Sincerely,    Iker Guerra MD    Enclosure  CC:  No Recipients    If you would like to receive this communication electronically, please contact [email or web address] or [phone] to request more information on [employer] access.    For providers and/or their staff who would like to refer a patient to [facility], please contact us through our one-stop-shop provider referral line, [facility], at [phone].    If you feel you have received this communication in error or would no longer like to receive these types of communications, please e-mail [email or web address]

## 2018-02-14 ENCOUNTER — DOCUMENTATION ONLY (OUTPATIENT)
Dept: CARDIOTHORACIC SURGERY | Facility: HOSPITAL | Age: 43
End: 2018-02-14

## 2018-02-14 NOTE — PATIENT CARE CONFERENCE
[facility]      THORACIC MULTIDISCIPLINARY TUMOR BOARD  PATIENT REVIEW FORM  ________________________________________________________________________    CLINIC #: [id number]  DATE: 02/14/2018    TUMOR SITE:   Chest wall mass    PRESENTER:   Dr. Guerra     PATIENT SUMMARY:   42 y.o. male with PMH of ESRD (HD MWF), TIA, HTN, hypokalemia and chronic systolic heart failure (EF 30%) presented for evaluation of enlarging painless calcified chest wall mass. Most recently measuring apx 15.0 x 9.6cm. W/u since April 2017. Mass has been biopsied twice via core needle. Pathology returned as non diagnostic. Interval increase in size of chest wall mass.      BOARD RECOMMENDATIONS:   Not a surgical resection candidate. Incisional biopsy for more tissue to direct further treatment.     CONSULT NEEDED:     [] Surgery    [] Hem/Onc    [] Rad/Onc    [] Dietary                 [] Social Service    [] Psychology       [] Pulmonology    Clinical Stage: Tumor n/a Node(s) n/a Metastasis n/a   Pathologic Stage: Tumor n/a Node(s) n/a Metastasis n/a        GROUP STAGE:     [] O    [] 1A    [] IB    [] IIA    [] IIB     [] IIIA     [] IIIB     [] IIIC    [] IV                               [] Local recurrence     [] Regional recurrence     [] Distant recurrence                   [] NSCLC     [] SCLC     Tumor type     Unstageable:      [] Yes     [] No  Metastatic site(s):          [x] Vanesa'[facility] Treatment Guidelines reviewed and care planned is consistent with guidelines.         (i.e., NCCN, NCI, PD, ACO, AUA, etc.)    PRESENTATION AT CANCER CONFERENCE:         [] Prospective    [] Retrospective     [] Follow-Up          [] Eligible for clinical trial

## 2018-02-15 DIAGNOSIS — R22.2 MASS OF CHEST WALL: Primary | ICD-10-CM

## 2018-02-20 ENCOUNTER — ANESTHESIA EVENT (OUTPATIENT)
Dept: SURGERY | Facility: HOSPITAL | Age: 43
End: 2018-02-20
Payer: MEDICARE

## 2018-02-20 DIAGNOSIS — Z01.818 PREOP TESTING: Primary | ICD-10-CM

## 2018-02-20 NOTE — PRE ADMISSION SCREENING
Anesthesia Assessment: Preoperative EQUATION    Planned Procedure: Procedure(s) (LRB):  BIOPSY-INCISIONAL (Right)  Requested Anesthesia Type:Local MAC  Surgeon: Iker Guerra MD  Service: General  Known or anticipated Date of Surgery:2/27/2018    Surgeon notes: reviewed and Mass of chest wall    Previous anesthesia records:1/31/1867-Ikaftvkz-Shostrpz Left AVF -Regional/Insertion-Catheter-hemodialysis Femoral Right-Local/MAC-no apparent anesthetic complications and tolerated procedure well    Anesthesia Hx:  No problems with previous Anesthesia  History of prior surgery of interest to airway management or planning: Denies Family Hx of Anesthesia complications.   Denies Personal Hx of Anesthesia complications.     Last PCP note: No PCP at present  Subspecialty notes: Hematology/Oncology, Vascular Surgery    Tests already available:  Results have been reviewed. Labs-2/3/18-Phosphorus/CMP/CBC/ 1/31/18-BMP/Magnesium/CBC/ 1/22/18-PT-INR/CXR 1 view-9/11/17/ EKG-9/11/17      Plan:  Phone      Testing:  None needed at this time      Patient stated he could not afford to come in before surgery  because of finacial & transportation hardship.       Patient  has previously scheduled Medical Appointment:None prior to surgery date.    Navigation:                            Straight Line to surgery.               No tests, anesthesia preop clinic visit, or consult required.          Marilee Simmons RN  2/20/18

## 2018-02-20 NOTE — ANESTHESIA PREPROCEDURE EVALUATION
Anesthesia Assessment: Preoperative EQUATION    Planned Procedure: Procedure(s) (LRB):  BIOPSY-INCISIONAL (Right)  Requested Anesthesia Type:Local MAC  Surgeon: Iker Guerra MD  Service: General  Known or anticipated Date of Surgery:2/27/2018    Surgeon notes: reviewed and Mass of chest wall    Previous anesthesia records:1/31/1836-Ydpeysxi-Ayskjhhs Left AVF -Regional/Insertion-Catheter-hemodialysis Femoral Right-Local/MAC-no apparent anesthetic complications and tolerated procedure well    Anesthesia Hx:  No problems with previous Anesthesia  History of prior surgery of interest to airway management or planning: Denies Family Hx of Anesthesia complications.   Denies Personal Hx of Anesthesia complications.     Last PCP note: No PCP at present  Subspecialty notes: Hematology/Oncology, Vascular Surgery    Tests already available:  Results have been reviewed. Labs-2/3/18-Phosphorus/CMP/CBC/ 1/31/18-BMP/Magnesium/CBC/ 1/22/18-PT-INR/CXR 1 view-9/11/17/ EKG-9/11/17      Plan:  Phone      Testing:  None needed at this time      Patient stated he could not afford to come in before surgery  because of finacial & transportation hardship.       Patient  has previously scheduled Medical Appointment:None prior to surgery date.    Navigation:  Reviewed plan with Dr. Leopold-Ordered EKG(Sign & Held)                          Straight Line to surgery.               No tests, anesthesia preop clinic visit, or consult required.          Marilee Simmons RN  2/20/18 02/20/2018  Livan Arevalo is a 43 y.o., male.    Anesthesia Evaluation    I have reviewed the Patient Summary Reports.    I have reviewed the Nursing Notes.   I have reviewed the Medications.     Review of Systems  Anesthesia Hx:  No problems with previous Anesthesia  History of prior surgery of interest to airway management or planning: Denies Family  Hx of Anesthesia complications.   Denies Personal Hx of Anesthesia complications.   Social:  Non-Smoker, No Alcohol Use    Hematology/Oncology:         -- Anemia: Hematology Comments: Anemia of CRF   EENT/Dental:   Wears glasses   Cardiovascular:   Hypertension, well controlled Past MI  CHF    Renal/:   Chronic Renal Disease, ESRD, Dialysis    Musculoskeletal:   CKD-Mineral & bone disorder   Neurological:   CVA, no residual symptoms x3 mild CVAs       Physical Exam  General:  Obesity    Airway/Jaw/Neck:  Airway Findings: Mouth Opening: Normal Tongue: Normal  General Airway Assessment: Adult  Mallampati: III  Improves to II with phonation.  TM Distance: Normal, at least 6 cm     Eyes/Ears/Nose:  EYES/EARS/NOSE FINDINGS: Normal    Chest/Lungs:  Chest/Lungs Clear    Heart/Vascular:  Heart Findings: Normal       Mental Status:  Mental Status Findings: Normal    Marilee Simmons RN  2/20/18          Anesthesia Plan  Type of Anesthesia, risks & benefits discussed:  Anesthesia Type:  MAC  Patient's Preference:   Intra-op Monitoring Plan: standard ASA monitors  Intra-op Monitoring Plan Comments:   Post Op Pain Control Plan:   Post Op Pain Control Plan Comments:   Induction:   IV  Beta Blocker:  Patient is not currently on a Beta-Blocker (No further documentation required).       Informed Consent: Patient understands risks and agrees with Anesthesia plan.  Questions answered. Anesthesia consent signed with patient.  ASA Score: 4     Day of Surgery Review of History & Physical:            Ready For Surgery From Anesthesia Perspective.

## 2018-02-26 ENCOUNTER — TELEPHONE (OUTPATIENT)
Dept: CARDIOTHORACIC SURGERY | Facility: CLINIC | Age: 43
End: 2018-02-26

## 2018-02-27 ENCOUNTER — HOSPITAL ENCOUNTER (OUTPATIENT)
Facility: HOSPITAL | Age: 43
Discharge: HOME HEALTH | End: 2018-02-27
Attending: THORACIC SURGERY (CARDIOTHORACIC VASCULAR SURGERY) | Admitting: THORACIC SURGERY (CARDIOTHORACIC VASCULAR SURGERY)
Payer: MEDICARE

## 2018-02-27 ENCOUNTER — ANESTHESIA (OUTPATIENT)
Dept: SURGERY | Facility: HOSPITAL | Age: 43
End: 2018-02-27
Payer: MEDICARE

## 2018-02-27 VITALS
BODY MASS INDEX: 37.49 KG/M2 | TEMPERATURE: 98 F | RESPIRATION RATE: 16 BRPM | OXYGEN SATURATION: 100 % | SYSTOLIC BLOOD PRESSURE: 121 MMHG | HEIGHT: 65 IN | DIASTOLIC BLOOD PRESSURE: 60 MMHG | WEIGHT: 225 LBS | HEART RATE: 84 BPM

## 2018-02-27 DIAGNOSIS — Z01.818 PREOP TESTING: ICD-10-CM

## 2018-02-27 DIAGNOSIS — R22.2 CHEST WALL MASS: Primary | ICD-10-CM

## 2018-02-27 DIAGNOSIS — R22.2 MASS OF CHEST WALL, RIGHT: ICD-10-CM

## 2018-02-27 LAB
ABO + RH BLD: NORMAL
APPEARANCE FLD: NORMAL
BLD GP AB SCN CELLS X3 SERPL QL: NORMAL
BODY FLD TYPE: NORMAL
BODY FLUID COMMENTS: NORMAL
COLOR FLD: NORMAL
GRAM STN SPEC: NORMAL
WBC # FLD: 1111 /CU MM

## 2018-02-27 PROCEDURE — 63600175 PHARM REV CODE 636 W HCPCS: Performed by: PHYSICIAN ASSISTANT

## 2018-02-27 PROCEDURE — 88305 TISSUE EXAM BY PATHOLOGIST: CPT | Performed by: PATHOLOGY

## 2018-02-27 PROCEDURE — 87070 CULTURE OTHR SPECIMN AEROBIC: CPT | Mod: 59

## 2018-02-27 PROCEDURE — 87176 TISSUE HOMOGENIZATION CULTR: CPT

## 2018-02-27 PROCEDURE — 88305 TISSUE EXAM BY PATHOLOGIST: CPT | Mod: 26,,, | Performed by: PATHOLOGY

## 2018-02-27 PROCEDURE — 87116 MYCOBACTERIA CULTURE: CPT | Mod: 59

## 2018-02-27 PROCEDURE — 89051 BODY FLUID CELL COUNT: CPT

## 2018-02-27 PROCEDURE — 86850 RBC ANTIBODY SCREEN: CPT

## 2018-02-27 PROCEDURE — 37000009 HC ANESTHESIA EA ADD 15 MINS: Performed by: THORACIC SURGERY (CARDIOTHORACIC VASCULAR SURGERY)

## 2018-02-27 PROCEDURE — 94761 N-INVAS EAR/PLS OXIMETRY MLT: CPT

## 2018-02-27 PROCEDURE — 25000003 PHARM REV CODE 250: Performed by: PHYSICIAN ASSISTANT

## 2018-02-27 PROCEDURE — 36000706: Performed by: THORACIC SURGERY (CARDIOTHORACIC VASCULAR SURGERY)

## 2018-02-27 PROCEDURE — 71000015 HC POSTOP RECOV 1ST HR: Performed by: THORACIC SURGERY (CARDIOTHORACIC VASCULAR SURGERY)

## 2018-02-27 PROCEDURE — 21550 BIOPSY OF NECK/CHEST: CPT | Mod: GC,,, | Performed by: THORACIC SURGERY (CARDIOTHORACIC VASCULAR SURGERY)

## 2018-02-27 PROCEDURE — 87102 FUNGUS ISOLATION CULTURE: CPT

## 2018-02-27 PROCEDURE — 63600175 PHARM REV CODE 636 W HCPCS: Performed by: ANESTHESIOLOGY

## 2018-02-27 PROCEDURE — 37000008 HC ANESTHESIA 1ST 15 MINUTES: Performed by: THORACIC SURGERY (CARDIOTHORACIC VASCULAR SURGERY)

## 2018-02-27 PROCEDURE — 87186 SC STD MICRODIL/AGAR DIL: CPT

## 2018-02-27 PROCEDURE — D9220A PRA ANESTHESIA: Mod: ,,, | Performed by: ANESTHESIOLOGY

## 2018-02-27 PROCEDURE — 71000016 HC POSTOP RECOV ADDL HR: Performed by: THORACIC SURGERY (CARDIOTHORACIC VASCULAR SURGERY)

## 2018-02-27 PROCEDURE — 87206 SMEAR FLUORESCENT/ACID STAI: CPT

## 2018-02-27 PROCEDURE — 25000003 PHARM REV CODE 250: Performed by: ANESTHESIOLOGY

## 2018-02-27 PROCEDURE — 25000003 PHARM REV CODE 250: Performed by: THORACIC SURGERY (CARDIOTHORACIC VASCULAR SURGERY)

## 2018-02-27 PROCEDURE — 71000033 HC RECOVERY, INTIAL HOUR: Performed by: THORACIC SURGERY (CARDIOTHORACIC VASCULAR SURGERY)

## 2018-02-27 PROCEDURE — 87205 SMEAR GRAM STAIN: CPT | Mod: 59

## 2018-02-27 PROCEDURE — 87077 CULTURE AEROBIC IDENTIFY: CPT

## 2018-02-27 PROCEDURE — 93005 ELECTROCARDIOGRAM TRACING: CPT

## 2018-02-27 PROCEDURE — 87075 CULTR BACTERIA EXCEPT BLOOD: CPT | Mod: 59

## 2018-02-27 PROCEDURE — 88112 CYTOPATH CELL ENHANCE TECH: CPT | Performed by: PATHOLOGY

## 2018-02-27 PROCEDURE — 71000039 HC RECOVERY, EACH ADD'L HOUR: Performed by: THORACIC SURGERY (CARDIOTHORACIC VASCULAR SURGERY)

## 2018-02-27 PROCEDURE — 36000707: Performed by: THORACIC SURGERY (CARDIOTHORACIC VASCULAR SURGERY)

## 2018-02-27 RX ORDER — FENTANYL CITRATE 50 UG/ML
25 INJECTION, SOLUTION INTRAMUSCULAR; INTRAVENOUS EVERY 5 MIN PRN
Status: DISCONTINUED | OUTPATIENT
Start: 2018-02-27 | End: 2018-02-27

## 2018-02-27 RX ORDER — LIDOCAINE HCL/PF 100 MG/5ML
SYRINGE (ML) INTRAVENOUS
Status: DISCONTINUED | OUTPATIENT
Start: 2018-02-27 | End: 2018-02-27

## 2018-02-27 RX ORDER — IPRATROPIUM BROMIDE AND ALBUTEROL SULFATE 2.5; .5 MG/3ML; MG/3ML
3 SOLUTION RESPIRATORY (INHALATION) EVERY 6 HOURS
Status: DISCONTINUED | OUTPATIENT
Start: 2018-02-27 | End: 2018-02-27

## 2018-02-27 RX ORDER — METOPROLOL TARTRATE 1 MG/ML
INJECTION, SOLUTION INTRAVENOUS
Status: DISCONTINUED | OUTPATIENT
Start: 2018-02-27 | End: 2018-02-27

## 2018-02-27 RX ORDER — SEVELAMER CARBONATE 800 MG/1
1600 TABLET, FILM COATED ORAL
Status: DISCONTINUED | OUTPATIENT
Start: 2018-02-27 | End: 2018-02-27 | Stop reason: HOSPADM

## 2018-02-27 RX ORDER — OXYCODONE AND ACETAMINOPHEN 10; 325 MG/1; MG/1
1 TABLET ORAL EVERY 4 HOURS PRN
Status: DISCONTINUED | OUTPATIENT
Start: 2018-02-27 | End: 2018-02-27 | Stop reason: HOSPADM

## 2018-02-27 RX ORDER — BACITRACIN 50000 [IU]/1
INJECTION, POWDER, FOR SOLUTION INTRAMUSCULAR
Status: DISCONTINUED | OUTPATIENT
Start: 2018-02-27 | End: 2018-02-27 | Stop reason: HOSPADM

## 2018-02-27 RX ORDER — ACETAMINOPHEN 325 MG/1
650 TABLET ORAL EVERY 4 HOURS PRN
Status: DISCONTINUED | OUTPATIENT
Start: 2018-02-27 | End: 2018-02-27 | Stop reason: HOSPADM

## 2018-02-27 RX ORDER — LIDOCAINE HYDROCHLORIDE AND EPINEPHRINE 10; 10 MG/ML; UG/ML
INJECTION, SOLUTION INFILTRATION; PERINEURAL
Status: DISCONTINUED | OUTPATIENT
Start: 2018-02-27 | End: 2018-02-27 | Stop reason: HOSPADM

## 2018-02-27 RX ORDER — HEPARIN SODIUM 5000 [USP'U]/ML
5000 INJECTION, SOLUTION INTRAVENOUS; SUBCUTANEOUS EVERY 8 HOURS
Status: DISCONTINUED | OUTPATIENT
Start: 2018-02-28 | End: 2018-02-27 | Stop reason: HOSPADM

## 2018-02-27 RX ORDER — PROPOFOL 10 MG/ML
INJECTION, EMULSION INTRAVENOUS
Status: DISCONTINUED | OUTPATIENT
Start: 2018-02-27 | End: 2018-02-27

## 2018-02-27 RX ORDER — OXYCODONE AND ACETAMINOPHEN 10; 325 MG/1; MG/1
2 TABLET ORAL EVERY 4 HOURS PRN
Status: DISCONTINUED | OUTPATIENT
Start: 2018-02-27 | End: 2018-02-27 | Stop reason: HOSPADM

## 2018-02-27 RX ORDER — DIPHENHYDRAMINE HYDROCHLORIDE 50 MG/ML
12.5 INJECTION INTRAMUSCULAR; INTRAVENOUS EVERY 6 HOURS PRN
Status: DISCONTINUED | OUTPATIENT
Start: 2018-02-27 | End: 2018-02-27 | Stop reason: HOSPADM

## 2018-02-27 RX ORDER — MIDAZOLAM HYDROCHLORIDE 1 MG/ML
INJECTION, SOLUTION INTRAMUSCULAR; INTRAVENOUS
Status: DISCONTINUED | OUTPATIENT
Start: 2018-02-27 | End: 2018-02-27

## 2018-02-27 RX ORDER — PROPOFOL 10 MG/ML
INJECTION, EMULSION INTRAVENOUS CONTINUOUS PRN
Status: DISCONTINUED | OUTPATIENT
Start: 2018-02-27 | End: 2018-02-27

## 2018-02-27 RX ORDER — RAMELTEON 8 MG/1
8 TABLET ORAL NIGHTLY PRN
Status: DISCONTINUED | OUTPATIENT
Start: 2018-02-27 | End: 2018-02-27 | Stop reason: HOSPADM

## 2018-02-27 RX ORDER — OXYCODONE AND ACETAMINOPHEN 10; 325 MG/1; MG/1
1 TABLET ORAL EVERY 6 HOURS PRN
Qty: 45 TABLET | Refills: 0 | Status: SHIPPED | OUTPATIENT
Start: 2018-02-27

## 2018-02-27 RX ORDER — ONDANSETRON 2 MG/ML
8 INJECTION INTRAMUSCULAR; INTRAVENOUS EVERY 8 HOURS PRN
Status: DISCONTINUED | OUTPATIENT
Start: 2018-02-27 | End: 2018-02-27 | Stop reason: HOSPADM

## 2018-02-27 RX ORDER — SODIUM CHLORIDE 9 MG/ML
INJECTION, SOLUTION INTRAVENOUS CONTINUOUS
Status: DISCONTINUED | OUTPATIENT
Start: 2018-02-27 | End: 2018-02-27

## 2018-02-27 RX ORDER — DOCUSATE SODIUM 100 MG/1
100 CAPSULE, LIQUID FILLED ORAL 2 TIMES DAILY
Qty: 60 CAPSULE | Refills: 3 | Status: SHIPPED | OUTPATIENT
Start: 2018-02-27

## 2018-02-27 RX ORDER — CEFAZOLIN SODIUM 1 G/3ML
2 INJECTION, POWDER, FOR SOLUTION INTRAMUSCULAR; INTRAVENOUS
Status: COMPLETED | OUTPATIENT
Start: 2018-02-27 | End: 2018-02-27

## 2018-02-27 RX ADMIN — METOPROLOL TARTRATE 1 MG: 5 INJECTION, SOLUTION INTRAVENOUS at 07:02

## 2018-02-27 RX ADMIN — PROPOFOL 50 MG: 10 INJECTION, EMULSION INTRAVENOUS at 07:02

## 2018-02-27 RX ADMIN — CEFAZOLIN 2 G: 330 INJECTION, POWDER, FOR SOLUTION INTRAMUSCULAR; INTRAVENOUS at 07:02

## 2018-02-27 RX ADMIN — SODIUM CHLORIDE: 0.9 INJECTION, SOLUTION INTRAVENOUS at 06:02

## 2018-02-27 RX ADMIN — METOPROLOL TARTRATE 2 MG: 5 INJECTION, SOLUTION INTRAVENOUS at 07:02

## 2018-02-27 RX ADMIN — LIDOCAINE HYDROCHLORIDE 100 MG: 20 INJECTION, SOLUTION INTRAVENOUS at 07:02

## 2018-02-27 RX ADMIN — PROPOFOL 75 MCG/KG/MIN: 10 INJECTION, EMULSION INTRAVENOUS at 07:02

## 2018-02-27 RX ADMIN — MIDAZOLAM HYDROCHLORIDE 2 MG: 1 INJECTION, SOLUTION INTRAMUSCULAR; INTRAVENOUS at 06:02

## 2018-02-27 NOTE — PROGRESS NOTES
[facility] resident for Dr. Guerra came saw patient and went over dressing change instructions and changed the dressing. Pt said he would not change dressing at home and would just come to clinic Thursday to do so. PA said that was fine.

## 2018-02-27 NOTE — TRANSFER OF CARE
"Anesthesia Transfer of Care Note    Patient: Livan Arevalo    Procedure(s) Performed: Procedure(s) (LRB):  BIOPSY-INCISIONAL (Right)    Patient location: PACU    Anesthesia Type: general    Transport from OR: Transported from OR on room air with adequate spontaneous ventilation    Post pain: adequate analgesia    Post assessment: no apparent anesthetic complications    Post vital signs: stable    Level of consciousness: awake and alert    Nausea/Vomiting: no nausea/vomiting    Complications: none    Transfer of care protocol was followed      Last vitals:   Visit Vitals  BP (!) 100/58   Pulse 108   Temp 36.8 °C (98.3 °F) (Oral)   Resp 16   Ht 5' 5" (1.651 m)   Wt 102.1 kg (225 lb)   SpO2 100%   BMI 37.44 kg/m²     "

## 2018-02-27 NOTE — ANESTHESIA RELEASE NOTE
"Anesthesia Release from PACU Note    Patient: Livan Arevalo    Procedure(s) Performed: Procedure(s) (LRB):  BIOPSY-INCISIONAL (Right)    Anesthesia type: GEN    Post pain: Adequate analgesia reported    Post assessment: no apparent anesthetic complications, tolerated procedure well and no evidence of recall    Post vital signs: /81   Pulse 104   Temp 36.7 °C (98.1 °F) (Temporal)   Resp 19   Ht 5' 5" (1.651 m)   Wt 102.1 kg (225 lb)   SpO2 97%   BMI 37.44 kg/m²     Level of consciousness: awake, alert and oriented    Nausea/Vomiting: no nausea/no vomiting    Complications: none    Airway Patency: patent    Respiratory: unassisted, spontaneous ventilation, room air    Cardiovascular: stable and blood pressure at baseline    Hydration: euvolemic    "

## 2018-02-27 NOTE — PROGRESS NOTES
Pts belongings taken from locker in [facility] per pt request. Pt has wallet, cell phone, secured enclosed envelope with money pt came with and pts clothing.

## 2018-02-27 NOTE — DISCHARGE INSTRUCTIONS
Wound Care After Packing Removal or Replacement  Packing is a special type of dressing placed inside a wound to help it heal. Once the packing is removed, you need to care for your wound. Good wound care helps prevent infection. Be sure to keep all follow-up appointments with your healthcare provider. Follow these instructions to take care of the wound once youre at home.  Home care  Here are some general care guidelines for your wound:  · Follow your healthcare providers instructions on how to care for your wound. Always wash your hands with soap and warm water before and after tending to your wound.  · If a bandage was put on, remove and change it once a day or as directed. If the bandage gets wet or dirty, replace it as soon as possible with a new bandage. Use a clean cloth to gently pat the wound dry.  · If your packing was replaced, a small piece of gauze may hang from the wound. It allows fluid to continue draining from the wound.   · Don't bathe in a tub or soak your wound until your healthcare provider says its OK. Take showers or sponge baths instead. Avoid swimming.  · Dont scratch, rub, scrub, or pick your wound.  · Check your wound daily for the signs of infection listed below.  If your wound was closed, it was likely with one of four types of closures. These include stitches (sutures), strips of surgical tape, skin glue, and staples. Your healthcare provider will decide on the best closure based on the size and location of your wound. Each type of closure needs specific care.  · Sutures. You may want to clean the wound daily after the first 2 to 3 days. To do this, remove the bandage and gently wash the area with soap and warm water. After cleaning, apply a thin layer of antibiotic ointment if recommended. Then put on a new bandage. Sutures on the outside of the skin usually need to be removed by your healthcare provider.  · Surgical tape. Keep the area dry. If it gets wet, blot it dry with a  towel. Surgical tape closures usually fall off within 7 to 10 days. If they have not fallen off after 10 days, you can remove them yourself. To remove the tape, use mineral oil or petroleum jelly on a cotton ball to gently rub the adhesive.  · Skin glue. You may shower or bathe as usual. But do not use soaps, lotions, or ointments on the wound area. Do not scrub the wound. After bathing, pat the wound dry with a soft towel. Do not apply liquids like peroxide, ointments, or creams to the wound while the strips or film is in place. Don't scratch, rub, or pick at the strips or film. Don't put tape directly over the strips or film. Skin adhesive film will fall off naturally in 5 to 10 days. If it does not peel off in 10 days, gently rub petroleum jelly or an ointment onto the film.  · Staples. Take showers or sponge baths. Don't take tub baths. Don't use lotions on the wound area. The area may be cleaned with soap and water 2 to 3 days after the wound was stapled. Don't scrub the wound. Pat it dry with a clean soft cloth or towel. You can use antibiotic ointment if your healthcare provider tells you to. Staples will need to be removed in 10 to 14 days.  Follow-up care  Follow up with your healthcare provider, or as directed. If your packing was replaced, you may need another visit within 1 to 3 days to remove or replace it.   When to seek medical advice  Call your health care provider right away if you have signs of infection:  · Fever of 1 degree or more above your normal temperature, or as directed by your healthcare provider  · Increasing pain in the wound or pain that doesnt get better even with pain medicine  · Increasing redness or swelling  · Pus or bad-smelling drainage from the wound  Also call your healthcare provider right away if any of these occur:  · Your wound bleeds more than a small amount or wont stop bleeding.  · The edges of your wound come apart.  · You have numbness or weakness in the wound area  that doesnt go away.  Wet to dry dressing change daily starting Wednesday night. Use kerlex soak with saline solution pack into wound. Place dry gauze and tape over this site.      Follow up in Thoracic clinic on Thursday, March 1, 2018. [facility] will see you on Friday, March 2, 2018.

## 2018-02-27 NOTE — PROGRESS NOTES
Dr. Yuan said she wants patient to change dressing daily starting tomorrow wet-to-dry dressing changes until clinic appointment on Thursday. Will instruct patient how to do so.

## 2018-02-27 NOTE — H&P (VIEW-ONLY)
History & Physical    SUBJECTIVE:     History of Present Illness:   42 y.o. male presents with PMH of ESRD (HD MWF), TIA, HTN, hypokalemia  and chronic systolic heart failure (EF 30%) presents for surgical evaluation of large chest wall mass. History dates back to April 2017 when [place] [facility] [place]. Mass has been biopsied twice via core needle. Pathology returned as non diagnostic. It seems patient has been noncompliant with several follow up appointments. Chest wall mass continues to grow. He states mass is not painful. Denies fever, chills, facial swelling, upper extremity weakness, SOB, cough, N/V or changes in bowel and bladder function. 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 Complains of bleeding at insertion site in right groin.       Chief Complaint   Patient presents with    Consult       Review of patient's allergies indicates:   Allergen Reactions    Ciprofloxacin Hives    Iodine and iodide containing products Hives and Itching     Pt states he is not allergic to iodine       Current Outpatient Prescriptions   Medication Sig Dispense Refill    oxyCODONE-acetaminophen (PERCOCET)  mg per tablet Take 1 tablet by mouth every 8 (eight) hours as needed for Pain (Take for severe pain). 21 tablet 0    sevelamer carbonate (RENVELA) 800 mg Tab Take 2 tablets (1,600 mg total) by mouth 3 (three) times daily with meals. Contact Nephrologist for refills 180 tablet 3    VANCOMYCIN HCL (VANCOMYCIN 1 G/250 ML NS, READY TO MIX SYSTEM,) Inject 250 mLs (1,000 mg total) into the vein every Mon, Wed, Fri. Given after Dialysis. Last dose, 2/21/18 250 mL 0     No current facility-administered medications for this visit.        Past Medical History:   Diagnosis Date    CHF (congestive heart failure)     ESRD (end stage renal disease)     Hemodialysis access, fistula mature     THRILL AND BRUIT PRESENT     "Hemodialysis patient     M-W-F    Hypertension     Myocardial infarction     Renal disorder     Stroke     3 MILD STROKES     Past Surgical History:   Procedure Laterality Date    DIALYSIS FISTULA CREATION       Family History   Problem Relation Age of Onset    Kidney disease Mother      Social History   Substance Use Topics    Smoking status: Never Smoker    Smokeless tobacco: Never Used    Alcohol use No        Review of Systems:  Review of Systems   Constitutional: Negative for activity change, appetite change, fatigue and fever.   HENT: Negative for congestion, trouble swallowing and voice change.    Respiratory: Positive for chest tightness. Negative for cough, shortness of breath and wheezing.    Cardiovascular: Negative for chest pain, palpitations and leg swelling.   Gastrointestinal: Negative for abdominal distention, abdominal pain, nausea and vomiting.   Genitourinary: Negative for difficulty urinating.   Neurological: Negative for dizziness, syncope and numbness.   Hematological: Negative for adenopathy.       OBJECTIVE:     Vital Signs (Most Recent)  Pulse: 107 (02/08/18 0900)  BP: (!) 143/97 (02/08/18 0900)  SpO2: 98 % (02/08/18 0900)  5' 4" (1.626 m)  102.5 kg (225 lb 15.5 oz)     Physical Exam:  Physical Exam   Constitutional: He is oriented to person, place, and time. He appears well-developed and well-nourished.   Obese   HENT:   Head: Atraumatic.   Mouth/Throat: Oropharynx is clear and moist.   Neck: Normal range of motion. No tracheal deviation present.   Cardiovascular: Normal rate and intact distal pulses.  A regularly irregular rhythm present.   No murmur heard.  Pulmonary/Chest: No respiratory distress. He has no decreased breath sounds. He has no wheezes. He exhibits mass. He exhibits no tenderness.   Approximately 15cm x 10cm mass protruding from right chest. Mass extends from sternal border to medial axillary line. Firm, nontender.    Abdominal: Soft. Bowel sounds are normal. He " exhibits no distension.   Musculoskeletal: Normal range of motion. He exhibits no edema.   Lymphadenopathy:     He has no cervical adenopathy.   Neurological: He is alert and oriented to person, place, and time.   Psychiatric: He has a normal mood and affect.     Diagnostic Results:    1/16/18 Chest and Abdomen CT-   Large indeterminate 15 cm calcified mass centered in the right first costosternal junction, extending anteriorly underneath the pectoralis musculature and posteriorly into the lung apex, increased in size since the 4/22/17 exam. It has imaging features which can be seen with tumoral calcinosis. Prominent right chest wall soft tissue edema with moderate axillary lymphadenopathy noted, also increased.  Small amount of ascites and increased number of retroperitoneal and mesenteric lymph nodes, similar to prior exam.  Atrophic kidneys.  Extensive calcified atherosclerotic disease.    Pathology:  4/24/17- RIGHT RIB LESION (BIOPSY):  -No neoplasm identified  -Extensive dystrophic calcifications and degenerative material  [facility]    1/23/18- Core bx of chest wall mass  -Prominent calcium deposition with associated chronic inflammation, prominent giant cell reaction and  fibrosis  -These changes can be seen in response to old inflammation or abscess and, rarely, in conditions  associated with increased calcium levels  -No malignancy is identified area  Part 2  Lymphoid and fibrous tissue (submitted as right axillary lymph node):  -Diffuse small lymphocyte population  -No evidence of a metastatic process is identified  -Slides will be sent in consultation with a supplemental report to follow.      ASSESSMENT/PLAN:     42 y.o. male presents with PMH of ESRD (HD MWF), TIA, HTN, hypokalemia  and chronic systolic heart failure (EF 30%) presents for surgical evaluation of large chest wall mass.    PLAN:Plan   Will present at multidisciplinary thoracic tumor board to discuss incisional surgical biopsy vs. radiation.    He  will be seen by vascular surgery today for femoral access concerns.       ATTENDING ATTESTATION:    I evaluated the patient and I agree with the assessment and plan.  Enlarging right chest wall mass, biopsies non-diagnostic to this point.  Will present at multi-disciplinary thoracic oncology conference.  Will likely need incisional biopsy.  Unlikely to be a candidate for resection given co-morbidities.

## 2018-02-27 NOTE — PLAN OF CARE
Pt AAOx4, denies pain. Pt denies n/v. Pt tolerated PO intake. Pt to go home after HH setup. CM s/w pt and is working on setting up HH. Will transfer to next phase of care.

## 2018-02-27 NOTE — PLAN OF CARE
CM called pt on cell [phone] to verify address. He will be going to his home at [address] [place] [address]. He has no preference for .

## 2018-02-27 NOTE — PROGRESS NOTES
No family/friends available to take patient belongings while in surgery. Patient belongings including $1,140 cash counted out in front of patient with myself along with MARCK Bergman. Wallet and cell phone placed and sealed in security bag and placed in [facility] locker with all other patient belongings.

## 2018-02-27 NOTE — INTERVAL H&P NOTE
The patient has been examined and the H&P has been reviewed:    I concur with the findings and no changes have occurred since H&P was written.    Anesthesia/Surgery risks, benefits and alternative options discussed and understood by patient/family.          Active Hospital Problems    Diagnosis  POA    Chest wall mass [R22.2]  Yes      Resolved Hospital Problems    Diagnosis Date Resolved POA   No resolved problems to display.

## 2018-02-27 NOTE — PLAN OF CARE
[facility]    HOME HEALTH ORDERS  FACE TO FACE ENCOUNTER    Patient Name: Livan Arevalo  YOB: 1975    PCP: Primary Doctor No   PCP Address: None  PCP Phone Number: None  PCP Fax: None    Encounter Date: 02/27/2018    Admit to Home Health    Diagnoses:  Active Hospital Problems    Diagnosis  POA    *Chest wall mass [R22.2]  Yes      Resolved Hospital Problems    Diagnosis Date Resolved POA   No resolved problems to display.       Future Appointments  Date Time Provider Department Center   3/1/2018 9:15 AM Iker Guerra MD [facility] THORAC [place]   3/8/2018 11:45 AM Iker Guerra MD [facility] THORAC [place]   3/13/2018 10:00 AM VASCULAR, LAB [facility] BESSY Patel [facility]   3/13/2018 10:30 AM MIGUEL Grover III, MD [facility]     Follow-up Information     Iker Guerra MD On 3/1/2018.    Specialty:  Cardiothoracic Surgery  Why:  Post-op appointment on Thursday in Thoracic Surgery clinic for wound check and possible VAC placement.   Contact information:  [address]  [facility] [address]  [phone]                     I have seen and examined this patient face to face today. My clinical findings that support the need for the home health skilled services and home bound status are the following:  Medical restrictions requiring assistance of another human to leave home due to  Wound care needs.    Allergies:  Review of patient's allergies indicates:   Allergen Reactions    Ciprofloxacin Hives    Iodine and iodide containing products Hives and Itching     Pt states he is not allergic to iodine       Diet: renal diet    Activities: activity as tolerated    Nursing:   SN to complete comprehensive assessment including routine vital signs. Instruct on disease process and s/s of complications to report to MD. Review/verify medication list sent home with the patient at time of discharge  and instruct patient/caregiver as needed. Frequency may be adjusted  depending on start of care date.    Notify MD if SBP > 190 or < 90;  HR > 120 or < 40; Temp > 101.4; Other:      MISCELLANEOUS CARE:  Wound Care Orders:  yes:  Surgical Wound:  Location: Right chest wall    Consult ET nurse        Apply the following to wound:  Wound VAC initial application on Thursday 3/1 in Thoracic Surgery clinic, then will need wound VAC dressing changes every 3 days by home health nurse starting Monday 3/5.    WOUND CARE ORDERS  yes:  Surgical Wound:  Location: Right chest wall    Consult ET nurse        Apply the following to wound:   Wound VAC initial application on Thursday 3/1 in Thoracic Surgery clinic, then will need wound VAC dressing changes every 3 days by home health nurse starting Monday 3/5.      Medications: Review discharge medications with patient and family and provide education.      Current Discharge Medication List      START taking these medications    Details   docusate sodium (COLACE) 100 MG capsule Take 1 capsule (100 mg total) by mouth 2 (two) times daily.  Qty: 60 capsule, Refills: 3         CONTINUE these medications which have CHANGED    Details   oxyCODONE-acetaminophen (PERCOCET)  mg per tablet Take 1 tablet by mouth every 6 (six) hours as needed for Pain (Take for severe pain).  Qty: 45 tablet, Refills: 0         CONTINUE these medications which have NOT CHANGED    Details   sevelamer carbonate (RENVELA) 800 mg Tab Take 2 tablets (1,600 mg total) by mouth 3 (three) times daily with meals. Contact Nephrologist for refills  Qty: 180 tablet, Refills: 3      VANCOMYCIN HCL (VANCOMYCIN 1 G/250 ML NS, READY TO MIX SYSTEM,) Inject 250 mLs (1,000 mg total) into the vein every Mon, Wed, Fri. Given after Dialysis. Last dose, 2/21/18  Qty: 250 mL, Refills: 0             I certify that this patient is confined to his home and needs intermittent skilled nursing care.

## 2018-02-27 NOTE — BRIEF OP NOTE
[facility]  Brief Operative Note     SUMMARY     Surgery Date:  2/27/2018     Surgeons:     * Iker Guerra MD - Primary     * Senia Yuan MD - Resident - Assisting     * Regina Cerna MD - Resident - Assisting        Pre-op Diagnosis:  Mass of chest wall [R22.2]    Post-op Diagnosis:   Mass of chest wall [R22.2]    Procedure:  INCISIONAL BIOPSY OF RIGHT CHEST WALL MASS.     Anesthesia: General    Description/Findings of the procedure:  No complications, good hemostasis.  Wound approximately 8 x 6 x 4 cm.  Wound was left surgically open due to gross purulence which was encountered and sent for multiple cultures.     Estimated Blood Loss:  10 mL.        Specimens:   Specimen (12h ago through future)    Start     Ordered    02/27/18 0805  Specimen to Pathology - Surgery  Once     Comments:  1. Right chest wall mass - PERMANENT      02/27/18 0805    Pending  Specimen to Pathology - Surgery  Once     Comments:  1. Right chest wall mass - PERMANENT      Pending          Discharge Note    SUMMARY     Admit Date: 2/27/2018    Discharge Date and Time:  02/27/2018 11:00 AM    Hospital Course (synopsis of major diagnoses, care, treatment, and services provided during the course of the hospital stay): No complications, patient discharged home after routine outpatient surgery.        Final Diagnosis: Post-Op Diagnosis Codes:     * Mass of chest wall [R22.2]    Disposition: Home or Self Care    Follow Up/Patient Instructions:     Medications:  Reconciled Home Medications:   Current Discharge Medication List      START taking these medications    Details   docusate sodium (COLACE) 100 MG capsule Take 1 capsule (100 mg total) by mouth 2 (two) times daily.  Qty: 60 capsule, Refills: 3         CONTINUE these medications which have CHANGED    Details   oxyCODONE-acetaminophen (PERCOCET)  mg per tablet Take 1 tablet by mouth every 6 (six) hours as needed for Pain (Take for severe pain).  Qty: 45  tablet, Refills: 0         CONTINUE these medications which have NOT CHANGED    Details   sevelamer carbonate (RENVELA) 800 mg Tab Take 2 tablets (1,600 mg total) by mouth 3 (three) times daily with meals. Contact Nephrologist for refills  Qty: 180 tablet, Refills: 3      VANCOMYCIN HCL (VANCOMYCIN 1 G/250 ML NS, READY TO MIX SYSTEM,) Inject 250 mLs (1,000 mg total) into the vein every Mon, Wed, Fri. Given after Dialysis. Last dose, 2/21/18  Qty: 250 mL, Refills: 0             Discharge Procedure Orders  Call MD for:  temperature >100.4     Call MD for:  persistent nausea and vomiting or diarrhea     Call MD for:  severe uncontrolled pain     Call MD for:  redness, tenderness, or signs of infection (pain, swelling, redness, odor or green/yellow discharge around incision site)     Call MD for:  difficulty breathing or increased cough     Call MD for:  severe persistent headache     Call MD for:  worsening rash     Call MD for:  persistent dizziness, light-headedness, or visual disturbances     Call MD for:  increased confusion or weakness     Remove dressing in 24 hours     EKG 12-lead   Standing Status: Future  Standing Exp. Date: 02/20/19   Order Specific Question Answer Comments   Diagnosis Preop testing [224048]        Follow-up Information     Iker Guerra MD On 3/1/2018.    Specialty:  Cardiothoracic Surgery  Why:  Post-op appointment on Thursday in Thoracic Surgery clinic for wound check and possible VAC placement.   Contact information:  [address]  [facility] [address]  [phone]

## 2018-02-27 NOTE — PROGRESS NOTES
"Admit order placed for pt to stay in observation over night. Updated pt on POC. Pt stated "he has things to do, I'm about to get out here now.". Educated pt on importance of stay overnight due to procedure. Pt stated he "does not want to stay overnight no matter what happens".   Dr. Yuan paged and notified of pt refusing to stay. Dr. Yuan to bedside and s/w patient now.   VSS, will continue to monitor.   "

## 2018-02-27 NOTE — ANESTHESIA POSTPROCEDURE EVALUATION
"Anesthesia Post Evaluation    Patient: Livan Arevalo    Procedure(s) Performed: Procedure(s) (LRB):  BIOPSY-INCISIONAL (Right)    Final Anesthesia Type: general  Patient location during evaluation: PACU  Patient participation: Yes- Able to Participate  Level of consciousness: awake and alert and oriented  Post-procedure vital signs: reviewed and stable  Pain management: adequate  Airway patency: patent  PONV status at discharge: No PONV  Anesthetic complications: no      Cardiovascular status: stable  Respiratory status: unassisted, spontaneous ventilation and room air  Hydration status: euvolemic  Follow-up not needed.        Visit Vitals  /81   Pulse 104   Temp 36.7 °C (98.1 °F) (Temporal)   Resp 19   Ht 5' 5" (1.651 m)   Wt 102.1 kg (225 lb)   SpO2 97%   BMI 37.44 kg/m²       Pain/Mariann Score: Pain Assessment Performed: Yes (2/27/2018  6:12 AM)  Presence of Pain: denies (2/27/2018  6:12 AM)      "

## 2018-02-27 NOTE — PLAN OF CARE
DARNELL was informed by the PA that the pt will dc today with HH and a wound vac.  They want the wound vac delivered to their clinic and they will place it on Thursday.  HH can start on Friday.  He will dc to: [address], [address], [place] [address].  The sw faxed the wound vac form to [employer] ([employer] [phone], f/[phone]).  DARNELL will f/u as needed.    Yoana Tenorio, MULUGETA x [phone]

## 2018-02-28 ENCOUNTER — HOSPITAL ENCOUNTER (EMERGENCY)
Facility: HOSPITAL | Age: 43
Discharge: HOME | End: 2018-02-28
Attending: EMERGENCY MEDICINE
Payer: MEDICARE

## 2018-02-28 ENCOUNTER — TELEPHONE (OUTPATIENT)
Dept: CARDIOTHORACIC SURGERY | Facility: CLINIC | Age: 43
End: 2018-02-28

## 2018-02-28 VITALS
HEIGHT: 65 IN | HEART RATE: 109 BPM | RESPIRATION RATE: 18 BRPM | WEIGHT: 225 LBS | TEMPERATURE: 99 F | DIASTOLIC BLOOD PRESSURE: 61 MMHG | BODY MASS INDEX: 37.49 KG/M2 | OXYGEN SATURATION: 97 % | SYSTOLIC BLOOD PRESSURE: 103 MMHG

## 2018-02-28 DIAGNOSIS — T14.8XXA BLEEDING FROM WOUND: Primary | ICD-10-CM

## 2018-02-28 PROCEDURE — 99281 EMR DPT VST MAYX REQ PHY/QHP: CPT | Mod: ,,, | Performed by: PHYSICIAN ASSISTANT

## 2018-02-28 PROCEDURE — 99283 EMERGENCY DEPT VISIT LOW MDM: CPT

## 2018-02-28 NOTE — TELEPHONE ENCOUNTER
Called to check on the pt after hospital d/c, pt is recovering at home and will go to dialysis this morning. He reports continuous drainage from his wound and has gone through several shirts. Discussed that he should continue to reinforce the dressing with the gauze the ER sent him home with last night. Pt is agreeable to come in tomorrow and will call back today if drainage suddenly increases.

## 2018-02-28 NOTE — TELEPHONE ENCOUNTER
Received call this morning from pt concerning bleeding from surgical site.  Encouraged to keep appointment for dialysis and to continue to apply dressing to site to decrease drainage.  Would like to come into ER to have dressing to wound applied but, informed that wound vac is not available and should continue to change dressing.  Verbalized understanding and will keep dialysis appointment.

## 2018-02-28 NOTE — PLAN OF CARE
2/28/18, 3:00pm    DARNELL sent additional paperwork to [employer].  They delivered the wound vac to [facility] for [facility].  They will place on the pt tomorrow.   nurse will see the pt Friday.    Yoana Tenorio LCSW x [phone]

## 2018-02-28 NOTE — ED TRIAGE NOTES
Presents to ER due to excessive bleeding from a biopsy site to his right chest area.  States that [place].  States that he is to get a wound vac placed to this area tomorrow.    Pt identifiers checked and correct  LOC: The patient is awake, alert, aware of environment with an appropriate affect. Oriented x3, speaking appropriately  APPEARANCE: Pt resting comfortably, in no acute distress, pt is clean and well groomed, clothing properly fastened  SKIN: Skin warm, dry and intact, normal skin turgor, moist mucus membranes  RESPIRATORY: Airway is open and patent, respirations are spontaneous, even and unlabored, normal effort and rate  MUSCULOSKELETAL: No obvious deformities.

## 2018-03-01 ENCOUNTER — OFFICE VISIT (OUTPATIENT)
Dept: CARDIOTHORACIC SURGERY | Facility: CLINIC | Age: 43
End: 2018-03-01
Payer: MEDICARE

## 2018-03-01 VITALS
WEIGHT: 229.25 LBS | HEIGHT: 64 IN | SYSTOLIC BLOOD PRESSURE: 132 MMHG | DIASTOLIC BLOOD PRESSURE: 83 MMHG | BODY MASS INDEX: 39.14 KG/M2 | OXYGEN SATURATION: 94 % | HEART RATE: 109 BPM

## 2018-03-01 DIAGNOSIS — R22.2 CHEST WALL MASS: ICD-10-CM

## 2018-03-01 DIAGNOSIS — R22.2 MASS OF CHEST WALL: Primary | ICD-10-CM

## 2018-03-01 PROCEDURE — 97605 NEG PRS WND THER DME<=50SQCM: CPT | Mod: S$PBB,,, | Performed by: THORACIC SURGERY (CARDIOTHORACIC VASCULAR SURGERY)

## 2018-03-01 PROCEDURE — 97605 NEG PRS WND THER DME<=50SQCM: CPT | Mod: PBBFAC | Performed by: THORACIC SURGERY (CARDIOTHORACIC VASCULAR SURGERY)

## 2018-03-01 PROCEDURE — 99999 PR PBB SHADOW E&M-EST. PATIENT-LVL III: CPT | Mod: PBBFAC,,, | Performed by: THORACIC SURGERY (CARDIOTHORACIC VASCULAR SURGERY)

## 2018-03-01 PROCEDURE — 99024 POSTOP FOLLOW-UP VISIT: CPT | Mod: POP,,, | Performed by: THORACIC SURGERY (CARDIOTHORACIC VASCULAR SURGERY)

## 2018-03-01 PROCEDURE — 99213 OFFICE O/P EST LOW 20 MIN: CPT | Mod: PBBFAC,25 | Performed by: THORACIC SURGERY (CARDIOTHORACIC VASCULAR SURGERY)

## 2018-03-01 RX ORDER — LANTHANUM CARBONATE 1000 MG/1
TABLET, CHEWABLE ORAL
COMMUNITY
Start: 2018-02-23

## 2018-03-01 NOTE — OP NOTE
Date of Procedure: 2/27/2018    Pre-operative Diagnosis: Right Chest Wall Mass    Post-operative Diagnosis: Same    Procedure(s): Incisional Biopsy of Right Chest Wall Mass    Surgeon: Iker Guerra MD    Assistant(s): Regina Cerna MD; Senia Yuan MD    Anesthesia: Local/MAC    Findings: Multi-loculated/cystic collection of chalky/purulent fluid without significant solid component or any palpable calcification    Estimated Blood Loss: 10mL    Drains: None    Specimen(s): Right chest wall tissue for culture and permanent    Complications: None    Indications for Procedure: 44 yo male with rapidly-enlarging right chest wall mass.  Prior percutaneous biopsies have been non-diagnostic, so it was decided to proceed with surgical biopsy.  Risks, benefits and possible outcomes were discussed in detail with the patient, and he  and he was given the opportunity to ask questions and have those questions answered to his satisfaction.  he desires to proceed and signed consent.    Procedure in Detail: The patient was taken to the operating room and placed supine on the operating table.  Intravenous sedation and analgesia were administered.  Chest was prepped and draped in standard sterile fashion.  Prophylactic intravenous antibiotics were administered.  Time-out was performed.  A 4cm transverse incision was made overlying the mass.  Subcutaneous tissue was divided with electrocautery.  Pectoralis muscle fibers were retracted and the mass was encountered.  The mass consisted of a loculated cystic collection of purulent/chalky fluid, consistent with tumoral calcinosis.  There was no significant solid component or palpable calcified tissue encountered.  The area was probed bluntly down to the chest wall and cephalad to the clavicle.  Loculations were broken up.  Cystic tissue was excised and sent for permanent section and culture.  Hemostasis was achieved.  The wound was packed with a saline-soaked Kerlix and covered with  a sterile dressing.  All sponge, needle and instrument counts were correct at the end of the case.  The patient tolerated the procedure well.  There were no immediate complications.    Disposition: PACU in stable condition

## 2018-03-01 NOTE — PROGRESS NOTES
"Subjective:       Patient ID: Livan Arevalo is a 43 y.o. male.    Chief Complaint: Post-op Evaluation    HPI    43 y.o. male presents with PMH of ESRD (HD MWF), TIA, HTN, hypokalemia  and chronic systolic heart failure (EF 30%) presents for surgical evaluation of large chest wall mass. History dates back to April 2017 when he presented to [facility] for generalized weakness. Mass has been biopsied twice via core needle. Pathology returned as non diagnostic. It seems patient has been noncompliant with several follow up appointments. Chest wall mass continues to grow. He states mass is not painful.   Now s/p attempted incisional biopsy earlier this week. Wound approximately 8 x 6 x 4 cm.  Wound was left surgically open due to gross purulence which was encountered and sent for multiple cultures. Returns for wound vac placement.    Denies fever, chills, facial swelling, upper extremity weakness, SOB, cough, N/V or changes in bowel and bladder function.     Review of Systems   Constitutional: Negative for activity change, appetite change, fatigue and fever.   HENT: Negative for congestion, trouble swallowing and voice change.    Respiratory: Positive for chest tightness. Negative for cough, shortness of breath and wheezing.    Cardiovascular: Negative for chest pain, palpitations and leg swelling.   Gastrointestinal: Negative for abdominal distention, abdominal pain, nausea and vomiting.   Genitourinary: Negative for difficulty urinating.   Neurological: Negative for dizziness, syncope and numbness.   Hematological: Negative for adenopathy.       Objective:       Visit Vitals  /83   Pulse 109   Ht 5' 4" (1.626 m)   Wt 104 kg (229 lb 4.5 oz)   SpO2 (!) 94%   BMI 39.36 kg/m²       Physical Exam   Constitutional: He is oriented to person, place, and time. He appears well-developed and well-nourished.   Obese   HENT:   Head: Atraumatic.   Mouth/Throat: Oropharynx is clear and moist.   Neck: Normal range of motion. No " tracheal deviation present.   Cardiovascular: Normal rate and intact distal pulses.  A regularly irregular rhythm present.   No murmur heard.  Pulmonary/Chest: No respiratory distress. He has no decreased breath sounds. He has no wheezes. He exhibits mass. He exhibits no tenderness.   Anterior right chest wound with packing intact. Some sanguinous drainage noted. No purulence. No odor.   Abdominal: Soft. Bowel sounds are normal. He exhibits no distension.   Musculoskeletal: Normal range of motion. He exhibits no edema.   Lymphadenopathy:     He has no cervical adenopathy.   Neurological: He is alert and oriented to person, place, and time.   Psychiatric: He has a normal mood and affect.       Assessment:     43 year old male with large chest wall mass presents for wound vac placement s/p incisional biopsy and drainage.    Plan:           Wound vac placed in clinic today. Home health scheduled to see him tomorrow for wound vac changes.     ATTENDING ATTESTATION:    I evaluated the patient and I agree with the assessment and plan.

## 2018-03-02 ENCOUNTER — TELEPHONE (OUTPATIENT)
Dept: CARDIOTHORACIC SURGERY | Facility: CLINIC | Age: 43
End: 2018-03-02

## 2018-03-02 LAB
BACTERIA SPEC AEROBE CULT: NO GROWTH
BACTERIA SPEC AEROBE CULT: NORMAL

## 2018-03-02 NOTE — TELEPHONE ENCOUNTER
----- Message from Jarek Collado sent at 3/2/2018  9:55 AM CST -----  Contact: Susy//[facility]  Caller states that (s)he needs to speak with nurse in ref to getting orders for home health aid for the pt;caller states that the pt is requesting oral antibiotics//please call back at [phone]//thank you

## 2018-03-02 NOTE — TELEPHONE ENCOUNTER
Returned call to  nurse Jain, informed that Dr. Guerra will not add any additional antibiotics to his post op care.  Ok to have home health aide assist with ADL's once a week for two weeks.  Plan discussed with PA-Noelle and agreeable with plan.

## 2018-03-03 LAB — BACTERIA SPEC AEROBE CULT: NO GROWTH

## 2018-03-05 LAB
BACTERIA SPEC ANAEROBE CULT: NORMAL
BACTERIA SPEC ANAEROBE CULT: NORMAL

## 2018-03-06 LAB — BACTERIA SPEC ANAEROBE CULT: NORMAL

## 2018-03-09 ENCOUNTER — TELEPHONE (OUTPATIENT)
Dept: CARDIOTHORACIC SURGERY | Facility: CLINIC | Age: 43
End: 2018-03-09

## 2018-03-09 ENCOUNTER — TELEPHONE (OUTPATIENT)
Dept: ADMINISTRATIVE | Facility: CLINIC | Age: 43
End: 2018-03-09

## 2018-03-09 NOTE — TELEPHONE ENCOUNTER
----- Message from Jeremy Thompson sent at 3/9/2018  8:41 AM CST -----  Contact: Manju with [facility]  Caller  said pt has a wound vac that home health is suppose to be changing. Caller said pt isn't always home and they don't know where pt is going. Caller said they will try to see if pt is home today but he isn't letting them know his schedule. Caller said she also wants to know if pt can go to a wound clinic since they aren't able to see him at home. Please call regarding this at [phone]

## 2018-03-09 NOTE — PATIENT INSTRUCTIONS
Home health notifying doctor:      Sn for wound vac change was not performed due to patient not being home for scheduled sn visit. Patient stated he had somewhere to go and could not wait on sn. Patient will be dischargedfrom home health due to not being homebound. Patient needs appointment set up to go to wound care clinic for wound vac changes.    5. Level of urgency- moderate     6. Call back info cell/office/email [email or web address] or [phone]  7. Whom to call back: clinician        Thanks

## 2018-03-09 NOTE — TELEPHONE ENCOUNTER
"Received a call from  nurse concerning pt not being compliant with homebound therapy for wound care.  States "unable to perform wound care due to pt having places to go at scheduled home visits".  Requesting that pt be cared for by wound clinic.  Staff is in agreement with appointment with wound care.  Called pt to inform him of the wound care appointment that has been scheduled for Tuesday 3/13, pt originally states he will need to cancel those appointments.  Encouraged to keep scheduled appointments that will address his access sites for dialysis.  Also informed that the wound care appointment is scheduled to follow his vascular appointments.  Pt states he is just not ready for any more surgeries.  Agreed to keep appointments once informed that that appointments are not for a scheduled surgery.    "

## 2018-03-12 ENCOUNTER — TELEPHONE (OUTPATIENT)
Dept: CARDIOTHORACIC SURGERY | Facility: CLINIC | Age: 43
End: 2018-03-12

## 2018-03-12 NOTE — TELEPHONE ENCOUNTER
Rturned call and attempted to explain the decision to place his care under clinic visits and discharge home health services for wound care.  Verbalized that he is home bound and will need assistance with bathing and would care.  Verbalized understanding what home bound is in receiving home care.  Agreeable to attend appointments on Tuesday 3/13 so he could see wound care and vascular.

## 2018-03-12 NOTE — TELEPHONE ENCOUNTER
----- Message from Yessica Raymundo sent at 3/12/2018 10:35 AM CDT -----  Contact: Pt.Self   Pt.States he would like to speak to nurse in reference to finding out why his home health services were discontinued please call Pt.l back @ [phone] Thank You :)

## 2018-03-13 ENCOUNTER — HOSPITAL ENCOUNTER (OUTPATIENT)
Dept: VASCULAR SURGERY | Facility: CLINIC | Age: 43
Discharge: HOME | End: 2018-03-13
Attending: STUDENT IN AN ORGANIZED HEALTH CARE EDUCATION/TRAINING PROGRAM
Payer: MEDICARE

## 2018-03-13 ENCOUNTER — OFFICE VISIT (OUTPATIENT)
Dept: VASCULAR SURGERY | Facility: CLINIC | Age: 43
End: 2018-03-13
Payer: MEDICARE

## 2018-03-13 VITALS
SYSTOLIC BLOOD PRESSURE: 149 MMHG | WEIGHT: 227.06 LBS | TEMPERATURE: 98 F | BODY MASS INDEX: 37.83 KG/M2 | HEART RATE: 107 BPM | DIASTOLIC BLOOD PRESSURE: 101 MMHG | HEIGHT: 65 IN

## 2018-03-13 DIAGNOSIS — T81.72XA COMPLICATION OF VEIN FOLLOWING PROCEDURE, NOT ELSEWHERE CLASSIFIED, INITIAL ENCOUNTER: ICD-10-CM

## 2018-03-13 DIAGNOSIS — N18.6 ESRD (END STAGE RENAL DISEASE): Chronic | ICD-10-CM

## 2018-03-13 DIAGNOSIS — Z98.890: ICD-10-CM

## 2018-03-13 DIAGNOSIS — T82.7XXA ARTERIOVENOUS GRAFT INFECTION, INITIAL ENCOUNTER: ICD-10-CM

## 2018-03-13 DIAGNOSIS — N18.6 ESRD ON DIALYSIS: Primary | Chronic | ICD-10-CM

## 2018-03-13 DIAGNOSIS — E83.9 CHRONIC KIDNEY DISEASE-MINERAL AND BONE DISORDER: ICD-10-CM

## 2018-03-13 DIAGNOSIS — Z99.2 ESRD ON DIALYSIS: ICD-10-CM

## 2018-03-13 DIAGNOSIS — Z99.2 ESRD ON DIALYSIS: Primary | Chronic | ICD-10-CM

## 2018-03-13 DIAGNOSIS — T82.9XXA COMPLICATION OF ARTERIOVENOUS DIALYSIS FISTULA, INITIAL ENCOUNTER: ICD-10-CM

## 2018-03-13 DIAGNOSIS — N18.6 ESRD ON DIALYSIS: ICD-10-CM

## 2018-03-13 DIAGNOSIS — T82.7XXA: ICD-10-CM

## 2018-03-13 DIAGNOSIS — N18.9 CHRONIC KIDNEY DISEASE-MINERAL AND BONE DISORDER: ICD-10-CM

## 2018-03-13 DIAGNOSIS — M89.9 CHRONIC KIDNEY DISEASE-MINERAL AND BONE DISORDER: ICD-10-CM

## 2018-03-13 PROCEDURE — 93990 DOPPLER FLOW TESTING: CPT | Mod: 26,S$PBB,, | Performed by: SURGERY

## 2018-03-13 PROCEDURE — 99213 OFFICE O/P EST LOW 20 MIN: CPT | Mod: PBBFAC | Performed by: SURGERY

## 2018-03-13 PROCEDURE — 99999 PR PBB SHADOW E&M-EST. PATIENT-LVL III: CPT | Mod: PBBFAC,,, | Performed by: SURGERY

## 2018-03-13 PROCEDURE — 93990 DOPPLER FLOW TESTING: CPT | Mod: PBBFAC | Performed by: SURGERY

## 2018-03-13 PROCEDURE — 99024 POSTOP FOLLOW-UP VISIT: CPT | Mod: POP,,, | Performed by: SURGERY

## 2018-03-13 NOTE — PROGRESS NOTES
See my prior inpatient note; review of systems, family history and social   history are unchanged.    HISTORY OF PRESENT ILLNESS:  A 43-year-old male with end-stage renal disease   status post:  1.  Urgent excision, infected left AV fistula and right femoral PermCath   placement, 01/31/2018.  2.  Incisional biopsy, right chest wall mass (Dr. Guerra), 02/27/2018, now being   treated with a wound VAC.    He now returns.  He is without complaint.    PHYSICAL EXAMINATION:  VITAL SIGNS:  See nursing note.  EXTREMITIES:  Left arm shows a well-healed incision with interrupted nylons in   place.    IMAGING:  Vein mapping on the right shows likely inadequate cephalic vein for   fistula, 2.3 at the wrist, 2.1 in the middle forearm, 2.0 distal arm and 2.6 in   the proximal arm.    Basilic vein is 2.3 at the distal arm.    ASSESSMENT:  Probable poor candidate for right AV fistula.  He will likely need   an AV graft.    I would defer this until he has near complete healing of this open right chest   wound.    PLAN:  Follow up in four weeks, likely to schedule a prosthetic right AV graft.      ELIANE/POLLO  dd: 03/13/2018 12:38:34 (CDT)  td: 03/14/2018 08:39:35 (CDT)  Doc ID   #[id number]  Job ID #[id number]    CC:

## 2018-03-15 ENCOUNTER — OFFICE VISIT (OUTPATIENT)
Dept: WOUND CARE | Facility: CLINIC | Age: 43
End: 2018-03-15
Payer: MEDICARE

## 2018-03-15 ENCOUNTER — OFFICE VISIT (OUTPATIENT)
Dept: CARDIOTHORACIC SURGERY | Facility: CLINIC | Age: 43
End: 2018-03-15
Payer: MEDICARE

## 2018-03-15 VITALS
BODY MASS INDEX: 37.25 KG/M2 | HEART RATE: 110 BPM | OXYGEN SATURATION: 93 % | WEIGHT: 223.56 LBS | HEIGHT: 65 IN | DIASTOLIC BLOOD PRESSURE: 93 MMHG | SYSTOLIC BLOOD PRESSURE: 140 MMHG

## 2018-03-15 VITALS
TEMPERATURE: 97 F | DIASTOLIC BLOOD PRESSURE: 96 MMHG | HEART RATE: 110 BPM | BODY MASS INDEX: 43.78 KG/M2 | SYSTOLIC BLOOD PRESSURE: 137 MMHG | WEIGHT: 223 LBS | HEIGHT: 60 IN

## 2018-03-15 DIAGNOSIS — T81.89XA DELAYED SURGICAL WOUND HEALING, INITIAL ENCOUNTER: ICD-10-CM

## 2018-03-15 DIAGNOSIS — E83.59 TUMORAL CALCINOSIS: Primary | ICD-10-CM

## 2018-03-15 PROCEDURE — 99999 PR PBB SHADOW E&M-EST. PATIENT-LVL IV: CPT | Mod: PBBFAC,,, | Performed by: NURSE PRACTITIONER

## 2018-03-15 PROCEDURE — 99213 OFFICE O/P EST LOW 20 MIN: CPT | Mod: PBBFAC,27 | Performed by: THORACIC SURGERY (CARDIOTHORACIC VASCULAR SURGERY)

## 2018-03-15 PROCEDURE — 99214 OFFICE O/P EST MOD 30 MIN: CPT | Mod: PBBFAC | Performed by: NURSE PRACTITIONER

## 2018-03-15 PROCEDURE — 99999 PR PBB SHADOW E&M-EST. PATIENT-LVL III: CPT | Mod: PBBFAC,,, | Performed by: THORACIC SURGERY (CARDIOTHORACIC VASCULAR SURGERY)

## 2018-03-15 PROCEDURE — 99024 POSTOP FOLLOW-UP VISIT: CPT | Mod: S$PBB,,, | Performed by: NURSE PRACTITIONER

## 2018-03-15 PROCEDURE — 99024 POSTOP FOLLOW-UP VISIT: CPT | Mod: POP,,, | Performed by: THORACIC SURGERY (CARDIOTHORACIC VASCULAR SURGERY)

## 2018-03-15 NOTE — LETTER
March 15, 2018      Iker Guerra MD  [address]  [facility] [address]           [facility]  [address]  [facility] [address]  Phone: [phone]          Patient: Livan Arevalo   MR Number: [id number]   YOB: 1975   Date of Visit: 3/15/2018       Dear Dr. Iker Guerra:    Thank you for referring Livan Arevalo to me for evaluation. Attached you will find relevant portions of my assessment and plan of care.    If you have questions, please do not hesitate to call me. I look forward to following Livan Arevalo along with you.    Sincerely,    Socorro Garcia, SANAZ    Enclosure  CC:  No Recipients    If you would like to receive this communication electronically, please contact [email or web address] or [phone] to request more information on [employer] access.    For providers and/or their staff who would like to refer a patient to [facility], please contact us through our one-stop-shop provider referral line, Kathryn Marks, at [phone].    If you feel you have received this communication in error or would no longer like to receive these types of communications, please e-mail [email or web address]

## 2018-03-15 NOTE — PROGRESS NOTES
Subjective:       Patient ID: Livan Arevalo is a 43 y.o. male.    Chief Complaint: Wound Check    HPI   This is a 43 year old male referred by Dr. Guerra for evaluation and management of a wound to the chest wall. He is s/p incisional biopsy of right chest wall mass on 2/27/18 with Dr. Guerra.   He has a PMH of ESRD (HD MWF), TIA, HTN, hypokalemia  and chronic systolic heart failure (EF 30%).   A wound vac was placed in clinic by Dr. Guerra on 3/1/18.  He had home health but was discharged a week ago because of non-homebound status.  The vac has not been changed in a week.  He is afebrile.  He denies increased redness, swelling or purulent drainage.  His pain level is 7/10.  Review of Systems   Constitutional: Negative for chills, diaphoresis and fever.   HENT: Positive for rhinorrhea. Negative for hearing loss, postnasal drip, sinus pressure, sneezing, sore throat, tinnitus and trouble swallowing.    Eyes: Negative for visual disturbance.   Respiratory: Positive for cough and shortness of breath. Negative for apnea and wheezing.    Cardiovascular: Negative for chest pain, palpitations and leg swelling.   Gastrointestinal: Positive for diarrhea. Negative for constipation, nausea and vomiting.   Genitourinary: Negative for difficulty urinating, dysuria, frequency and hematuria.        On dialysis and urinates very little   Musculoskeletal: Negative for arthralgias, back pain and joint swelling.   Skin: Positive for wound.   Neurological: Negative for dizziness, weakness, light-headedness and headaches.   Hematological: Does not bruise/bleed easily.   Psychiatric/Behavioral: Positive for sleep disturbance. Negative for confusion, decreased concentration and dysphoric mood. The patient is not nervous/anxious.        Objective:      Physical Exam   Constitutional: He is oriented to person, place, and time. He appears well-developed and well-nourished. No distress.   HENT:   Head: Normocephalic and atraumatic.    Mouth/Throat: Oropharynx is clear and moist.   Eyes: Conjunctivae and EOM are normal. Pupils are equal, round, and reactive to light. Right eye exhibits no discharge. Left eye exhibits no discharge. No scleral icterus.   Neck: Normal range of motion. Neck supple. No JVD present. No tracheal deviation present. No thyromegaly present.   Cardiovascular: Regular rhythm and normal heart sounds.  Tachycardia present.  Exam reveals no gallop and no friction rub.    No murmur heard.  Pulmonary/Chest: Effort normal and breath sounds normal. No respiratory distress. He has no wheezes. He has no rales.   Abdominal: Soft. Bowel sounds are normal. He exhibits no distension. There is no tenderness.   Musculoskeletal: Normal range of motion. He exhibits no edema or tenderness.        Arms:  Neurological: He is alert and oriented to person, place, and time.   Skin: Skin is warm and dry. No rash noted. He is not diaphoretic. No erythema.   Psychiatric: His behavior is normal. Judgment and thought content normal. His mood appears anxious.   Nursing note and vitals reviewed.      Assessment:       1. Delayed surgical wound healing, initial encounter        Plan:           Cleanse wound with wound cleanser.  Medihoney gel to base of wound.   Green foam wrapped in mepitel to undermined areas of wound.  Green foam to cover remainder of wound.  Run vac at 150 mmHg on continuous suciton.  Change vac three times weekly.  Return to clinic in 2-3 weeks.  [facility] notified of orders via email.  We will ask them to see the patient three times weekly.          Skin prep to periwound area.

## 2018-03-15 NOTE — PROGRESS NOTES
"Subjective:       Patient ID: Livan Arevalo is a 43 y.o. male.    Chief Complaint: No chief complaint on file.    HPI    43 y.o. male presents with PMH of ESRD (HD MWF), TIA, HTN, hypokalemia  and chronic systolic heart failure (EF 30%) presents for surgical evaluation of large chest wall mass. History dates back to April 2017 when he presented to [facility] ED for generalized weakness. Mass has been biopsied twice via core needle. Pathology returned as non diagnostic. It seems patient has been noncompliant with several follow up appointments. Chest wall mass continues to grow. He states mass is not painful. Now s/p attempted incisional biopsy earlier this week. Wound approximately 8 x 6 x 4 cm.  Wound was left surgically open due to gross purulence which was encountered and sent for multiple cultures with no growth. Pathology most consistent with tumor calcinosis. Returns for wound vac exchange.  Denies fever, chills, facial swelling, upper extremity weakness, SOB, cough, N/V or changes in bowel and bladder function.      Review of Systems   Constitutional: Negative for activity change, appetite change, fatigue and fever.   HENT: Negative for congestion, trouble swallowing and voice change.    Respiratory: Positive for shortness of breath. Negative for chest tightness.    Cardiovascular: Negative for chest pain, palpitations and leg swelling.   Gastrointestinal: Negative for abdominal distention, abdominal pain, diarrhea, nausea and vomiting.   Genitourinary: Negative for difficulty urinating.   Musculoskeletal: Negative for arthralgias and myalgias.         Objective:       Vitals:    03/15/18 1204   BP: (!) 140/93   Pulse: 110   SpO2: (!) 93%   Weight: 101.4 kg (223 lb 8.7 oz)   Height: 5' 5" (1.651 m)   PainSc: 0-No pain     Physical Exam    Constitutional: He is oriented to person, place, and time. He appears well-developed and well-nourished.   Obese   HENT:   Head: Atraumatic.   Mouth/Throat: Oropharynx is " clear and moist.   Neck: Normal range of motion. No tracheal deviation present.   Cardiovascular: Normal rate and intact distal pulses.  A regularly irregular rhythm present.   No murmur heard.  Pulmonary/Chest: No respiratory distress. He has no decreased breath sounds. He has no wheezes. He exhibits mass. He exhibits no tenderness.   Anterior right chest wound with wound vac intact.  Abdominal: Soft. Bowel sounds are normal. He exhibits no distension.   Musculoskeletal: Normal range of motion. He exhibits no edema.   Lymphadenopathy:     He has no cervical adenopathy.   Neurological: He is alert and oriented to person, place, and time.   Psychiatric: He has a normal mood and affect.     FINAL PATHOLOGIC DIAGNOSIS  RIGHT CHEST WALL MASS, BIOPSY:  -Findings most consistent with tumoral calcinosis, see note  Note: The specimen consists of abundant calcifications surrounded by connective tissue with numerous giant cells.  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      Assessment:        43 y.o. 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 Patient having difficulty coordinating care with home health as he is no longer home bound.   Will defer further wound management to wound care team.     ATTENDING ATTESTATION:    I evaluated the patient and I agree with the assessment and plan.

## 2018-03-16 ENCOUNTER — TELEPHONE (OUTPATIENT)
Dept: CARDIOTHORACIC SURGERY | Facility: CLINIC | Age: 43
End: 2018-03-16

## 2018-03-16 ENCOUNTER — TELEPHONE (OUTPATIENT)
Dept: WOUND CARE | Facility: CLINIC | Age: 43
End: 2018-03-16

## 2018-03-16 NOTE — TELEPHONE ENCOUNTER
----- Message from Herlinda Siegel sent at 3/16/2018 11:20 AM CDT -----  [facility]/cathie  [phone]//caller states that pt will be admitted to [facility] tomorrow //please call//thank you

## 2018-03-18 ENCOUNTER — HOSPITAL ENCOUNTER (OUTPATIENT)
Facility: HOSPITAL | Age: 43
Discharge: HOME | End: 2018-03-20
Attending: EMERGENCY MEDICINE | Admitting: INTERNAL MEDICINE
Payer: MEDICARE

## 2018-03-18 DIAGNOSIS — I50.22 CHRONIC SYSTOLIC HEART FAILURE: Chronic | ICD-10-CM

## 2018-03-18 DIAGNOSIS — T82.42XA DISPLACEMENT OF VASCULAR DIALYSIS CATHETER: Primary | ICD-10-CM

## 2018-03-18 DIAGNOSIS — R07.9 CHEST PAIN: ICD-10-CM

## 2018-03-18 DIAGNOSIS — I10 ESSENTIAL HYPERTENSION: Chronic | ICD-10-CM

## 2018-03-18 DIAGNOSIS — N18.6 ESRD ON DIALYSIS: Chronic | ICD-10-CM

## 2018-03-18 DIAGNOSIS — T82.49XA OTHER COMPLICATION OF VASCULAR DIALYSIS CATHETER, INITIAL ENCOUNTER: ICD-10-CM

## 2018-03-18 DIAGNOSIS — E87.5 HYPERKALEMIA: ICD-10-CM

## 2018-03-18 DIAGNOSIS — T81.89XA DELAYED SURGICAL WOUND HEALING, INITIAL ENCOUNTER: ICD-10-CM

## 2018-03-18 DIAGNOSIS — Z99.2 ESRD ON DIALYSIS: Chronic | ICD-10-CM

## 2018-03-18 DIAGNOSIS — T82.42XA DISPLACEMENT OF VASCULAR DIALYSIS CATHETER, INITIAL ENCOUNTER: ICD-10-CM

## 2018-03-18 LAB
ALBUMIN SERPL BCP-MCNC: 3 G/DL
ANION GAP SERPL CALC-SCNC: 14 MMOL/L
BASOPHILS # BLD AUTO: 0.04 K/UL
BASOPHILS NFR BLD: 0.6 %
BNP SERPL-MCNC: 714 PG/ML
BUN SERPL-MCNC: 49 MG/DL (ref 6–30)
BUN SERPL-MCNC: 51 MG/DL
BUN SERPL-MCNC: 74 MG/DL (ref 6–30)
CALCIUM SERPL-MCNC: 10.2 MG/DL
CHLORIDE SERPL-SCNC: 97 MMOL/L
CHLORIDE SERPL-SCNC: 98 MMOL/L (ref 95–110)
CHLORIDE SERPL-SCNC: 99 MMOL/L (ref 95–110)
CO2 SERPL-SCNC: 24 MMOL/L
CREAT SERPL-MCNC: 10.1 MG/DL (ref 0.5–1.4)
CREAT SERPL-MCNC: 10.6 MG/DL (ref 0.5–1.4)
CREAT SERPL-MCNC: 9.4 MG/DL
DIFFERENTIAL METHOD: ABNORMAL
EOSINOPHIL # BLD AUTO: 0.1 K/UL
EOSINOPHIL NFR BLD: 1.6 %
ERYTHROCYTE [DISTWIDTH] IN BLOOD BY AUTOMATED COUNT: 17.3 %
EST. GFR  (AFRICAN AMERICAN): 7.2 ML/MIN/1.73 M^2
EST. GFR  (NON AFRICAN AMERICAN): 6.2 ML/MIN/1.73 M^2
GLUCOSE SERPL-MCNC: 106 MG/DL (ref 70–110)
GLUCOSE SERPL-MCNC: 74 MG/DL
GLUCOSE SERPL-MCNC: 91 MG/DL (ref 70–110)
HCT VFR BLD AUTO: 29.7 %
HCT VFR BLD CALC: 31 %PCV (ref 36–54)
HCT VFR BLD CALC: 32 %PCV (ref 36–54)
HGB BLD-MCNC: 9.3 G/DL
IMM GRANULOCYTES # BLD AUTO: 0.01 K/UL
IMM GRANULOCYTES NFR BLD AUTO: 0.2 %
LYMPHOCYTES # BLD AUTO: 1.6 K/UL
LYMPHOCYTES NFR BLD: 25.3 %
MAGNESIUM SERPL-MCNC: 2.3 MG/DL
MCH RBC QN AUTO: 28.4 PG
MCHC RBC AUTO-ENTMCNC: 31.3 G/DL
MCV RBC AUTO: 91 FL
MONOCYTES # BLD AUTO: 0.8 K/UL
MONOCYTES NFR BLD: 13 %
NEUTROPHILS # BLD AUTO: 3.8 K/UL
NEUTROPHILS NFR BLD: 59.3 %
NRBC BLD-RTO: 0 /100 WBC
PHOSPHATE SERPL-MCNC: 8.1 MG/DL
PLATELET # BLD AUTO: 161 K/UL
PMV BLD AUTO: 13 FL
POC IONIZED CALCIUM: 1.04 MMOL/L (ref 1.06–1.42)
POC IONIZED CALCIUM: 1.16 MMOL/L (ref 1.06–1.42)
POC TCO2 (MEASURED): 28 MMOL/L (ref 23–29)
POC TCO2 (MEASURED): 31 MMOL/L (ref 23–29)
POTASSIUM BLD-SCNC: 5.8 MMOL/L (ref 3.5–5.1)
POTASSIUM BLD-SCNC: 8.7 MMOL/L (ref 3.5–5.1)
POTASSIUM SERPL-SCNC: 6 MMOL/L
RBC # BLD AUTO: 3.28 M/UL
SAMPLE: ABNORMAL
SAMPLE: ABNORMAL
SODIUM BLD-SCNC: 133 MMOL/L (ref 136–145)
SODIUM BLD-SCNC: 136 MMOL/L (ref 136–145)
SODIUM SERPL-SCNC: 135 MMOL/L
WBC # BLD AUTO: 6.4 K/UL

## 2018-03-18 PROCEDURE — 83735 ASSAY OF MAGNESIUM: CPT

## 2018-03-18 PROCEDURE — 99284 EMERGENCY DEPT VISIT MOD MDM: CPT | Mod: 25

## 2018-03-18 PROCEDURE — 85025 COMPLETE CBC W/AUTO DIFF WBC: CPT

## 2018-03-18 PROCEDURE — 99285 EMERGENCY DEPT VISIT HI MDM: CPT | Mod: ,,, | Performed by: EMERGENCY MEDICINE

## 2018-03-18 PROCEDURE — 80069 RENAL FUNCTION PANEL: CPT

## 2018-03-18 PROCEDURE — 93010 ELECTROCARDIOGRAM REPORT: CPT | Mod: ,,, | Performed by: INTERNAL MEDICINE

## 2018-03-18 PROCEDURE — 25000003 PHARM REV CODE 250: Performed by: PHYSICIAN ASSISTANT

## 2018-03-18 PROCEDURE — G0378 HOSPITAL OBSERVATION PER HR: HCPCS

## 2018-03-18 PROCEDURE — 93005 ELECTROCARDIOGRAM TRACING: CPT

## 2018-03-18 PROCEDURE — 99220 PR INITIAL OBSERVATION CARE,LEVL III: CPT | Mod: ,,, | Performed by: PHYSICIAN ASSISTANT

## 2018-03-18 PROCEDURE — 83880 ASSAY OF NATRIURETIC PEPTIDE: CPT

## 2018-03-18 RX ORDER — OXYCODONE AND ACETAMINOPHEN 10; 325 MG/1; MG/1
1 TABLET ORAL EVERY 6 HOURS PRN
Status: DISCONTINUED | OUTPATIENT
Start: 2018-03-18 | End: 2018-03-20 | Stop reason: HOSPADM

## 2018-03-18 RX ORDER — RAMELTEON 8 MG/1
8 TABLET ORAL NIGHTLY PRN
Status: DISCONTINUED | OUTPATIENT
Start: 2018-03-18 | End: 2018-03-20 | Stop reason: HOSPADM

## 2018-03-18 RX ORDER — SEVELAMER CARBONATE 800 MG/1
1600 TABLET, FILM COATED ORAL
Status: DISCONTINUED | OUTPATIENT
Start: 2018-03-18 | End: 2018-03-20 | Stop reason: HOSPADM

## 2018-03-18 RX ORDER — ACETAMINOPHEN 325 MG/1
650 TABLET ORAL EVERY 4 HOURS PRN
Status: DISCONTINUED | OUTPATIENT
Start: 2018-03-18 | End: 2018-03-20 | Stop reason: HOSPADM

## 2018-03-18 RX ORDER — POLYETHYLENE GLYCOL 3350 17 G/17G
17 POWDER, FOR SOLUTION ORAL EVERY 12 HOURS PRN
Status: DISCONTINUED | OUTPATIENT
Start: 2018-03-18 | End: 2018-03-20 | Stop reason: HOSPADM

## 2018-03-18 RX ORDER — SODIUM CHLORIDE 0.9 % (FLUSH) 0.9 %
5 SYRINGE (ML) INJECTION
Status: DISCONTINUED | OUTPATIENT
Start: 2018-03-18 | End: 2018-03-20 | Stop reason: HOSPADM

## 2018-03-18 RX ORDER — MORPHINE SULFATE 4 MG/ML
4 INJECTION, SOLUTION INTRAMUSCULAR; INTRAVENOUS
Status: DISCONTINUED | OUTPATIENT
Start: 2018-03-18 | End: 2018-03-18

## 2018-03-18 RX ORDER — ONDANSETRON 8 MG/1
8 TABLET, ORALLY DISINTEGRATING ORAL EVERY 8 HOURS PRN
Status: DISCONTINUED | OUTPATIENT
Start: 2018-03-18 | End: 2018-03-20 | Stop reason: HOSPADM

## 2018-03-18 RX ADMIN — SEVELAMER CARBONATE 1600 MG: 800 TABLET, FILM COATED ORAL at 04:03

## 2018-03-18 RX ADMIN — SODIUM POLYSTYRENE SULFONATE 30 G: 15 SUSPENSION ORAL; RECTAL at 04:03

## 2018-03-18 NOTE — PLAN OF CARE
Problem: Patient Care Overview  Goal: Plan of Care Review  Outcome: Ongoing (interventions implemented as appropriate)  Pt aaox3, vss, no s/s of distress noted.  Safety precautions maintained, non skid socks and fall risk band applied.  Nephrology Consult.  Last Dialysis was Friday.  Pt refuses telemetry, PA aware.  Call light within reach.

## 2018-03-18 NOTE — SUBJECTIVE & OBJECTIVE
Past Medical History:   Diagnosis Date    CHF (congestive heart failure)     ESRD (end stage renal disease)     Hemodialysis access, fistula mature     THRILL AND BRUIT PRESENT    Hemodialysis patient     M-W-F    Hypertension     Myocardial infarction     Renal disorder     Stroke     3 MILD STROKES       Past Surgical History:   Procedure Laterality Date    CHEST WALL BIOPSY      multiple    DIALYSIS FISTULA CREATION         Review of patient's allergies indicates:   Allergen Reactions    Ciprofloxacin Hives    Iodine and iodide containing products Hives and Itching     Pt states he is not allergic to iodine       No current facility-administered medications on file prior to encounter.      Current Outpatient Prescriptions on File Prior to Encounter   Medication Sig    docusate sodium (COLACE) 100 MG capsule Take 1 capsule (100 mg total) by mouth 2 (two) times daily.    lanthanum (FOSRENOL) 1000 MG chewable tablet     oxyCODONE-acetaminophen (PERCOCET)  mg per tablet Take 1 tablet by mouth every 6 (six) hours as needed for Pain (Take for severe pain).    sevelamer carbonate (RENVELA) 800 mg Tab Take 2 tablets (1,600 mg total) by mouth 3 (three) times daily with meals. Contact Nephrologist for refills    VANCOMYCIN HCL (VANCOMYCIN 1 G/250 ML NS, READY TO MIX SYSTEM,) Inject 250 mLs (1,000 mg total) into the vein every Mon, Wed, Fri. Given after Dialysis. Last dose, 2/21/18     Family History     Problem Relation (Age of Onset)    Kidney disease Mother        Social History Main Topics    Smoking status: Never Smoker    Smokeless tobacco: Never Used    Alcohol use No    Drug use: No    Sexual activity: No     Review of Systems   Constitutional: Negative for chills, diaphoresis, fatigue, fever and unexpected weight change.   HENT: Negative for congestion, sore throat, trouble swallowing and voice change.    Eyes: Negative for photophobia, pain, redness and visual disturbance.    Respiratory: Negative for cough, chest tightness, shortness of breath and wheezing.    Cardiovascular: Negative for chest pain, palpitations and leg swelling.   Gastrointestinal: Negative for abdominal pain, constipation, diarrhea, nausea and vomiting.   Endocrine: Negative for cold intolerance, heat intolerance, polydipsia, polyphagia and polyuria.   Genitourinary: Negative for difficulty urinating, dysuria, flank pain and frequency.   Musculoskeletal: Negative for arthralgias, back pain, myalgias and neck pain.   Skin: Negative for color change, pallor, rash and wound.   Neurological: Negative for dizziness, tremors, syncope, weakness, light-headedness and headaches.   Hematological: Negative for adenopathy. Does not bruise/bleed easily.   Psychiatric/Behavioral: Negative for agitation, confusion and dysphoric mood. The patient is not nervous/anxious.      Objective:     Vital Signs (Most Recent):  Temp: 97.7 °F (36.5 °C) (03/18/18 1056)  Pulse: 109 (03/18/18 1056)  Resp: 16 (03/18/18 1056)  BP: (!) 160/93 (03/18/18 1056)  SpO2: 100 % (03/18/18 1056) Vital Signs (24h Range):  Temp:  [97.7 °F (36.5 °C)] 97.7 °F (36.5 °C)  Pulse:  [109] 109  Resp:  [16] 16  SpO2:  [100 %] 100 %  BP: (160)/(93) 160/93     Weight: 99.8 kg (220 lb)  Body mass index is 36.61 kg/m².    Physical Exam   Constitutional: He is oriented to person, place, and time. He appears well-developed and well-nourished. No distress.   HENT:   Head: Normocephalic and atraumatic.   Eyes: EOM are normal. Pupils are equal, round, and reactive to light.   Neck: Normal range of motion. Neck supple. No tracheal deviation present. No thyromegaly present.   Cardiovascular: Normal rate and regular rhythm.    No murmur heard.  Pulmonary/Chest: Effort normal and breath sounds normal. He has no wheezes.       Abdominal: Soft. Bowel sounds are normal. There is no tenderness.   Musculoskeletal: Normal range of motion. He exhibits no edema or tenderness.   R femoral  site C/D, no bleeding   Lymphadenopathy:     He has no cervical adenopathy.   Neurological: He is alert and oriented to person, place, and time. He has normal reflexes. No cranial nerve deficit.   Skin: Skin is warm and dry. He is not diaphoretic.   Psychiatric: He has a normal mood and affect. His behavior is normal.   Nursing note and vitals reviewed.        CRANIAL NERVES     CN III, IV, VI   Pupils are equal, round, and reactive to light.  Extraocular motions are normal.        Significant Labs: All pertinent labs within the past 24 hours have been reviewed.    Significant Imaging: I have reviewed all pertinent imaging results/findings within the past 24 hours.

## 2018-03-18 NOTE — ASSESSMENT & PLAN NOTE
Last full HD session 3/16/18  BP slightly elevated  K 5.8 on istat  Ordered BNP, Renal function panel, CBC and Mg  Pt appears euvolemic  Renal Diet  Nephrology consult

## 2018-03-18 NOTE — NURSING
Pt arrived to unit via stretcher from ED.  Pt aaox3, no s/s of distress noted.  Pt denies pain.  Pt refuses to be placed on telemetry.  Bed locked and in lowest position.  Call light within reach.

## 2018-03-18 NOTE — PROGRESS NOTES
Pt refuses telemetry.  Explained to pt the importance of heart monitor brianna since potassium is elevated.  Pt informed that abnormal labs can cause arrythmias and telemetry monitoring is important at this time.  Pt continues to refuse.  JOSE A Yost aware.

## 2018-03-18 NOTE — ED PROVIDER NOTES
Encounter Date: 3/18/2018    SCRIBE #1 NOTE: I, Mara Shay, am scribing for, and in the presence of,  Dr. Cole. I have scribed the following portions of the note - the Resident attestation.       History     Chief Complaint   Patient presents with    dialysis catheter in rt groin fell out today     M-W-F dialysis pt , catheter fell out of rt groin  today.      Mr. Arevalo is a 43 y.o. Male with history of ESRD on HD MWF, HTN, CHF (EF 30%), TIA, HTN, and recent excision of chest mass concerning for tumoral calcinosis who presents to the ED with complaints of R femoral dialysis line falling out. States he woke up this morning and the catheter had fallen out. Denies any bleeding from the site, redness around the catheter site or pain. Pt last had dialysis on Friday and had his whole treatment. Denies any shortness of breath, chest pain, abdominal pain, nausea/vomiting, fever/chills, diarrhea/constipation. Pt has been on HD for 7 years and had a left AVF that was removed on 1/31/18 2/2 infection at which time a temporary R femoral dialysis line was placed.    Pt with recent admission for chest mass excision with wound vac in place with no complaints of redness or swelling of the area. Pt just followed up with surgery recently and is scheduled to go back soon.           Review of patient's allergies indicates:   Allergen Reactions    Ciprofloxacin Hives    Iodine and iodide containing products Hives and Itching     Pt states he is not allergic to iodine     Past Medical History:   Diagnosis Date    CHF (congestive heart failure)     ESRD (end stage renal disease)     Hemodialysis access, fistula mature     THRILL AND BRUIT PRESENT    Hemodialysis patient     M-W-F    Hypertension     Myocardial infarction     Renal disorder     Stroke     3 MILD STROKES     Past Surgical History:   Procedure Laterality Date    CHEST WALL BIOPSY      multiple    DIALYSIS FISTULA CREATION       Family History   Problem  Relation Age of Onset    Kidney disease Mother     Anesthesia problems Neg Hx      Social History   Substance Use Topics    Smoking status: Never Smoker    Smokeless tobacco: Never Used    Alcohol use No     Review of Systems   Constitutional: Negative for chills and fever.   HENT: Negative for congestion and nosebleeds.    Eyes: Negative for visual disturbance.   Respiratory: Negative for cough and shortness of breath.    Cardiovascular: Negative for chest pain, palpitations and leg swelling.   Gastrointestinal: Negative for abdominal pain, nausea and vomiting.   Genitourinary: Negative for dysuria and hematuria.        Anuric at baseline   Musculoskeletal: Negative for back pain and neck pain.   Skin:        Catheter site R femoral    Neurological: Negative for dizziness, weakness, light-headedness, numbness and headaches.       Physical Exam     Initial Vitals [03/18/18 1056]   BP Pulse Resp Temp SpO2   (!) 160/93 109 16 97.7 °F (36.5 °C) 100 %      MAP       115.33         Physical Exam    Nursing note and vitals reviewed.  Constitutional: He appears well-developed and well-nourished.   Pleasant AA male resting    HENT:   Head: Normocephalic and atraumatic.   Nose: Nose normal.   Mouth/Throat: Oropharynx is clear and moist.   Eyes: Conjunctivae and EOM are normal. Pupils are equal, round, and reactive to light.   Neck: Normal range of motion. Neck supple. No JVD present.   Cardiovascular:   Tachycardia, regular rhythm, nml S1/S2, no S3/S4 appreciated, no murmurs/rubs/gallops   Pulmonary/Chest: Breath sounds normal. No respiratory distress. He has no wheezes. He has no rhonchi. He has no rales.   Wound vac in place in right anterior chest wall with no surrounding erythema, swelling or tenderness   Abdominal: Soft. Bowel sounds are normal. He exhibits no distension. There is no tenderness. There is no rebound and no guarding.   Musculoskeletal:   R groin with hemostatic R femoral dialysis catheter site with  no signs of hematoma, pain or erythema of the area; no peripheral edema appreciated   Neurological: He is alert and oriented to person, place, and time.   Gross motor and sensory to light touch intact throughout   Skin: Skin is warm and dry.         ED Course   Procedures  Labs Reviewed   CBC W/ AUTO DIFFERENTIAL - Abnormal; Notable for the following:        Result Value    RBC 3.28 (*)     Hemoglobin 9.3 (*)     Hematocrit 29.7 (*)     MCHC 31.3 (*)     RDW 17.3 (*)     MPV 13.0 (*)     All other components within normal limits   ISTAT PROCEDURE - Abnormal; Notable for the following:     POC BUN 74 (*)     POC Creatinine 10.6 (*)     POC Sodium 133 (*)     POC Potassium 8.7 (*)     POC TCO2 (MEASURED) 31 (*)     POC Ionized Calcium 1.04 (*)     POC Hematocrit 31 (*)     All other components within normal limits   ISTAT PROCEDURE - Abnormal; Notable for the following:     POC BUN 49 (*)     POC Creatinine 10.1 (*)     POC Potassium 5.8 (*)     POC Hematocrit 32 (*)     All other components within normal limits   RENAL FUNCTION PANEL   MAGNESIUM   B-TYPE NATRIURETIC PEPTIDE   ISTAT CHEM8   ISTAT CHEM8             Medical Decision Making:   History:   Old Medical Records: I decided to obtain old medical records.  Initial Assessment:   43 y.o. M with history of ESRD on HD MWF (last dialyzed on Friday), chest mass s/p incision and wound vac, CHF (EF 30%), and HTN presents after temporary R femoral dialysis line fell out during sleep. Pt is tachycardic however reports that he is always a little tachycardic and previous records confirm this is his baseline, otherwise is afebrile, sating well on room air and mildly hypertensive. PE significant for hemostatic old R femoral catheter line site with no surrounding hematoma or erythema.   Differential Diagnosis:   Dialysis catheter malfunction, electrolyte abnormality  Clinical Tests:   Lab Tests: Ordered and Reviewed  Medical Tests: Ordered and Reviewed  ED Management:  Pt  does not appear to be clinically fluid overloaded. Will obtain istat chemistry to check electrolyte status. After istat chem will consult medicine to admit for line placement tomorrow as pt is going to need access for extended period of time while new AVF is being created and matured.   Other:   I have discussed this case with another health care provider.       <> Summary of the Discussion: Nephrology       APC / Resident Notes:   Interval Update 14:04 3/18/2018    Pt's repeat potassium was 5.8 and pt is supposed to get dialysis tomorrow. After talking with RN pt's blood likely hemolyzed with first sample. Pt with no EKG changes. Pt remains alert and oriented in no acute distress. Spoke with hospitals medicine, Ritika who is working with Dr. Johnson who will admit the pt for replacement dialysis line. Spoke with case management for which pt is considered observation.    Jeanine Alcantara, PGY-1  [facility]       Scribe Attestation:   Scribe #1: I performed the above scribed service and the documentation accurately describes the services I performed. I attest to the accuracy of the note.    Attending Attestation:   Physician Attestation Statement for Resident:  As the supervising MD   Physician Attestation Statement: I have personally seen and examined this patient.   I agree with the above history. -: Dialysis catheter fell out. He is asymptomatic. We're going to put him in Medicine for catheter replacement.    As the supervising MD I agree with the above PE.    As the supervising MD I agree with the above treatment, course, plan, and disposition.  I have reviewed and agree with the residents interpretation of the following: lab data.                       Clinical Impression:   The primary encounter diagnosis was Displacement of vascular dialysis catheter, initial encounter. Diagnoses of Hyperkalemia and Chest pain were also pertinent to this visit.                           Jeanine Alcantara,  MD  Resident  03/18/18 2658

## 2018-03-18 NOTE — ASSESSMENT & PLAN NOTE
istat K 5.8  Will repeat with renal function panel  Pt does make some urine so could treat with IV lasix if elevated

## 2018-03-18 NOTE — ED NOTES
"State's : it fell out in his sleep" Last dialysis was Friday.Temporary Catheter completely out of right groin.On dialysis for 6 1/2 yrs,. Bleeding controlled at dialysis site  "

## 2018-03-18 NOTE — H&P
[facility] Hospital Medicine  History & Physical    Patient Name: Livan Arevalo  MRN: [id number]  Admission Date: 3/18/2018  Attending Physician: Eamon Cole MD   Primary Care Provider: Primary Doctor [facility] Medicine Team: [facility] MED F Ritika Carias PA-C     Patient information was obtained from patient and ER records.     Subjective:     Principal Problem:Displacement of vascular dialysis catheter    Chief Complaint:   Chief Complaint   Patient presents with    dialysis catheter in rt groin fell out today     M-W-F dialysis pt , catheter fell out of rt groin  today.         HPI: 44 y/o M with PMH ESRD on HD MWF (full session complete 3/16/18) presents due to loss of temporary access.  Pt had urgent excision of infected L AV fistula and R femoral PermCath placement 01/31/2018.  He recently saw Vascular Surgery 03/13/2018 with plan for R AV graft in 4 weeks.  R AV deferred due to R chest wound with wound vac from biopsy of R chest wall mass on 2/27/18.  Pt states he noticed last night while sleeping R femoral line had fallen out.  No bleeding, erythema or pain.  Pt denies fever, chills, CP, SOB, diaphoresis, leg swelling.  Pt does make some urine.    In the ED, istat shows mild hyperkalemia at 5.8.  Pt sats 100% on RA.    Past Medical History:   Diagnosis Date    CHF (congestive heart failure)     ESRD (end stage renal disease)     Hemodialysis access, fistula mature     THRILL AND BRUIT PRESENT    Hemodialysis patient     M-W-F    Hypertension     Myocardial infarction     Renal disorder     Stroke     3 MILD STROKES       Past Surgical History:   Procedure Laterality Date    CHEST WALL BIOPSY      multiple    DIALYSIS FISTULA CREATION         Review of patient's allergies indicates:   Allergen Reactions    Ciprofloxacin Hives    Iodine and iodide containing products Hives and Itching     Pt states he is not allergic to iodine       No current facility-administered  medications on file prior to encounter.      Current Outpatient Prescriptions on File Prior to Encounter   Medication Sig    docusate sodium (COLACE) 100 MG capsule Take 1 capsule (100 mg total) by mouth 2 (two) times daily.    lanthanum (FOSRENOL) 1000 MG chewable tablet     oxyCODONE-acetaminophen (PERCOCET)  mg per tablet Take 1 tablet by mouth every 6 (six) hours as needed for Pain (Take for severe pain).    sevelamer carbonate (RENVELA) 800 mg Tab Take 2 tablets (1,600 mg total) by mouth 3 (three) times daily with meals. Contact Nephrologist for refills    VANCOMYCIN HCL (VANCOMYCIN 1 G/250 ML NS, READY TO MIX SYSTEM,) Inject 250 mLs (1,000 mg total) into the vein every Mon, Wed, Fri. Given after Dialysis. Last dose, 2/21/18     Family History     Problem Relation (Age of Onset)    Kidney disease Mother        Social History Main Topics    Smoking status: Never Smoker    Smokeless tobacco: Never Used    Alcohol use No    Drug use: No    Sexual activity: No     Review of Systems   Constitutional: Negative for chills, diaphoresis, fatigue, fever and unexpected weight change.   HENT: Negative for congestion, sore throat, trouble swallowing and voice change.    Eyes: Negative for photophobia, pain, redness and visual disturbance.   Respiratory: Negative for cough, chest tightness, shortness of breath and wheezing.    Cardiovascular: Negative for chest pain, palpitations and leg swelling.   Gastrointestinal: Negative for abdominal pain, constipation, diarrhea, nausea and vomiting.   Endocrine: Negative for cold intolerance, heat intolerance, polydipsia, polyphagia and polyuria.   Genitourinary: Negative for difficulty urinating, dysuria, flank pain and frequency.   Musculoskeletal: Negative for arthralgias, back pain, myalgias and neck pain.   Skin: Negative for color change, pallor, rash and wound.   Neurological: Negative for dizziness, tremors, syncope, weakness, light-headedness and headaches.    Hematological: Negative for adenopathy. Does not bruise/bleed easily.   Psychiatric/Behavioral: Negative for agitation, confusion and dysphoric mood. The patient is not nervous/anxious.      Objective:     Vital Signs (Most Recent):  Temp: 97.7 °F (36.5 °C) (03/18/18 1056)  Pulse: 109 (03/18/18 1056)  Resp: 16 (03/18/18 1056)  BP: (!) 160/93 (03/18/18 1056)  SpO2: 100 % (03/18/18 1056) Vital Signs (24h Range):  Temp:  [97.7 °F (36.5 °C)] 97.7 °F (36.5 °C)  Pulse:  [109] 109  Resp:  [16] 16  SpO2:  [100 %] 100 %  BP: (160)/(93) 160/93     Weight: 99.8 kg (220 lb)  Body mass index is 36.61 kg/m².    Physical Exam   Constitutional: He is oriented to person, place, and time. He appears well-developed and well-nourished. No distress.   HENT:   Head: Normocephalic and atraumatic.   Eyes: EOM are normal. Pupils are equal, round, and reactive to light.   Neck: Normal range of motion. Neck supple. No tracheal deviation present. No thyromegaly present.   Cardiovascular: Normal rate and regular rhythm.    No murmur heard.  Pulmonary/Chest: Effort normal and breath sounds normal. He has no wheezes.       Abdominal: Soft. Bowel sounds are normal. There is no tenderness.   Musculoskeletal: Normal range of motion. He exhibits no edema or tenderness.   R femoral site C/D, no bleeding   Lymphadenopathy:     He has no cervical adenopathy.   Neurological: He is alert and oriented to person, place, and time. He has normal reflexes. No cranial nerve deficit.   Skin: Skin is warm and dry. He is not diaphoretic.   Psychiatric: He has a normal mood and affect. His behavior is normal.   Nursing note and vitals reviewed.        CRANIAL NERVES     CN III, IV, VI   Pupils are equal, round, and reactive to light.  Extraocular motions are normal.        Significant Labs: All pertinent labs within the past 24 hours have been reviewed.    Significant Imaging: I have reviewed all pertinent imaging results/findings within the past 24  hours.    Assessment/Plan:     * Displacement of vascular dialysis catheter    R femoral line fell out while pt sleeping  Site C/D/I no bleeding  Pt seen in Vascular Surgery clinic 03/13/18 and plan was to keep temporary line for additional 4 weeks and to then schedule R AV graft.  Deferred due to R chest wound from incisional biopsy.  Nephrology and Nephrology Access Service consulted.  Discussed with staff Dr. Johnson.          ESRD on dialysis    Last full HD session 3/16/18  BP slightly elevated  K 5.8 on istat  Ordered BNP, Renal function panel, CBC and Mg  Pt appears euvolemic  Renal Diet  Nephrology consult          Delayed surgical wound healing    S/p incisional biopsy R chest wall mass 02/27/18  Last Wound Vac change in clinic 03/15/18  Pt to run vac at 150 mmHg on continuous suciton.  Change vac three times weekly.  WC consulted.  Return to WC clinic in 2-3 weeks.          Hyperkalemia    istat K 5.8  Will repeat with renal function panel  Pt does make some urine so could treat with IV lasix if elevated          Chronic systolic heart failure    EF 30% 04/2017  Checking BNP, no signs of volume overload  No need to repeat echo at this time          Essential hypertension    Diet controlled per patient  Slight elevation on admit 160/93  Will order hydralazine prn  Pt does make some urine so lasix is an option if needed          History of stroke    Unclear why patient is not on statin or asa  Defer to PCP            VTE Risk Mitigation         Ordered     IP VTE LOW RISK PATIENT  Once      03/18/18 5879             Ritika Carias PA-C  Department of Hospital Medicine   [facility]

## 2018-03-18 NOTE — ASSESSMENT & PLAN NOTE
R femoral line fell out while pt sleeping  Site C/D/I no bleeding  Pt seen in Vascular Surgery clinic 03/13/18 and plan was to keep temporary line for additional 4 weeks and to then schedule R AV graft.  Deferred due to R chest wound from incisional biopsy.  Nephrology and Nephrology Access Service consulted.  Discussed with staff Dr. Johnson.

## 2018-03-18 NOTE — ASSESSMENT & PLAN NOTE
S/p incisional biopsy R chest wall mass 02/27/18  Last Wound Vac change in clinic 03/15/18  Pt to run vac at 150 mmHg on continuous suciton.  Change vac three times weekly.  WC consulted.  Return to WC clinic in 2-3 weeks.

## 2018-03-18 NOTE — HOSPITAL COURSE
Pt admitted for loss of temporary HD access line (R femoral). Nephrology and Nephrology Access Service consulted. WC consulted as pt due for wound vac change. Day 2 HD access obtained and HD completed. Pt with episode of hypoglycemia (K shift earlier in the day with insulin). BGs stable overnight. Pt discharged home to resume HH/wound care.

## 2018-03-18 NOTE — ASSESSMENT & PLAN NOTE
Diet controlled per patient  Slight elevation on admit 160/93  Will order hydralazine prn  Pt does make some urine so lasix is an option if needed

## 2018-03-18 NOTE — HPI
44 y/o M with PMH ESRD on HD MWF (full session complete 3/16/18) presents due to loss of temporary access.  Pt had urgent excision of infected L AV fistula and R femoral PermCath placement 01/31/2018.  He recently saw Vascular Surgery 03/13/2018 with plan for R AV graft in 4 weeks.  R AV deferred due to R chest wound with wound vac from biopsy of R chest wall mass on 2/27/18.  Pt states he noticed last night while sleeping R femoral line had fallen out.  No bleeding, erythema or pain.  Pt denies fever, chills, CP, SOB, diaphoresis, leg swelling.  Pt does make some urine.    In the ED, istat shows mild hyperkalemia at 5.8.  Pt sats 100% on RA.

## 2018-03-19 LAB
ABO + RH BLD: NORMAL
ALBUMIN SERPL BCP-MCNC: 2.9 G/DL
ANION GAP SERPL CALC-SCNC: 16 MMOL/L
BLD GP AB SCN CELLS X3 SERPL QL: NORMAL
BUN SERPL-MCNC: 63 MG/DL
CALCIUM SERPL-MCNC: 9.7 MG/DL
CHLORIDE SERPL-SCNC: 96 MMOL/L
CO2 SERPL-SCNC: 22 MMOL/L
CREAT SERPL-MCNC: 10.7 MG/DL
EST. GFR  (AFRICAN AMERICAN): 6.1 ML/MIN/1.73 M^2
EST. GFR  (NON AFRICAN AMERICAN): 5.3 ML/MIN/1.73 M^2
GLUCOSE SERPL-MCNC: 81 MG/DL
MAGNESIUM SERPL-MCNC: 2.2 MG/DL
PHOSPHATE SERPL-MCNC: 9 MG/DL
POCT GLUCOSE: 115 MG/DL (ref 70–110)
POCT GLUCOSE: 142 MG/DL (ref 70–110)
POCT GLUCOSE: 45 MG/DL (ref 70–110)
POCT GLUCOSE: 85 MG/DL (ref 70–110)
POCT GLUCOSE: 86 MG/DL (ref 70–110)
POCT GLUCOSE: 96 MG/DL (ref 70–110)
POCT GLUCOSE: 98 MG/DL (ref 70–110)
POTASSIUM SERPL-SCNC: 6.4 MMOL/L
SODIUM SERPL-SCNC: 134 MMOL/L

## 2018-03-19 PROCEDURE — 86850 RBC ANTIBODY SCREEN: CPT

## 2018-03-19 PROCEDURE — G0378 HOSPITAL OBSERVATION PER HR: HCPCS

## 2018-03-19 PROCEDURE — 63600175 PHARM REV CODE 636 W HCPCS: Performed by: INTERNAL MEDICINE

## 2018-03-19 PROCEDURE — 36415 COLL VENOUS BLD VENIPUNCTURE: CPT

## 2018-03-19 PROCEDURE — 36558 INSERT TUNNELED CV CATH: CPT | Mod: ,,, | Performed by: INTERNAL MEDICINE

## 2018-03-19 PROCEDURE — 94761 N-INVAS EAR/PLS OXIMETRY MLT: CPT

## 2018-03-19 PROCEDURE — 25000003 PHARM REV CODE 250: Performed by: NURSE PRACTITIONER

## 2018-03-19 PROCEDURE — 63600175 PHARM REV CODE 636 W HCPCS: Performed by: PHYSICIAN ASSISTANT

## 2018-03-19 PROCEDURE — 83735 ASSAY OF MAGNESIUM: CPT

## 2018-03-19 PROCEDURE — 25000003 PHARM REV CODE 250

## 2018-03-19 PROCEDURE — C1769 GUIDE WIRE: HCPCS

## 2018-03-19 PROCEDURE — 99152 MOD SED SAME PHYS/QHP 5/>YRS: CPT | Mod: ,,, | Performed by: INTERNAL MEDICINE

## 2018-03-19 PROCEDURE — 99214 OFFICE O/P EST MOD 30 MIN: CPT | Mod: 25,,, | Performed by: INTERNAL MEDICINE

## 2018-03-19 PROCEDURE — 93010 ELECTROCARDIOGRAM REPORT: CPT | Mod: ,,, | Performed by: INTERNAL MEDICINE

## 2018-03-19 PROCEDURE — 63600175 PHARM REV CODE 636 W HCPCS: Performed by: NURSE PRACTITIONER

## 2018-03-19 PROCEDURE — 90935 HEMODIALYSIS ONE EVALUATION: CPT | Mod: ,,, | Performed by: INTERNAL MEDICINE

## 2018-03-19 PROCEDURE — 99226 PR SUBSEQUENT OBSERVATION CARE,LEVEL III: CPT | Mod: ,,, | Performed by: PHYSICIAN ASSISTANT

## 2018-03-19 PROCEDURE — 25000003 PHARM REV CODE 250: Performed by: PHYSICIAN ASSISTANT

## 2018-03-19 PROCEDURE — 93005 ELECTROCARDIOGRAM TRACING: CPT | Mod: 59

## 2018-03-19 PROCEDURE — 90935 HEMODIALYSIS ONE EVALUATION: CPT

## 2018-03-19 PROCEDURE — G0257 UNSCHED DIALYSIS ESRD PT HOS: HCPCS

## 2018-03-19 PROCEDURE — 76937 US GUIDE VASCULAR ACCESS: CPT | Mod: 26,,, | Performed by: INTERNAL MEDICINE

## 2018-03-19 PROCEDURE — 63600175 PHARM REV CODE 636 W HCPCS

## 2018-03-19 PROCEDURE — 80069 RENAL FUNCTION PANEL: CPT | Mod: ET

## 2018-03-19 PROCEDURE — C1750 CATH, HEMODIALYSIS,LONG-TERM: HCPCS

## 2018-03-19 PROCEDURE — 77001 FLUOROGUIDE FOR VEIN DEVICE: CPT | Mod: 26,,, | Performed by: INTERNAL MEDICINE

## 2018-03-19 RX ORDER — GLUCAGON 1 MG
1 KIT INJECTION
Status: DISCONTINUED | OUTPATIENT
Start: 2018-03-19 | End: 2018-03-20 | Stop reason: HOSPADM

## 2018-03-19 RX ORDER — IBUPROFEN 200 MG
16 TABLET ORAL
Status: DISCONTINUED | OUTPATIENT
Start: 2018-03-19 | End: 2018-03-20 | Stop reason: HOSPADM

## 2018-03-19 RX ORDER — IBUPROFEN 200 MG
32 TABLET ORAL ONCE
Status: CANCELLED | OUTPATIENT
Start: 2018-03-19

## 2018-03-19 RX ORDER — SODIUM CHLORIDE 9 MG/ML
INJECTION, SOLUTION INTRAVENOUS
Status: DISCONTINUED | OUTPATIENT
Start: 2018-03-19 | End: 2018-03-20 | Stop reason: HOSPADM

## 2018-03-19 RX ORDER — FUROSEMIDE 10 MG/ML
40 INJECTION INTRAMUSCULAR; INTRAVENOUS ONCE
Status: DISCONTINUED | OUTPATIENT
Start: 2018-03-19 | End: 2018-03-20 | Stop reason: HOSPADM

## 2018-03-19 RX ORDER — IBUPROFEN 200 MG
24 TABLET ORAL
Status: DISCONTINUED | OUTPATIENT
Start: 2018-03-19 | End: 2018-03-20 | Stop reason: HOSPADM

## 2018-03-19 RX ORDER — SODIUM CHLORIDE 9 MG/ML
INJECTION, SOLUTION INTRAVENOUS ONCE
Status: COMPLETED | OUTPATIENT
Start: 2018-03-19 | End: 2018-03-19

## 2018-03-19 RX ORDER — IBUPROFEN 200 MG
24 TABLET ORAL ONCE
Status: COMPLETED | OUTPATIENT
Start: 2018-03-19 | End: 2018-03-19

## 2018-03-19 RX ORDER — HEPARIN SODIUM 1000 [USP'U]/ML
1000 INJECTION, SOLUTION INTRAVENOUS; SUBCUTANEOUS
Status: DISCONTINUED | OUTPATIENT
Start: 2018-03-19 | End: 2018-03-20 | Stop reason: HOSPADM

## 2018-03-19 RX ADMIN — HEPARIN SODIUM 1000 UNITS: 1000 INJECTION, SOLUTION INTRAVENOUS; SUBCUTANEOUS at 06:03

## 2018-03-19 RX ADMIN — SODIUM POLYSTYRENE SULFONATE 30 G: 15 SUSPENSION ORAL; RECTAL at 09:03

## 2018-03-19 RX ADMIN — DEXTROSE MONOHYDRATE 25 G: 25 INJECTION, SOLUTION INTRAVENOUS at 11:03

## 2018-03-19 RX ADMIN — INSULIN HUMAN 10 UNITS: 100 INJECTION, SOLUTION PARENTERAL at 11:03

## 2018-03-19 RX ADMIN — SEVELAMER CARBONATE 1600 MG: 800 TABLET, FILM COATED ORAL at 09:03

## 2018-03-19 RX ADMIN — DEXTROSE MONOHYDRATE 25 G: 500 INJECTION PARENTERAL at 03:03

## 2018-03-19 RX ADMIN — CALCIUM GLUCONATE 1000 MG: 94 INJECTION, SOLUTION INTRAVENOUS at 11:03

## 2018-03-19 RX ADMIN — Medication 24 G: at 11:03

## 2018-03-19 RX ADMIN — SODIUM CHLORIDE: 9 INJECTION, SOLUTION INTRAVENOUS at 03:03

## 2018-03-19 NOTE — PLAN OF CARE
03/19/18 1000   Discharge Assessment   Assessment Type Discharge Planning Assessment   Confirmed/corrected address and phone number on facesheet? Yes   Assessment information obtained from? Patient   Expected Length of Stay (days) 2   Communicated expected length of stay with patient/caregiver yes   Prior to hospitilization cognitive status: Alert/Oriented   Prior to hospitalization functional status: Assistive Equipment   Current cognitive status: Alert/Oriented   Current Functional Status: Assistive Equipment   Lives With alone   Able to Return to Prior Arrangements yes   Is patient able to care for self after discharge? Yes   Patient's perception of discharge disposition home health  (OHH)   Readmission Within The Last 30 Days current reason for admission unrelated to previous admission   If yes, most recent facility name: [facility]   Patient currently being followed by outpatient case management? No   Patient currently receives any other outside agency services? No   Equipment Currently Used at Home cane, quad;shower chair;walker, rolling   Do you have any problems affording any of your prescribed medications? No   Is the patient taking medications as prescribed? yes   Does the patient have transportation home? Yes   Transportation Available car   Dialysis Name and Scheduled days [facility] [place] (MWF 1200)    Does the patient receive services at the Coumadin Clinic? No   Discharge Plan A Home Health   Discharge Plan B Home   Patient/Family In Agreement With Plan yes   Readmission Questionnaire   At the time of your discharge, did someone talk to you about what your health problems were? Yes   At the time of discharge, did someone talk to you about what to watch out for regarding worsening of your health problem? Yes   At the time of discharge, did someone talk to you about what to do if you experienced worsening of your health problem? Yes   At the time of discharge, did someone talk to you about which medication  to take when you left the hospital and which ones to stop taking? Yes   At the time of discharge, did someone talk to you about when and where to follow up with a doctor after you left the hospital? Yes   What do you believe caused you to be sick enough to be re-admitted? Right groin permacath displaced   How often do you need to have someone help you when you read instructions, pamphlets, or other written material from your doctor or pharmacy? Rarely   Do you have problems taking your medications as prescribed? No   Do you have any problems affording any of  your prescribed medications? No   Do you have problems obtaining/receiving your medications? No   Does the patient have transportation to healthcare appointments? Yes   Living Arrangements apartment   Does the patient have family/friends to help with healtcare needs after discharge? yes   Does your caregiver provide all the help you need? Yes   Are you currently feeling confused? No   Are you currently having problems thinking? No   Are you currently having memory problems? No   Have you felt down, depressed, or hopeless? 0   Have you felt little interest or pleasure in doing things? 0   In the last 7 days, my sleep quality was: fair     Patient awake & alert talking on the phone when CM rounded. No family present. Patient was admitted with a displaced right groin permacath. Neph & wound care consults noted. Patient scheduled to have an HD line placed at 1200 today. Patient lives alone, & has equipment to assist with ambulation, & is currently receiving home health care from [facility]. Patient was diagnosed with a right chest wall mass & had a wound vac placed during his previous hospitalization. Wound vac to right chest intact. Patient receives HD treatments at [facility] [place] (MWF 1200). Plan to discharge patient home with [facility] when medically stable. Patient denied the need for assistance with transportation at time of discharge. Patient requested assistance  getting established [place] PCP [place] [id number]. Hospital follow up appointment & appointment to establish care with a new PCP scheduled for the patient with Dr. Joanne Fletcher at the [facility] on 4/3/18 at 1115. Will continue to follow.

## 2018-03-19 NOTE — HPI
42 yo AAM with significant history for htn, DM, CVA, HF EF 30%, hx  Atrial flutter, L AV fistula excision with R fem TDC (rewiring and replacement by VIVI on 2/2/18) on 1/31/18, and  recent chest wall mass bx with wound vac placement 2/27/18 admitted for   dislodgement of R fem TDC while sleeping. Seen by Vascular Surgery on 3/13, will defer possible right arm  AVG until chest wall wound heal. VIVI consulted for permacath.      Nephrology consult for hemodialysis. ESRD on HD x 6 hrs MWF 3:45 duration at [facility]. Last HD 3/16.  kg.

## 2018-03-19 NOTE — PLAN OF CARE
Problem: Patient Care Overview  Goal: Plan of Care Review  Outcome: Ongoing (interventions implemented as appropriate)  Patient AAOx4. Safety maintained. Bed locked in low with 2 side rails up. Call light within reach. Patient refusing telemetry.

## 2018-03-19 NOTE — ASSESSMENT & PLAN NOTE
Diet controlled per patient  Slight elevation on admit 160/93  Will order hydralazine prn  Pt anuric

## 2018-03-19 NOTE — PROGRESS NOTES
[facility] Hospital Medicine  Progress Note    Patient Name: Livan Arevalo  MRN: [id number]  Patient Class: OP- Observation   Admission Date: 3/18/2018  Length of Stay: 0 days  Attending Physician: Eamon Cole MD  Primary Care Provider: Primary Doctor [facility] Medicine Team: [facility] MED F Ritika Carias PA-C    Subjective:     Principal Problem:Displacement of vascular dialysis catheter    HPI:  42 y/o M with PMH ESRD on HD MWF (full session complete 3/16/18) presents due to loss of temporary access.  Pt had urgent excision of infected L AV fistula and R femoral PermCath placement 01/31/2018.  He recently saw Vascular Surgery 03/13/2018 with plan for R AV graft in 4 weeks.  R AV deferred due to R chest wound with wound vac from biopsy of R chest wall mass on 2/27/18.  Pt states he noticed last night while sleeping R femoral line had fallen out.  No bleeding, erythema or pain.  Pt denies fever, chills, CP, SOB, diaphoresis, leg swelling.  Pt does make some urine.    In the ED, istat shows mild hyperkalemia at 5.8.  Pt sats 100% on RA.    Hospital Course:  Pt admitted for loss of temporary HD access line (R femoral).  Nephrology and Nephrology Access Service consulted.  BNP, CBC, Mg, Renal function panel ordered.  Pt placed on renal diet and continued Renvela.  WC consulted as pt due for wound vac change.  Day 2 HD access obtained and pt scheduled for HD.  Pt with episode of hypoglycemia (K shift earlier in the day with insulin).  BG improved with D50.    Interval History: no acute events overnight, no new complaints.  K 6.4 this morning.  Pt going for line placement today via nephrology access then to dialysis this evening.    Review of Systems   Constitutional: Negative for chills, diaphoresis, fatigue, fever and unexpected weight change.   HENT: Negative for congestion, sore throat, trouble swallowing and voice change.    Respiratory: Negative for cough, chest tightness, shortness of  breath and wheezing.    Cardiovascular: Negative for chest pain, palpitations and leg swelling.   Gastrointestinal: Negative for abdominal pain, constipation, diarrhea, nausea and vomiting.   Genitourinary: Negative for difficulty urinating, dysuria, flank pain and frequency.        Anuric   Musculoskeletal: Negative for arthralgias, back pain, myalgias and neck pain.   Skin: Negative for color change, pallor, rash and wound.   Neurological: Negative for dizziness, tremors, syncope, weakness, light-headedness and headaches.   Psychiatric/Behavioral: Negative for agitation, confusion and dysphoric mood. The patient is not nervous/anxious.      Objective:     Vital Signs (Most Recent):  Temp: 98 °F (36.7 °C) (03/19/18 1545)  Pulse: 104 (03/19/18 1700)  Resp: 18 (03/19/18 1700)  BP: (!) 157/90 (03/19/18 1700)  SpO2: 100 % (03/19/18 1416) Vital Signs (24h Range):  Temp:  [97 °F (36.1 °C)-98.9 °F (37.2 °C)] 98 °F (36.7 °C)  Pulse:  [] 104  Resp:  [16-18] 18  SpO2:  [96 %-100 %] 100 %  BP: (122-168)/() 157/90     Weight: 102.1 kg (225 lb)  Body mass index is 37.44 kg/m².  No intake or output data in the 24 hours ending 03/19/18 1737   Physical Exam   Constitutional: He is oriented to person, place, and time. He appears well-developed and well-nourished. No distress.   HENT:   Head: Normocephalic and atraumatic.   Eyes: EOM are normal. Pupils are equal, round, and reactive to light.   Neck: Normal range of motion. Neck supple. No tracheal deviation present. No thyromegaly present.   Cardiovascular: Normal rate and regular rhythm.    Pulmonary/Chest: Effort normal and breath sounds normal.       Abdominal: Soft. Bowel sounds are normal. There is no tenderness.   Musculoskeletal: Normal range of motion. He exhibits no edema or tenderness.   R femoral site C/D, no bleeding   Lymphadenopathy:     He has no cervical adenopathy.   Neurological: He is alert and oriented to person, place, and time. He has normal  reflexes. No cranial nerve deficit.   Skin: Skin is warm and dry. He is not diaphoretic.   Psychiatric: He has a normal mood and affect. His behavior is normal.   Nursing note and vitals reviewed.      Significant Labs: All pertinent labs within the past 24 hours have been reviewed.    Significant Imaging: I have reviewed all pertinent imaging results/findings within the past 24 hours.    Assessment/Plan:      * Displacement of vascular dialysis catheter    R femoral line fell out while pt sleeping  Site C/D/I no bleeding  Pt seen in Vascular Surgery clinic 03/13/18 and plan was to keep temporary line for additional 4 weeks and to then schedule R AV graft.  Deferred due to R chest wound from incisional biopsy.  Nephrology and Nephrology Access Service consulted.  Discussed with staff Dr. Johnson.  3/19 R fem vein permacath placed without complication.  HD this evening.          ESRD on dialysis    Last full HD session 3/16/18  BP slightly elevated on admission  K 5.8 on istat, repeat on CMP 6.0 and pt given kayexalate 30 gm x 2.  Ordered BNP (714), Renal function panel, CBC and Mg  Pt appears euvolemic  Renal Diet  Nephrology consulted: HD after permacath placement.          Delayed surgical wound healing    S/p incisional biopsy R chest wall mass 02/27/18  Last Wound Vac change in clinic 03/15/18  Pt to run vac at 150 mmHg on continuous suciton.  Change vac three times weekly.   consulted.  Return to  clinic in 2-3 weeks.          Hyperkalemia    istat K 5.8->CMP 6.0  On admit given kayexalate 30 x 2  3/19 K 6.4.  K shifted with 10U regular insulin, D50% 25 gm, kayexalate 30, calcium gluconate 1 gm.  Plan for HD this evening with 2K bath.  Pt refusing telemetry this morning.   and Nephrology explained to pt risks of not using telemetry with elevated K.          Chronic systolic heart failure    EF 30% 04/2017  , no signs of volume overload  No need to repeat echo at this time          Essential  hypertension    Diet controlled per patient  Slight elevation on admit 160/93  Will order hydralazine prn  Pt anuric          History of stroke    Unclear why patient is not on statin or asa  Defer to PCP            VTE Risk Mitigation         Ordered     IP VTE LOW RISK PATIENT  Once      03/18/18 9500              Ritika Carias PA-C  Department of Hospital Medicine   [facility]

## 2018-03-19 NOTE — ASSESSMENT & PLAN NOTE
istat K 5.8->CMP 6.0  On admit given kayexalate 30 x 2  3/19 K 6.4.  K shifted with 10U regular insulin, D50% 25 gm, kayexalate 30, calcium gluconate 1 gm.  Plan for HD this evening with 2K bath.  Pt refusing telemetry this morning.  HM and Nephrology explained to pt risks of not using telemetry with elevated K.

## 2018-03-19 NOTE — PROGRESS NOTES
Dr Castano at bedside. Pt more responsive. Sitting up in bed, eating xiomy crackers, drinking apple juice. VSS.

## 2018-03-19 NOTE — CONSULTS
[facility]  Nephrology  Consult Note    Patient Name: Livan Arevalo  MRN: [id number]  Admission Date: 3/18/2018  Hospital Length of Stay: 0 days  Attending Provider: Eamon Cole MD   Primary Care Physician: Primary Doctor No  Principal Problem:Displacement of vascular dialysis catheter    Inpatient consult to Nephrology  Consult performed by: FELECIA GOMEZ  Consult ordered by: DEANA HORVATH  Reason for consult: esrd        Subjective:     HPI: 42 yo AAM with significant history for htn, DM, CVA, HF EF 30%, hx  Atrial flutter, L AV fistula excision with R fem TDC (rewiring and replacement by VIVI on 2/2/18) on 1/31/18, and  recent chest wall mass bx with wound vac placement 2/27/18 admitted for   dislodgement of R fem TDC while sleeping. Seen by Vascular Surgery on 3/13, will defer possible right arm  AVG until chest wall wound heal. VIVI consulted for permacath.      Nephrology consult for hemodialysis. ESRD on HD x 6 hrs MWF 3:45 duration at [facility]. Last HD 3/16.  kg.     Past Medical History:   Diagnosis Date    CHF (congestive heart failure)     ESRD (end stage renal disease)     Hemodialysis access, fistula mature     THRILL AND BRUIT PRESENT    Hemodialysis patient     M-W-F    Hypertension     Myocardial infarction     Renal disorder     Stroke     3 MILD STROKES       Past Surgical History:   Procedure Laterality Date    CHEST WALL BIOPSY      multiple    DIALYSIS FISTULA CREATION         Review of patient's allergies indicates:   Allergen Reactions    Ciprofloxacin Hives    Iodine and iodide containing products Hives and Itching     Pt states he is not allergic to iodine     Current Facility-Administered Medications   Medication Frequency    acetaminophen tablet 650 mg Q4H PRN    furosemide injection 40 mg Once    ondansetron disintegrating tablet 8 mg Q8H PRN    oxyCODONE-acetaminophen  mg per tablet 1 tablet Q6H PRN    polyethylene glycol  packet 17 g Q12H PRN    ramelteon tablet 8 mg Nightly PRN    sevelamer carbonate tablet 1,600 mg TID WM    sodium chloride 0.9% flush 5 mL PRN     Family History     Problem Relation (Age of Onset)    Kidney disease Mother        Social History Main Topics    Smoking status: Never Smoker    Smokeless tobacco: Never Used    Alcohol use No    Drug use: No    Sexual activity: No     Review of Systems   Constitutional: Positive for chills and diaphoresis.   HENT: Negative.    Eyes: Negative.    Respiratory: Negative.    Cardiovascular: Negative.    Gastrointestinal: Negative.    Endocrine: Negative.    Genitourinary:        Anuric   Musculoskeletal: Negative.    Skin: Negative.    Neurological: Negative.    Hematological: Negative.    Psychiatric/Behavioral: Negative.      Objective:     Vital Signs (Most Recent):  Temp: 97 °F (36.1 °C) (03/19/18 0754)  Pulse: 106 (03/19/18 0754)  Resp: 16 (03/19/18 0754)  BP: (!) 146/84 (03/19/18 0754)  SpO2: 98 % (03/19/18 0754)  O2 Device (Oxygen Therapy): room air (03/19/18 0754) Vital Signs (24h Range):  Temp:  [96.8 °F (36 °C)-98.9 °F (37.2 °C)] 97 °F (36.1 °C)  Pulse:  [] 106  Resp:  [16-18] 16  SpO2:  [96 %-100 %] 98 %  BP: (146-168)/() 146/84     Weight: 102.1 kg (225 lb) (03/18/18 1607)  Body mass index is 37.44 kg/m².  Body surface area is 2.16 meters squared.    No intake/output data recorded.    Physical Exam   Constitutional: He is oriented to person, place, and time. He appears well-developed and well-nourished. No distress.   HENT:   Head: Atraumatic.   Eyes: EOM are normal.   Neck: Neck supple.   Cardiovascular: Regular rhythm and normal heart sounds.    Pulmonary/Chest: Effort normal and breath sounds normal. No respiratory distress.   Abdominal: Soft. Bowel sounds are normal.   Musculoskeletal: Normal range of motion.   Neurological: He is alert and oriented to person, place, and time.   Skin: Skin is warm and dry.   Rt chest wound vac.  DESTINEY old  excision of AVF site healed        Significant Labs:  All labs within the past 24 hours have been reviewed.    Significant Imaging:  Labs: Reviewed    Assessment/Plan:     History of stroke     ESRD on HD x 6 hrs MWF 3:45 duration at [facility] [place] [facility]. Last HD 3/16.  kg. Very minimal less than 1 cup a day.   -K 6.4 this am. . Denies chest shortness of breath or chest pain . Refusing telemetry.    -Plan to wait for chest wall to heal then possible placement of right arm graft by Vascular Surgery.    -Consult VIVI for fem vein permacath.   -Given calcium gluconate, D50, insulin, kaexyalate, EKG.   -Plan for HD after permacath placement. 2 K bath.      BMD  Lab Results   Component Value Date     (H) 02/10/2012    CALCIUM 9.7 03/19/2018    PHOS 9.0 (HH) 03/19/2018   -Continue renvela.  -cCa 10.5. Avoid any Ca based supplements or Ca containing binders.     Anemia of CKD  -Hgb 9.3. \  -Likely receiving MICHAELA outpatient. No iron. Will obtain outpatient HD flowsheet.                Thank you for your consult. I will follow-up with patient. Please contact us if you have any additional questions.    Jami White NP  Nephrology  [facility]      I have reviewed and concur with the NP's history, physical, assessment, and plan. I have personally interviewed and examined the patient at bedside.

## 2018-03-19 NOTE — PROVIDER TRANSFER
10 units of insulin administered. #24 gauge RAC IV blew during flushing. 24 g glucose tablets administered. New #22 gauge IV placed in R upper arm. Calcium gluconate 1 g hung and infusing.

## 2018-03-19 NOTE — PROGRESS NOTES
D5 given per catheter. HD treatment started. No complications with access to right groin catheter. Lines secured and telemetry in place. No complaints of discomfort at this time.

## 2018-03-19 NOTE — PROGRESS NOTES
Spoke to Ritika NARANJO concerning situation detailed in previous note. Stated she would take a look at the labs and place orders.

## 2018-03-19 NOTE — SUBJECTIVE & OBJECTIVE
Past Medical History:   Diagnosis Date    CHF (congestive heart failure)     ESRD (end stage renal disease)     Hemodialysis access, fistula mature     THRILL AND BRUIT PRESENT    Hemodialysis patient     M-W-F    Hypertension     Myocardial infarction     Renal disorder     Stroke     3 MILD STROKES       Past Surgical History:   Procedure Laterality Date    CHEST WALL BIOPSY      multiple    DIALYSIS FISTULA CREATION         Review of patient's allergies indicates:   Allergen Reactions    Ciprofloxacin Hives    Iodine and iodide containing products Hives and Itching     Pt states he is not allergic to iodine     Current Facility-Administered Medications   Medication Frequency    acetaminophen tablet 650 mg Q4H PRN    furosemide injection 40 mg Once    ondansetron disintegrating tablet 8 mg Q8H PRN    oxyCODONE-acetaminophen  mg per tablet 1 tablet Q6H PRN    polyethylene glycol packet 17 g Q12H PRN    ramelteon tablet 8 mg Nightly PRN    sevelamer carbonate tablet 1,600 mg TID WM    sodium chloride 0.9% flush 5 mL PRN     Family History     Problem Relation (Age of Onset)    Kidney disease Mother        Social History Main Topics    Smoking status: Never Smoker    Smokeless tobacco: Never Used    Alcohol use No    Drug use: No    Sexual activity: No     Review of Systems   Constitutional: Positive for chills and diaphoresis.   HENT: Negative.    Eyes: Negative.    Respiratory: Negative.    Cardiovascular: Negative.    Gastrointestinal: Negative.    Endocrine: Negative.    Genitourinary:        Anuric   Musculoskeletal: Negative.    Skin: Negative.    Neurological: Negative.    Hematological: Negative.    Psychiatric/Behavioral: Negative.      Objective:     Vital Signs (Most Recent):  Temp: 97 °F (36.1 °C) (03/19/18 0754)  Pulse: 106 (03/19/18 0754)  Resp: 16 (03/19/18 0754)  BP: (!) 146/84 (03/19/18 0754)  SpO2: 98 % (03/19/18 0754)  O2 Device (Oxygen Therapy): room air (03/19/18  0754) Vital Signs (24h Range):  Temp:  [96.8 °F (36 °C)-98.9 °F (37.2 °C)] 97 °F (36.1 °C)  Pulse:  [] 106  Resp:  [16-18] 16  SpO2:  [96 %-100 %] 98 %  BP: (146-168)/() 146/84     Weight: 102.1 kg (225 lb) (03/18/18 1607)  Body mass index is 37.44 kg/m².  Body surface area is 2.16 meters squared.    No intake/output data recorded.    Physical Exam   Constitutional: He is oriented to person, place, and time. He appears well-developed and well-nourished. No distress.   HENT:   Head: Atraumatic.   Eyes: EOM are normal.   Neck: Neck supple.   Cardiovascular: Regular rhythm and normal heart sounds.    Pulmonary/Chest: Effort normal and breath sounds normal. No respiratory distress.   Abdominal: Soft. Bowel sounds are normal.   Musculoskeletal: Normal range of motion.   Neurological: He is alert and oriented to person, place, and time.   Skin: Skin is warm and dry.   Rt chest wound vac.  DESTINEY old excision of AVF site healed        Significant Labs:  All labs within the past 24 hours have been reviewed.    Significant Imaging:  Labs: Reviewed

## 2018-03-19 NOTE — ASSESSMENT & PLAN NOTE
ESRD on HD x 6 hrs MWF 3:45 duration at [facility] [place] [place] [facility]. Last HD 3/16.  kg. Very minimal less than 1 cup a day.   -K 6.4 this am. . Denies chest shortness of breath or chest pain . Refusing telemetry.    -Plan to wait for chest wall to heal then possible placement of right arm graft by Vascular Surgery.    -Consult VIVI for fem vein permacath.   -Given calcium gluconate, D50, insulin, kaexyalate, EKG.   -Plan for HD after permacath placement. 2 K bath.      BMD  Lab Results   Component Value Date     (H) 02/10/2012    CALCIUM 9.7 03/19/2018    PHOS 9.0 (HH) 03/19/2018   -Continue renvela.  -cCa 10.5. Avoid any Ca based supplements or Ca containing binders.     Anemia of CKD  -Hgb 9.3. \  -Likely receiving MICHAELA outpatient. No iron. Will obtain outpatient HD flowsheet.

## 2018-03-19 NOTE — PLAN OF CARE
DARNELL informed Divine with [facility] that the pt is in the hospital but will dc today.  Pt is Medicare and Obs so he does not need new orders.      Yoana Tenorio, [facility] [phone]

## 2018-03-19 NOTE — PLAN OF CARE
Problem: Patient Care Overview  Goal: Plan of Care Review  Outcome: Ongoing (interventions implemented as appropriate)  Pt continues to progress towards goals this shift; Safety management for increased fall risk;Pt moved to OBS 4 for bathroom convenience due to medication regime. Rt groin vascular access site without swelling, tenderness or bleeding, dressing remains dry & intact to area. Pt with continuous wound vac at 175mm suction to LT chest wall note small amount serosanguineous drainage in tubing. Pt maintains IV access saline locked to 24g to RT Ac. Pt NPO after midnight.

## 2018-03-19 NOTE — ASSESSMENT & PLAN NOTE
R femoral line fell out while pt sleeping  Site C/D/I no bleeding  Pt seen in Vascular Surgery clinic 03/13/18 and plan was to keep temporary line for additional 4 weeks and to then schedule R AV graft.  Deferred due to R chest wound from incisional biopsy.  Nephrology and Nephrology Access Service consulted.  Discussed with staff Dr. Johnson.  3/19 R fem vein permacath placed without complication.  HD this evening.

## 2018-03-19 NOTE — ASSESSMENT & PLAN NOTE
Last full HD session 3/16/18  BP slightly elevated on admission  K 5.8 on istat, repeat on CMP 6.0 and pt given kayexalate 30 gm x 2.  Ordered BNP (714), Renal function panel, CBC and Mg  Pt appears euvolemic  Renal Diet  Nephrology consulted: HD after permacath placement.

## 2018-03-19 NOTE — PROGRESS NOTES
Critical results received from lab of K+6.4 and Phos 9.0. Called nephrology access concerning patient's new dialysis line placement. Ginger stated that she is going to speak with Dr. Castano concerning a time for the procedure. I called dialysis and spoke to Jen who stated that they have an afternoon time slot assigned to the patient. On call for IMF paged to make them aware of the situation.

## 2018-03-19 NOTE — SUBJECTIVE & OBJECTIVE
Interval History: no acute events overnight, no new complaints.  K 6.4 this morning.  Pt going for line placement today via nephrology access then to dialysis this evening.    Review of Systems   Constitutional: Negative for chills, diaphoresis, fatigue, fever and unexpected weight change.   HENT: Negative for congestion, sore throat, trouble swallowing and voice change.    Respiratory: Negative for cough, chest tightness, shortness of breath and wheezing.    Cardiovascular: Negative for chest pain, palpitations and leg swelling.   Gastrointestinal: Negative for abdominal pain, constipation, diarrhea, nausea and vomiting.   Genitourinary: Negative for difficulty urinating, dysuria, flank pain and frequency.        Anuric   Musculoskeletal: Negative for arthralgias, back pain, myalgias and neck pain.   Skin: Negative for color change, pallor, rash and wound.   Neurological: Negative for dizziness, tremors, syncope, weakness, light-headedness and headaches.   Psychiatric/Behavioral: Negative for agitation, confusion and dysphoric mood. The patient is not nervous/anxious.      Objective:     Vital Signs (Most Recent):  Temp: 98 °F (36.7 °C) (03/19/18 1545)  Pulse: 104 (03/19/18 1700)  Resp: 18 (03/19/18 1700)  BP: (!) 157/90 (03/19/18 1700)  SpO2: 100 % (03/19/18 1416) Vital Signs (24h Range):  Temp:  [97 °F (36.1 °C)-98.9 °F (37.2 °C)] 98 °F (36.7 °C)  Pulse:  [] 104  Resp:  [16-18] 18  SpO2:  [96 %-100 %] 100 %  BP: (122-168)/() 157/90     Weight: 102.1 kg (225 lb)  Body mass index is 37.44 kg/m².  No intake or output data in the 24 hours ending 03/19/18 1737   Physical Exam   Constitutional: He is oriented to person, place, and time. He appears well-developed and well-nourished. No distress.   HENT:   Head: Normocephalic and atraumatic.   Eyes: EOM are normal. Pupils are equal, round, and reactive to light.   Neck: Normal range of motion. Neck supple. No tracheal deviation present. No thyromegaly  present.   Cardiovascular: Normal rate and regular rhythm.    Pulmonary/Chest: Effort normal and breath sounds normal.       Abdominal: Soft. Bowel sounds are normal. There is no tenderness.   Musculoskeletal: Normal range of motion. He exhibits no edema or tenderness.   R femoral site C/D, no bleeding   Lymphadenopathy:     He has no cervical adenopathy.   Neurological: He is alert and oriented to person, place, and time. He has normal reflexes. No cranial nerve deficit.   Skin: Skin is warm and dry. He is not diaphoretic.   Psychiatric: He has a normal mood and affect. His behavior is normal.   Nursing note and vitals reviewed.      Significant Labs: All pertinent labs within the past 24 hours have been reviewed.    Significant Imaging: I have reviewed all pertinent imaging results/findings within the past 24 hours.

## 2018-03-20 VITALS
OXYGEN SATURATION: 99 % | RESPIRATION RATE: 16 BRPM | HEART RATE: 80 BPM | HEIGHT: 65 IN | DIASTOLIC BLOOD PRESSURE: 83 MMHG | BODY MASS INDEX: 37.49 KG/M2 | WEIGHT: 225 LBS | SYSTOLIC BLOOD PRESSURE: 167 MMHG | TEMPERATURE: 96 F

## 2018-03-20 PROBLEM — E87.5 HYPERKALEMIA: Status: RESOLVED | Noted: 2017-09-11 | Resolved: 2018-03-20

## 2018-03-20 LAB
ALBUMIN SERPL BCP-MCNC: 2.7 G/DL
ANION GAP SERPL CALC-SCNC: 11 MMOL/L
BASOPHILS # BLD AUTO: 0.04 K/UL
BASOPHILS NFR BLD: 0.7 %
BUN SERPL-MCNC: 49 MG/DL
CALCIUM SERPL-MCNC: 9.5 MG/DL
CHLORIDE SERPL-SCNC: 99 MMOL/L
CO2 SERPL-SCNC: 25 MMOL/L
CREAT SERPL-MCNC: 9.2 MG/DL
DIFFERENTIAL METHOD: ABNORMAL
EOSINOPHIL # BLD AUTO: 0.1 K/UL
EOSINOPHIL NFR BLD: 1.1 %
ERYTHROCYTE [DISTWIDTH] IN BLOOD BY AUTOMATED COUNT: 16.9 %
EST. GFR  (AFRICAN AMERICAN): 7.4 ML/MIN/1.73 M^2
EST. GFR  (NON AFRICAN AMERICAN): 6.4 ML/MIN/1.73 M^2
GLUCOSE SERPL-MCNC: 101 MG/DL
HCT VFR BLD AUTO: 29 %
HGB BLD-MCNC: 9.3 G/DL
IMM GRANULOCYTES # BLD AUTO: 0.01 K/UL
IMM GRANULOCYTES NFR BLD AUTO: 0.2 %
LYMPHOCYTES # BLD AUTO: 1 K/UL
LYMPHOCYTES NFR BLD: 17.7 %
MAGNESIUM SERPL-MCNC: 2.1 MG/DL
MCH RBC QN AUTO: 28.4 PG
MCHC RBC AUTO-ENTMCNC: 32.1 G/DL
MCV RBC AUTO: 88 FL
MONOCYTES # BLD AUTO: 0.7 K/UL
MONOCYTES NFR BLD: 12.9 %
NEUTROPHILS # BLD AUTO: 3.6 K/UL
NEUTROPHILS NFR BLD: 67.4 %
NRBC BLD-RTO: 0 /100 WBC
PHOSPHATE SERPL-MCNC: 8.3 MG/DL
PLATELET # BLD AUTO: 136 K/UL
PMV BLD AUTO: 11.2 FL
POCT GLUCOSE: 100 MG/DL (ref 70–110)
POTASSIUM SERPL-SCNC: 5.1 MMOL/L
RBC # BLD AUTO: 3.28 M/UL
SODIUM SERPL-SCNC: 135 MMOL/L
WBC # BLD AUTO: 5.36 K/UL

## 2018-03-20 PROCEDURE — 85025 COMPLETE CBC W/AUTO DIFF WBC: CPT | Mod: ET

## 2018-03-20 PROCEDURE — 99217 PR OBSERVATION CARE DISCHARGE: CPT | Mod: ,,, | Performed by: PHYSICIAN ASSISTANT

## 2018-03-20 PROCEDURE — 83735 ASSAY OF MAGNESIUM: CPT

## 2018-03-20 PROCEDURE — G0378 HOSPITAL OBSERVATION PER HR: HCPCS

## 2018-03-20 PROCEDURE — 80069 RENAL FUNCTION PANEL: CPT

## 2018-03-20 PROCEDURE — 82962 GLUCOSE BLOOD TEST: CPT

## 2018-03-20 PROCEDURE — 94761 N-INVAS EAR/PLS OXIMETRY MLT: CPT

## 2018-03-20 PROCEDURE — 36415 COLL VENOUS BLD VENIPUNCTURE: CPT

## 2018-03-20 PROCEDURE — 25000003 PHARM REV CODE 250: Performed by: PHYSICIAN ASSISTANT

## 2018-03-20 RX ADMIN — SEVELAMER CARBONATE 1600 MG: 800 TABLET, FILM COATED ORAL at 08:03

## 2018-03-20 NOTE — ASSESSMENT & PLAN NOTE
R femoral line fell out while pt sleeping  - Site C/D/I no bleeding  - Pt seen in Vascular Surgery clinic 03/13/18 and plan was to keep temporary line for additional 4 weeks and to then schedule R AV graft. Deferred due to R chest wound from incisional biopsy.  - Nephrology and Nephrology Access Service consulted  - R fem vein permacath placed without complication and HD completed 3/19

## 2018-03-20 NOTE — PLAN OF CARE
Problem: Patient Care Overview  Goal: Plan of Care Review  Outcome: Ongoing (interventions implemented as appropriate)  Patient AAOx4. Safety maintained. Bed locked in low with 2 side rails up. Call light within reach. No complaints of pain. RCW wound dressing reinforced. Wound Care RN notified that patient needs a dressing change. Stated that someone would be by to see the patient today.

## 2018-03-20 NOTE — PROGRESS NOTES
Discharge instructions provided to and gone over with patient. Understanding verbalized. IV removed, tip intact. Patient waiting on transport.

## 2018-03-20 NOTE — PLAN OF CARE
DARNELL informed [facility] that the pt will dc today. He did not dc yesterday.    Yoana Tenorio, [facility] x [phone]

## 2018-03-20 NOTE — DISCHARGE SUMMARY
[facility] Hospital Medicine  Discharge Summary      Patient Name: Livan Arevalo  MRN: [id number]  Admission Date: 3/18/2018  Hospital Length of Stay: 0 days  Discharge Date and Time: 3/20/2018  1:40 PM  Attending Physician: Jessica att. providers found   Discharging Provider: Chanel Parnell PA-C  Primary Care Provider: Primary Doctor Jessica  [facility] Medicine Team: [facility] Chanel Parnell PA-C    HPI:   44 y/o M with PMH ESRD on HD MWF (full session complete 3/16/18) presents due to loss of temporary access.  Pt had urgent excision of infected L AV fistula and R femoral PermCath placement 01/31/2018.  He recently saw Vascular Surgery 03/13/2018 with plan for R AV graft in 4 weeks.  R AV deferred due to R chest wound with wound vac from biopsy of R chest wall mass on 2/27/18.  Pt states he noticed last night while sleeping R femoral line had fallen out.  No bleeding, erythema or pain.  Pt denies fever, chills, CP, SOB, diaphoresis, leg swelling.  Pt does make some urine.    In the ED, istat shows mild hyperkalemia at 5.8.  Pt sats 100% on RA.    Procedure(s) (LRB):  PERMCATH INSERTION-TUNNELED CVC (N/A)      Hospital Course:   Pt admitted for loss of temporary HD access line (R femoral). Nephrology and Nephrology Access Service consulted. WC consulted as pt due for wound vac change. Day 2 HD access obtained and HD completed. Pt with episode of hypoglycemia (K shift earlier in the day with insulin). BGs stable overnight. Pt discharged home to resume HH/wound care.     Consults:   Consults         Status Ordering Provider     Inpatient consult to Nephrology  Once     Provider:  (Not yet assigned)    Completed DEANA HORVATH          * Displacement of vascular dialysis catheter    R femoral line fell out while pt sleeping  - Site C/D/I no bleeding  - Pt seen in Vascular Surgery clinic 03/13/18 and plan was to keep temporary line for additional 4 weeks and to then schedule R AV graft. Deferred due to R  chest wound from incisional biopsy.  - Nephrology and Nephrology Access Service consulted  - R fem vein permacath placed without complication and HD completed 3/19        ESRD on dialysis    Last full HD session 3/16/18  - BP slightly elevated on admission  - K 5.8 on istat, repeat on CMP 6.0 and pt given kayexalate 30 gm x 2.  - Nephrology consulted: HD after permacath placement 3/19  3/20: K WNL        Delayed surgical wound healing    S/p incisional biopsy R chest wall mass 02/27/18  Last Wound Vac change in clinic 03/15/18  - Pt to run vac at 150 mmHg on continuous suciton.  - Change vac three times weekly. WC consulted.  - Return to WC clinic in 2-3 weeks.  - Discharged to resume HH/wound care        Essential hypertension    - Diet controlled per patient  - BP stable  - PCP f/u as outpatient        Chronic systolic heart failure    - EF 30% 04/2017  -   - No s/s of acute exacerbation         History of stroke    - Unclear why patient is not on statin or asa  - Defer to PCP          Final Active Diagnoses:    Diagnosis Date Noted POA    PRINCIPAL PROBLEM:  Displacement of vascular dialysis catheter [T82.42XA] 03/18/2018 Yes    ESRD on dialysis [N18.6, Z99.2] 03/18/2014 Not Applicable     Chronic    Delayed surgical wound healing [T81.89XA] 03/15/2018 Yes    Essential hypertension [I10] 03/18/2014 Yes     Chronic    Chronic systolic heart failure [I50.22] 04/24/2017 Yes     Chronic    History of stroke [Z86.73] 04/28/2017 Not Applicable     Chronic      Problems Resolved During this Admission:    Diagnosis Date Noted Date Resolved POA    Hyperkalemia [E87.5] 09/11/2017 03/20/2018 Yes       Discharged Condition: stable    Disposition: Home or Self Care    Follow Up:  Follow-up Information     [facility].    Specialty:  Home Health Services  Why:  Home Health: The nurse will see the patient tomorrow.  Contact information:  [address]  [address]  [place]  [address]  [phone]             [facility] On 4/3/2018.    Why:  at 11:15 am; arrive 30 minutes early & bring ID, insurance cared, & proof of income  Contact information:  [address] [facility] [address]  [phone]                 Patient Instructions:     Activity as tolerated         Pending Diagnostic Studies:     None         Medications:  Reconciled Home Medications:   Discharge Medication List as of 3/20/2018  1:12 PM      CONTINUE these medications which have NOT CHANGED    Details   docusate sodium (COLACE) 100 MG capsule Take 1 capsule (100 mg total) by mouth 2 (two) times daily., Starting Tue 2/27/2018, Print      lanthanum (FOSRENOL) 1000 MG chewable tablet Starting Fri 2/23/2018, Historical Med      oxyCODONE-acetaminophen (PERCOCET)  mg per tablet Take 1 tablet by mouth every 6 (six) hours as needed for Pain (Take for severe pain)., Starting Tue 2/27/2018, Print      sevelamer carbonate (RENVELA) 800 mg Tab Take 2 tablets (1,600 mg total) by mouth 3 (three) times daily with meals. Contact Nephrologist for refills, Starting Sat 2/3/2018, Until Sun 2/3/2019, Print         STOP taking these medications       VANCOMYCIN HCL (VANCOMYCIN 1 G/250 ML NS, READY TO MIX SYSTEM,) Comments:   Reason for Stopping:               Indwelling Lines/Drains at time of discharge:   Lines/Drains/Airways     Central Venous Catheter Line                 Percutaneous Central Line Insertion/Assessment - double lumen  03/19/18 1450 right femoral 1 day          Pressure Ulcer                 Negative Pressure Wound Therapy  -- days                Time spent on the discharge of patient: 30 minutes  Patient was seen and examined on the date of discharge and determined to be suitable for discharge.         Chanel Parnell PA-C  Department of Hospital Medicine  [facility]  Discussed with staff: Dr. Benítez

## 2018-03-20 NOTE — ASSESSMENT & PLAN NOTE
S/p incisional biopsy R chest wall mass 02/27/18  Last Wound Vac change in clinic 03/15/18  - Pt to run vac at 150 mmHg on continuous suciton.  - Change vac three times weekly. WC consulted.  - Return to  clinic in 2-3 weeks.  - Discharged to resume HH/wound care

## 2018-03-20 NOTE — PLAN OF CARE
Problem: Patient Care Overview  Goal: Plan of Care Review  Outcome: Ongoing (interventions implemented as appropriate)  Pt continues to progress towards goals this shift. Safety management for injury risk ongoing; Ongoing assessment of vascular access site post insertion to RT groin area; Continuous assess of pt hemodynamic status and report changes to MD

## 2018-03-20 NOTE — PROGRESS NOTES
Jasmine with wound care stated that the patient's wound vac is not compatible with the dressings available here. She stated she placed a wet to dry dressing over the wound and that the home health nurse will place a new wound vac dressing when they see the patient tomorrow.

## 2018-03-20 NOTE — PLAN OF CARE
03/20/18 1521   Final Note   Assessment Type Final Discharge Note     Patient discharged home with [facility] 3/20/18. Discharge summary faxed to Dr. Daija Fletcher (PCP) f [phone].

## 2018-03-20 NOTE — PLAN OF CARE
Problem: Patient Care Overview  Goal: Plan of Care Review  Outcome: Ongoing (interventions implemented as appropriate)  HD treatment complete. Duration of treatment 2 hours and 15 minutes and 1.5 L removed. System clotted with 15 minutes left of treatment. Treatment was tolerated well and no complications with access to right groin catheter. Catheter flushed with NS and locked with heparin. Capped and taped. BS at end of treatment 115.

## 2018-03-20 NOTE — ASSESSMENT & PLAN NOTE
Last full HD session 3/16/18  - BP slightly elevated on admission  - K 5.8 on istat, repeat on CMP 6.0 and pt given kayexalate 30 gm x 2.  - Nephrology consulted: HD after permacath placement 3/19  3/20: JOHNNY NIEVES

## 2018-03-21 NOTE — CONSULTS
Patient seen for wound vac change. Patient has home NPWT in place but not [employer] wound vac. His pump has no battery life left, has no  and no supplies for dressing change. These are not available on the inpatient side. Removed NPWT dressing and applied wet to damp. Discussed with patient and home health to reapply tomorrow after he returns home. Discussed with nurse as well and additional supplies were given to patient. Nursing to continue care.

## 2018-03-28 LAB
FUNGUS SPEC CULT: NORMAL

## 2018-04-10 ENCOUNTER — TELEPHONE (OUTPATIENT)
Dept: WOUND CARE | Facility: CLINIC | Age: 43
End: 2018-04-10

## 2018-05-01 LAB
ACID FAST MOD KINY STN SPEC: NORMAL
MYCOBACTERIUM SPEC QL CULT: NORMAL

## 2018-07-23 NOTE — ED PROVIDER NOTES
Encounter Date: 9/11/2017       History     Chief Complaint   Patient presents with    Diarrhea     Pt presented to the ED via EMS. Pt c/o diarrhea and weakness x 3 days. Pt alert.     42 y.o. male with medical history of HTN, ESRD on HD MWF presents to ED with fatigue. Patient admitted multiple times in past with similar complaint. Patient endorses n/v/d. Reports approximately 6 episodes of diarrhea and 5 episodes of vomiting. Patient states his brother in law called EMS because patient was lethargic and had a low blood sugar. Patient also missed dialysis today. Denies fever, chills, chest pain, shortness of breath, abdominal pain, syncope, diaphoresis, changes in urination, URI symptoms, headache.           Review of patient's allergies indicates:   Allergen Reactions    Ciprofloxacin Hives    Iodine and iodide containing products Hives and Itching     Past Medical History:   Diagnosis Date    ESRD (end stage renal disease)     Hypertension     Renal disorder      Past Surgical History:   Procedure Laterality Date    DIALYSIS FISTULA CREATION       Family History   Problem Relation Age of Onset    Kidney disease Mother      Social History   Substance Use Topics    Smoking status: Never Smoker    Smokeless tobacco: Not on file    Alcohol use No     Review of Systems   Constitutional: Positive for fatigue. Negative for diaphoresis and fever.   HENT: Negative for nosebleeds.    Eyes: Negative for visual disturbance.   Respiratory: Negative for cough and shortness of breath.    Cardiovascular: Negative for chest pain and leg swelling.   Gastrointestinal: Positive for diarrhea, nausea and vomiting. Negative for abdominal distention.   Genitourinary: Negative for dysuria and hematuria.   Musculoskeletal: Negative for neck pain.   Skin: Negative for rash.   Neurological: Negative for syncope, weakness and headaches.   Psychiatric/Behavioral: The patient is not nervous/anxious.        Physical Exam     Initial  Vitals [09/11/17 1312]   BP Pulse Resp Temp SpO2   102/68 105 18 98.3 °F (36.8 °C) 98 %      MAP       79.33         Physical Exam    Vitals reviewed.  Constitutional: Vital signs are normal. He appears well-developed and well-nourished. He is not diaphoretic. No distress.   HENT:   Head: Normocephalic and atraumatic.   Nose: Nose normal.   Mouth/Throat: Mucous membranes are dry.   Eyes: Conjunctivae, EOM and lids are normal. Pupils are equal, round, and reactive to light. Lids are everted and swept, no foreign bodies found.   Neck: Trachea normal and normal range of motion. Neck supple.   Cardiovascular: Normal rate, regular rhythm, intact distal pulses and normal pulses.   No murmur heard.  Pulmonary/Chest: He has no wheezes. He has no rhonchi. He has rales.   Abdominal: Soft. Normal appearance and bowel sounds are normal. He exhibits no distension. There is no tenderness. There is no rebound and no guarding.   Musculoskeletal: Normal range of motion.   Neurological: He is alert and oriented to person, place, and time. He has normal strength. No cranial nerve deficit or sensory deficit.   Skin: Skin is warm and dry. Capillary refill takes less than 2 seconds. No rash noted. No cyanosis.   Psychiatric: He has a normal mood and affect.         ED Course   Procedures  Labs Reviewed   CBC W/ AUTO DIFFERENTIAL - Abnormal; Notable for the following:        Result Value    WBC 15.38 (*)     RBC 3.64 (*)     Hemoglobin 10.3 (*)     Hematocrit 30.3 (*)     RDW 18.1 (*)     Gran # 13.1 (*)     Mono # 1.1 (*)     Gran% 85.2 (*)     Lymph% 7.5 (*)     All other components within normal limits   HEPATIC FUNCTION PANEL - Abnormal; Notable for the following:     Total Protein 9.2 (*)     Albumin 2.7 (*)     Total Bilirubin 5.2 (*)     Bilirubin, Direct 3.8 (*)     Alkaline Phosphatase 364 (*)     All other components within normal limits   COMPREHENSIVE METABOLIC PANEL - Abnormal; Notable for the following:     Potassium 5.9 (*)      Chloride 93 (*)     Glucose 40 (*)     BUN, Bld 55 (*)     Creatinine 10.6 (*)     Total Protein 9.2 (*)     Albumin 2.7 (*)     Total Bilirubin 5.2 (*)     Alkaline Phosphatase 364 (*)     Anion Gap 20 (*)     eGFR if  6.2 (*)     eGFR if non  5.4 (*)     All other components within normal limits    Narrative:     ADD ON CMP PER DR. BREN GIRON AT  17:52  09/11/2017    ISTAT PROCEDURE - Abnormal; Notable for the following:     POC Glucose 50 (*)     POC BUN 62 (*)     POC Creatinine 10.9 (*)     POC Potassium 5.7 (*)     POC TCO2 (MEASURED) 30 (*)     POC Ionized Calcium 1.04 (*)     POC Hematocrit 35 (*)     All other components within normal limits   POCT GLUCOSE - Abnormal; Notable for the following:     POCT Glucose 63 (*)     All other components within normal limits   POCT GLUCOSE - Abnormal; Notable for the following:     POCT Glucose 123 (*)     All other components within normal limits   COMPREHENSIVE METABOLIC PANEL   ISTAT CHEM8        Imaging Results          X-Ray Chest PA And Lateral (Final result)  Result time 09/11/17 19:18:28    Final result by Tramaine Elmore MD (09/11/17 19:18:28)                 Impression:     Cardiomegaly with new bilateral interstitial and alveolar opacities, favored to represent pulmonary edema. Pneumonia or alveolar hemorrhage could have a similar appearance.      Electronically signed by: Tramaine Elmore  Date:     09/11/17  Time:    19:18              Narrative:    EXAM:  2 VIEW CHEST RADIOGRAPH.     CLINICAL INDICATION:  Hyperkalemia.    COMPARISON: Chest radiograph, 05/14/2017.    TECHNIQUE:  PA and lateral views of the chest were obtained.     FINDINGS: Cardiac silhouette is enlarged and there is central vascular congestion.  New interstitial and alveolar airspace disease, right side greater than left. Calcified mass again projects over the right lung apex.  Suspect bilateral effusions.                                 Medical  Decision Making:   History:   Old Medical Records: I decided to obtain old medical records.  Old Records Summarized: records from clinic visits.  Clinical Tests:   Lab Tests: Ordered and Reviewed  Medical Tests: Ordered and Reviewed       APC / Resident Notes:   42 y.o. male with medical history of HTN, ESRD on HD MWF presents to ED with fatigue.    DDX includes but is not limited to hyperkalemia, hypoglycemia, need for HD, gastroenteritis, fatigue. Intake did initial basic labs. Will add CXR, EKG and consult nephrology. Potassium 5.7. Glucose 63.CXR pending.  Will give bicarb 100mg, D50 and admit to IM.     I have discussed and reviewed with my supervising physician.              ED Course      Clinical Impression:   The primary encounter diagnosis was Hyperkalemia. Diagnoses of Diarrhea and Need for acute hemodialysis were also pertinent to this visit.    Disposition:   Disposition: Placed in Observation  Condition: Stable                        Sneha Cárdenas PA-C  09/11/17 0528     WDL

## 2018-12-31 NOTE — PROCEDURES
DATE OF PROCEDURE: 3/19/18    PROCEDURE TYPE: Placement of right femoral vein tunneled dialysis catheter.     INDICATIONS: Hemodilaysis    Operators  Shaheen Castano. MD Jeremiah Seals. MD ( Fellow)        PROCEDURE NOTE: After informed consent was obtained, the area of   right femoral vein and right thigh was sterilely prepped and draped in   the usual fashion. Ultrasound was used to interrogate the right femoral vein   which was found to be patent. Photodocumentation was obtained.   Anesthesia was then obtained with 2% lidocaine with epinephrine and using   ultrasound guidance, the right  Femoral vein was successfully   cannulated. A 4-Prydeinig sheath was then established and a angled glide wire was advanced to   the superior vena cava using fluoroscopic guidance. Manometry pressures were consistent with venous system. A suitable area on the right thigh  was then selected; anesthesia again obtained with 2% lidocaine with epinephrine and an exit site and tunnel was then constructed. A 44   cm palindrome retro garde  catheter was then tunneled in place. The intravascular portion was placed via a tear-away sheath. The tip of the catheter was confirmed to be in the center of the right atrium via fluoroscopy. There was excellent aspiration and flush through both ports. The venotomy site was then closed with 3-0 Vicryl in a subcuticular stitch and the exit site was closed with 3-0 Prolene in a wrapped   stitch. He  tolerated the procedure well. There were no immediate   complications. Estimated blood loss was less than 5 mL. Total sedation given   was 1 mg of Versed and 50 mcg of fentanyl.  He was then discharged   from the [facility] in stable condition.     Conscious sedation was achieved with 50 mcg of Fentanyl and 1 mg of Midazolam (Versed). Local anesthesia was achieved with 20 ml of Lidocaine 2%. Moderate conscious sedation was performed and cardiorespiratory functions  were monitored the entire procedure by me and , RN. Sedation began at 1420  pm and concluded at 1440 m   S/p outpatient EGD (8/2017) that revealed Gastritis, a small hiatal hernia, a markedly patulous LES, but no strictures or narrowing. Has been doing better with improvement in dysphagia symptoms on PPI daily.   - Continue home PPI  - Aspiration precautions  - Regular with Nectar Thick Liquids   - Positioning: seated upright in chair with lateral view projection (per Speech) - Continue home PPI  - Aspiration precautions  - Regular with Nectar Thick Liquids   - Positioning: seated upright in chair with lateral view projection (per Speech)

## 2019-12-03 NOTE — PROGRESS NOTES
Livan Arevalo is a 42 y.o. male patient.    Follow for ESRD, dialysis    Patient seen while on dialysis  No new c/o, comfortable    Scheduled Meds:   sodium chloride 0.9%   Intravenous Once    aspirin  81 mg Oral Daily    atorvastatin  40 mg Oral Daily    carvedilol  25 mg Oral BID WM    heparin (porcine)  5,000 Units Subcutaneous Q8H    vitamin renal formula (B-complex-vitamin c-folic acid)  1 capsule Oral Daily       Review of patient's allergies indicates:   Allergen Reactions    Ciprofloxacin Hives    Iodine and iodide containing products Hives and Itching         Vital Signs Range (Last 24H):  Temp:  [97.5 °F (36.4 °C)-98.3 °F (36.8 °C)]   Pulse:  []   Resp:  [18-19]   BP: (105-117)/(55-79)   SpO2:  [95 %-99 %]     I & O (Last 24H):  Intake/Output Summary (Last 24 hours) at 04/28/17 1010  Last data filed at 04/27/17 1800   Gross per 24 hour   Intake              360 ml   Output                0 ml   Net              360 ml           Physical Exam:  General appearance: well developed, well nourished, no distress  Lungs:  clear to auscultation bilaterally and normal respiratory effort  Heart: regular rate and rhythm  Abdomen: soft, non-tender non-distented; bowel sounds normal; no masses,  no organomegaly  Extremities: no cyanosis or edema, or clubbing    Laboratory:  CBC:   Recent Labs  Lab 04/23/17  0414   WBC 7.84   RBC 3.66*   HGB 10.3*   HCT 31.4*   *   MCV 86   MCH 28.1   MCHC 32.8     CMP:   Recent Labs  Lab 04/24/17  0413  04/28/17  0330   *  < > 120*   CALCIUM 9.1  < > 9.0   ALBUMIN 2.7*  --   --    PROT 8.3  --   --    *  < > 134*   K 4.6  < > 4.3   CO2 25  < > 24   CL 96  < > 96   BUN 42*  < > 56*   CREATININE 8.1*  < > 8.2*   ALKPHOS 278*  --   --    ALT 34  --   --    AST 35  --   --    BILITOT 1.8*  --   --    < > = values in this interval not displayed.    Imp/Plan    ESRD - on dialysis, tolerated well stable  HTN  Chest wall mass  Anemia of CKD    Continue  ANTICOAGULATION MANAGEMENT    Maia CHRIS Miranda due for review and annual renewal of referral to anticoagulation monitoring.      Warfarin indication(s) Atrial Fibrillation   INR goal 2.0-3.0   Current duration of therapy Indefinite/long term therapy   Date of last office visit 08/19/2019     Patient's PCP has left [facility]. Establishing with new provider:  Next 5 appointments (look out 90 days)    Dec 20, 2019  9:30 AM CST  Acute Visit with Li Flores MD, EA EXAM ROOM 37  [facility] [address] [place] [address]  [address]  [facility] [id number] [phone]        Please advise if Maia Miranda's anticoagulation management referral can be renewed for next year.     Please confirm renewal approval in new note within this telephone encounter and route back. No further action is necessary at this time (referral orders,etc).     Dian Astudillo RN  [facility]       present rx  HD qMWF  We'll follow for dialysis    Wesley Gray  4/28/2017

## 2022-08-04 NOTE — ANESTHESIA POSTPROCEDURE EVALUATION
"Anesthesia Post Evaluation    Patient: Livan Arevalo    Procedure(s) Performed: Procedure(s) (LRB):  EXCISION-ANEURYSM L AVF (Left)  INSERTION-CATHETER-HEMODIALYSIS FEMORAL (Right)    Final Anesthesia Type: regional  Patient location during evaluation: med/surg floor  Patient participation: Yes- Able to Participate  Level of consciousness: awake and alert  Pain management: adequate  Airway patency: patent  PONV status at discharge: No PONV  Anesthetic complications: no      Cardiovascular status: blood pressure returned to baseline  Respiratory status: unassisted, spontaneous ventilation and room air  Hydration status: euvolemic  Follow-up not needed.        Visit Vitals  /78   Pulse 86   Temp 36.7 °C (98.1 °F) (Oral)   Resp 18   Ht 5' 5" (1.651 m)   Wt 109 kg (240 lb 4.8 oz)   SpO2 (!) 93%   BMI 39.99 kg/m²       Pain/Mariann Score: Pain Assessment Performed: Yes (1/31/2018  9:33 PM)  Presence of Pain: complains of pain/discomfort (1/31/2018  9:33 PM)  Pain Rating Prior to Med Admin: 10 (2/1/2018  7:58 AM)  Pain Rating Post Med Admin: 1 (1/31/2018 10:33 PM)  Mariann Score: 10 (1/31/2018  5:00 PM)  Modified Mariann Score: 19 (1/31/2018  5:00 PM)      " 5

## 2022-09-01 NOTE — ASSESSMENT & PLAN NOTE
Has cardiovascular disease. Noted on CT with contrast. EF of 30 % is a new finding. . Afterload reduction, UF via HD, ASA, statin and BB  Cards notes ischemic work up at some point.      Render Risk Assessment In Note?: no Other (Free Text): s/p ED&C performed by [facility] Detail Level: Zone Note Text (......Xxx Chief Complaint.): This diagnosis correlates with the Detail Level: Detailed Other (Free Text): Melanoma right inferior lateral mid back excised by wide local excision by ERICKSON 3/2019

## 2024-02-01 NOTE — TELEPHONE ENCOUNTER
----- Message from Guillermina Dwyer RN sent at 6/1/2017  1:59 PM CDT -----  IR is trying to set up the repeat biopsy for 6/9/17.  Radiologist wants another CT scan prior though.  If this is still the plan, can you enter the order then I can schedule for early next week prior to the biopsy.  Also is patient aware of this plan?  Analilia   Denton Borjas MD  PGY 1 Department of Internal Medicine        Patient is a 67y old  Female who presents with a chief complaint of AHRF (31 Jan 2024 07:10)      SUBJECTIVE / OVERNIGHT EVENTS: Pt seen and examined. No acute overnight events. Denies fevers, chills, CP, SOB, Abdominal pain, N/V, Constipation, Diarrhea        MEDICATIONS  (STANDING):  atorvastatin 20 milliGRAM(s) Oral at bedtime  budesonide 160 MICROgram(s)/formoterol 4.5 MICROgram(s) Inhaler 2 Puff(s) Inhalation two times a day  dextrose 5%. 1000 milliLiter(s) (50 mL/Hr) IV Continuous <Continuous>  dextrose 5%. 1000 milliLiter(s) (100 mL/Hr) IV Continuous <Continuous>  dextrose 50% Injectable 25 Gram(s) IV Push once  dextrose 50% Injectable 12.5 Gram(s) IV Push once  dextrose 50% Injectable 25 Gram(s) IV Push once  furosemide   Injectable 40 milliGRAM(s) IV Push daily  glucagon  Injectable 1 milliGRAM(s) IntraMuscular once  heparin   Injectable 5000 Unit(s) SubCutaneous every 8 hours  insulin glargine Injectable (LANTUS) 15 Unit(s) SubCutaneous at bedtime  insulin lispro (ADMELOG) corrective regimen sliding scale   SubCutaneous three times a day before meals  insulin lispro Injectable (ADMELOG) 5 Unit(s) SubCutaneous three times a day before meals  loratadine 10 milliGRAM(s) Oral daily  montelukast 10 milliGRAM(s) Oral daily  multivitamin 1 Tablet(s) Oral daily  polyethylene glycol 3350 17 Gram(s) Oral daily  senna 2 Tablet(s) Oral at bedtime  sodium bicarbonate 650 milliGRAM(s) Oral every 12 hours    MEDICATIONS  (PRN):  dextrose Oral Gel 15 Gram(s) Oral once PRN Blood Glucose LESS THAN 70 milliGRAM(s)/deciliter      I&O's Summary    31 Jan 2024 07:01  -  01 Feb 2024 07:00  --------------------------------------------------------  IN: 0 mL / OUT: 300 mL / NET: -300 mL        Vital Signs Last 24 Hrs  T(C): 37 (01 Feb 2024 05:39), Max: 37.3 (01 Feb 2024 00:27)  T(F): 98.6 (01 Feb 2024 05:39), Max: 99.1 (01 Feb 2024 00:27)  HR: 96 (01 Feb 2024 05:39) (96 - 104)  BP: 138/85 (01 Feb 2024 05:39) (101/90 - 143/68)  BP(mean): --  RR: 18 (01 Feb 2024 05:39) (18 - 24)  SpO2: 100% (01 Feb 2024 05:39) (94% - 100%)    Parameters below as of 01 Feb 2024 05:39  Patient On (Oxygen Delivery Method): nasal cannula  O2 Flow (L/min): 3      CAPILLARY BLOOD GLUCOSE      POCT Blood Glucose.: 326 mg/dL (01 Feb 2024 02:29)  POCT Blood Glucose.: 350 mg/dL (01 Feb 2024 00:19)  POCT Blood Glucose.: 338 mg/dL (31 Jan 2024 22:06)  POCT Blood Glucose.: 320 mg/dL (31 Jan 2024 18:43)  POCT Blood Glucose.: 158 mg/dL (31 Jan 2024 13:52)  POCT Blood Glucose.: 270 mg/dL (31 Jan 2024 09:33)      PHYSICAL EXAM:  GENERAL: NAD, resting comfortably  HEAD:  Atraumatic, Normocephalic  EYES: EOMI, conjunctiva and sclera clear  NECK: No JVD  CHEST/LUNG: Clear to auscultation bilaterally; No wheeze  HEART: Regular rate and rhythm; No murmurs, rubs, or gallops  ABDOMEN: Soft, Nontender, Nondistended; Bowel sounds present  EXTREMITIES:  2+ Peripheral Pulses, No clubbing, cyanosis, 1+ BLE edema  PSYCH: AAOx3  NEUROLOGY: non-focal  SKIN: No rashes or lesions       LABS:                        8.6    15.44 )-----------( 299      ( 31 Jan 2024 07:37 )             25.2     Auto Eosinophil # 0.00  / Auto Eosinophil % 0.0   / Auto Neutrophil # 14.34 / Auto Neutrophil % 93.0  / BANDS % x                            8.9    18.83 )-----------( 337      ( 30 Jan 2024 17:25 )             26.2     Auto Eosinophil # 0.02  / Auto Eosinophil % 0.1   / Auto Neutrophil # 16.07 / Auto Neutrophil % 85.3  / BANDS % x        01-31    137  |  102  |  54<H>  ----------------------------<  272<H>  4.0   |  22  |  5.66<H>  01-30    136  |  97<L>  |  50<H>  ----------------------------<  450<HH>  3.3<L>   |  17<L>  |  5.23<H>  01-30    139  |  97<L>  |  49<H>  ----------------------------<  351<H>  2.8<LL>   |  19<L>  |  5.25<H>    Ca    9.0      31 Jan 2024 07:37  Mg     2.30     01-31  Phos  4.2     01-31  TPro  7.5  /  Alb  3.0<L>  /  TBili  0.2  /  DBili  x   /  AST  20  /  ALT  15  /  AlkPhos  61  01-31  TPro  7.5  /  Alb  3.1<L>  /  TBili  0.4  /  DBili  <0.2  /  AST  21  /  ALT  20  /  AlkPhos  66  01-30  TPro  7.6  /  Alb  3.0<L>  /  TBili  0.3  /  DBili  x   /  AST  21  /  ALT  20  /  AlkPhos  69  01-30          Urinalysis Basic - ( 31 Jan 2024 07:37 )    Color: x / Appearance: x / SG: x / pH: x  Gluc: 272 mg/dL / Ketone: x  / Bili: x / Urobili: x   Blood: x / Protein: x / Nitrite: x   Leuk Esterase: x / RBC: x / WBC x   Sq Epi: x / Non Sq Epi: x / Bacteria: x            RADIOLOGY & ADDITIONAL TESTS:    Imaging Personally Reviewed:    Consultant(s) Notes Reviewed:      Care Discussed with Consultants/Other Providers:   Denton Borjas MD  PGY 1 Department of Internal Medicine        Patient is a 67y old  Female who presents with a chief complaint of AHRF (31 Jan 2024 07:10)      SUBJECTIVE / OVERNIGHT EVENTS: Pt seen and examined. No acute overnight events. Denies fevers, chills, CP, SOB, Abdominal pain, N/V, Constipation, Diarrhea        MEDICATIONS  (STANDING):  atorvastatin 20 milliGRAM(s) Oral at bedtime  budesonide 160 MICROgram(s)/formoterol 4.5 MICROgram(s) Inhaler 2 Puff(s) Inhalation two times a day  dextrose 5%. 1000 milliLiter(s) (50 mL/Hr) IV Continuous <Continuous>  dextrose 5%. 1000 milliLiter(s) (100 mL/Hr) IV Continuous <Continuous>  dextrose 50% Injectable 25 Gram(s) IV Push once  dextrose 50% Injectable 12.5 Gram(s) IV Push once  dextrose 50% Injectable 25 Gram(s) IV Push once  furosemide   Injectable 40 milliGRAM(s) IV Push daily  glucagon  Injectable 1 milliGRAM(s) IntraMuscular once  heparin   Injectable 5000 Unit(s) SubCutaneous every 8 hours  insulin glargine Injectable (LANTUS) 15 Unit(s) SubCutaneous at bedtime  insulin lispro (ADMELOG) corrective regimen sliding scale   SubCutaneous three times a day before meals  insulin lispro Injectable (ADMELOG) 5 Unit(s) SubCutaneous three times a day before meals  loratadine 10 milliGRAM(s) Oral daily  montelukast 10 milliGRAM(s) Oral daily  multivitamin 1 Tablet(s) Oral daily  polyethylene glycol 3350 17 Gram(s) Oral daily  senna 2 Tablet(s) Oral at bedtime  sodium bicarbonate 650 milliGRAM(s) Oral every 12 hours    MEDICATIONS  (PRN):  dextrose Oral Gel 15 Gram(s) Oral once PRN Blood Glucose LESS THAN 70 milliGRAM(s)/deciliter      I&O's Summary    31 Jan 2024 07:01  -  01 Feb 2024 07:00  --------------------------------------------------------  IN: 0 mL / OUT: 300 mL / NET: -300 mL        Vital Signs Last 24 Hrs  T(C): 37 (01 Feb 2024 05:39), Max: 37.3 (01 Feb 2024 00:27)  T(F): 98.6 (01 Feb 2024 05:39), Max: 99.1 (01 Feb 2024 00:27)  HR: 96 (01 Feb 2024 05:39) (96 - 104)  BP: 138/85 (01 Feb 2024 05:39) (101/90 - 143/68)  BP(mean): --  RR: 18 (01 Feb 2024 05:39) (18 - 24)  SpO2: 100% (01 Feb 2024 05:39) (94% - 100%)    Parameters below as of 01 Feb 2024 05:39  Patient On (Oxygen Delivery Method): nasal cannula  O2 Flow (L/min): 3      CAPILLARY BLOOD GLUCOSE      POCT Blood Glucose.: 326 mg/dL (01 Feb 2024 02:29)  POCT Blood Glucose.: 350 mg/dL (01 Feb 2024 00:19)  POCT Blood Glucose.: 338 mg/dL (31 Jan 2024 22:06)  POCT Blood Glucose.: 320 mg/dL (31 Jan 2024 18:43)  POCT Blood Glucose.: 158 mg/dL (31 Jan 2024 13:52)  POCT Blood Glucose.: 270 mg/dL (31 Jan 2024 09:33)      PHYSICAL EXAM:  GENERAL: NAD, resting comfortably  HEAD:  Atraumatic, Normocephalic  EYES: EOMI, conjunctiva and sclera clear  NECK: No JVD  CHEST/LUNG: Clear to auscultation bilaterally; No wheeze  HEART: Regular rate and rhythm; No murmurs, rubs, or gallops  ABDOMEN: Soft, Nontender, Nondistended; Bowel sounds present  : Garcia in place, yellow urine in bag  EXTREMITIES:  2+ Peripheral Pulses, No clubbing, cyanosis, 1+ BLE edema  PSYCH: AAOx3  NEUROLOGY: non-focal  SKIN: No rashes or lesions       LABS:                        8.6    15.44 )-----------( 299      ( 31 Jan 2024 07:37 )             25.2     Auto Eosinophil # 0.00  / Auto Eosinophil % 0.0   / Auto Neutrophil # 14.34 / Auto Neutrophil % 93.0  / BANDS % x                            8.9    18.83 )-----------( 337      ( 30 Jan 2024 17:25 )             26.2     Auto Eosinophil # 0.02  / Auto Eosinophil % 0.1   / Auto Neutrophil # 16.07 / Auto Neutrophil % 85.3  / BANDS % x        01-31    137  |  102  |  54<H>  ----------------------------<  272<H>  4.0   |  22  |  5.66<H>  01-30    136  |  97<L>  |  50<H>  ----------------------------<  450<HH>  3.3<L>   |  17<L>  |  5.23<H>  01-30    139  |  97<L>  |  49<H>  ----------------------------<  351<H>  2.8<LL>   |  19<L>  |  5.25<H>    Ca    9.0      31 Jan 2024 07:37  Mg     2.30     01-31  Phos  4.2     01-31  TPro  7.5  /  Alb  3.0<L>  /  TBili  0.2  /  DBili  x   /  AST  20  /  ALT  15  /  AlkPhos  61  01-31  TPro  7.5  /  Alb  3.1<L>  /  TBili  0.4  /  DBili  <0.2  /  AST  21  /  ALT  20  /  AlkPhos  66  01-30  TPro  7.6  /  Alb  3.0<L>  /  TBili  0.3  /  DBili  x   /  AST  21  /  ALT  20  /  AlkPhos  69  01-30          Urinalysis Basic - ( 31 Jan 2024 07:37 )    Color: x / Appearance: x / SG: x / pH: x  Gluc: 272 mg/dL / Ketone: x  / Bili: x / Urobili: x   Blood: x / Protein: x / Nitrite: x   Leuk Esterase: x / RBC: x / WBC x   Sq Epi: x / Non Sq Epi: x / Bacteria: x            RADIOLOGY & ADDITIONAL TESTS:    Imaging Personally Reviewed:    Consultant(s) Notes Reviewed:      Care Discussed with Consultants/Other Providers:   Denton Borjas MD  PGY 1 Department of Internal Medicine        Patient is a 67y old  Female who presents with a chief complaint of AHRF (31 Jan 2024 07:10)      SUBJECTIVE / OVERNIGHT EVENTS: Pt seen and examined. Had increased dyspnea this morning improved with nebs, otherwise no acute events. Still feels mildly dyspneic this AM. Reports no BM in 2-3 days. Denies fevers, chills, CP, Abdominal pain, N/V, Diarrhea        MEDICATIONS  (STANDING):  atorvastatin 20 milliGRAM(s) Oral at bedtime  budesonide 160 MICROgram(s)/formoterol 4.5 MICROgram(s) Inhaler 2 Puff(s) Inhalation two times a day  dextrose 5%. 1000 milliLiter(s) (50 mL/Hr) IV Continuous <Continuous>  dextrose 5%. 1000 milliLiter(s) (100 mL/Hr) IV Continuous <Continuous>  dextrose 50% Injectable 25 Gram(s) IV Push once  dextrose 50% Injectable 12.5 Gram(s) IV Push once  dextrose 50% Injectable 25 Gram(s) IV Push once  furosemide   Injectable 40 milliGRAM(s) IV Push daily  glucagon  Injectable 1 milliGRAM(s) IntraMuscular once  heparin   Injectable 5000 Unit(s) SubCutaneous every 8 hours  insulin glargine Injectable (LANTUS) 15 Unit(s) SubCutaneous at bedtime  insulin lispro (ADMELOG) corrective regimen sliding scale   SubCutaneous three times a day before meals  insulin lispro Injectable (ADMELOG) 5 Unit(s) SubCutaneous three times a day before meals  loratadine 10 milliGRAM(s) Oral daily  montelukast 10 milliGRAM(s) Oral daily  multivitamin 1 Tablet(s) Oral daily  polyethylene glycol 3350 17 Gram(s) Oral daily  senna 2 Tablet(s) Oral at bedtime  sodium bicarbonate 650 milliGRAM(s) Oral every 12 hours    MEDICATIONS  (PRN):  dextrose Oral Gel 15 Gram(s) Oral once PRN Blood Glucose LESS THAN 70 milliGRAM(s)/deciliter      I&O's Summary    31 Jan 2024 07:01  -  01 Feb 2024 07:00  --------------------------------------------------------  IN: 0 mL / OUT: 300 mL / NET: -300 mL        Vital Signs Last 24 Hrs  T(C): 37 (01 Feb 2024 05:39), Max: 37.3 (01 Feb 2024 00:27)  T(F): 98.6 (01 Feb 2024 05:39), Max: 99.1 (01 Feb 2024 00:27)  HR: 96 (01 Feb 2024 05:39) (96 - 104)  BP: 138/85 (01 Feb 2024 05:39) (101/90 - 143/68)  BP(mean): --  RR: 18 (01 Feb 2024 05:39) (18 - 24)  SpO2: 100% (01 Feb 2024 05:39) (94% - 100%)    Parameters below as of 01 Feb 2024 05:39  Patient On (Oxygen Delivery Method): nasal cannula  O2 Flow (L/min): 3      CAPILLARY BLOOD GLUCOSE      POCT Blood Glucose.: 326 mg/dL (01 Feb 2024 02:29)  POCT Blood Glucose.: 350 mg/dL (01 Feb 2024 00:19)  POCT Blood Glucose.: 338 mg/dL (31 Jan 2024 22:06)  POCT Blood Glucose.: 320 mg/dL (31 Jan 2024 18:43)  POCT Blood Glucose.: 158 mg/dL (31 Jan 2024 13:52)  POCT Blood Glucose.: 270 mg/dL (31 Jan 2024 09:33)      PHYSICAL EXAM:  GENERAL: NAD, resting comfortably  HEAD:  Atraumatic, Normocephalic  EYES: EOMI, conjunctiva and sclera clear  NECK: No JVD  CHEST/LUNG: Bibasilar crackles, no wheezes or rhonchi  HEART: Regular rate and rhythm; No murmurs, rubs, or gallops  ABDOMEN: Soft, Nontender, Nondistended; Bowel sounds present  : Garcia in place, yellow urine in bag  EXTREMITIES:  2+ Peripheral Pulses, No clubbing, cyanosis, 2+ BLE edema  PSYCH: AAOx3  NEUROLOGY: non-focal  SKIN: No rashes or lesions       LABS:                        8.6    15.44 )-----------( 299      ( 31 Jan 2024 07:37 )             25.2     Auto Eosinophil # 0.00  / Auto Eosinophil % 0.0   / Auto Neutrophil # 14.34 / Auto Neutrophil % 93.0  / BANDS % x                            8.9    18.83 )-----------( 337      ( 30 Jan 2024 17:25 )             26.2     Auto Eosinophil # 0.02  / Auto Eosinophil % 0.1   / Auto Neutrophil # 16.07 / Auto Neutrophil % 85.3  / BANDS % x        01-31    137  |  102  |  54<H>  ----------------------------<  272<H>  4.0   |  22  |  5.66<H>  01-30    136  |  97<L>  |  50<H>  ----------------------------<  450<HH>  3.3<L>   |  17<L>  |  5.23<H>  01-30    139  |  97<L>  |  49<H>  ----------------------------<  351<H>  2.8<LL>   |  19<L>  |  5.25<H>    Ca    9.0      31 Jan 2024 07:37  Mg     2.30     01-31  Phos  4.2     01-31  TPro  7.5  /  Alb  3.0<L>  /  TBili  0.2  /  DBili  x   /  AST  20  /  ALT  15  /  AlkPhos  61  01-31  TPro  7.5  /  Alb  3.1<L>  /  TBili  0.4  /  DBili  <0.2  /  AST  21  /  ALT  20  /  AlkPhos  66  01-30  TPro  7.6  /  Alb  3.0<L>  /  TBili  0.3  /  DBili  x   /  AST  21  /  ALT  20  /  AlkPhos  69  01-30          Urinalysis Basic - ( 31 Jan 2024 07:37 )    Color: x / Appearance: x / SG: x / pH: x  Gluc: 272 mg/dL / Ketone: x  / Bili: x / Urobili: x   Blood: x / Protein: x / Nitrite: x   Leuk Esterase: x / RBC: x / WBC x   Sq Epi: x / Non Sq Epi: x / Bacteria: x            RADIOLOGY & ADDITIONAL TESTS:    Imaging Personally Reviewed:    Consultant(s) Notes Reviewed:      Care Discussed with Consultants/Other Providers:

## 2024-10-26 NOTE — ED PROVIDER NOTES
Encounter Date: 2/28/2018       History     Chief Complaint   Patient presents with    Post-op Problem     breast bx yest on R breast, states bleeding     43-year-old male 1 day postop biopsy of chest wall mass who presents to the ED with complaints of persistent bleeding from the wound.  Patient denies any fevers, purulent drainage.  His pain has been well-controlled with Percocet.  Patient is scheduled to see surgery tomorrow.           Review of patient's allergies indicates:   Allergen Reactions    Ciprofloxacin Hives    Iodine and iodide containing products Hives and Itching     Pt states he is not allergic to iodine     Past Medical History:   Diagnosis Date    CHF (congestive heart failure)     ESRD (end stage renal disease)     Hemodialysis access, fistula mature     THRILL AND BRUIT PRESENT    Hemodialysis patient     M-W-F    Hypertension     Myocardial infarction     Renal disorder     Stroke     3 MILD STROKES     Past Surgical History:   Procedure Laterality Date    CHEST WALL BIOPSY      multiple    DIALYSIS FISTULA CREATION       Family History   Problem Relation Age of Onset    Kidney disease Mother     Anesthesia problems Neg Hx      Social History   Substance Use Topics    Smoking status: Never Smoker    Smokeless tobacco: Never Used    Alcohol use No     Review of Systems   Constitutional: Negative for fever.   Skin: Positive for wound.       Physical Exam     Initial Vitals [02/28/18 1307]   BP Pulse Resp Temp SpO2   (!) 109/58 (!) 114 18 98.4 °F (36.9 °C) 97 %      MAP       75         Physical Exam    Constitutional: He appears well-developed and well-nourished.   Pulmonary/Chest:   Open wound to R upper chest with packing in place. Bleeding is controlled.           ED Course   Procedures  Labs Reviewed - No data to display                APC / Resident Notes:   43-year-old male presents for evaluation of persistent bleeding from wound from biopsy done yesterday.  On my exam,  Oseas Armstrongvicente  49M w LBP and L sided radiculopathy in L5/S1 distriubtion x1.5 yrs p/w worsening radic x 1mo. No bladder/ bowel symptoms. No saddle anesthesia. O/p pain management x 1.5 yrs, currently on Meloxicam, Robixin, Pregabalin 70, s/p RUFUS 1 mo ago. CT L spine no acute fx, but disc bulging L3/4, L4/5, L5/S1, causing b/l foraminal stenosis (L>R). Exam: AOx3, BUE 5/5, RLE 5/5, LLE HF/KF 4/5 but pain limited, DF/PF 5/5.   there is no active bleeding.  There is an 8 cm incision in the right upper chest with packing in place.  The wound was redressed with a layer of hemostatic dressing and reinforced with Mepilex and Tegaderm.  Patient scheduled to see surgery tomorrow for reevaluation of wound.  Patient was noted to be mildly tachycardic in the ED, however after chart review, patient is frequently tachycardic.  I feel that he is stable for discharge. I have reviewed the patient's records and discussed this case with my supervising physician.                   Clinical Impression:   The encounter diagnosis was Bleeding from wound.    Disposition:   Disposition: Discharged  Condition: Stable                        Araceli Judd PA-C  02/28/18 1934

## (undated) DEVICE — SPONGE DERMACEA 4X4IN 12PLY

## (undated) DEVICE — SUT SILK 2-0 SH 18IN BLACK

## (undated) DEVICE — COVER LIGHT HANDLE 80/CA

## (undated) DEVICE — STOCKINET 4INX48

## (undated) DEVICE — SEE MEDLINE ITEM 157173

## (undated) DEVICE — ELECTRODE REM PLYHSV RETURN 9

## (undated) DEVICE — SUT PROLENE 1 CTX 30IN BLUE

## (undated) DEVICE — BOOT SUTURE AID

## (undated) DEVICE — SUT SILK 2-0 STRANDS 30IN

## (undated) DEVICE — GOWN SMART IMP BREATHABLE XXLG

## (undated) DEVICE — GOWN SURGICAL X-LARGE

## (undated) DEVICE — SPONGE DERMACEA GAUZE 4X4

## (undated) DEVICE — CONTAINER SPECIMEN STRL 4OZ

## (undated) DEVICE — TRAY MINOR GEN SURG

## (undated) DEVICE — DRAPE ABDOMINAL TIBURON 14X11

## (undated) DEVICE — SET DECANTER MEDICHOICE

## (undated) DEVICE — CLIP MED TICALL

## (undated) DEVICE — SUT 3-0 12-18IN SILK

## (undated) DEVICE — TAPE UMBILICAL 1/8X36IN WHITE

## (undated) DEVICE — SUT 2 30IN SILK BLK BRAIDE

## (undated) DEVICE — APPLICATOR CHLORAPREP ORN 26ML

## (undated) DEVICE — DRESSING TRANS 4X4 TEGADERM

## (undated) DEVICE — SUT LIGACLIP SMALL XTRA

## (undated) DEVICE — SEE MEDLINE ITEM 157117

## (undated) DEVICE — LOOP VESSEL BLUE MAXI

## (undated) DEVICE — SUT 4-0 12-18IN SILK BLACK

## (undated) DEVICE — TAPE MEDIPORE 4IN X 2YDS

## (undated) DEVICE — SEE MEDLINE ITEM 152622

## (undated) DEVICE — SUT 2/0 30IN SILK BLK BRAI

## (undated) DEVICE — SUT 2-0 12-18IN SILK

## (undated) DEVICE — SUT 7/0 24IN PROLENE BL MO

## (undated) DEVICE — SUT 2 27IN COATED VICRYL V

## (undated) DEVICE — SEE MEDLINE ITEM 153688

## (undated) DEVICE — BLADE SURG CARBON STEEL SZ11

## (undated) DEVICE — GAUZE SPONGE 4X4 12PLY

## (undated) DEVICE — GUIDEWIRE STF .035X180CM ANG

## (undated) DEVICE — SUT MCRYL PLUS 4-0 PS2 27IN

## (undated) DEVICE — DRESSING TELFA STRL 4X3 LF

## (undated) DEVICE — KIT INTRO MICRO NIT VSI 4FR

## (undated) DEVICE — DRAPE PLASTIC U 60X72

## (undated) DEVICE — GUIDEWIRE AMPLATZ .035X260 SS

## (undated) DEVICE — DRESSING TRANS 2X2 TEGADERM

## (undated) DEVICE — PACK DRAPE UNIVERSAL CONVERTOR

## (undated) DEVICE — BLADE ELECTRO EDGE INSULATED

## (undated) DEVICE — SUT PROLENE 6-0 24 BV-1

## (undated) DEVICE — SUT MONOCRYL 3-0 SH U/D

## (undated) DEVICE — SYR ONLY LUER LOCK 20CC